# Patient Record
Sex: FEMALE | Race: WHITE | NOT HISPANIC OR LATINO | Employment: OTHER | URBAN - METROPOLITAN AREA
[De-identification: names, ages, dates, MRNs, and addresses within clinical notes are randomized per-mention and may not be internally consistent; named-entity substitution may affect disease eponyms.]

---

## 2019-03-26 ENCOUNTER — APPOINTMENT (OUTPATIENT)
Dept: GENERAL RADIOLOGY | Facility: HOSPITAL | Age: 64
End: 2019-03-26

## 2019-03-26 ENCOUNTER — HOSPITAL ENCOUNTER (INPATIENT)
Facility: HOSPITAL | Age: 64
LOS: 6 days | Discharge: NURSING, REHAB OR LONG-TERM CARE | End: 2019-04-01
Attending: EMERGENCY MEDICINE | Admitting: ORTHOPAEDIC SURGERY

## 2019-03-26 DIAGNOSIS — S72.141A CLOSED INTERTROCHANTERIC FRACTURE OF HIP, RIGHT, INITIAL ENCOUNTER (HCC): Primary | ICD-10-CM

## 2019-03-26 DIAGNOSIS — Z74.09 IMPAIRED MOBILITY AND ADLS: ICD-10-CM

## 2019-03-26 DIAGNOSIS — Z74.09 IMPAIRED FUNCTIONAL MOBILITY, BALANCE, GAIT, AND ENDURANCE: ICD-10-CM

## 2019-03-26 DIAGNOSIS — Z78.9 IMPAIRED MOBILITY AND ADLS: ICD-10-CM

## 2019-03-26 PROBLEM — E78.5 HLD (HYPERLIPIDEMIA): Status: ACTIVE | Noted: 2019-03-26

## 2019-03-26 PROBLEM — I48.91 ATRIAL FIBRILLATION (HCC): Status: ACTIVE | Noted: 2019-03-26

## 2019-03-26 PROBLEM — S32.599A PUBIC RAMUS FRACTURE: Status: ACTIVE | Noted: 2019-03-26

## 2019-03-26 PROBLEM — M06.9 RHEUMATOID ARTHRITIS: Status: ACTIVE | Noted: 2019-03-26

## 2019-03-26 PROBLEM — E11.9 DM II (DIABETES MELLITUS, TYPE II), CONTROLLED: Status: ACTIVE | Noted: 2019-03-26

## 2019-03-26 PROBLEM — I10 HTN (HYPERTENSION): Status: ACTIVE | Noted: 2019-03-26

## 2019-03-26 LAB
ABO GROUP BLD: NORMAL
ABO GROUP BLD: NORMAL
ALBUMIN SERPL-MCNC: 3.81 G/DL (ref 3.2–4.8)
ALBUMIN/GLOB SERPL: 1.7 G/DL (ref 1.5–2.5)
ALP SERPL-CCNC: 65 U/L (ref 25–100)
ALT SERPL W P-5'-P-CCNC: 10 U/L (ref 7–40)
ANION GAP SERPL CALCULATED.3IONS-SCNC: 11 MMOL/L (ref 3–11)
AST SERPL-CCNC: 19 U/L (ref 0–33)
BASOPHILS # BLD AUTO: 0.02 10*3/MM3 (ref 0–0.2)
BASOPHILS NFR BLD AUTO: 0.2 % (ref 0–1)
BILIRUB SERPL-MCNC: 0.4 MG/DL (ref 0.3–1.2)
BLD GP AB SCN SERPL QL: NEGATIVE
BUN BLD-MCNC: 8 MG/DL (ref 9–23)
BUN/CREAT SERPL: 11 (ref 7–25)
CALCIUM SPEC-SCNC: 8.2 MG/DL (ref 8.7–10.4)
CHLORIDE SERPL-SCNC: 100 MMOL/L (ref 99–109)
CO2 SERPL-SCNC: 29 MMOL/L (ref 20–31)
CREAT BLD-MCNC: 0.73 MG/DL (ref 0.6–1.3)
DEPRECATED RDW RBC AUTO: 59.2 FL (ref 37–54)
EOSINOPHIL # BLD AUTO: 0.02 10*3/MM3 (ref 0–0.3)
EOSINOPHIL NFR BLD AUTO: 0.2 % (ref 0–3)
ERYTHROCYTE [DISTWIDTH] IN BLOOD BY AUTOMATED COUNT: 18.7 % (ref 11.3–14.5)
GFR SERPL CREATININE-BSD FRML MDRD: 80 ML/MIN/1.73
GLOBULIN UR ELPH-MCNC: 2.2 GM/DL
GLUCOSE BLD-MCNC: 178 MG/DL (ref 70–100)
HCT VFR BLD AUTO: 38 % (ref 34.5–44)
HGB BLD-MCNC: 11.3 G/DL (ref 11.5–15.5)
IMM GRANULOCYTES # BLD AUTO: 0.04 10*3/MM3 (ref 0–0.05)
IMM GRANULOCYTES NFR BLD AUTO: 0.4 % (ref 0–0.6)
LYMPHOCYTES # BLD AUTO: 0.88 10*3/MM3 (ref 0.6–4.8)
LYMPHOCYTES NFR BLD AUTO: 8.3 % (ref 24–44)
MCH RBC QN AUTO: 26 PG (ref 27–31)
MCHC RBC AUTO-ENTMCNC: 29.7 G/DL (ref 32–36)
MCV RBC AUTO: 87.6 FL (ref 80–99)
MONOCYTES # BLD AUTO: 0.45 10*3/MM3 (ref 0–1)
MONOCYTES NFR BLD AUTO: 4.2 % (ref 0–12)
NEUTROPHILS # BLD AUTO: 9.25 10*3/MM3 (ref 1.5–8.3)
NEUTROPHILS NFR BLD AUTO: 87.1 % (ref 41–71)
PLATELET # BLD AUTO: 273 10*3/MM3 (ref 150–450)
PMV BLD AUTO: 9.8 FL (ref 6–12)
POTASSIUM BLD-SCNC: 4.2 MMOL/L (ref 3.5–5.5)
PROT SERPL-MCNC: 6 G/DL (ref 5.7–8.2)
RBC # BLD AUTO: 4.34 10*6/MM3 (ref 3.89–5.14)
RH BLD: POSITIVE
RH BLD: POSITIVE
SODIUM BLD-SCNC: 140 MMOL/L (ref 132–146)
T&S EXPIRATION DATE: NORMAL
TROPONIN I SERPL-MCNC: 0 NG/ML (ref 0–0.07)
WBC NRBC COR # BLD: 10.62 10*3/MM3 (ref 3.5–10.8)

## 2019-03-26 PROCEDURE — 86900 BLOOD TYPING SEROLOGIC ABO: CPT | Performed by: EMERGENCY MEDICINE

## 2019-03-26 PROCEDURE — 63710000001 INSULIN LISPRO (HUMAN) PER 5 UNITS: Performed by: INTERNAL MEDICINE

## 2019-03-26 PROCEDURE — 80053 COMPREHEN METABOLIC PANEL: CPT | Performed by: EMERGENCY MEDICINE

## 2019-03-26 PROCEDURE — 99222 1ST HOSP IP/OBS MODERATE 55: CPT | Performed by: ORTHOPAEDIC SURGERY

## 2019-03-26 PROCEDURE — 86850 RBC ANTIBODY SCREEN: CPT | Performed by: EMERGENCY MEDICINE

## 2019-03-26 PROCEDURE — 25010000002 HYDROMORPHONE 1 MG/ML SOLUTION: Performed by: EMERGENCY MEDICINE

## 2019-03-26 PROCEDURE — 25010000002 MORPHINE PER 10 MG: Performed by: EMERGENCY MEDICINE

## 2019-03-26 PROCEDURE — 25010000002 HYDROMORPHONE PER 4 MG: Performed by: EMERGENCY MEDICINE

## 2019-03-26 PROCEDURE — 99284 EMERGENCY DEPT VISIT MOD MDM: CPT

## 2019-03-26 PROCEDURE — 71045 X-RAY EXAM CHEST 1 VIEW: CPT

## 2019-03-26 PROCEDURE — 84484 ASSAY OF TROPONIN QUANT: CPT

## 2019-03-26 PROCEDURE — 73502 X-RAY EXAM HIP UNI 2-3 VIEWS: CPT

## 2019-03-26 PROCEDURE — 99223 1ST HOSP IP/OBS HIGH 75: CPT | Performed by: INTERNAL MEDICINE

## 2019-03-26 PROCEDURE — 86901 BLOOD TYPING SEROLOGIC RH(D): CPT | Performed by: EMERGENCY MEDICINE

## 2019-03-26 PROCEDURE — 93005 ELECTROCARDIOGRAM TRACING: CPT | Performed by: EMERGENCY MEDICINE

## 2019-03-26 PROCEDURE — 85025 COMPLETE CBC W/AUTO DIFF WBC: CPT | Performed by: EMERGENCY MEDICINE

## 2019-03-26 PROCEDURE — 86900 BLOOD TYPING SEROLOGIC ABO: CPT

## 2019-03-26 PROCEDURE — 25010000002 ONDANSETRON PER 1 MG

## 2019-03-26 PROCEDURE — 25010000002 ONDANSETRON PER 1 MG: Performed by: EMERGENCY MEDICINE

## 2019-03-26 PROCEDURE — 63710000001 PROMETHAZINE PER 12.5 MG: Performed by: NURSE PRACTITIONER

## 2019-03-26 PROCEDURE — 86901 BLOOD TYPING SEROLOGIC RH(D): CPT

## 2019-03-26 PROCEDURE — 82962 GLUCOSE BLOOD TEST: CPT

## 2019-03-26 RX ORDER — SODIUM CHLORIDE 0.9 % (FLUSH) 0.9 %
10 SYRINGE (ML) INJECTION AS NEEDED
Status: DISCONTINUED | OUTPATIENT
Start: 2019-03-26 | End: 2019-03-26

## 2019-03-26 RX ORDER — FLUOXETINE HYDROCHLORIDE 40 MG/1
40 CAPSULE ORAL 2 TIMES DAILY
Status: ON HOLD | COMMUNITY
End: 2019-03-26

## 2019-03-26 RX ORDER — ALLOPURINOL 100 MG/1
100 TABLET ORAL DAILY
Status: DISCONTINUED | OUTPATIENT
Start: 2019-03-27 | End: 2019-04-01 | Stop reason: HOSPADM

## 2019-03-26 RX ORDER — ONDANSETRON 2 MG/ML
4 INJECTION INTRAMUSCULAR; INTRAVENOUS EVERY 6 HOURS PRN
Status: DISCONTINUED | OUTPATIENT
Start: 2019-03-26 | End: 2019-04-01 | Stop reason: HOSPADM

## 2019-03-26 RX ORDER — FLUOXETINE HYDROCHLORIDE 20 MG/1
40 CAPSULE ORAL 2 TIMES DAILY
Status: DISCONTINUED | OUTPATIENT
Start: 2019-03-27 | End: 2019-03-27

## 2019-03-26 RX ORDER — LANOLIN ALCOHOL/MO/W.PET/CERES
400 CREAM (GRAM) TOPICAL DAILY
Status: DISCONTINUED | OUTPATIENT
Start: 2019-03-27 | End: 2019-04-01 | Stop reason: HOSPADM

## 2019-03-26 RX ORDER — GABAPENTIN 800 MG/1
800 TABLET ORAL 3 TIMES DAILY
COMMUNITY
End: 2023-01-03 | Stop reason: HOSPADM

## 2019-03-26 RX ORDER — GLIPIZIDE 10 MG/1
10 TABLET ORAL
COMMUNITY
End: 2019-04-01 | Stop reason: HOSPADM

## 2019-03-26 RX ORDER — HYDROMORPHONE HYDROCHLORIDE 1 MG/ML
0.5 INJECTION, SOLUTION INTRAMUSCULAR; INTRAVENOUS; SUBCUTANEOUS ONCE
Status: COMPLETED | OUTPATIENT
Start: 2019-03-26 | End: 2019-03-26

## 2019-03-26 RX ORDER — ONDANSETRON 4 MG/1
4 TABLET, FILM COATED ORAL EVERY 8 HOURS PRN
Status: ON HOLD | COMMUNITY
End: 2023-01-03 | Stop reason: SDUPTHER

## 2019-03-26 RX ORDER — DEXTROSE MONOHYDRATE 25 G/50ML
25 INJECTION, SOLUTION INTRAVENOUS
Status: DISCONTINUED | OUTPATIENT
Start: 2019-03-26 | End: 2019-04-01 | Stop reason: HOSPADM

## 2019-03-26 RX ORDER — ASPIRIN 81 MG/1
81 TABLET ORAL DAILY
COMMUNITY
End: 2019-04-01 | Stop reason: HOSPADM

## 2019-03-26 RX ORDER — HYDROCODONE BITARTRATE AND ACETAMINOPHEN 7.5; 325 MG/1; MG/1
1 TABLET ORAL EVERY 6 HOURS PRN
Status: DISCONTINUED | OUTPATIENT
Start: 2019-03-26 | End: 2019-03-27

## 2019-03-26 RX ORDER — ROSUVASTATIN CALCIUM 20 MG/1
20 TABLET, COATED ORAL NIGHTLY
COMMUNITY

## 2019-03-26 RX ORDER — SENNA AND DOCUSATE SODIUM 50; 8.6 MG/1; MG/1
2 TABLET, FILM COATED ORAL NIGHTLY
Status: DISCONTINUED | OUTPATIENT
Start: 2019-03-27 | End: 2019-04-01 | Stop reason: HOSPADM

## 2019-03-26 RX ORDER — LEVOCETIRIZINE DIHYDROCHLORIDE 5 MG/1
5 TABLET, FILM COATED ORAL EVERY EVENING
Status: ON HOLD | COMMUNITY
End: 2022-12-17

## 2019-03-26 RX ORDER — HYDROMORPHONE HYDROCHLORIDE 1 MG/ML
0.5 INJECTION, SOLUTION INTRAMUSCULAR; INTRAVENOUS; SUBCUTANEOUS
Status: DISCONTINUED | OUTPATIENT
Start: 2019-03-26 | End: 2019-03-26

## 2019-03-26 RX ORDER — ZOLPIDEM TARTRATE 5 MG/1
10 TABLET ORAL NIGHTLY PRN
Status: DISCONTINUED | OUTPATIENT
Start: 2019-03-26 | End: 2019-04-01 | Stop reason: HOSPADM

## 2019-03-26 RX ORDER — ONDANSETRON 2 MG/ML
INJECTION INTRAMUSCULAR; INTRAVENOUS
Status: COMPLETED
Start: 2019-03-26 | End: 2019-03-26

## 2019-03-26 RX ORDER — FOLIC ACID 5 MG
CAPSULE ORAL
COMMUNITY
End: 2019-04-01 | Stop reason: HOSPADM

## 2019-03-26 RX ORDER — INSULIN GLARGINE 100 [IU]/ML
60 INJECTION, SOLUTION SUBCUTANEOUS DAILY
COMMUNITY
End: 2019-04-01 | Stop reason: HOSPADM

## 2019-03-26 RX ORDER — ONDANSETRON 2 MG/ML
4 INJECTION INTRAMUSCULAR; INTRAVENOUS ONCE
Status: COMPLETED | OUTPATIENT
Start: 2019-03-26 | End: 2019-03-26

## 2019-03-26 RX ORDER — SODIUM CHLORIDE 0.9 % (FLUSH) 0.9 %
3 SYRINGE (ML) INJECTION EVERY 12 HOURS SCHEDULED
Status: DISCONTINUED | OUTPATIENT
Start: 2019-03-27 | End: 2019-04-01 | Stop reason: HOSPADM

## 2019-03-26 RX ORDER — ONDANSETRON 4 MG/1
4 TABLET, FILM COATED ORAL EVERY 6 HOURS PRN
Status: DISCONTINUED | OUTPATIENT
Start: 2019-03-26 | End: 2019-04-01 | Stop reason: HOSPADM

## 2019-03-26 RX ORDER — MORPHINE SULFATE 4 MG/ML
4 INJECTION, SOLUTION INTRAMUSCULAR; INTRAVENOUS ONCE
Status: COMPLETED | OUTPATIENT
Start: 2019-03-26 | End: 2019-03-26

## 2019-03-26 RX ORDER — NALOXONE HCL 0.4 MG/ML
0.4 VIAL (ML) INJECTION
Status: DISCONTINUED | OUTPATIENT
Start: 2019-03-26 | End: 2019-04-01 | Stop reason: HOSPADM

## 2019-03-26 RX ORDER — ZOLPIDEM TARTRATE 10 MG/1
5 TABLET ORAL NIGHTLY PRN
Status: ON HOLD | COMMUNITY
End: 2023-01-25 | Stop reason: SDUPTHER

## 2019-03-26 RX ORDER — ALLOPURINOL 300 MG/1
1 TABLET ORAL DAILY
COMMUNITY

## 2019-03-26 RX ORDER — DEXTROSE MONOHYDRATE 25 G/50ML
25 INJECTION, SOLUTION INTRAVENOUS
Status: DISCONTINUED | OUTPATIENT
Start: 2019-03-26 | End: 2019-03-26 | Stop reason: ALTCHOICE

## 2019-03-26 RX ORDER — PROMETHAZINE HYDROCHLORIDE 12.5 MG/1
12.5 TABLET ORAL ONCE
Status: COMPLETED | OUTPATIENT
Start: 2019-03-26 | End: 2019-03-26

## 2019-03-26 RX ORDER — LEVOTHYROXINE SODIUM 0.12 MG/1
125 TABLET ORAL EVERY MORNING
Status: ON HOLD | COMMUNITY
End: 2023-01-03 | Stop reason: SDUPTHER

## 2019-03-26 RX ORDER — BISOPROLOL FUMARATE 5 MG/1
2.5 TABLET ORAL DAILY
COMMUNITY
End: 2019-04-01 | Stop reason: HOSPADM

## 2019-03-26 RX ORDER — PROMETHAZINE HYDROCHLORIDE 25 MG/1
25 TABLET ORAL EVERY 6 HOURS PRN
Status: ON HOLD | COMMUNITY
End: 2022-12-17

## 2019-03-26 RX ORDER — ACETAMINOPHEN 325 MG/1
650 TABLET ORAL EVERY 4 HOURS PRN
Status: DISCONTINUED | OUTPATIENT
Start: 2019-03-26 | End: 2019-04-01 | Stop reason: HOSPADM

## 2019-03-26 RX ORDER — MAGNESIUM SULFATE HEPTAHYDRATE 40 MG/ML
2 INJECTION, SOLUTION INTRAVENOUS AS NEEDED
Status: DISCONTINUED | OUTPATIENT
Start: 2019-03-26 | End: 2019-04-01 | Stop reason: HOSPADM

## 2019-03-26 RX ORDER — METHOTREXATE 25 MG/ML
2 INJECTION INTRA-ARTERIAL; INTRAMUSCULAR; INTRATHECAL; INTRAVENOUS WEEKLY
Status: ON HOLD | COMMUNITY
End: 2023-01-25 | Stop reason: SDUPTHER

## 2019-03-26 RX ORDER — PREDNISONE 1 MG/1
5 TABLET ORAL DAILY
COMMUNITY

## 2019-03-26 RX ORDER — NICOTINE POLACRILEX 4 MG
15 LOZENGE BUCCAL
Status: DISCONTINUED | OUTPATIENT
Start: 2019-03-26 | End: 2019-03-26 | Stop reason: ALTCHOICE

## 2019-03-26 RX ORDER — POTASSIUM CHLORIDE 750 MG/1
40 CAPSULE, EXTENDED RELEASE ORAL AS NEEDED
Status: DISCONTINUED | OUTPATIENT
Start: 2019-03-26 | End: 2019-04-01 | Stop reason: HOSPADM

## 2019-03-26 RX ORDER — ASPIRIN 81 MG/1
81 TABLET ORAL DAILY
Status: CANCELLED | OUTPATIENT
Start: 2019-03-27

## 2019-03-26 RX ORDER — SODIUM CHLORIDE 9 MG/ML
75 INJECTION, SOLUTION INTRAVENOUS CONTINUOUS
Status: DISCONTINUED | OUTPATIENT
Start: 2019-03-27 | End: 2019-04-01 | Stop reason: HOSPADM

## 2019-03-26 RX ORDER — FLUOXETINE HYDROCHLORIDE 20 MG/1
20 CAPSULE ORAL 2 TIMES DAILY
COMMUNITY

## 2019-03-26 RX ORDER — NICOTINE POLACRILEX 4 MG
15 LOZENGE BUCCAL
Status: DISCONTINUED | OUTPATIENT
Start: 2019-03-26 | End: 2019-04-01 | Stop reason: HOSPADM

## 2019-03-26 RX ORDER — HYDROMORPHONE HYDROCHLORIDE 1 MG/ML
0.5 INJECTION, SOLUTION INTRAMUSCULAR; INTRAVENOUS; SUBCUTANEOUS
Status: DISCONTINUED | OUTPATIENT
Start: 2019-03-26 | End: 2019-04-01 | Stop reason: HOSPADM

## 2019-03-26 RX ORDER — SODIUM CHLORIDE 0.9 % (FLUSH) 0.9 %
10 SYRINGE (ML) INJECTION AS NEEDED
Status: DISCONTINUED | OUTPATIENT
Start: 2019-03-26 | End: 2019-04-01 | Stop reason: HOSPADM

## 2019-03-26 RX ORDER — PROMETHAZINE HYDROCHLORIDE 25 MG/1
25 TABLET ORAL EVERY 6 HOURS PRN
Status: DISCONTINUED | OUTPATIENT
Start: 2019-03-26 | End: 2019-04-01 | Stop reason: HOSPADM

## 2019-03-26 RX ORDER — HYDROXYZINE HYDROCHLORIDE 25 MG/1
25 TABLET, FILM COATED ORAL 2 TIMES DAILY
Status: ON HOLD | COMMUNITY
End: 2022-12-17

## 2019-03-26 RX ORDER — KETOROLAC TROMETHAMINE 30 MG/ML
30 INJECTION, SOLUTION INTRAMUSCULAR; INTRAVENOUS EVERY 6 HOURS PRN
Status: DISPENSED | OUTPATIENT
Start: 2019-03-26 | End: 2019-03-29

## 2019-03-26 RX ORDER — MAGNESIUM SULFATE HEPTAHYDRATE 40 MG/ML
4 INJECTION, SOLUTION INTRAVENOUS AS NEEDED
Status: DISCONTINUED | OUTPATIENT
Start: 2019-03-26 | End: 2019-04-01 | Stop reason: HOSPADM

## 2019-03-26 RX ORDER — POTASSIUM CHLORIDE 1.5 G/1.77G
40 POWDER, FOR SOLUTION ORAL AS NEEDED
Status: DISCONTINUED | OUTPATIENT
Start: 2019-03-26 | End: 2019-04-01 | Stop reason: HOSPADM

## 2019-03-26 RX ORDER — PREDNISONE 1 MG/1
5 TABLET ORAL
Status: DISCONTINUED | OUTPATIENT
Start: 2019-03-27 | End: 2019-04-01 | Stop reason: HOSPADM

## 2019-03-26 RX ORDER — SODIUM CHLORIDE 0.9 % (FLUSH) 0.9 %
3-10 SYRINGE (ML) INJECTION AS NEEDED
Status: DISCONTINUED | OUTPATIENT
Start: 2019-03-26 | End: 2019-04-01 | Stop reason: HOSPADM

## 2019-03-26 RX ADMIN — HYDROMORPHONE HYDROCHLORIDE 0.5 MG: 1 INJECTION, SOLUTION INTRAMUSCULAR; INTRAVENOUS; SUBCUTANEOUS at 23:18

## 2019-03-26 RX ADMIN — ONDANSETRON 4 MG: 2 INJECTION INTRAMUSCULAR; INTRAVENOUS at 18:18

## 2019-03-26 RX ADMIN — ONDANSETRON 4 MG: 2 INJECTION INTRAMUSCULAR; INTRAVENOUS at 22:26

## 2019-03-26 RX ADMIN — HYDROMORPHONE HYDROCHLORIDE 1 MG: 1 INJECTION, SOLUTION INTRAMUSCULAR; INTRAVENOUS; SUBCUTANEOUS at 19:23

## 2019-03-26 RX ADMIN — MORPHINE SULFATE 4 MG: 4 INJECTION, SOLUTION INTRAMUSCULAR; INTRAVENOUS at 18:20

## 2019-03-26 RX ADMIN — HYDROMORPHONE HYDROCHLORIDE 0.5 MG: 1 INJECTION, SOLUTION INTRAMUSCULAR; INTRAVENOUS; SUBCUTANEOUS at 21:10

## 2019-03-26 RX ADMIN — HYDROMORPHONE HYDROCHLORIDE 0.5 MG: 1 INJECTION, SOLUTION INTRAMUSCULAR; INTRAVENOUS; SUBCUTANEOUS at 22:26

## 2019-03-26 RX ADMIN — PROMETHAZINE HYDROCHLORIDE 12.5 MG: 12.5 TABLET ORAL at 22:50

## 2019-03-27 ENCOUNTER — ANESTHESIA EVENT (OUTPATIENT)
Dept: PERIOP | Facility: HOSPITAL | Age: 64
End: 2019-03-27

## 2019-03-27 ENCOUNTER — ANESTHESIA (OUTPATIENT)
Dept: PERIOP | Facility: HOSPITAL | Age: 64
End: 2019-03-27

## 2019-03-27 ENCOUNTER — APPOINTMENT (OUTPATIENT)
Dept: GENERAL RADIOLOGY | Facility: HOSPITAL | Age: 64
End: 2019-03-27

## 2019-03-27 LAB
ANION GAP SERPL CALCULATED.3IONS-SCNC: 10 MMOL/L (ref 3–11)
BUN BLD-MCNC: 9 MG/DL (ref 9–23)
BUN/CREAT SERPL: 14.1 (ref 7–25)
CALCIUM SPEC-SCNC: 8.2 MG/DL (ref 8.7–10.4)
CHLORIDE SERPL-SCNC: 101 MMOL/L (ref 99–109)
CO2 SERPL-SCNC: 30 MMOL/L (ref 20–31)
CREAT BLD-MCNC: 0.64 MG/DL (ref 0.6–1.3)
DEPRECATED RDW RBC AUTO: 60.9 FL (ref 37–54)
ERYTHROCYTE [DISTWIDTH] IN BLOOD BY AUTOMATED COUNT: 18.9 % (ref 11.3–14.5)
GFR SERPL CREATININE-BSD FRML MDRD: 93 ML/MIN/1.73
GLUCOSE BLD-MCNC: 78 MG/DL (ref 70–100)
GLUCOSE BLDC GLUCOMTR-MCNC: 107 MG/DL (ref 70–130)
GLUCOSE BLDC GLUCOMTR-MCNC: 111 MG/DL (ref 70–130)
GLUCOSE BLDC GLUCOMTR-MCNC: 111 MG/DL (ref 70–130)
GLUCOSE BLDC GLUCOMTR-MCNC: 88 MG/DL (ref 70–130)
GLUCOSE BLDC GLUCOMTR-MCNC: 93 MG/DL (ref 70–130)
GLUCOSE BLDC GLUCOMTR-MCNC: 94 MG/DL (ref 70–130)
HCT VFR BLD AUTO: 35.5 % (ref 34.5–44)
HGB BLD-MCNC: 10.5 G/DL (ref 11.5–15.5)
MCH RBC QN AUTO: 26.1 PG (ref 27–31)
MCHC RBC AUTO-ENTMCNC: 29.6 G/DL (ref 32–36)
MCV RBC AUTO: 88.3 FL (ref 80–99)
PLATELET # BLD AUTO: 248 10*3/MM3 (ref 150–450)
PMV BLD AUTO: 9.6 FL (ref 6–12)
POTASSIUM BLD-SCNC: 3.8 MMOL/L (ref 3.5–5.5)
RBC # BLD AUTO: 4.02 10*6/MM3 (ref 3.89–5.14)
SODIUM BLD-SCNC: 141 MMOL/L (ref 132–146)
WBC NRBC COR # BLD: 11.46 10*3/MM3 (ref 3.5–10.8)

## 2019-03-27 PROCEDURE — 25010000003 CEFAZOLIN IN DEXTROSE 2-4 GM/100ML-% SOLUTION: Performed by: ANESTHESIOLOGY

## 2019-03-27 PROCEDURE — 85027 COMPLETE CBC AUTOMATED: CPT | Performed by: INTERNAL MEDICINE

## 2019-03-27 PROCEDURE — 25010000002 FENTANYL CITRATE (PF) 100 MCG/2ML SOLUTION: Performed by: ANESTHESIOLOGY

## 2019-03-27 PROCEDURE — 27245 TREAT THIGH FRACTURE: CPT | Performed by: ORTHOPAEDIC SURGERY

## 2019-03-27 PROCEDURE — 25010000002 ROPIVACAINE PER 1 MG: Performed by: NURSE ANESTHETIST, CERTIFIED REGISTERED

## 2019-03-27 PROCEDURE — C1713 ANCHOR/SCREW BN/BN,TIS/BN: HCPCS | Performed by: ORTHOPAEDIC SURGERY

## 2019-03-27 PROCEDURE — 63710000001 PREDNISONE PER 5 MG: Performed by: INTERNAL MEDICINE

## 2019-03-27 PROCEDURE — 80048 BASIC METABOLIC PNL TOTAL CA: CPT | Performed by: INTERNAL MEDICINE

## 2019-03-27 PROCEDURE — C1769 GUIDE WIRE: HCPCS | Performed by: ORTHOPAEDIC SURGERY

## 2019-03-27 PROCEDURE — 82962 GLUCOSE BLOOD TEST: CPT

## 2019-03-27 PROCEDURE — 76000 FLUOROSCOPY <1 HR PHYS/QHP: CPT

## 2019-03-27 PROCEDURE — 25810000003 SODIUM CHLORIDE 0.9 % WITH KCL 20 MEQ 20-0.9 MEQ/L-% SOLUTION: Performed by: ORTHOPAEDIC SURGERY

## 2019-03-27 PROCEDURE — 25010000002 HYDROMORPHONE PER 4 MG: Performed by: INTERNAL MEDICINE

## 2019-03-27 PROCEDURE — 25010000002 PROPOFOL 10 MG/ML EMULSION: Performed by: ANESTHESIOLOGY

## 2019-03-27 PROCEDURE — 0QS636Z REPOSITION RIGHT UPPER FEMUR WITH INTRAMEDULLARY INTERNAL FIXATION DEVICE, PERCUTANEOUS APPROACH: ICD-10-PCS | Performed by: ORTHOPAEDIC SURGERY

## 2019-03-27 PROCEDURE — 99232 SBSQ HOSP IP/OBS MODERATE 35: CPT | Performed by: INTERNAL MEDICINE

## 2019-03-27 DEVICE — SCRW LK STRDRV TI 5X42MM STRL: Type: IMPLANTABLE DEVICE | Site: HIP | Status: FUNCTIONAL

## 2019-03-27 DEVICE — IMPLANTABLE DEVICE: Type: IMPLANTABLE DEVICE | Site: HIP | Status: FUNCTIONAL

## 2019-03-27 DEVICE — BLD FEM FIX HELI TFN ADV PERF 95MM STRL: Type: IMPLANTABLE DEVICE | Site: HIP | Status: FUNCTIONAL

## 2019-03-27 RX ORDER — PROPOFOL 10 MG/ML
VIAL (ML) INTRAVENOUS AS NEEDED
Status: DISCONTINUED | OUTPATIENT
Start: 2019-03-27 | End: 2019-03-27 | Stop reason: SURG

## 2019-03-27 RX ORDER — BUPIVACAINE HYDROCHLORIDE 5 MG/ML
INJECTION, SOLUTION EPIDURAL; INTRACAUDAL
Status: COMPLETED | OUTPATIENT
Start: 2019-03-27 | End: 2019-03-27

## 2019-03-27 RX ORDER — ONDANSETRON 4 MG/1
4 TABLET, FILM COATED ORAL EVERY 6 HOURS PRN
Status: DISCONTINUED | OUTPATIENT
Start: 2019-03-27 | End: 2019-04-01 | Stop reason: HOSPADM

## 2019-03-27 RX ORDER — SODIUM CHLORIDE AND POTASSIUM CHLORIDE 150; 900 MG/100ML; MG/100ML
50 INJECTION, SOLUTION INTRAVENOUS CONTINUOUS
Status: DISCONTINUED | OUTPATIENT
Start: 2019-03-27 | End: 2019-04-01 | Stop reason: HOSPADM

## 2019-03-27 RX ORDER — CEFAZOLIN SODIUM 2 G/100ML
INJECTION, SOLUTION INTRAVENOUS AS NEEDED
Status: DISCONTINUED | OUTPATIENT
Start: 2019-03-27 | End: 2019-03-27 | Stop reason: SURG

## 2019-03-27 RX ORDER — FAMOTIDINE 20 MG/1
20 TABLET, FILM COATED ORAL
Status: COMPLETED | OUTPATIENT
Start: 2019-03-27 | End: 2019-03-27

## 2019-03-27 RX ORDER — PROMETHAZINE HYDROCHLORIDE 25 MG/ML
6.25 INJECTION, SOLUTION INTRAMUSCULAR; INTRAVENOUS ONCE AS NEEDED
Status: DISCONTINUED | OUTPATIENT
Start: 2019-03-27 | End: 2019-03-27 | Stop reason: HOSPADM

## 2019-03-27 RX ORDER — BISACODYL 10 MG
10 SUPPOSITORY, RECTAL RECTAL DAILY PRN
Status: DISCONTINUED | OUTPATIENT
Start: 2019-03-27 | End: 2019-04-01 | Stop reason: HOSPADM

## 2019-03-27 RX ORDER — PROMETHAZINE HYDROCHLORIDE 25 MG/1
25 TABLET ORAL ONCE AS NEEDED
Status: DISCONTINUED | OUTPATIENT
Start: 2019-03-27 | End: 2019-03-27 | Stop reason: HOSPADM

## 2019-03-27 RX ORDER — ONDANSETRON 2 MG/ML
4 INJECTION INTRAMUSCULAR; INTRAVENOUS EVERY 6 HOURS PRN
Status: DISCONTINUED | OUTPATIENT
Start: 2019-03-27 | End: 2019-04-01 | Stop reason: HOSPADM

## 2019-03-27 RX ORDER — SODIUM CHLORIDE, SODIUM LACTATE, POTASSIUM CHLORIDE, CALCIUM CHLORIDE 600; 310; 30; 20 MG/100ML; MG/100ML; MG/100ML; MG/100ML
INJECTION, SOLUTION INTRAVENOUS CONTINUOUS PRN
Status: DISCONTINUED | OUTPATIENT
Start: 2019-03-27 | End: 2019-03-27 | Stop reason: SURG

## 2019-03-27 RX ORDER — HYDROMORPHONE HYDROCHLORIDE 1 MG/ML
0.5 INJECTION, SOLUTION INTRAMUSCULAR; INTRAVENOUS; SUBCUTANEOUS
Status: DISCONTINUED | OUTPATIENT
Start: 2019-03-27 | End: 2019-03-27 | Stop reason: HOSPADM

## 2019-03-27 RX ORDER — FLUOXETINE HYDROCHLORIDE 20 MG/1
20 CAPSULE ORAL 2 TIMES DAILY
Status: DISCONTINUED | OUTPATIENT
Start: 2019-03-28 | End: 2019-04-01 | Stop reason: HOSPADM

## 2019-03-27 RX ORDER — PROMETHAZINE HYDROCHLORIDE 25 MG/1
25 SUPPOSITORY RECTAL ONCE AS NEEDED
Status: DISCONTINUED | OUTPATIENT
Start: 2019-03-27 | End: 2019-03-27 | Stop reason: HOSPADM

## 2019-03-27 RX ORDER — HYDROMORPHONE HYDROCHLORIDE 1 MG/ML
0.5 INJECTION, SOLUTION INTRAMUSCULAR; INTRAVENOUS; SUBCUTANEOUS
Status: DISCONTINUED | OUTPATIENT
Start: 2019-03-27 | End: 2019-03-27

## 2019-03-27 RX ORDER — FENTANYL CITRATE 50 UG/ML
50 INJECTION, SOLUTION INTRAMUSCULAR; INTRAVENOUS
Status: DISCONTINUED | OUTPATIENT
Start: 2019-03-27 | End: 2019-03-27

## 2019-03-27 RX ORDER — OXYCODONE AND ACETAMINOPHEN 7.5; 325 MG/1; MG/1
1 TABLET ORAL EVERY 6 HOURS PRN
Status: DISCONTINUED | OUTPATIENT
Start: 2019-03-27 | End: 2019-03-28

## 2019-03-27 RX ORDER — DOCUSATE SODIUM 100 MG/1
100 CAPSULE, LIQUID FILLED ORAL 2 TIMES DAILY PRN
Status: DISCONTINUED | OUTPATIENT
Start: 2019-03-27 | End: 2019-04-01 | Stop reason: HOSPADM

## 2019-03-27 RX ORDER — ROPIVACAINE HYDROCHLORIDE 2 MG/ML
10 INJECTION, SOLUTION EPIDURAL; INFILTRATION CONTINUOUS
Status: DISPENSED | OUTPATIENT
Start: 2019-03-27 | End: 2019-04-01

## 2019-03-27 RX ORDER — HYDROCODONE BITARTRATE AND ACETAMINOPHEN 7.5; 325 MG/1; MG/1
1 TABLET ORAL EVERY 6 HOURS PRN
Status: DISCONTINUED | OUTPATIENT
Start: 2019-03-27 | End: 2019-03-27

## 2019-03-27 RX ORDER — CEFAZOLIN SODIUM IN 0.9 % NACL 3 G/100 ML
3 INTRAVENOUS SOLUTION, PIGGYBACK (ML) INTRAVENOUS ONCE
Status: DISCONTINUED | OUTPATIENT
Start: 2019-03-27 | End: 2019-03-27 | Stop reason: HOSPADM

## 2019-03-27 RX ORDER — MAGNESIUM HYDROXIDE 1200 MG/15ML
LIQUID ORAL AS NEEDED
Status: DISCONTINUED | OUTPATIENT
Start: 2019-03-27 | End: 2019-03-27 | Stop reason: HOSPADM

## 2019-03-27 RX ORDER — CEFAZOLIN SODIUM IN 0.9 % NACL 3 G/100 ML
3 INTRAVENOUS SOLUTION, PIGGYBACK (ML) INTRAVENOUS EVERY 8 HOURS
Status: COMPLETED | OUTPATIENT
Start: 2019-03-28 | End: 2019-03-28

## 2019-03-27 RX ORDER — SODIUM CHLORIDE, SODIUM LACTATE, POTASSIUM CHLORIDE, CALCIUM CHLORIDE 600; 310; 30; 20 MG/100ML; MG/100ML; MG/100ML; MG/100ML
9 INJECTION, SOLUTION INTRAVENOUS CONTINUOUS PRN
Status: DISCONTINUED | OUTPATIENT
Start: 2019-03-27 | End: 2019-04-01 | Stop reason: HOSPADM

## 2019-03-27 RX ORDER — SODIUM CHLORIDE 0.9 % (FLUSH) 0.9 %
3-10 SYRINGE (ML) INJECTION AS NEEDED
Status: DISCONTINUED | OUTPATIENT
Start: 2019-03-27 | End: 2019-04-01 | Stop reason: HOSPADM

## 2019-03-27 RX ORDER — SODIUM CHLORIDE 0.9 % (FLUSH) 0.9 %
3 SYRINGE (ML) INJECTION EVERY 12 HOURS SCHEDULED
Status: DISCONTINUED | OUTPATIENT
Start: 2019-03-27 | End: 2019-04-01 | Stop reason: HOSPADM

## 2019-03-27 RX ORDER — HEPARIN SODIUM 5000 [USP'U]/ML
5000 INJECTION, SOLUTION INTRAVENOUS; SUBCUTANEOUS EVERY 12 HOURS SCHEDULED
Status: DISCONTINUED | OUTPATIENT
Start: 2019-03-28 | End: 2019-04-01 | Stop reason: HOSPADM

## 2019-03-27 RX ORDER — FENTANYL CITRATE 50 UG/ML
INJECTION, SOLUTION INTRAMUSCULAR; INTRAVENOUS AS NEEDED
Status: DISCONTINUED | OUTPATIENT
Start: 2019-03-27 | End: 2019-03-27 | Stop reason: SURG

## 2019-03-27 RX ORDER — FENTANYL CITRATE 50 UG/ML
50 INJECTION, SOLUTION INTRAMUSCULAR; INTRAVENOUS
Status: DISCONTINUED | OUTPATIENT
Start: 2019-03-27 | End: 2019-03-27 | Stop reason: HOSPADM

## 2019-03-27 RX ADMIN — SODIUM CHLORIDE 75 ML/HR: 9 INJECTION, SOLUTION INTRAVENOUS at 13:40

## 2019-03-27 RX ADMIN — FENTANYL CITRATE 50 MCG: 50 INJECTION INTRAMUSCULAR; INTRAVENOUS at 20:18

## 2019-03-27 RX ADMIN — SODIUM CHLORIDE, POTASSIUM CHLORIDE, SODIUM LACTATE AND CALCIUM CHLORIDE: 600; 310; 30; 20 INJECTION, SOLUTION INTRAVENOUS at 19:58

## 2019-03-27 RX ADMIN — OXYCODONE HYDROCHLORIDE AND ACETAMINOPHEN 1 TABLET: 7.5; 325 TABLET ORAL at 07:36

## 2019-03-27 RX ADMIN — FENTANYL CITRATE 50 MCG: 50 INJECTION INTRAMUSCULAR; INTRAVENOUS at 20:30

## 2019-03-27 RX ADMIN — POTASSIUM CHLORIDE AND SODIUM CHLORIDE 50 ML/HR: 900; 150 INJECTION, SOLUTION INTRAVENOUS at 21:38

## 2019-03-27 RX ADMIN — HYDROMORPHONE HYDROCHLORIDE 0.5 MG: 1 INJECTION, SOLUTION INTRAMUSCULAR; INTRAVENOUS; SUBCUTANEOUS at 11:55

## 2019-03-27 RX ADMIN — PREDNISONE 5 MG: 5 TABLET ORAL at 09:06

## 2019-03-27 RX ADMIN — SODIUM CHLORIDE, POTASSIUM CHLORIDE, SODIUM LACTATE AND CALCIUM CHLORIDE: 600; 310; 30; 20 INJECTION, SOLUTION INTRAVENOUS at 18:00

## 2019-03-27 RX ADMIN — HYDROCODONE BITARTRATE AND ACETAMINOPHEN 1 TABLET: 7.5; 325 TABLET ORAL at 00:47

## 2019-03-27 RX ADMIN — PROPOFOL 150 MG: 10 INJECTION, EMULSION INTRAVENOUS at 18:20

## 2019-03-27 RX ADMIN — ALLOPURINOL 100 MG: 100 TABLET ORAL at 09:10

## 2019-03-27 RX ADMIN — FAMOTIDINE 20 MG: 20 TABLET ORAL at 17:30

## 2019-03-27 RX ADMIN — FLUOXETINE HYDROCHLORIDE 40 MG: 20 CAPSULE ORAL at 09:10

## 2019-03-27 RX ADMIN — TRANEXAMIC ACID 1000 MG: 100 INJECTION, SOLUTION INTRAVENOUS at 19:45

## 2019-03-27 RX ADMIN — BUPIVACAINE HYDROCHLORIDE 30 ML: 5 INJECTION, SOLUTION EPIDURAL; INTRACAUDAL; PERINEURAL at 08:35

## 2019-03-27 RX ADMIN — OXYCODONE HYDROCHLORIDE AND ACETAMINOPHEN 1 TABLET: 7.5; 325 TABLET ORAL at 13:42

## 2019-03-27 RX ADMIN — FLUOXETINE HYDROCHLORIDE 40 MG: 20 CAPSULE ORAL at 00:47

## 2019-03-27 RX ADMIN — SODIUM CHLORIDE 75 ML/HR: 9 INJECTION, SOLUTION INTRAVENOUS at 00:37

## 2019-03-27 RX ADMIN — TRANEXAMIC ACID 1000 MG: 100 INJECTION, SOLUTION INTRAVENOUS at 18:34

## 2019-03-27 RX ADMIN — SODIUM CHLORIDE, PRESERVATIVE FREE 3 ML: 5 INJECTION INTRAVENOUS at 00:48

## 2019-03-27 RX ADMIN — OXYCODONE HYDROCHLORIDE AND ACETAMINOPHEN 1 TABLET: 7.5; 325 TABLET ORAL at 21:46

## 2019-03-27 RX ADMIN — ROPIVACAINE HYDROCHLORIDE 8 ML/HR: 2 INJECTION, SOLUTION EPIDURAL; INFILTRATION at 09:06

## 2019-03-27 RX ADMIN — SODIUM CHLORIDE, PRESERVATIVE FREE 3 ML: 5 INJECTION INTRAVENOUS at 21:54

## 2019-03-27 RX ADMIN — HYDROMORPHONE HYDROCHLORIDE 0.5 MG: 1 INJECTION, SOLUTION INTRAMUSCULAR; INTRAVENOUS; SUBCUTANEOUS at 02:14

## 2019-03-27 RX ADMIN — HYDROMORPHONE HYDROCHLORIDE 0.5 MG: 1 INJECTION, SOLUTION INTRAMUSCULAR; INTRAVENOUS; SUBCUTANEOUS at 07:27

## 2019-03-27 RX ADMIN — CEFAZOLIN SODIUM 3 G: 2 INJECTION, SOLUTION INTRAVENOUS at 18:20

## 2019-03-27 RX ADMIN — FENTANYL CITRATE 100 MCG: 50 INJECTION, SOLUTION INTRAMUSCULAR; INTRAVENOUS at 18:20

## 2019-03-27 RX ADMIN — HYDROMORPHONE HYDROCHLORIDE 0.5 MG: 1 INJECTION, SOLUTION INTRAMUSCULAR; INTRAVENOUS; SUBCUTANEOUS at 04:41

## 2019-03-27 RX ADMIN — FOLIC ACID TAB 400 MCG 400 MCG: 400 TAB at 09:06

## 2019-03-27 RX ADMIN — HYDROMORPHONE HYDROCHLORIDE 0.5 MG: 1 INJECTION, SOLUTION INTRAMUSCULAR; INTRAVENOUS; SUBCUTANEOUS at 00:37

## 2019-03-27 NOTE — ANESTHESIA PROCEDURE NOTES
Fascia Iliaca    Pre-sedation assessment completed: 3/27/2019 8:17 AM    Patient reassessed immediately prior to procedure    Patient location during procedure: pre-op  Stop time: 3/27/2019 8:35 AM  Reason for block: procedure for pain and at surgeon's request  Performed by  CRNA: Katlin Christianson CRNA  Assisted by: Hay Beatty CRNA  Preanesthetic Checklist  Completed: patient identified, site marked, surgical consent, pre-op evaluation, timeout performed, IV checked, risks and benefits discussed and monitors and equipment checked  Prep:  Pt Position: supine  Sterile barriers:cap, gloves and mask  Prep: ChloraPrep  Patient monitoring: blood pressure monitoring, continuous pulse oximetry and EKG  Procedure  Performed under: local infiltration  Guidance:ultrasound guided  Images:still images obtained    Laterality:right  Block Type:fascia iliaca catheter  Injection Technique:catheter  Needle Type:echogenic  Needle Gauge:18 G    Catheter Size:20 G (20g)  Cath Depth at skin: 20 cm  Medications Used: bupivacaine (PF) (MARCAINE) 0.5 % injection, 30 mL  Med admintered at 3/27/2019 8:35 AM  Medications  Preservative Free Saline:30ml    Post Assessment  Injection Assessment: negative aspiration for heme, no paresthesia on injection and incremental injection  Patient Tolerance:comfortable throughout block  Complications:no  Additional Notes  Procedure:                 Pt placed in supine position.   The insertion site was prepped in sterile fashion with Chlorapreop and clear plastic drapes.  Analgesia was provided by skin infiltration at insertion site with Lidocaine 1% 3mls.  A B-Erickson 18 g , 4 inch echogenic Touhy needle was advance In-plane under ultrasound guidance. The   Anterior superior Iliac crest was initially visualized and the probe was directed slightly medially and slightly towards the umbilicus.  The course of the needle was tracked over the sartorius muscle through the fascia Iliacus and into the anterior  portion of the Iliacus muscle.  Major vessels where identified and avoided as where structures of the peritoneal cavity.  LA injection was made incrementally in 1-5ml amounts spread was visualized superiorly below fascia iliacus.  Injection was completed with negative aspiration of blood and negative intravascular injection.  Injection pressures where normal or minimal resistance.  A 20 g B-Erickson wire styleted catheter was then advance thru the needle and very easily placed in a superior or cephalad direction.  The catheter was secured at insertion site with skin afix , mastisol, steristreps.  A CHG tegaderm dressing was placed over the insertion site and the nerve catheter labeled and capped.  Thank You.

## 2019-03-27 NOTE — ANESTHESIA PREPROCEDURE EVALUATION
Anesthesia Evaluation     Patient summary reviewed and Nursing notes reviewed   NPO Solid Status: > 8 hours  NPO Liquid Status: > 8 hours           Airway   Dental      Pulmonary    Cardiovascular     ECG reviewed    (+) hypertension, dysrhythmias Atrial Fib, hyperlipidemia,       Neuro/Psych  GI/Hepatic/Renal/Endo    (+) obesity,  GERD,  diabetes mellitus, hypothyroidism,     Musculoskeletal         ROS comment: Intertrochanteric femur fx  Abdominal    Substance History      OB/GYN          Other   (+) arthritis (RA)       Other Comment: Chronic low dose steroids                  Anesthesia Plan    ASA 3     general with block     intravenous induction   Anesthetic plan, all risks, benefits, and alternatives have been provided, discussed and informed consent has been obtained with: patient.

## 2019-03-27 NOTE — ANESTHESIA PROCEDURE NOTES
Airway  Urgency: elective    Airway not difficult    General Information and Staff    Patient location during procedure: OR  Anesthesiologist: Tereso Enriquez MD    Indications and Patient Condition  Indications for airway management: airway protection    Preoxygenated: yes  Mask difficulty assessment: 1 - vent by mask    Final Airway Details  Final airway type: supraglottic airway      Successful airway: I-gel  Size 5    Number of attempts at approach: 1    Additional Comments  LMA placed without difficulty, ventilation with assist, equal breath sounds and symmetric chest rise and fall

## 2019-03-28 LAB
ANION GAP SERPL CALCULATED.3IONS-SCNC: 7 MMOL/L (ref 3–11)
BASOPHILS # BLD AUTO: 0.02 10*3/MM3 (ref 0–0.2)
BASOPHILS NFR BLD AUTO: 0.2 % (ref 0–1)
BUN BLD-MCNC: 10 MG/DL (ref 9–23)
BUN/CREAT SERPL: 13.7 (ref 7–25)
CALCIUM SPEC-SCNC: 7.7 MG/DL (ref 8.7–10.4)
CHLORIDE SERPL-SCNC: 98 MMOL/L (ref 99–109)
CO2 SERPL-SCNC: 30 MMOL/L (ref 20–31)
CREAT BLD-MCNC: 0.73 MG/DL (ref 0.6–1.3)
DEPRECATED RDW RBC AUTO: 61.6 FL (ref 37–54)
EOSINOPHIL # BLD AUTO: 0.28 10*3/MM3 (ref 0–0.3)
EOSINOPHIL NFR BLD AUTO: 3 % (ref 0–3)
ERYTHROCYTE [DISTWIDTH] IN BLOOD BY AUTOMATED COUNT: 19.4 % (ref 11.3–14.5)
GFR SERPL CREATININE-BSD FRML MDRD: 80 ML/MIN/1.73
GLUCOSE BLD-MCNC: 184 MG/DL (ref 70–100)
GLUCOSE BLDC GLUCOMTR-MCNC: 179 MG/DL (ref 70–130)
GLUCOSE BLDC GLUCOMTR-MCNC: 196 MG/DL (ref 70–130)
GLUCOSE BLDC GLUCOMTR-MCNC: 263 MG/DL (ref 70–130)
GLUCOSE BLDC GLUCOMTR-MCNC: 339 MG/DL (ref 70–130)
HCT VFR BLD AUTO: 30.3 % (ref 34.5–44)
HGB BLD-MCNC: 8.9 G/DL (ref 11.5–15.5)
IMM GRANULOCYTES # BLD AUTO: 0.04 10*3/MM3 (ref 0–0.05)
IMM GRANULOCYTES NFR BLD AUTO: 0.4 % (ref 0–0.6)
LYMPHOCYTES # BLD AUTO: 1.18 10*3/MM3 (ref 0.6–4.8)
LYMPHOCYTES NFR BLD AUTO: 12.6 % (ref 24–44)
MCH RBC QN AUTO: 26.2 PG (ref 27–31)
MCHC RBC AUTO-ENTMCNC: 29.4 G/DL (ref 32–36)
MCV RBC AUTO: 89.1 FL (ref 80–99)
MONOCYTES # BLD AUTO: 1.19 10*3/MM3 (ref 0–1)
MONOCYTES NFR BLD AUTO: 12.7 % (ref 0–12)
NEUTROPHILS # BLD AUTO: 6.68 10*3/MM3 (ref 1.5–8.3)
NEUTROPHILS NFR BLD AUTO: 71.5 % (ref 41–71)
PLATELET # BLD AUTO: 212 10*3/MM3 (ref 150–450)
PMV BLD AUTO: 10 FL (ref 6–12)
POTASSIUM BLD-SCNC: 3.8 MMOL/L (ref 3.5–5.5)
RBC # BLD AUTO: 3.4 10*6/MM3 (ref 3.89–5.14)
SODIUM BLD-SCNC: 135 MMOL/L (ref 132–146)
WBC NRBC COR # BLD: 9.35 10*3/MM3 (ref 3.5–10.8)

## 2019-03-28 PROCEDURE — 25010000002 KETOROLAC TROMETHAMINE PER 15 MG: Performed by: INTERNAL MEDICINE

## 2019-03-28 PROCEDURE — 80048 BASIC METABOLIC PNL TOTAL CA: CPT | Performed by: INTERNAL MEDICINE

## 2019-03-28 PROCEDURE — 99232 SBSQ HOSP IP/OBS MODERATE 35: CPT | Performed by: INTERNAL MEDICINE

## 2019-03-28 PROCEDURE — 99024 POSTOP FOLLOW-UP VISIT: CPT | Performed by: ORTHOPAEDIC SURGERY

## 2019-03-28 PROCEDURE — 63710000001 INSULIN REGULAR HUMAN PER 5 UNITS: Performed by: NURSE PRACTITIONER

## 2019-03-28 PROCEDURE — 63710000001 DIPHENHYDRAMINE PER 50 MG: Performed by: ORTHOPAEDIC SURGERY

## 2019-03-28 PROCEDURE — 82962 GLUCOSE BLOOD TEST: CPT

## 2019-03-28 PROCEDURE — 25010000002 HYDROMORPHONE PER 4 MG: Performed by: INTERNAL MEDICINE

## 2019-03-28 PROCEDURE — 63710000001 INSULIN DETEMIR PER 5 UNITS: Performed by: INTERNAL MEDICINE

## 2019-03-28 PROCEDURE — 25010000002 ROPIVACAINE PER 1 MG: Performed by: NURSE ANESTHETIST, CERTIFIED REGISTERED

## 2019-03-28 PROCEDURE — 25010000002 HEPARIN (PORCINE) PER 1000 UNITS: Performed by: ORTHOPAEDIC SURGERY

## 2019-03-28 PROCEDURE — 85025 COMPLETE CBC W/AUTO DIFF WBC: CPT | Performed by: ORTHOPAEDIC SURGERY

## 2019-03-28 PROCEDURE — 97166 OT EVAL MOD COMPLEX 45 MIN: CPT | Performed by: OCCUPATIONAL THERAPIST

## 2019-03-28 PROCEDURE — 25010000002 CEFAZOLIN PER 500 MG: Performed by: ORTHOPAEDIC SURGERY

## 2019-03-28 PROCEDURE — P9612 CATHETERIZE FOR URINE SPEC: HCPCS

## 2019-03-28 PROCEDURE — 94799 UNLISTED PULMONARY SVC/PX: CPT

## 2019-03-28 PROCEDURE — 63710000001 PREDNISONE PER 5 MG: Performed by: INTERNAL MEDICINE

## 2019-03-28 PROCEDURE — 97162 PT EVAL MOD COMPLEX 30 MIN: CPT

## 2019-03-28 PROCEDURE — 25010000002 ONDANSETRON PER 1 MG: Performed by: INTERNAL MEDICINE

## 2019-03-28 RX ORDER — DIPHENHYDRAMINE HCL 25 MG
25 CAPSULE ORAL EVERY 6 HOURS PRN
Status: DISCONTINUED | OUTPATIENT
Start: 2019-03-28 | End: 2019-04-01 | Stop reason: HOSPADM

## 2019-03-28 RX ORDER — OXYCODONE AND ACETAMINOPHEN 7.5; 325 MG/1; MG/1
1 TABLET ORAL EVERY 4 HOURS PRN
Status: DISCONTINUED | OUTPATIENT
Start: 2019-03-28 | End: 2019-04-01 | Stop reason: HOSPADM

## 2019-03-28 RX ADMIN — INSULIN HUMAN 8 UNITS: 100 INJECTION, SOLUTION PARENTERAL at 12:50

## 2019-03-28 RX ADMIN — HYDROMORPHONE HYDROCHLORIDE 0.5 MG: 1 INJECTION, SOLUTION INTRAMUSCULAR; INTRAVENOUS; SUBCUTANEOUS at 05:19

## 2019-03-28 RX ADMIN — PREDNISONE 5 MG: 5 TABLET ORAL at 08:27

## 2019-03-28 RX ADMIN — DIPHENHYDRAMINE HYDROCHLORIDE 25 MG: 25 CAPSULE ORAL at 08:27

## 2019-03-28 RX ADMIN — SODIUM CHLORIDE, PRESERVATIVE FREE 3 ML: 5 INJECTION INTRAVENOUS at 20:59

## 2019-03-28 RX ADMIN — HEPARIN SODIUM 5000 UNITS: 5000 INJECTION INTRAVENOUS; SUBCUTANEOUS at 08:26

## 2019-03-28 RX ADMIN — OXYCODONE HYDROCHLORIDE AND ACETAMINOPHEN 1 TABLET: 7.5; 325 TABLET ORAL at 08:28

## 2019-03-28 RX ADMIN — ROPIVACAINE HYDROCHLORIDE 10 ML/HR: 2 INJECTION, SOLUTION EPIDURAL; INFILTRATION at 08:35

## 2019-03-28 RX ADMIN — HYDROMORPHONE HYDROCHLORIDE 0.5 MG: 1 INJECTION, SOLUTION INTRAMUSCULAR; INTRAVENOUS; SUBCUTANEOUS at 11:28

## 2019-03-28 RX ADMIN — ALLOPURINOL 100 MG: 100 TABLET ORAL at 08:26

## 2019-03-28 RX ADMIN — SODIUM CHLORIDE 3 G: 9 INJECTION, SOLUTION INTRAVENOUS at 11:28

## 2019-03-28 RX ADMIN — SODIUM CHLORIDE 3 G: 9 INJECTION, SOLUTION INTRAVENOUS at 01:49

## 2019-03-28 RX ADMIN — OXYCODONE HYDROCHLORIDE AND ACETAMINOPHEN 1 TABLET: 7.5; 325 TABLET ORAL at 14:10

## 2019-03-28 RX ADMIN — INSULIN HUMAN 2 UNITS: 100 INJECTION, SOLUTION PARENTERAL at 20:58

## 2019-03-28 RX ADMIN — ONDANSETRON 4 MG: 2 INJECTION INTRAMUSCULAR; INTRAVENOUS at 11:28

## 2019-03-28 RX ADMIN — INSULIN DETEMIR 15 UNITS: 100 INJECTION, SOLUTION SUBCUTANEOUS at 20:54

## 2019-03-28 RX ADMIN — HYDROMORPHONE HYDROCHLORIDE 0.5 MG: 1 INJECTION, SOLUTION INTRAMUSCULAR; INTRAVENOUS; SUBCUTANEOUS at 16:52

## 2019-03-28 RX ADMIN — OXYCODONE HYDROCHLORIDE AND ACETAMINOPHEN 1 TABLET: 7.5; 325 TABLET ORAL at 03:22

## 2019-03-28 RX ADMIN — DOCUSATE SODIUM AND SENNOSIDES 2 TABLET: 50; 8.6 TABLET ORAL at 20:57

## 2019-03-28 RX ADMIN — LEVOTHYROXINE SODIUM 225 MCG: 125 TABLET ORAL at 05:19

## 2019-03-28 RX ADMIN — INSULIN HUMAN 10 UNITS: 100 INJECTION, SOLUTION PARENTERAL at 16:56

## 2019-03-28 RX ADMIN — OXYCODONE HYDROCHLORIDE AND ACETAMINOPHEN 1 TABLET: 7.5; 325 TABLET ORAL at 20:58

## 2019-03-28 RX ADMIN — KETOROLAC TROMETHAMINE 30 MG: 30 INJECTION, SOLUTION INTRAMUSCULAR; INTRAVENOUS at 06:09

## 2019-03-28 RX ADMIN — INSULIN HUMAN 3 UNITS: 100 INJECTION, SOLUTION PARENTERAL at 08:27

## 2019-03-28 RX ADMIN — FOLIC ACID TAB 400 MCG 400 MCG: 400 TAB at 08:26

## 2019-03-28 RX ADMIN — FLUOXETINE HYDROCHLORIDE 20 MG: 20 CAPSULE ORAL at 20:57

## 2019-03-28 RX ADMIN — FLUOXETINE HYDROCHLORIDE 20 MG: 20 CAPSULE ORAL at 08:27

## 2019-03-28 RX ADMIN — HYDROMORPHONE HYDROCHLORIDE 0.5 MG: 1 INJECTION, SOLUTION INTRAMUSCULAR; INTRAVENOUS; SUBCUTANEOUS at 00:42

## 2019-03-28 RX ADMIN — HEPARIN SODIUM 5000 UNITS: 5000 INJECTION INTRAVENOUS; SUBCUTANEOUS at 20:58

## 2019-03-28 NOTE — ANESTHESIA POSTPROCEDURE EVALUATION
Patient: Charly Blevins    Procedure Summary     Date:  03/27/19 Room / Location:   [facility]    Anesthesia Start:  1820 Anesthesia Stop:      Procedure:  HIP TROCANTERIC NAILING WITH INTRAMEDULLARY HIP SCREW (Right Hip) Diagnosis:      Surgeon:  Jona Tom MD Provider:  Tereso Enriquez MD    Anesthesia Type:  general with block ASA Status:  3          Anesthesia Type: general with block  Last vitals  BP   117/72 (03/27/19 1726)   Temp   97.7 °F (36.5 °C) (03/27/19 1726)   Pulse   72 (03/27/19 1726)   Resp   20 (03/27/19 1726)     SpO2   92 % (03/27/19 1726)     Post Anesthesia Care and Evaluation    Patient location during evaluation: PACU  Patient participation: complete - patient participated  Level of consciousness: awake and alert  Pain score: 0  Pain management: adequate  Airway patency: patent  Anesthetic complications: No anesthetic complications  PONV Status: none  Cardiovascular status: hemodynamically stable and acceptable  Respiratory status: nonlabored ventilation, acceptable and nasal cannula  Hydration status: acceptable

## 2019-03-29 LAB
DEPRECATED RDW RBC AUTO: 63.6 FL (ref 37–54)
ERYTHROCYTE [DISTWIDTH] IN BLOOD BY AUTOMATED COUNT: 19.9 % (ref 11.3–14.5)
GLUCOSE BLDC GLUCOMTR-MCNC: 162 MG/DL (ref 70–130)
GLUCOSE BLDC GLUCOMTR-MCNC: 206 MG/DL (ref 70–130)
GLUCOSE BLDC GLUCOMTR-MCNC: 239 MG/DL (ref 70–130)
GLUCOSE BLDC GLUCOMTR-MCNC: 243 MG/DL (ref 70–130)
GLUCOSE BLDC GLUCOMTR-MCNC: 253 MG/DL (ref 70–130)
HCT VFR BLD AUTO: 27.9 % (ref 34.5–44)
HGB BLD-MCNC: 8.3 G/DL (ref 11.5–15.5)
MCH RBC QN AUTO: 26.5 PG (ref 27–31)
MCHC RBC AUTO-ENTMCNC: 29.7 G/DL (ref 32–36)
MCV RBC AUTO: 89.1 FL (ref 80–99)
PLATELET # BLD AUTO: 178 10*3/MM3 (ref 150–450)
PMV BLD AUTO: 10.1 FL (ref 6–12)
RBC # BLD AUTO: 3.13 10*6/MM3 (ref 3.89–5.14)
WBC NRBC COR # BLD: 9.12 10*3/MM3 (ref 3.5–10.8)

## 2019-03-29 PROCEDURE — 25010000002 HEPARIN (PORCINE) PER 1000 UNITS: Performed by: ORTHOPAEDIC SURGERY

## 2019-03-29 PROCEDURE — 85027 COMPLETE CBC AUTOMATED: CPT | Performed by: INTERNAL MEDICINE

## 2019-03-29 PROCEDURE — 99232 SBSQ HOSP IP/OBS MODERATE 35: CPT | Performed by: INTERNAL MEDICINE

## 2019-03-29 PROCEDURE — 25010000002 HYDROMORPHONE PER 4 MG: Performed by: INTERNAL MEDICINE

## 2019-03-29 PROCEDURE — 63710000001 INSULIN LISPRO (HUMAN) PER 5 UNITS: Performed by: INTERNAL MEDICINE

## 2019-03-29 PROCEDURE — 63710000001 PREDNISONE PER 5 MG: Performed by: INTERNAL MEDICINE

## 2019-03-29 PROCEDURE — 97110 THERAPEUTIC EXERCISES: CPT

## 2019-03-29 PROCEDURE — 63710000001 INSULIN DETEMIR PER 5 UNITS: Performed by: INTERNAL MEDICINE

## 2019-03-29 PROCEDURE — 25010000002 KETOROLAC TROMETHAMINE PER 15 MG: Performed by: INTERNAL MEDICINE

## 2019-03-29 PROCEDURE — 82962 GLUCOSE BLOOD TEST: CPT

## 2019-03-29 PROCEDURE — 25010000002 ROPIVACAINE PER 1 MG: Performed by: NURSE ANESTHETIST, CERTIFIED REGISTERED

## 2019-03-29 PROCEDURE — 97116 GAIT TRAINING THERAPY: CPT

## 2019-03-29 PROCEDURE — 94799 UNLISTED PULMONARY SVC/PX: CPT

## 2019-03-29 PROCEDURE — 99024 POSTOP FOLLOW-UP VISIT: CPT | Performed by: PHYSICIAN ASSISTANT

## 2019-03-29 RX ORDER — TAMSULOSIN HYDROCHLORIDE 0.4 MG/1
0.4 CAPSULE ORAL DAILY
Status: DISCONTINUED | OUTPATIENT
Start: 2019-03-29 | End: 2019-04-01 | Stop reason: HOSPADM

## 2019-03-29 RX ADMIN — LEVOTHYROXINE SODIUM 225 MCG: 125 TABLET ORAL at 05:25

## 2019-03-29 RX ADMIN — HEPARIN SODIUM 5000 UNITS: 5000 INJECTION INTRAVENOUS; SUBCUTANEOUS at 21:09

## 2019-03-29 RX ADMIN — OXYCODONE HYDROCHLORIDE AND ACETAMINOPHEN 1 TABLET: 7.5; 325 TABLET ORAL at 01:06

## 2019-03-29 RX ADMIN — TAMSULOSIN HYDROCHLORIDE 0.4 MG: 0.4 CAPSULE ORAL at 21:09

## 2019-03-29 RX ADMIN — SODIUM CHLORIDE, PRESERVATIVE FREE 3 ML: 5 INJECTION INTRAVENOUS at 21:19

## 2019-03-29 RX ADMIN — SODIUM CHLORIDE, PRESERVATIVE FREE 3 ML: 5 INJECTION INTRAVENOUS at 08:55

## 2019-03-29 RX ADMIN — INSULIN HUMAN 5 UNITS: 100 INJECTION, SOLUTION PARENTERAL at 12:03

## 2019-03-29 RX ADMIN — HEPARIN SODIUM 5000 UNITS: 5000 INJECTION INTRAVENOUS; SUBCUTANEOUS at 08:26

## 2019-03-29 RX ADMIN — DOCUSATE SODIUM 100 MG: 100 CAPSULE, LIQUID FILLED ORAL at 08:25

## 2019-03-29 RX ADMIN — KETOROLAC TROMETHAMINE 30 MG: 30 INJECTION, SOLUTION INTRAMUSCULAR; INTRAVENOUS at 00:12

## 2019-03-29 RX ADMIN — ALLOPURINOL 100 MG: 100 TABLET ORAL at 08:25

## 2019-03-29 RX ADMIN — INSULIN LISPRO 6 UNITS: 100 INJECTION, SOLUTION INTRAVENOUS; SUBCUTANEOUS at 17:22

## 2019-03-29 RX ADMIN — OXYCODONE HYDROCHLORIDE AND ACETAMINOPHEN 1 TABLET: 7.5; 325 TABLET ORAL at 21:18

## 2019-03-29 RX ADMIN — OXYCODONE HYDROCHLORIDE AND ACETAMINOPHEN 1 TABLET: 7.5; 325 TABLET ORAL at 15:32

## 2019-03-29 RX ADMIN — ONDANSETRON HYDROCHLORIDE 4 MG: 4 TABLET, FILM COATED ORAL at 09:48

## 2019-03-29 RX ADMIN — HYDROMORPHONE HYDROCHLORIDE 0.5 MG: 1 INJECTION, SOLUTION INTRAMUSCULAR; INTRAVENOUS; SUBCUTANEOUS at 02:30

## 2019-03-29 RX ADMIN — FOLIC ACID TAB 400 MCG 400 MCG: 400 TAB at 08:25

## 2019-03-29 RX ADMIN — INSULIN HUMAN 5 UNITS: 100 INJECTION, SOLUTION PARENTERAL at 08:26

## 2019-03-29 RX ADMIN — PREDNISONE 5 MG: 5 TABLET ORAL at 08:25

## 2019-03-29 RX ADMIN — INSULIN DETEMIR 15 UNITS: 100 INJECTION, SOLUTION SUBCUTANEOUS at 21:10

## 2019-03-29 RX ADMIN — OXYCODONE HYDROCHLORIDE AND ACETAMINOPHEN 1 TABLET: 7.5; 325 TABLET ORAL at 05:27

## 2019-03-29 RX ADMIN — FLUOXETINE HYDROCHLORIDE 20 MG: 20 CAPSULE ORAL at 21:09

## 2019-03-29 RX ADMIN — DOCUSATE SODIUM AND SENNOSIDES 2 TABLET: 50; 8.6 TABLET ORAL at 21:09

## 2019-03-29 RX ADMIN — ROPIVACAINE HYDROCHLORIDE 10 ML/HR: 2 INJECTION, SOLUTION EPIDURAL; INFILTRATION at 22:12

## 2019-03-29 RX ADMIN — ROPIVACAINE HYDROCHLORIDE 10 ML/HR: 2 INJECTION, SOLUTION EPIDURAL; INFILTRATION at 04:05

## 2019-03-29 RX ADMIN — FLUOXETINE HYDROCHLORIDE 20 MG: 20 CAPSULE ORAL at 08:25

## 2019-03-29 RX ADMIN — INSULIN LISPRO 4 UNITS: 100 INJECTION, SOLUTION INTRAVENOUS; SUBCUTANEOUS at 21:11

## 2019-03-29 RX ADMIN — OXYCODONE HYDROCHLORIDE AND ACETAMINOPHEN 1 TABLET: 7.5; 325 TABLET ORAL at 09:48

## 2019-03-30 LAB
ANION GAP SERPL CALCULATED.3IONS-SCNC: 6 MMOL/L (ref 3–11)
BUN BLD-MCNC: 13 MG/DL (ref 9–23)
BUN/CREAT SERPL: 17.8 (ref 7–25)
CALCIUM SPEC-SCNC: 8.3 MG/DL (ref 8.7–10.4)
CHLORIDE SERPL-SCNC: 101 MMOL/L (ref 99–109)
CO2 SERPL-SCNC: 30 MMOL/L (ref 20–31)
CREAT BLD-MCNC: 0.73 MG/DL (ref 0.6–1.3)
DEPRECATED RDW RBC AUTO: 63.4 FL (ref 37–54)
ERYTHROCYTE [DISTWIDTH] IN BLOOD BY AUTOMATED COUNT: 19.8 % (ref 11.3–14.5)
GFR SERPL CREATININE-BSD FRML MDRD: 80 ML/MIN/1.73
GLUCOSE BLD-MCNC: 283 MG/DL (ref 70–100)
GLUCOSE BLDC GLUCOMTR-MCNC: 242 MG/DL (ref 70–130)
GLUCOSE BLDC GLUCOMTR-MCNC: 267 MG/DL (ref 70–130)
GLUCOSE BLDC GLUCOMTR-MCNC: 269 MG/DL (ref 70–130)
GLUCOSE BLDC GLUCOMTR-MCNC: 305 MG/DL (ref 70–130)
HCT VFR BLD AUTO: 26.1 % (ref 34.5–44)
HGB BLD-MCNC: 7.6 G/DL (ref 11.5–15.5)
MCH RBC QN AUTO: 26 PG (ref 27–31)
MCHC RBC AUTO-ENTMCNC: 29.1 G/DL (ref 32–36)
MCV RBC AUTO: 89.4 FL (ref 80–99)
PLATELET # BLD AUTO: 168 10*3/MM3 (ref 150–450)
PMV BLD AUTO: 10.1 FL (ref 6–12)
POTASSIUM BLD-SCNC: 3.9 MMOL/L (ref 3.5–5.5)
RBC # BLD AUTO: 2.92 10*6/MM3 (ref 3.89–5.14)
SODIUM BLD-SCNC: 137 MMOL/L (ref 132–146)
WBC NRBC COR # BLD: 7.14 10*3/MM3 (ref 3.5–10.8)

## 2019-03-30 PROCEDURE — 85027 COMPLETE CBC AUTOMATED: CPT | Performed by: INTERNAL MEDICINE

## 2019-03-30 PROCEDURE — 97116 GAIT TRAINING THERAPY: CPT

## 2019-03-30 PROCEDURE — 63710000001 PREDNISONE PER 5 MG: Performed by: INTERNAL MEDICINE

## 2019-03-30 PROCEDURE — 99232 SBSQ HOSP IP/OBS MODERATE 35: CPT | Performed by: INTERNAL MEDICINE

## 2019-03-30 PROCEDURE — 99024 POSTOP FOLLOW-UP VISIT: CPT | Performed by: ORTHOPAEDIC SURGERY

## 2019-03-30 PROCEDURE — 25010000002 HEPARIN (PORCINE) PER 1000 UNITS: Performed by: ORTHOPAEDIC SURGERY

## 2019-03-30 PROCEDURE — 82962 GLUCOSE BLOOD TEST: CPT

## 2019-03-30 PROCEDURE — 63710000001 INSULIN DETEMIR PER 5 UNITS: Performed by: INTERNAL MEDICINE

## 2019-03-30 PROCEDURE — 25010000002 HYDROMORPHONE PER 4 MG: Performed by: INTERNAL MEDICINE

## 2019-03-30 PROCEDURE — 94799 UNLISTED PULMONARY SVC/PX: CPT

## 2019-03-30 PROCEDURE — 97530 THERAPEUTIC ACTIVITIES: CPT | Performed by: OCCUPATIONAL THERAPIST

## 2019-03-30 PROCEDURE — 80048 BASIC METABOLIC PNL TOTAL CA: CPT | Performed by: INTERNAL MEDICINE

## 2019-03-30 RX ORDER — FERROUS SULFATE 325(65) MG
325 TABLET ORAL
Status: DISCONTINUED | OUTPATIENT
Start: 2019-03-30 | End: 2019-04-01 | Stop reason: HOSPADM

## 2019-03-30 RX ADMIN — FOLIC ACID TAB 400 MCG 400 MCG: 400 TAB at 08:35

## 2019-03-30 RX ADMIN — OXYCODONE HYDROCHLORIDE AND ACETAMINOPHEN 1 TABLET: 7.5; 325 TABLET ORAL at 05:11

## 2019-03-30 RX ADMIN — SODIUM CHLORIDE, PRESERVATIVE FREE 3 ML: 5 INJECTION INTRAVENOUS at 08:37

## 2019-03-30 RX ADMIN — FLUOXETINE HYDROCHLORIDE 20 MG: 20 CAPSULE ORAL at 08:35

## 2019-03-30 RX ADMIN — ALLOPURINOL 100 MG: 100 TABLET ORAL at 08:35

## 2019-03-30 RX ADMIN — OXYCODONE HYDROCHLORIDE AND ACETAMINOPHEN 1 TABLET: 7.5; 325 TABLET ORAL at 12:28

## 2019-03-30 RX ADMIN — INSULIN LISPRO 7 UNITS: 100 INJECTION, SOLUTION INTRAVENOUS; SUBCUTANEOUS at 08:35

## 2019-03-30 RX ADMIN — MAGNESIUM HYDROXIDE 10 ML: 2400 SUSPENSION ORAL at 22:08

## 2019-03-30 RX ADMIN — ONDANSETRON HYDROCHLORIDE 4 MG: 4 TABLET, FILM COATED ORAL at 12:28

## 2019-03-30 RX ADMIN — DOCUSATE SODIUM AND SENNOSIDES 2 TABLET: 50; 8.6 TABLET ORAL at 22:08

## 2019-03-30 RX ADMIN — OXYCODONE HYDROCHLORIDE AND ACETAMINOPHEN 1 TABLET: 7.5; 325 TABLET ORAL at 08:43

## 2019-03-30 RX ADMIN — PREDNISONE 5 MG: 5 TABLET ORAL at 08:35

## 2019-03-30 RX ADMIN — DOCUSATE SODIUM 100 MG: 100 CAPSULE, LIQUID FILLED ORAL at 22:08

## 2019-03-30 RX ADMIN — HEPARIN SODIUM 5000 UNITS: 5000 INJECTION INTRAVENOUS; SUBCUTANEOUS at 08:34

## 2019-03-30 RX ADMIN — OXYCODONE HYDROCHLORIDE AND ACETAMINOPHEN 1 TABLET: 7.5; 325 TABLET ORAL at 16:17

## 2019-03-30 RX ADMIN — FLUOXETINE HYDROCHLORIDE 20 MG: 20 CAPSULE ORAL at 22:08

## 2019-03-30 RX ADMIN — INSULIN LISPRO 4 UNITS: 100 INJECTION, SOLUTION INTRAVENOUS; SUBCUTANEOUS at 22:10

## 2019-03-30 RX ADMIN — OXYCODONE HYDROCHLORIDE AND ACETAMINOPHEN 1 TABLET: 7.5; 325 TABLET ORAL at 00:57

## 2019-03-30 RX ADMIN — OXYCODONE HYDROCHLORIDE AND ACETAMINOPHEN 1 TABLET: 7.5; 325 TABLET ORAL at 22:08

## 2019-03-30 RX ADMIN — INSULIN LISPRO 6 UNITS: 100 INJECTION, SOLUTION INTRAVENOUS; SUBCUTANEOUS at 17:48

## 2019-03-30 RX ADMIN — LEVOTHYROXINE SODIUM 225 MCG: 125 TABLET ORAL at 05:11

## 2019-03-30 RX ADMIN — INSULIN LISPRO 6 UNITS: 100 INJECTION, SOLUTION INTRAVENOUS; SUBCUTANEOUS at 11:58

## 2019-03-30 RX ADMIN — INSULIN DETEMIR 20 UNITS: 100 INJECTION, SOLUTION SUBCUTANEOUS at 22:07

## 2019-03-30 RX ADMIN — Medication 325 MG: at 08:43

## 2019-03-30 RX ADMIN — DOCUSATE SODIUM 100 MG: 100 CAPSULE, LIQUID FILLED ORAL at 05:12

## 2019-03-30 RX ADMIN — HEPARIN SODIUM 5000 UNITS: 5000 INJECTION INTRAVENOUS; SUBCUTANEOUS at 22:08

## 2019-03-30 RX ADMIN — HYDROMORPHONE HYDROCHLORIDE 0.5 MG: 1 INJECTION, SOLUTION INTRAMUSCULAR; INTRAVENOUS; SUBCUTANEOUS at 20:46

## 2019-03-31 LAB
DEPRECATED RDW RBC AUTO: 63.8 FL (ref 37–54)
ERYTHROCYTE [DISTWIDTH] IN BLOOD BY AUTOMATED COUNT: 19.9 % (ref 11.3–14.5)
GLUCOSE BLDC GLUCOMTR-MCNC: 157 MG/DL (ref 70–130)
GLUCOSE BLDC GLUCOMTR-MCNC: 206 MG/DL (ref 70–130)
GLUCOSE BLDC GLUCOMTR-MCNC: 262 MG/DL (ref 70–130)
GLUCOSE BLDC GLUCOMTR-MCNC: 339 MG/DL (ref 70–130)
HCT VFR BLD AUTO: 28.5 % (ref 34.5–44)
HGB BLD-MCNC: 8.6 G/DL (ref 11.5–15.5)
MCH RBC QN AUTO: 26.6 PG (ref 27–31)
MCHC RBC AUTO-ENTMCNC: 30.2 G/DL (ref 32–36)
MCV RBC AUTO: 88.2 FL (ref 80–99)
PLATELET # BLD AUTO: 220 10*3/MM3 (ref 150–450)
PMV BLD AUTO: 9.7 FL (ref 6–12)
RBC # BLD AUTO: 3.23 10*6/MM3 (ref 3.89–5.14)
WBC NRBC COR # BLD: 7.56 10*3/MM3 (ref 3.5–10.8)

## 2019-03-31 PROCEDURE — 63710000001 PREDNISONE PER 5 MG: Performed by: INTERNAL MEDICINE

## 2019-03-31 PROCEDURE — 99232 SBSQ HOSP IP/OBS MODERATE 35: CPT | Performed by: NURSE PRACTITIONER

## 2019-03-31 PROCEDURE — 97110 THERAPEUTIC EXERCISES: CPT

## 2019-03-31 PROCEDURE — 25010000002 HYDROMORPHONE PER 4 MG: Performed by: INTERNAL MEDICINE

## 2019-03-31 PROCEDURE — 63710000001 INSULIN DETEMIR PER 5 UNITS: Performed by: INTERNAL MEDICINE

## 2019-03-31 PROCEDURE — 99024 POSTOP FOLLOW-UP VISIT: CPT | Performed by: ORTHOPAEDIC SURGERY

## 2019-03-31 PROCEDURE — 97116 GAIT TRAINING THERAPY: CPT

## 2019-03-31 PROCEDURE — 25010000002 HEPARIN (PORCINE) PER 1000 UNITS: Performed by: ORTHOPAEDIC SURGERY

## 2019-03-31 PROCEDURE — 85027 COMPLETE CBC AUTOMATED: CPT | Performed by: INTERNAL MEDICINE

## 2019-03-31 PROCEDURE — 82962 GLUCOSE BLOOD TEST: CPT

## 2019-03-31 RX ADMIN — INSULIN LISPRO 4 UNITS: 100 INJECTION, SOLUTION INTRAVENOUS; SUBCUTANEOUS at 20:47

## 2019-03-31 RX ADMIN — INSULIN DETEMIR 20 UNITS: 100 INJECTION, SOLUTION SUBCUTANEOUS at 20:47

## 2019-03-31 RX ADMIN — HEPARIN SODIUM 5000 UNITS: 5000 INJECTION INTRAVENOUS; SUBCUTANEOUS at 08:29

## 2019-03-31 RX ADMIN — FLUOXETINE HYDROCHLORIDE 20 MG: 20 CAPSULE ORAL at 20:47

## 2019-03-31 RX ADMIN — Medication 10 MG: at 04:25

## 2019-03-31 RX ADMIN — HEPARIN SODIUM 5000 UNITS: 5000 INJECTION INTRAVENOUS; SUBCUTANEOUS at 20:47

## 2019-03-31 RX ADMIN — INSULIN LISPRO 7 UNITS: 100 INJECTION, SOLUTION INTRAVENOUS; SUBCUTANEOUS at 12:48

## 2019-03-31 RX ADMIN — OXYCODONE HYDROCHLORIDE AND ACETAMINOPHEN 1 TABLET: 7.5; 325 TABLET ORAL at 20:47

## 2019-03-31 RX ADMIN — FOLIC ACID TAB 400 MCG 400 MCG: 400 TAB at 08:29

## 2019-03-31 RX ADMIN — INSULIN LISPRO 2 UNITS: 100 INJECTION, SOLUTION INTRAVENOUS; SUBCUTANEOUS at 08:28

## 2019-03-31 RX ADMIN — PREDNISONE 5 MG: 5 TABLET ORAL at 08:28

## 2019-03-31 RX ADMIN — Medication 325 MG: at 08:29

## 2019-03-31 RX ADMIN — DOCUSATE SODIUM 100 MG: 100 CAPSULE, LIQUID FILLED ORAL at 08:30

## 2019-03-31 RX ADMIN — TAMSULOSIN HYDROCHLORIDE 0.4 MG: 0.4 CAPSULE ORAL at 08:28

## 2019-03-31 RX ADMIN — ALLOPURINOL 100 MG: 100 TABLET ORAL at 08:29

## 2019-03-31 RX ADMIN — OXYCODONE HYDROCHLORIDE AND ACETAMINOPHEN 1 TABLET: 7.5; 325 TABLET ORAL at 16:34

## 2019-03-31 RX ADMIN — INSULIN LISPRO 6 UNITS: 100 INJECTION, SOLUTION INTRAVENOUS; SUBCUTANEOUS at 18:36

## 2019-03-31 RX ADMIN — FLUOXETINE HYDROCHLORIDE 20 MG: 20 CAPSULE ORAL at 08:29

## 2019-03-31 RX ADMIN — OXYCODONE HYDROCHLORIDE AND ACETAMINOPHEN 1 TABLET: 7.5; 325 TABLET ORAL at 08:29

## 2019-03-31 RX ADMIN — SODIUM CHLORIDE, PRESERVATIVE FREE 3 ML: 5 INJECTION INTRAVENOUS at 08:30

## 2019-03-31 RX ADMIN — LEVOTHYROXINE SODIUM 225 MCG: 125 TABLET ORAL at 04:25

## 2019-03-31 RX ADMIN — HYDROMORPHONE HYDROCHLORIDE 0.5 MG: 1 INJECTION, SOLUTION INTRAMUSCULAR; INTRAVENOUS; SUBCUTANEOUS at 00:25

## 2019-03-31 RX ADMIN — DOCUSATE SODIUM AND SENNOSIDES 2 TABLET: 50; 8.6 TABLET ORAL at 20:47

## 2019-04-01 VITALS
DIASTOLIC BLOOD PRESSURE: 74 MMHG | SYSTOLIC BLOOD PRESSURE: 131 MMHG | TEMPERATURE: 97.4 F | WEIGHT: 220 LBS | BODY MASS INDEX: 35.36 KG/M2 | HEART RATE: 89 BPM | RESPIRATION RATE: 18 BRPM | OXYGEN SATURATION: 96 % | HEIGHT: 66 IN

## 2019-04-01 LAB
ANION GAP SERPL CALCULATED.3IONS-SCNC: 6 MMOL/L (ref 3–11)
BUN BLD-MCNC: 12 MG/DL (ref 9–23)
BUN/CREAT SERPL: 16.7 (ref 7–25)
CALCIUM SPEC-SCNC: 9.2 MG/DL (ref 8.7–10.4)
CHLORIDE SERPL-SCNC: 99 MMOL/L (ref 99–109)
CO2 SERPL-SCNC: 37 MMOL/L (ref 20–31)
CREAT BLD-MCNC: 0.72 MG/DL (ref 0.6–1.3)
DEPRECATED RDW RBC AUTO: 65.2 FL (ref 37–54)
ERYTHROCYTE [DISTWIDTH] IN BLOOD BY AUTOMATED COUNT: 20.2 % (ref 11.3–14.5)
GFR SERPL CREATININE-BSD FRML MDRD: 82 ML/MIN/1.73
GLUCOSE BLD-MCNC: 138 MG/DL (ref 70–100)
GLUCOSE BLDC GLUCOMTR-MCNC: 144 MG/DL (ref 70–130)
HBA1C MFR BLD: 6.5 % (ref 4.8–5.6)
HCT VFR BLD AUTO: 30 % (ref 34.5–44)
HGB BLD-MCNC: 8.7 G/DL (ref 11.5–15.5)
MCH RBC QN AUTO: 26.2 PG (ref 27–31)
MCHC RBC AUTO-ENTMCNC: 29 G/DL (ref 32–36)
MCV RBC AUTO: 90.4 FL (ref 80–99)
PLATELET # BLD AUTO: 228 10*3/MM3 (ref 150–450)
PMV BLD AUTO: 9.9 FL (ref 6–12)
POTASSIUM BLD-SCNC: 4 MMOL/L (ref 3.5–5.5)
RBC # BLD AUTO: 3.32 10*6/MM3 (ref 3.89–5.14)
SODIUM BLD-SCNC: 142 MMOL/L (ref 132–146)
WBC NRBC COR # BLD: 6.84 10*3/MM3 (ref 3.5–10.8)

## 2019-04-01 PROCEDURE — 80048 BASIC METABOLIC PNL TOTAL CA: CPT | Performed by: NURSE PRACTITIONER

## 2019-04-01 PROCEDURE — 83036 HEMOGLOBIN GLYCOSYLATED A1C: CPT | Performed by: NURSE PRACTITIONER

## 2019-04-01 PROCEDURE — 63710000001 DIPHENHYDRAMINE PER 50 MG: Performed by: ORTHOPAEDIC SURGERY

## 2019-04-01 PROCEDURE — 99024 POSTOP FOLLOW-UP VISIT: CPT | Performed by: ORTHOPAEDIC SURGERY

## 2019-04-01 PROCEDURE — 63710000001 PREDNISONE PER 5 MG: Performed by: INTERNAL MEDICINE

## 2019-04-01 PROCEDURE — 82962 GLUCOSE BLOOD TEST: CPT

## 2019-04-01 PROCEDURE — 25010000002 HEPARIN (PORCINE) PER 1000 UNITS: Performed by: ORTHOPAEDIC SURGERY

## 2019-04-01 PROCEDURE — 99239 HOSP IP/OBS DSCHRG MGMT >30: CPT | Performed by: NURSE PRACTITIONER

## 2019-04-01 PROCEDURE — 85027 COMPLETE CBC AUTOMATED: CPT | Performed by: NURSE PRACTITIONER

## 2019-04-01 RX ORDER — OXYCODONE AND ACETAMINOPHEN 7.5; 325 MG/1; MG/1
1 TABLET ORAL EVERY 4 HOURS PRN
Start: 2019-04-01 | End: 2019-04-25 | Stop reason: SDUPTHER

## 2019-04-01 RX ORDER — LANOLIN ALCOHOL/MO/W.PET/CERES
400 CREAM (GRAM) TOPICAL DAILY
Status: ON HOLD
Start: 2019-04-02 | End: 2023-01-03 | Stop reason: SDUPTHER

## 2019-04-01 RX ORDER — BISACODYL 10 MG
10 SUPPOSITORY, RECTAL RECTAL DAILY PRN
Status: ON HOLD
Start: 2019-04-01 | End: 2022-12-17

## 2019-04-01 RX ORDER — PSEUDOEPHEDRINE HCL 30 MG
100 TABLET ORAL 2 TIMES DAILY PRN
Status: ON HOLD
Start: 2019-04-01 | End: 2022-12-17

## 2019-04-01 RX ORDER — SENNA AND DOCUSATE SODIUM 50; 8.6 MG/1; MG/1
2 TABLET, FILM COATED ORAL NIGHTLY
Status: ON HOLD
Start: 2019-04-01 | End: 2022-12-17

## 2019-04-01 RX ORDER — FERROUS SULFATE 325(65) MG
325 TABLET ORAL
Status: ON HOLD
Start: 2019-04-02 | End: 2022-12-17

## 2019-04-01 RX ORDER — ACETAMINOPHEN 325 MG/1
650 TABLET ORAL EVERY 4 HOURS PRN
Start: 2019-04-01

## 2019-04-01 RX ORDER — TAMSULOSIN HYDROCHLORIDE 0.4 MG/1
0.4 CAPSULE ORAL DAILY
Qty: 30 CAPSULE | Status: ON HOLD
Start: 2019-04-02 | End: 2022-12-26

## 2019-04-01 RX ADMIN — TAMSULOSIN HYDROCHLORIDE 0.4 MG: 0.4 CAPSULE ORAL at 08:24

## 2019-04-01 RX ADMIN — DIPHENHYDRAMINE HYDROCHLORIDE 25 MG: 25 CAPSULE ORAL at 00:10

## 2019-04-01 RX ADMIN — FLUOXETINE HYDROCHLORIDE 20 MG: 20 CAPSULE ORAL at 08:24

## 2019-04-01 RX ADMIN — OXYCODONE HYDROCHLORIDE AND ACETAMINOPHEN 1 TABLET: 7.5; 325 TABLET ORAL at 10:01

## 2019-04-01 RX ADMIN — FOLIC ACID TAB 400 MCG 400 MCG: 400 TAB at 08:24

## 2019-04-01 RX ADMIN — OXYCODONE HYDROCHLORIDE AND ACETAMINOPHEN 1 TABLET: 7.5; 325 TABLET ORAL at 05:56

## 2019-04-01 RX ADMIN — OXYCODONE HYDROCHLORIDE AND ACETAMINOPHEN 1 TABLET: 7.5; 325 TABLET ORAL at 02:06

## 2019-04-01 RX ADMIN — PREDNISONE 5 MG: 5 TABLET ORAL at 08:24

## 2019-04-01 RX ADMIN — LEVOTHYROXINE SODIUM 225 MCG: 125 TABLET ORAL at 05:56

## 2019-04-01 RX ADMIN — Medication 325 MG: at 08:24

## 2019-04-01 RX ADMIN — HEPARIN SODIUM 5000 UNITS: 5000 INJECTION INTRAVENOUS; SUBCUTANEOUS at 08:25

## 2019-04-01 RX ADMIN — ALLOPURINOL 100 MG: 100 TABLET ORAL at 08:24

## 2019-04-01 NOTE — THERAPY DISCHARGE NOTE
Acute Care - Physical Therapy Discharge Summary  [facility]       Patient Name: Charly Blevins  : 1955  MRN: [id number]    Today's Date: 2019  Onset of Illness/Injury or Date of Surgery: 19    Date of Referral to PT: 19  Referring Physician: MD Vaishali      Admit Date: 3/26/2019      PT Recommendation and Plan    Visit Dx:    ICD-10-CM ICD-9-CM   1. Closed intertrochanteric fracture of hip, right, initial encounter (CMS/[facility]) S72.141A 820.21   2. Impaired functional mobility, balance, gait, and endurance Z74.09 V49.89   3. Impaired mobility and ADLs Z74.09 799.89       Outcome Measures     Row Name 19 1310 19 1446 19 1101       How much help from another person do you currently need...    Turning from your back to your side while in flat bed without using bedrails?  2  -CT  --  2  -CT    Moving from lying on back to sitting on the side of a flat bed without bedrails?  2  -CT  --  2  -CT    Moving to and from a bed to a chair (including a wheelchair)?  2  -CT  --  2  -CT    Standing up from a chair using your arms (e.g., wheelchair, bedside chair)?  2  -CT  --  2  -CT    Climbing 3-5 steps with a railing?  1  -CT  --  1  -CT    To walk in hospital room?  2  -CT  --  2  -CT    AM-PAC 6 Clicks Score  11  -CT  --  11  -CT       How much help from another is currently needed...    Putting on and taking off regular lower body clothing?  --  2  -ST  --    Bathing (including washing, rinsing, and drying)  --  2  -ST  --    Toileting (which includes using toilet bed pan or urinal)  --  2  -ST  --    Putting on and taking off regular upper body clothing  --  3  -ST  --    Taking care of personal grooming (such as brushing teeth)  --  3  -ST  --    Eating meals  --  4  -ST  --    Score  --  16  -ST  --       Functional Assessment    Outcome Measure Options  AM-PAC 6 Clicks Basic Mobility (PT)  -CT  --  AM-PAC 6 Clicks Basic Mobility (PT)  -CT      User Key  (r) = Recorded By, (t)  = Taken By, (c) = Cosigned By    Initials Name Provider Type    Evelyne Bunn, OTR Occupational Therapist    Kwabena Gamez, PT Physical Therapist              Rehab Goal Summary     Row Name 04/01/19 1314             Physical Therapy Goals    Bed Mobility Goal Selection (PT)  bed mobility, PT goal 1  -LR      Transfer Goal Selection (PT)  transfer, PT goal 1  -LR      Gait Training Goal Selection (PT)  gait training, PT goal 1  -LR         Bed Mobility Goal 1 (PT)    Activity/Assistive Device (Bed Mobility Goal 1, PT)  sit to supine/supine to sit  -LR      [place] Level/Cues Needed (Bed Mobility Goal 1, PT)  contact guard assist  -LR      Time Frame (Bed Mobility Goal 1, PT)  long term goal (LTG);5 days  -LR      Progress/Outcomes (Bed Mobility Goal 1, PT)  goal not met;discharged from facility  -LR         Transfer Goal 1 (PT)    Activity/Assistive Device (Transfer Goal 1, PT)  sit-to-stand/stand-to-sit;walker, rolling;bed-to-chair/chair-to-bed  -LR      [place] Level/Cues Needed (Transfer Goal 1, PT)  contact guard assist  -LR      Time Frame (Transfer Goal 1, PT)  long term goal (LTG);5 days  -LR      Progress/Outcome (Transfer Goal 1, PT)  goal not met;discharged from facility  -LR         Gait Training Goal 1 (PT)    Activity/Assistive Device (Gait Training Goal 1, PT)  gait (walking locomotion);assistive device use;walker, rolling  -LR      [place] Level (Gait Training Goal 1, PT)  minimum assist (75% or more patient effort);2 person assist  -LR      Distance (Gait Goal 1, PT)  10 feet  -LR      Time Frame (Gait Training Goal 1, PT)  long term goal (LTG);5 days  -LR      Progress/Outcome (Gait Training Goal 1, PT)  goal not met;discharged from facility  -LR        User Key  (r) = Recorded By, (t) = Taken By, (c) = Cosigned By    Initials Name Provider Type Татьяна Lebron, PT Physical Therapist PT              PT Discharge Summary  Anticipated  Discharge Disposition (PT): skilled nursing facility  Reason for Discharge: Discharge from facility  Outcomes Achieved: Refer to plan of care for updates on goals achieved  Discharge Destination: SNF      Татьяна Bray, PT   4/1/2019

## 2019-04-01 NOTE — DISCHARGE SUMMARY
[facility] Medicine Services  TRANSFER SUMMARY    Patient Name: Charly Blevins  : 1955  MRN: [id number]    Date of Admission: 3/26/2019  Date of Discharge:  2019  Length of Stay: 6  Primary Care Physician: Boyd Molina MD    Consults     Date and Time Order Name Status Description    3/26/2019 2316 Inpatient Orthopedic Surgery Consult            Hospital Course     Presenting Problem:   Closed intertrochanteric fracture of hip, right, initial encounter (CMS/[facility]) [S72.141A]    Active Hospital Problems    Diagnosis  POA   • Closed intertrochanteric fracture of hip, right, initial encounter (CMS/[facility]) [S72.141A]  Yes   • Pubic ramus fracture (CMS/[facility]) [S32.599A]  Yes   • HTN (hypertension) [I10]  Unknown   • HLD (hyperlipidemia) [E78.5]  Unknown   • DM II (diabetes mellitus, type II), controlled (CMS/[facility]) [E11.9]  Unknown   • Rheumatoid arthritis (CMS/[facility]) [M06.9]  Unknown   • Atrial fibrillation (CMS/[facility]) [I48.91]  Unknown      Resolved Hospital Problems   No resolved problems to display.          Hospital Course:  Charly Blevins is a 63 y.o. female past medical history significant for hypertension, hyperlipidemia, diabetes type 2, RA, A. fib.  Presents to be HL secondary to mechanical fall with hip pain.  Found to have right femur fracture and pubic ramus fracture.  Orthopedics was consulted.  She went to the OR on 3/27 for right intertrochanteric femur fracture nailing.  Postoperatively is improving.  Working with therapy.  Pain is controlled on oral medication.  Tolerating diet.  Hemodynamically stable and afebrile.  She has remained off of her home blood pressure medications while hospitalized due to low end of normal BPs here.  We will continue off of these on transfer and may be resumed at providers discretion at [facility] once BP improves.    Patient with history of urinary retention issues.  Failed voiding trial postop.  Hickman was replaced.  Has been started on  Flomax.  Will transfer today with Hickman in place with recommendations to begin voiding trials on transfer.  She is currently awake and alert.  She has been cleared for transfer from an orthopedic standpoint.  Mentations for weightbearing as tolerated to right lower extremity for transfers only otherwise touchdown weightbearing to right lower extremity.  Recommendations for Lovenox daily VT E prophylaxis times 4 weeks total.  Dressing change 1 week postop as noted below and then follow orthopedic recommendations.  Follow-up PCP 1 week of discharge from [facility].  Follow-up with orthopedics in 2 weeks as noted.        Discharge Follow Up Recommendations for labs/diagnostics:  PCP 1 week of dc [facility]  F/u [facility] in ortho [facility] clinic in 2 weeks     Pt is on several BP meds outpt.  These have been held due to low end nl BP here.  Will continue off on transfer today but may be resumed at [facility] providers discretion when needed.     Pt with DM II.  On glipize, metformin and basal insulin outpt.  Running high post op.  Will continue basal/bolus regimen and add back home metformin on transfer.  Defer resuming glipizide and further insulin adjustments to [facility] provider.      Day of Discharge     HPI:   She is seen resting up in bed awake and alert.  No visitors at bedside.  Reports her pain currently 9/10 scale but just took pain medication.  Has been working with therapy.  Still has Hickman cath in place due to urinary retention but is tolerating Flomax well.  Failed voiding trial prior to this admit.  Skin to keep Hickman on transfer and attempt voiding trial at [facility] later today.  Otherwise no nausea or vomiting.  Tolerating diet.  No new issues ready for transfer.    Review of Systems  Gen- No fevers, chills  CV- No chest pain, palpitations  Resp- No cough, dyspnea  GI- No N/V/D, abd pain      Otherwise complete ROS is negative except as mentioned in the HPI.    Vital Signs:   Temp:  [97.4 °F (36.3 °C)-98.5 °F (36.9 °C)]  97.4 °F (36.3 °C)  Heart Rate:  [] 89  Resp:  [16-18] 18  BP: (113-134)/(56-74) 131/74     Physical Exam:  Constitutional: No acute distress, awake, alert.  Resting back in bed.  No visitors at bs.  HENT: NCAT, mucous membranes moist  Respiratory: Clear to auscultation bilaterally A/P, respiratory effort normal on 2LNC  Cardiovascular: RRR, no murmurs, rubs, or gallops, palpable pedal pulses bilaterally  Gastrointestinal: Positive bowel sounds, soft, nontender, nondistended.  Obese   Musculoskeletal: RLE thigh edema (stable).  ROMERO spont.  Slight decreased rom to RLE due to pain post op   Psychiatric: Appropriate affect, cooperative  Neurologic: Oriented x 3, strength symmetric in all extremities, Cranial Nerves grossly intact to confrontation, speech clear and appropriate.  Follows commands   Skin: No rashes.  Rt hip drsg c/d/i.    Exam unchanged c/t yesterday 3/31/19      Pertinent Results     Results from last 7 days   Lab Units 04/01/19  0703 03/31/19  0737 03/30/19  0724 03/29/19  0704 03/28/19  0617 03/27/19  0611 03/26/19  1918   WBC 10*3/mm3 6.84 7.56 7.14 9.12 9.35 11.46* 10.62   HEMOGLOBIN g/dL 8.7* 8.6* 7.6* 8.3* 8.9* 10.5* 11.3*   HEMATOCRIT % 30.0* 28.5* 26.1* 27.9* 30.3* 35.5 38.0   PLATELETS 10*3/mm3 228 220 168 178 212 248 273   SODIUM mmol/L 142  --  137  --  135 141 140   POTASSIUM mmol/L 4.0  --  3.9  --  3.8 3.8 4.2   CHLORIDE mmol/L 99  --  101  --  98* 101 100   CO2 mmol/L 37.0*  --  30.0  --  30.0 30.0 29.0   BUN mg/dL 12  --  13  --  10 9 8*   CREATININE mg/dL 0.72  --  0.73  --  0.73 0.64 0.73   GLUCOSE mg/dL 138*  --  283*  --  184* 78 178*   CALCIUM mg/dL 9.2  --  8.3*  --  7.7* 8.2* 8.2*     Results from last 7 days   Lab Units 03/26/19  1918   BILIRUBIN mg/dL 0.4   ALK PHOS U/L 65   ALT (SGPT) U/L 10   AST (SGOT) U/L 19           Invalid input(s): TG, LDLCALC, LDLREALC  Results from last 7 days   Lab Units 04/01/19  0703   HEMOGLOBIN A1C % 6.50*     Brief Urine Lab Results     None           Microbiology Results Abnormal     None          Imaging Results (all)     Procedure Component Value Units Date/Time    FL C Arm During Surgery [[id number]] Collected:  03/28/19 1112     Updated:  03/28/19 1723    Narrative:       EXAMINATION: FL C-ARM DURING SURGERY-03/27/2019:     INDICATION: Right troch nail; S72.141A-Displaced intertrochanteric  fracture of right femur, initial encounter for closed fracture, right  hip pain.     COMPARISON: NONE.     FINDINGS: Two minutes and 10 seconds of fluoroscopy and 22 images used  for nailing of a right hip fracture. Please see the procedure report for  full details.       Impression:       Fluoroscopy for nailing of a right hip fracture.     D:  03/28/2019  E:  03/28/2019            This report was finalized on 3/28/2019 5:19 PM by Dr. Cary Hogan MD.       XR Chest 1 View [[id number]] Collected:  03/26/19 1839     Updated:  03/26/19 2034    Narrative:       EXAM:    XR Chest, 1 View     EXAM DATE/TIME:    3/26/2019 6:39 PM     CLINICAL HISTORY:    63 years old, female; Screening exam; Pre-operative exam; Other: Hip FX.   Pre-op chest     TECHNIQUE:    Imaging protocol: XR of the chest, 1 view.     COMPARISON:    Chest radiograph dated 11/16/16     FINDINGS:    Lungs:  Minimal bibasilar atelectasis and/or scarring.    Pleural space: Normal.    Heart/Mediastinum:  Small-sized hiatal hernia. Cardiac silhouette appears   mildly enlarged as before.    Bones/joints: No acute abnormality.    Other findings:  Area of mildly increased opacity within the medial aspect of   the right thorax, similar to prior study, likely representing superimposition   of normal tissues.       Impression:       No acute cardiopulmonary abnormality.    THIS DOCUMENT HAS BEEN ELECTRONICALLY SIGNED BY SOFIA VILLALBA MD    XR Hip With or Without Pelvis 2 - 3 View Right [[id number]] Collected:  03/26/19 1830     Updated:  03/26/19 2023    Narrative:       EXAM:    XR Right Hip with Pelvis when  Performed, 2 or 3 Views     EXAM DATE/TIME:    3/26/2019 6:30 PM     CLINICAL HISTORY:    63 years old, female; Pain; Hip pain; Right hip; Additional info: Fall/right   hip pain     TECHNIQUE:    Imaging protocol: XR Right hip with pelvis when performed, 2 or 3 views     COMPARISON:    No relevant prior studies available.     FINDINGS:    Bones/joints:  Acute, displaced, slightly comminuted fracture of the   intertrochanteric region of the right proximal femur. Minimal degenerative   changes of the hips bilaterally. Lower lobar spine degenerative changes.   Acute-appearing, minimally displaced fracture of the right inferior pubic   ramus.    Soft tissues:  Multiple surgical clips project over the upper pelvis.       Impression:       1. Acute, displaced, slightly comminuted fracture of the intertrochanteric   region of the right proximal femur.   2. Acute-appearing, minimally displaced fracture of the right inferior pubic   ramus.     THIS DOCUMENT HAS BEEN ELECTRONICALLY SIGNED BY SOFIA VILLALBA MD                   Discharge Details        Discharge Medications      New Medications      Instructions Start Date   acetaminophen 325 MG tablet  Commonly known as:  TYLENOL   650 mg, Oral, Every 4 Hours PRN      bisacodyl 10 MG suppository  Commonly known as:  DULCOLAX   10 mg, Rectal, Daily PRN      docusate sodium 100 MG capsule   100 mg, Oral, 2 Times Daily PRN      enoxaparin 40 MG/0.4ML solution syringe  Commonly known as:  LOVENOX   40 mg, Subcutaneous, Every 24 Hours Scheduled, VTE prophylaxis x 4 wks per ortho      ferrous sulfate 325 (65 FE) MG tablet   325 mg, Oral, Daily With Breakfast   Start Date:  4/2/2019     folic acid 400 MCG tablet  Commonly known as:  FOLVITE  Replaces:  Folic Acid 5 MG capsule   400 mcg, Oral, Daily   Start Date:  4/2/2019     insulin detemir 100 UNIT/ML injection  Commonly known as:  LEVEMIR   20 Units, Subcutaneous, Nightly      oxyCODONE-acetaminophen 7.5-325 MG per tablet  Commonly  known as:  PERCOCET   1 tablet, Oral, Every 4 Hours PRN      sennosides-docusate sodium 8.6-50 MG tablet  Commonly known as:  SENOKOT-S   2 tablets, Oral, Nightly      tamsulosin 0.4 MG capsule 24 hr capsule  Commonly known as:  FLOMAX   0.4 mg, Oral, Daily   Start Date:  4/2/2019        Changes to Medications      Instructions Start Date   insulin lispro 100 UNIT/ML injection  Commonly known as:  humaLOG  What changed:    · how much to take  · when to take this   0-9 Units, Subcutaneous, 4 Times Daily With Meals & Nightly      insulin lispro 100 UNIT/ML injection  Commonly known as:  humaLOG  What changed:  You were already taking a medication with the same name, and this prescription was added. Make sure you understand how and when to take each.   8 Units, Subcutaneous, 3 Times Daily With Meals         Continue These Medications      Instructions Start Date   ALLOPURINOL PO   Oral      AMBIEN 10 MG tablet  Generic drug:  zolpidem   20 mg, Oral, Nightly PRN      esomeprazole 20 MG capsule  Commonly known as:  nexIUM   20 mg, Oral, Every Morning Before Breakfast      FLUoxetine 20 MG capsule  Commonly known as:  PROzac   20 mg, Oral, 2 Times Daily      gabapentin 100 MG capsule  Commonly known as:  NEURONTIN   100 mg, Oral, 3 Times Daily      hydrOXYzine 25 MG tablet  Commonly known as:  ATARAX   25 mg, Oral, 2 Times Daily      levocetirizine 5 MG tablet  Commonly known as:  XYZAL   5 mg, Oral, Every Evening      metFORMIN 1000 MG tablet  Commonly known as:  GLUCOPHAGE   1,000 mg, Oral, 2 Times Daily With Meals      METHOTREXATE SODIUM (PF) IJ   1 unit marking on U-100 syringe, Injection, Weekly, Every saturday       ondansetron 4 MG tablet  Commonly known as:  ZOFRAN   4 mg, Oral, Every 8 Hours PRN      predniSONE 5 MG tablet  Commonly known as:  DELTASONE   5 mg, Oral, Daily      promethazine 25 MG tablet  Commonly known as:  PHENERGAN   25 mg, Oral, Every 6 Hours PRN      rosuvastatin 5 MG tablet  Commonly known  as:  CRESTOR   5 mg, Oral, Daily      SYNTHROID PO   225 mg, Oral         Stop These Medications    aspirin 81 MG EC tablet     bisoprolol 2.5 MG half tablet  Commonly known as:  ZEBeta     Folic Acid 5 MG capsule  Replaced by:  folic acid 400 MCG tablet     glipiZIDE 10 MG tablet  Commonly known as:  GLUCOTROL     HYDROcodone-acetaminophen 7.5-325 MG per tablet  Commonly known as:  NORCO     insulin glargine 100 UNIT/ML injection  Commonly known as:  LANTUS     NON FORMULARY     TEKTURNA -12.5 MG tablet  Generic drug:  Aliskiren-hydroCHLOROthiazide            No Known Allergies      Discharge Disposition:  Rehab Facility or Unit (DC - External)    Discharge Diet:  Diet Order   Procedures   • Diet Regular; Consistent Carbohydrate, Cardiac       Discharge Activity:  Activity Instructions     Other Instructions (Specify)      Per Dr Coleman:    Weight Bearing Status: Weightbearing as tolerated right lower extremity for transfers only.  Otherwise, touchdown weightbearing right lower extremity at all other times.     DVT prophylaxis: She will need Lovenox upon discharge for 4 weeks total.     Discharge planning: Skilled nursing facility     Upon discharge, patient will need follow-up with Mady Patel PA-C in the PA clinic in 2 weeks' time.  Continue DVTpx for 4 weeks.  Continue Aquacel dressing for 1 week after surgery and then remove and replace with a Covaderm or dry dressing every other day.  Keep incision covered at all times.  DC staples on or around 4/11/2019.            CODE STATUS:    Code Status and Medical Interventions:   Ordered at: 03/26/19 2115     Code Status:    CPR     Medical Interventions (Level of Support Prior to Arrest):    Full         Future Appointments   Date Time Provider Department Center   4/16/2019  9:50 AM Mady Patel PA-C [facility] None       Additional Instructions for the Follow-ups that You Need to Schedule     Discharge Follow-up with PCP   As directed        Currently Documented PCP:    Boyd Molina MD    PCP Phone Number:    [phone]     Follow Up Details:  1 week of dc         Discharge Follow-up with Specialty: f/u in Dr Anand office with [facility] in the PA clinic in 2 weeks   As directed      Specialty:  f/u in Dr Anand office with [facility] in the PA clinic in 2 weeks                 Time Spent on Discharge:  40 minutes    Electronically signed by KELSIE Norris, 04/01/19, 11:13 AM.

## 2019-04-01 NOTE — PROGRESS NOTES
"          Orthopaedic Surgery Hip Fracture Post-Op Progress Note      LOS: 6 days   Patient Care Team:  Boyd Molina MD as PCP - General (Family Medicine)    POD 5    Subjective     Interval History:   Patient states pain continues to improve.  Having some difficulty maintaining weightbearing restrictions with PT.    Objective     Vital Signs:  Temp (24hrs), Av.4 °F (36.9 °C), Min:98.2 °F (36.8 °C), Max:98.6 °F (37 °C)    /57 (BP Location: Right arm, Patient Position: Lying)   Pulse 83   Temp 98.5 °F (36.9 °C) (Oral)   Resp 16   Ht 167.6 cm (66\")   Wt 99.8 kg (220 lb)   SpO2 95%   BMI 35.51 kg/m²     Labs:  Lab Results (last 24 hours)     Procedure Component Value Units Date/Time    POC Glucose Once []  (Abnormal) Collected:  19    Specimen:  Blood Updated:  19 2006     Glucose 206 mg/dL     POC Glucose Once []  (Abnormal) Collected:  19 1539    Specimen:  Blood Updated:  19 1541     Glucose 262 mg/dL     POC Glucose Once []  (Abnormal) Collected:  19 1217    Specimen:  Blood Updated:  19 1228     Glucose 339 mg/dL     CBC (No Diff) []  (Abnormal) Collected:  19 0737    Specimen:  Blood Updated:  19 0841     WBC 7.56 10*3/mm3      RBC 3.23 10*6/mm3      Hemoglobin 8.6 g/dL      Hematocrit 28.5 %      MCV 88.2 fL      MCH 26.6 pg      MCHC 30.2 g/dL      RDW 19.9 %      RDW-SD 63.8 fl      MPV 9.7 fL      Platelets 220 10*3/mm3     POC Glucose Once []  (Abnormal) Collected:  19 0733    Specimen:  Blood Updated:  19 0742     Glucose 157 mg/dL           Physical Exam:  EHL, FHL, gastroc soleus, and tibialis anterior are intact  Toes are pink and warm  Surgical dressing is clean, dry and intact  Calf is soft and non tender  No pain with gentle ROM of the hip    Assessment/Plan     Postoperative day number 5 status post long trochanteric nail fixation for comminuted reverse obliquity " intertrochanteric femur fracture.    Pain Control    PT and OT     Weight Bearing Status: Weightbearing as tolerated right lower extremity for transfers only.  Otherwise, touchdown weightbearing right lower extremity at all other times.    DVT prophylaxis: Subcu heparin.  She will need Lovenox upon discharge for 4 weeks total.    Discharge planning: Skilled nursing facility    Upon discharge, patient will need follow-up with Mady Patel PA-C in the PA clinic in 2 weeks' time.  Continue DVTpx for 4 weeks.  Continue Aquacel dressing for 1 week after surgery and then remove and replace with a Covaderm or dry dressing every other day.  Keep incision covered at all times.  DC staples on or around 4/11/2019.      Edgar Gamboa MD  04/01/19  7:09 AM

## 2019-04-01 NOTE — PROGRESS NOTES
Case Management Discharge Note    Final Note: Per Sara w/ [facility], they are able to accept patient to the General Rehab Unit today if medically ready. Nurse to call report to [phone], dc summary to be pulled from [facility]. Transport scheduled for 1130 via [facility] WC Van. Please have patient at the [address] [facility] maternity entrancy by 1125. CM [facility].     Destination - Selection Complete      Service Provider Request Status Selected Services Address Phone Number Fax Number    [facility] Selected Inpatient Rehabilitation [address] [address] [phone]           Transportation Services  Ambulance: [facility] Transportation  W/C Van: Other([facility] van)    Final Discharge Disposition Code: 62 - inpatient rehab facility

## 2019-04-01 NOTE — DISCHARGE INSTRUCTIONS
Upon discharge, patient will need follow-up with Mady Patel PA-C in the PA clinic in 2 weeks' time.  Continue DVTpx for 4 weeks.  Continue Aquacel dressing for 1 week after surgery and then remove and replace with a Covaderm or dry dressing every other day.  Keep incision covered at all times.  DC staples on or around 4/11/2019.      Weight Bearing Status: Weightbearing as tolerated right lower extremity for transfers only.  Otherwise, touchdown weightbearing right lower extremity at all other times.

## 2019-04-01 NOTE — PLAN OF CARE
Problem: Fall Risk (Adult)  Goal: Identify Related Risk Factors and Signs and Symptoms  Outcome: Ongoing (interventions implemented as appropriate)      Problem: Patient Care Overview  Goal: Plan of Care Review  Outcome: Ongoing (interventions implemented as appropriate)   04/01/19 0530   Coping/Psychosocial   Plan of Care Reviewed With patient   Plan of Care Review   Progress no change   OTHER   Outcome Summary bunch in place and bladder training continued, VSS, possible DC today to , will continue to monitor for changes.       Problem: Fracture Orthopaedic (Adult)  Goal: Signs and Symptoms of Listed Potential Problems Will be Absent, Minimized or Managed (Fracture Orthopaedic)  Outcome: Ongoing (interventions implemented as appropriate)      Problem: Skin Injury Risk (Adult)  Goal: Identify Related Risk Factors and Signs and Symptoms  Outcome: Ongoing (interventions implemented as appropriate)

## 2019-04-16 ENCOUNTER — TELEPHONE (OUTPATIENT)
Dept: ORTHOPEDIC SURGERY | Facility: CLINIC | Age: 64
End: 2019-04-16

## 2019-04-16 NOTE — TELEPHONE ENCOUNTER
I called and spoke with Mr. Blevins.  He said the Charly is still in [facility].  She is supposed to be released tomorrow.  I told him that it is important that she follow up so we can make sure that everything is healing correctly and to check her incision.  He said that he will bring her home and get her settled, and will call and get her in for an appointment next week.  Elizabeth

## 2019-04-17 ENCOUNTER — READMISSION MANAGEMENT (OUTPATIENT)
Dept: CALL CENTER | Facility: HOSPITAL | Age: 64
End: 2019-04-17

## 2019-04-17 NOTE — OUTREACH NOTE
Prep Survey      Responses   Facility patient discharged from?  [place]   Is patient eligible?  Yes   Discharge diagnosis  Closed R hip fracture and pubic ramus fracture 2nd to fall, surgical nailing to hip   Does the patient have one of the following disease processes/diagnoses(primary or secondary)?  General Surgery   Does the patient have Home health ordered?  Yes   What is the Home health agency?   Unknown   Medication alerts for this patient  numerouse new and stopped meds   General alerts for this patient  D/C sub-acute care 4/1, lives w/   Prep survey completed?  Yes          Елена Geronimo RN

## 2019-04-18 ENCOUNTER — READMISSION MANAGEMENT (OUTPATIENT)
Dept: CALL CENTER | Facility: HOSPITAL | Age: 64
End: 2019-04-18

## 2019-04-18 NOTE — OUTREACH NOTE
General Surgery Week 3 Survey      Responses   Facility patient discharged from?  [place]   Does the patient have one of the following disease processes/diagnoses(primary or secondary)?  General Surgery   Week 3 attempt successful?  No   Revoke  Other transitional program [patient transferred to general rehab unit]          Gita Rider RN

## 2019-04-23 ENCOUNTER — OFFICE VISIT (OUTPATIENT)
Dept: ORTHOPEDIC SURGERY | Facility: CLINIC | Age: 64
End: 2019-04-23

## 2019-04-23 DIAGNOSIS — Z87.81 STATUS POST-OPERATIVE REPAIR OF CLOSED FRACTURE OF RIGHT HIP: Primary | ICD-10-CM

## 2019-04-23 DIAGNOSIS — S72.141D CLOSED DISPLACED INTERTROCHANTERIC FRACTURE OF RIGHT FEMUR WITH ROUTINE HEALING, SUBSEQUENT ENCOUNTER: ICD-10-CM

## 2019-04-23 DIAGNOSIS — Z98.890 STATUS POST-OPERATIVE REPAIR OF CLOSED FRACTURE OF RIGHT HIP: Primary | ICD-10-CM

## 2019-04-23 PROCEDURE — 99024 POSTOP FOLLOW-UP VISIT: CPT | Performed by: PHYSICIAN ASSISTANT

## 2019-04-23 NOTE — PROGRESS NOTES
[facility] [place]    Subjective     Post-op (3 weeks status post Right Hip Trocanteric Nailing with Intramedullary Hip Screw 03/27/2019)       NADIA Blevins is a 63 y.o. female.  Patient presents for her initial postoperative visit following right intertrochanteric femur fracture using a long trochanteric nail and hip screw on the above date by Dr. Tom.  Patient was discharged last week from [facility].  She currently has occupational therapy coming to her home.  Physical therapy is exposed to be starting in a few days.  She continues to note pain to the right hip.  Reports pain scale maximum 5/10.  Severity the pain moderate to severe.  Quite the pain stabbing, shooting.  She states that she does feel her symptoms are improving.  She is still wearing a knee immobilizer on her right leg anytime she is transferring from chair to bed.  She states she has been doing gentle range of motion to the hip and knee as directed while in the hospital and at [facility].  She has diffuse neuropathy throughout the lower extremity.  Patient states that she has remained toe-touch weightbearing for transfers only.    No reported fever, chills, night sweats or other constitutional symptoms.        Objective      Physical Exam  There were no vitals taken for this visit.    There is no height or weight on file to calculate BMI.      Ortho Exam  Peripheral Vascular  Lower Extremity   Edema - None bilaterally    Musculoskeletal  Lower Extremity   Right hip:     Assistive Device Used for Ambulation: Sitting in a wheelchair    Inspection and palpation:    Aquacel dressings still on incisions with staples in place since time of surgery    Tissue tension/texture is pliable and soft    Normal warmth    Incision--staples in place, healing without redness, warmth, drainage or evidence of infection.    Calf--soft and non-tender   Strength and Tone    Hip flexors -  3/5     T/A-5/5    EHL-5/5    FHL-5/5    GS-5/5   Range of Motion    Gentle PROM of hip painful with stiffness and limited motion, no incisional pain   Deformities/Postures/Misalignments/Discrepancies    No leg length discrepancy     Decreased range of motion noted to the knee secondary to stiffness.  Gentle PROM knee 0-30 degrees.    Assessment:  1. Status post-operative repair of closed fracture of right hip    2. Closed displaced intertrochanteric fracture of right femur with routine healing, subsequent encounter        Plan:  1. Patient status post right intertrochanteric femur fracture with trochanteric nail placement.  2. Staples were removed today.  3. Coverlet dressing applied.  4. Note written for PT for range of motion of both hip and knee.  5. Patient is to remain nonweightbearing but may place foot down for toe-touch or foot flat for transfers only.  6. Patient may discontinue knee immobilizer.  7. Narcotic pain medication was also refilled by Dr. Tom.  8. Follow-up in 4 weeks for repeat evaluation, sooner if issues arise or symptoms worsen/change.  Patient will require repeat imaging of the right hip/femur at that appointment.  9. Questions and concerns answered.      Mady Patel PA-C  04/25/19  12:01 PM

## 2019-04-24 ENCOUNTER — TELEPHONE (OUTPATIENT)
Dept: ORTHOPEDIC SURGERY | Facility: CLINIC | Age: 64
End: 2019-04-24

## 2019-04-24 NOTE — TELEPHONE ENCOUNTER
She is wanting percocet to be called into her pharmacy. Also wants to know if she can resume taking Methotrexate.  Please advise thank you!  Jessy

## 2019-04-24 NOTE — TELEPHONE ENCOUNTER
PATIENT CALLED STATING THAT [facility] WAS GOING TO CALL MEDICATION IN FOR HER BUT THE PHARMACY NEVER RECEIVED IT.     SHE WOULD ALSO LIKE TO KNOW IF IT'S OKAY FOR HER TO RESUME TAKING METHOTREXATE?

## 2019-04-25 DIAGNOSIS — S72.141D CLOSED DISPLACED INTERTROCHANTERIC FRACTURE OF RIGHT FEMUR WITH ROUTINE HEALING, SUBSEQUENT ENCOUNTER: ICD-10-CM

## 2019-04-25 DIAGNOSIS — Z09 SURGERY FOLLOW-UP: Primary | ICD-10-CM

## 2019-04-25 RX ORDER — OXYCODONE AND ACETAMINOPHEN 7.5; 325 MG/1; MG/1
1 TABLET ORAL EVERY 6 HOURS PRN
Qty: 40 TABLET | Refills: 0 | Status: SHIPPED | OUTPATIENT
Start: 2019-04-25 | End: 2019-05-28 | Stop reason: SDUPTHER

## 2019-05-10 ENCOUNTER — TELEPHONE (OUTPATIENT)
Dept: ORTHOPEDIC SURGERY | Facility: CLINIC | Age: 64
End: 2019-05-10

## 2019-05-10 NOTE — TELEPHONE ENCOUNTER
Tarah from [facility] called just to let us know that the patient had a missed visit today as Tarah should not get a hold of the patient.

## 2019-05-28 ENCOUNTER — OFFICE VISIT (OUTPATIENT)
Dept: ORTHOPEDIC SURGERY | Facility: CLINIC | Age: 64
End: 2019-05-28

## 2019-05-28 DIAGNOSIS — Z98.890 STATUS POST-OPERATIVE REPAIR OF CLOSED FRACTURE OF RIGHT HIP: Primary | ICD-10-CM

## 2019-05-28 DIAGNOSIS — S72.141D CLOSED DISPLACED INTERTROCHANTERIC FRACTURE OF RIGHT FEMUR WITH ROUTINE HEALING, SUBSEQUENT ENCOUNTER: ICD-10-CM

## 2019-05-28 DIAGNOSIS — Z87.81 STATUS POST-OPERATIVE REPAIR OF CLOSED FRACTURE OF RIGHT HIP: Primary | ICD-10-CM

## 2019-05-28 PROCEDURE — 99024 POSTOP FOLLOW-UP VISIT: CPT | Performed by: PHYSICIAN ASSISTANT

## 2019-05-28 RX ORDER — OXYCODONE AND ACETAMINOPHEN 7.5; 325 MG/1; MG/1
1 TABLET ORAL EVERY 8 HOURS PRN
Qty: 30 TABLET | Refills: 0 | Status: SHIPPED | OUTPATIENT
Start: 2019-05-28 | End: 2019-06-25 | Stop reason: SDUPTHER

## 2019-05-28 NOTE — PROGRESS NOTES
[facility] [place] Orthopaedic Surgery Clinic Note    Subjective     Post-op Follow-up (5 week f/u, Status post-operative repair of closed fracture of right hip 03/27/19)       NADIA Blevins is a 63 y.o. female.  Patient presents for her status post 9 weeks right intertrochanteric femur fracture using a long trochanteric nail and hip screw on the above date by Dr. Tom.  She feels her pain has improved but still has pain along anterior hip and thigh as well as discomfort posteriorly.  Home health PT showed her ROM exercises and stated they'll return when she can begin strengthening and weightbearing.  Reports pain comes and goes with scale maximum 5/10.  Severity the pain moderate when present.  Quite the pain stabbing, shooting.  Patient states that she has remained toe-touch weightbearing for transfers only.     No reported fever, chills, night sweats or other constitutional symptoms.       Objective      Physical Exam  There were no vitals taken for this visit.    There is no height or weight on file to calculate BMI.    Sitting in a wheelchair but able to stand with walker.    Ortho Exam  Peripheral Vascular  Lower Extremity              Edema - None bilaterally     Musculoskeletal  Lower Extremity              Right hip:                 Assistive Device Used for Ambulation: Sitting in a wheelchair                  Inspection and palpation:                          Tissue tension/texture is pliable and soft                          Normal warmth                          Incision--well healed without redness, warmth, drainage or evidence of infection.                            Calf--soft and non-tender              Strength and Tone                          Hip flexors - 3/5                                     T/A-5/5                          EHL-5/5                          FHL-5/5                          GS-5/5              Range of Motion                          Gentle PROM/limited AROM of hip painful  (anterior hip) with stiffness and limited motion, no incisional pain              Deformities/Postures/Misalignments/Discrepancies                          No leg length discrepancy                Knee ROM: 0-100.     Imaging:  Ordered plain film imaging of right hip/femur.  Images read by Dr. Tom. Positive healing proximal femur/intertrochanteric fracture.  Hardware intact with loosening, cutout, breakage.  See chart for official report.      Assessment:  1. Status post-operative repair of closed fracture of right hip    2. Closed displaced intertrochanteric fracture of right femur with routine healing, subsequent encounter        Plan:  1. Patient status post right intertrochanteric femur fracture with trochanteric nail placement, healing and stable.  2. May begin progressive weight bearing with walker.  3. New home health PT  Order written for weight bearing, ROM and strengthening.  4. Patient requesting refill of pain medication.  Percocet prescribed by Dr. Tom.  5. Follow-up in 4 weeks for repeat evaluation, sooner if issues arise or symptoms worsen/change.  Patient will require repeat imaging of the right hip/femur at that appointment.  6. Questions and concerns answered.      Mady Patel PA-C  05/28/19  9:12 PM

## 2019-06-13 ENCOUNTER — TELEPHONE (OUTPATIENT)
Dept: ORTHOPEDIC SURGERY | Facility: CLINIC | Age: 64
End: 2019-06-13

## 2019-06-13 NOTE — TELEPHONE ENCOUNTER
Patient called to request another prescription for her wheelchair. She said [facility] is requesting another script.

## 2019-06-25 ENCOUNTER — OFFICE VISIT (OUTPATIENT)
Dept: ORTHOPEDIC SURGERY | Facility: CLINIC | Age: 64
End: 2019-06-25

## 2019-06-25 DIAGNOSIS — Z87.81 STATUS POST-OPERATIVE REPAIR OF CLOSED FRACTURE OF RIGHT HIP: Primary | ICD-10-CM

## 2019-06-25 DIAGNOSIS — Z98.890 STATUS POST-OPERATIVE REPAIR OF CLOSED FRACTURE OF RIGHT HIP: Primary | ICD-10-CM

## 2019-06-25 DIAGNOSIS — S72.141D CLOSED DISPLACED INTERTROCHANTERIC FRACTURE OF RIGHT FEMUR WITH ROUTINE HEALING, SUBSEQUENT ENCOUNTER: ICD-10-CM

## 2019-06-25 PROCEDURE — 99024 POSTOP FOLLOW-UP VISIT: CPT | Performed by: PHYSICIAN ASSISTANT

## 2019-06-25 RX ORDER — OXYCODONE AND ACETAMINOPHEN 7.5; 325 MG/1; MG/1
1 TABLET ORAL EVERY 8 HOURS PRN
Qty: 30 TABLET | Refills: 0 | Status: ON HOLD | OUTPATIENT
Start: 2019-06-25 | End: 2022-12-17

## 2019-06-25 NOTE — PROGRESS NOTES
[facility] [place] Orthopaedic Surgery Clinic Note    Subjective     Post-op Follow-up (4 week follow up - 9 weeks Status post-operative repair of closed fracture of right hip 03/27/19)       NADIA Blevins is a 63 y.o. female.  Patient returns today approximately 3 months status post right intertrochanteric femur fracture.  Long trochanteric nail and hip screw were placed on the above-stated date by Dr. Tom.  Patient continues to note a constant 6/10 pain but states her symptoms are improving.  Pain remains along the anterior hip into the thigh and laterally.  She is working with physical therapy.  She cannot do stairs yet but she can walk on flat surfaces.  She has to use a walker for ambulation assistance.  She is also utilizing physical therapy for range of motion and strengthening.  Patient is requesting refill of pain medication.    Once again she denies any fever, chills, night sweats or other constitutional symptoms.      Objective      Physical Exam  There were no vitals taken for this visit.    There is no height or weight on file to calculate BMI.        Ortho Exam  Peripheral Vascular  Lower Extremity              Edema - None bilaterally     Musculoskeletal  Lower Extremity              Right hip:                 Assistive Device Used for Ambulation: Sitting in a wheelchair                  Inspection and palpation:                          Tissue tension/texture is pliable and soft                          Normal warmth                          Incision--well healed without redness, warmth, drainage or evidence of infection.                            Calf--soft and non-tender              Strength and Tone                          Hip flexors - 4-/5                                     T/A-5/5                          EHL-5/5                          FHL-5/5                          GS-5/5              Range of Motion                          Gentle PROM ER/IR negative; AROM of hip  painful (anterior hip) with stiffness and limited motion, no incisional pain              Deformities/Postures/Misalignments/Discrepancies                          No leg length discrepancy     Stinchfield test remains positive.    Imaging    Right femur x-Ray     Indication: s/p ORIF     Views:  AP pelvis and Frog Leg views to include the entire implant     Comparison: Right femur 5/28/2019     Findings:  The  fracture of the proximal femur appears to be healing appropriately.  New bone is visible.  No signs of implant cutout are present in the proximal femur  No bony lesions are appreciated  Normal soft tissue appearance     Impression:  Stable exam with interval but incomplete healing of the proximal femur fracture      Assessment:  1. Status post-operative repair of closed fracture of right hip    2. Closed displaced intertrochanteric fracture of right femur with routine healing, subsequent encounter        Plan:  1. Status post right intertrochanteric femur fracture with trochanteric nail placement, healing and stable.  2. Continue working with PT on range of motion, strengthening, gait training.  3. Continue use of walker for ambulatory assistance.  4. Patient requesting refill of pain medication.  Percocet prescribed by Dr. Tom.  5. Follow-up in 2 months for repeat evaluation, sooner if issues arise or symptoms worsen/change.  Patient will require repeat imaging of the right hip/femur at that appointment.  6. Questions and concerns answered.      Mady Patel PA-C  06/25/19  11:12 PM

## 2019-07-02 ENCOUNTER — TELEPHONE (OUTPATIENT)
Dept: ORTHOPEDIC SURGERY | Facility: CLINIC | Age: 64
End: 2019-07-02

## 2019-08-01 ENCOUNTER — TELEPHONE (OUTPATIENT)
Dept: ORTHOPEDIC SURGERY | Facility: CLINIC | Age: 64
End: 2019-08-01

## 2019-08-01 NOTE — TELEPHONE ENCOUNTER
PATIENT WAS CALLING TO GET A REFILL ON HER PAIN MEDICATION. SHE WOULD LIKE IT CALLED INTO THE [facility] IN HER CHART. THANKS.

## 2019-08-01 NOTE — TELEPHONE ENCOUNTER
I spoke with patient and advised that per Dr. Tom, he would like to see if she is able to get it from her PCP. The patient advised that he is not giving her any more. I told her unfortunately with her being 4 months out from surgery, we are no longer going to prescribe any more pain medication either.     Julieta

## 2019-09-05 ENCOUNTER — OFFICE VISIT (OUTPATIENT)
Dept: ORTHOPEDIC SURGERY | Facility: CLINIC | Age: 64
End: 2019-09-05

## 2019-09-05 VITALS — BODY MASS INDEX: 35.32 KG/M2 | WEIGHT: 219.8 LBS | OXYGEN SATURATION: 94 % | HEIGHT: 66 IN | HEART RATE: 83 BPM

## 2019-09-05 DIAGNOSIS — S72.141D CLOSED DISPLACED INTERTROCHANTERIC FRACTURE OF RIGHT FEMUR WITH ROUTINE HEALING, SUBSEQUENT ENCOUNTER: ICD-10-CM

## 2019-09-05 DIAGNOSIS — Z98.890 POST-OPERATIVE STATE: Primary | ICD-10-CM

## 2019-09-05 PROCEDURE — 99213 OFFICE O/P EST LOW 20 MIN: CPT | Performed by: PHYSICIAN ASSISTANT

## 2019-09-05 NOTE — PROGRESS NOTES
"    [facility] [place] Orth[facility] Surgery Clinic Note    Subjective     CC: Follow-up of the Right Hip (2.5 month f/u/5.5 months Status post-operative repair of closed fracture of right hip 03/27/19)      NADIA Blevins is a 63 y.o. female.  Patient returns today for evaluation of her right hip.  She is approximately 5-1/2 months out from date of injury/surgery.  She reports improvement of her symptoms.  She is currently using a cane to help assist her with ambulation.  She only notes that aching sensation to the hip with pain noted predominantly to the lateral aspect of the.  No groin tenderness.  Pain scale 3-5/10 depending on what she is doing.  She just finished with home health PT and would like to start going to outpatient PT.  No reported numbness or tingling into the extremity.      ROS:    Constiutional:Pt denies fever, chills, nausea, or vomiting.  MSK:as above    Objective      Past Medical History  Past Medical History:   Diagnosis Date   • Atrial fibrillation (CMS/[facility]) 2015    CARDIOVERSION - NO REOCCURANCE SINCE    • Diabetes mellitus (CMS/[facility])    • Hyperlipidemia    • Hypertension          Physical Exam  Pulse 83   Ht 167.6 cm (65.98\")   Wt 99.7 kg (219 lb 12.8 oz)   SpO2 94%   Breastfeeding? No   BMI 35.49 kg/m²     Body mass index is 35.49 kg/m².    Patient is well nourished and well developed.        Ortho Exam  Peripheral Vascular  Lower Extremity   Edema - None bilaterally    Musculoskeletal  Lower Extremity   Right hip:     Assistive Device Used for Ambulation: Pain    Inspection and palpation:    Tissue tension/texture is pliable and soft    Normal warmth    Incision--well-healed without redness, warmth, drainage or evidence of infection    Calf--soft and non-tender   Strength and Tone    Hip flexors - 4/5     T/A-5/5    EHL-5/5    FHL-5/5    GS-5/5   Range of Motion    Gentle PROM of hip intact and not painful   Deformities/Postures/Misalignments/Discrepancies    No leg length " discrepancy    Stinchfield test with minimal discomfort       Imaging/Labs/EMG Reviewed:  Ordered right hip plain films.  Imaging reviewed by Dr. Tom.    Right Hip X-Ray     Indication: s/p ORIF     Views:  AP pelvis and Frog Leg views to include the entire implant     Comparison: Right femur 6/25/2019     Findings:  The  fracture of the proximal femur appears to be healed  No signs of implant cutout are present in the proximal femur  No bony lesions are appreciated  Normal soft tissue appearance     Impression:  Stable exam with interval and complete healing of the proximal femur fracture      Assessment:  1. Post-operative state    2. Closed displaced intertrochanteric fracture of right femur with routine healing, subsequent encounter        Plan:  1. Status post right intertrochanteric femur fracture with trochanteric nail placement, healed and stable.  2. Referral placed for outpatient PT through [facility] - continue working on range of motion, strengthening, gait training.  3. Continue use of cane for ambulatory assistance.  4. Follow-up as needed.  5. Questions and concerns answered.    Case discussed with Dr. Tom who agrees with the above assessment and plan.      Mady Patel PA-C  09/06/19  12:18 PM

## 2020-01-30 ENCOUNTER — OFFICE VISIT (OUTPATIENT)
Dept: ORTHOPEDIC SURGERY | Facility: CLINIC | Age: 65
End: 2020-01-30

## 2020-01-30 VITALS — WEIGHT: 220 LBS | HEART RATE: 66 BPM | OXYGEN SATURATION: 95 % | HEIGHT: 66 IN | BODY MASS INDEX: 35.36 KG/M2

## 2020-01-30 DIAGNOSIS — M25.561 RIGHT KNEE PAIN, UNSPECIFIED CHRONICITY: Primary | ICD-10-CM

## 2020-01-30 DIAGNOSIS — M17.11 PRIMARY OSTEOARTHRITIS OF RIGHT KNEE: ICD-10-CM

## 2020-01-30 PROCEDURE — 20610 DRAIN/INJ JOINT/BURSA W/O US: CPT | Performed by: ORTHOPAEDIC SURGERY

## 2020-01-30 PROCEDURE — 99214 OFFICE O/P EST MOD 30 MIN: CPT | Performed by: ORTHOPAEDIC SURGERY

## 2020-01-30 RX ORDER — HYDROCODONE BITARTRATE AND ACETAMINOPHEN 10; 325 MG/1; MG/1
1 TABLET ORAL EVERY 8 HOURS PRN
Status: ON HOLD | COMMUNITY
End: 2023-01-25 | Stop reason: SDUPTHER

## 2020-01-30 RX ORDER — LIDOCAINE HYDROCHLORIDE 10 MG/ML
3 INJECTION, SOLUTION EPIDURAL; INFILTRATION; INTRACAUDAL; PERINEURAL
Status: COMPLETED | OUTPATIENT
Start: 2020-01-30 | End: 2020-01-30

## 2020-01-30 RX ORDER — TRIAMCINOLONE ACETONIDE 40 MG/ML
40 INJECTION, SUSPENSION INTRA-ARTICULAR; INTRAMUSCULAR
Status: COMPLETED | OUTPATIENT
Start: 2020-01-30 | End: 2020-01-30

## 2020-01-30 RX ADMIN — TRIAMCINOLONE ACETONIDE 40 MG: 40 INJECTION, SUSPENSION INTRA-ARTICULAR; INTRAMUSCULAR at 14:34

## 2020-01-30 RX ADMIN — LIDOCAINE HYDROCHLORIDE 3 ML: 10 INJECTION, SOLUTION EPIDURAL; INFILTRATION; INTRACAUDAL; PERINEURAL at 14:34

## 2020-01-30 NOTE — PROGRESS NOTES
Procedure   Large Joint Arthrocentesis: R knee  Date/Time: 1/30/2020 2:34 PM  Consent given by: patient  Site marked: site marked  Timeout: Immediately prior to procedure a time out was called to verify the correct patient, procedure, equipment, support staff and site/side marked as required   Supporting Documentation  Indications: pain   Procedure Details  Location: knee - R knee  Preparation: Patient was prepped and draped in the usual sterile fashion  Needle size: 18 G  Medications administered: 3 mL lidocaine PF 1% 1 %; 40 mg triamcinolone acetonide 40 MG/ML  Aspirate amount: 27 mL  Aspirate: bloody  Patient tolerance: patient tolerated the procedure well with no immediate complications

## 2020-01-30 NOTE — PROGRESS NOTES
[facility] Note        Subjective     Pain of the Right Knee      HPI    Charly Blevins is a 64 y.o. female who presents with right knee pain.  The issue has been ongoing for 7 day(s). Pain is a 8/10 on the pain scale. Pain is described as dull, aching, burning, throbbing, stabbing and shooting. Associated symptoms include pain, swelling and giving way/buckling. The pain is worse with walking and standing; resting, sitting and pain medication and/or NSAID improve the pain. Previous treatments have included: nothing.    I have reviewed the following portions of the patient's history:History of Present Illness           Past Medical History:   Diagnosis Date   • Atrial fibrillation (CMS/HCC) 2015    CARDIOVERSION - NO REOCCURANCE SINCE    • Diabetes mellitus (CMS/HCC)    • Hyperlipidemia    • Hypertension       Past Surgical History:   Procedure Laterality Date   • APPENDECTOMY     • CHOLECYSTECTOMY     • COLOSTOMY      RESULTED FROM HYSTERECTOMY    • COLOSTOMY CLOSURE      WHILE DOING COLOSTOMY CLOSURE; ENDED UP HAVING A ILEOSTOMY   • HEMORRHOIDECTOMY     • HIP TROCHANTERIC NAILING WITH INTRAMEDULLARY HIP SCREW Right 3/27/2019    Procedure: HIP TROCANTERIC NAILING WITH INTRAMEDULLARY HIP SCREW RIGHT;  Surgeon: Jona Tom MD;  Location: [facility];  Service: Orthopedics   • HYSTERECTOMY     • LUMBAR DISCECTOMY      L4 - L5   • OTHER SURGICAL HISTORY      TUBAL REVERSAL   • THYROIDECTOMY     • TUBAL ABDOMINAL LIGATION      X 2      Family History   Problem Relation Age of Onset   • Cancer Mother      Social History     Socioeconomic History   • Marital status:      Spouse name: Not on file   • Number of children: Not on file   • Years of education: Not on file   • Highest education level: Not on file   Tobacco Use   • Smoking status: Never Smoker   • Smokeless tobacco: Never Used   Substance and Sexual Activity   • Alcohol use: No   • Drug use: No   • Sexual activity: Defer       Current Outpatient Medications on File Prior to Visit   Medication Sig Dispense Refill   • acetaminophen (TYLENOL) 325 MG tablet Take 2 tablets by mouth Every 4 (Four) Hours As Needed for Mild Pain .     • ALLOPURINOL PO Take  by mouth.     • bisacodyl (DULCOLAX) 10 MG suppository Insert 1 suppository into the rectum Daily As Needed for Constipation.     • docusate sodium 100 MG capsule Take 100 mg by mouth 2 (Two) Times a Day As Needed for Constipation.     • esomeprazole (nexIUM) 20 MG capsule Take 20 mg by mouth Every Morning Before Breakfast.     • ferrous sulfate 325 (65 FE) MG tablet Take 1 tablet by mouth Daily With Breakfast.     • FLUoxetine (PROzac) 20 MG capsule Take 20 mg by mouth 2 (Two) Times a Day.     • folic acid (FOLVITE) 400 MCG tablet Take 1 tablet by mouth Daily.     • gabapentin (NEURONTIN) 100 MG capsule Take 100 mg by mouth 3 (Three) Times a Day.     • HYDROcodone-acetaminophen (NORCO)  MG per tablet Take 1 tablet by mouth Every 6 (Six) Hours As Needed for Moderate Pain .     • hydrOXYzine (ATARAX) 25 MG tablet Take 25 mg by mouth 2 (Two) Times a Day.     • insulin detemir (LEVEMIR) 100 UNIT/ML injection Inject 20 Units under the skin into the appropriate area as directed Every Night.  12   • insulin lispro (humaLOG) 100 UNIT/ML injection Inject 0-9 Units under the skin into the appropriate area as directed 4 (Four) Times a Day With Meals & at Bedtime.  12   • insulin lispro (humaLOG) 100 UNIT/ML injection Inject 8 Units under the skin into the appropriate area as directed 3 (Three) Times a Day With Meals.  12   • levocetirizine (XYZAL) 5 MG tablet Take 5 mg by mouth Every Evening.     • Levothyroxine Sodium (SYNTHROID PO) Take 225 mg by mouth.     • metFORMIN (GLUCOPHAGE) 1000 MG tablet Take 1,000 mg by mouth 2 (Two) Times a Day With Meals.     • METHOTREXATE SODIUM, PF, IJ Inject 1 unit marking on U-100 syringe as directed 1 (One) Time Per Week. Every saturday     • ondansetron  "(ZOFRAN) 4 MG tablet Take 4 mg by mouth Every 8 (Eight) Hours As Needed for Nausea or Vomiting.     • predniSONE (DELTASONE) 5 MG tablet Take 5 mg by mouth Daily.     • promethazine (PHENERGAN) 25 MG tablet Take 25 mg by mouth Every 6 (Six) Hours As Needed for Nausea or Vomiting.     • rosuvastatin (CRESTOR) 5 MG tablet Take 5 mg by mouth Daily.     • sennosides-docusate sodium (SENOKOT-S) 8.6-50 MG tablet Take 2 tablets by mouth Every Night.     • tamsulosin (FLOMAX) 0.4 MG capsule 24 hr capsule Take 1 capsule by mouth Daily. 30 capsule    • zolpidem (AMBIEN) 10 MG tablet Take 20 mg by mouth At Night As Needed for Sleep.     • oxyCODONE-acetaminophen (PERCOCET) 7.5-325 MG per tablet Take 1 tablet by mouth Every 8 (Eight) Hours As Needed for Severe Pain . 30 tablet 0     No current facility-administered medications on file prior to visit.       No Known Allergies     The following portions of the patient's history were reviewed and updated as appropriate: allergies, current medications, past family history, past medical history, past social history, past surgical history and problem list.    Review of Systems   Constitutional: Negative.    HENT: Negative.    Eyes: Negative.    Respiratory: Negative.    Cardiovascular: Negative.    Gastrointestinal: Negative.    Endocrine: Negative.    Genitourinary: Negative.    Musculoskeletal: Positive for arthralgias.   Skin: Negative.    Allergic/Immunologic: Negative.    Neurological: Negative.    Hematological: Negative.    Psychiatric/Behavioral: Negative.             Objective      Physical Exam  Pulse 66   Ht 167.6 cm (65.98\")   Wt 99.8 kg (220 lb)   SpO2 95%   BMI 35.53 kg/m²     Body mass index is 35.53 kg/m².    General  Mental Status - alert  General Appearance - cooperative, well groomed, not in acute distress  Orientation - Oriented X3  Build & Nutrition - well developed and well nourished  Posture - normal posture  Gait - normal gait     Integumentary  Global " Assessment  Examination of related systems reveals - no lymphadenopathy  Ears:  No abnormality  Nose:  No mucous drainage  General Characteristics  Overall examination of the patient's skin reveals - no rashes, no evidence of scars, no suspicious lesions and no bruises.  Color - normal coloration of skin.  Vascular: Brisk capillary refill in all extremities    Ortho Exam  Peripheral Vascular:    Upper Extremity:   Inspection:  Left--no cyanotic nail beds Right--no cyanotic nail beds   Bilateral:  Pink nail beds with brisk capillary refill   Palpation:  Bilateral radial pulse normal    Musculoskeletal:  Global Assessment:  Overall assessment of Lower Extremity Muscle Strength and Tone:  Right quadriceps--5/5   Right hamstrings--5/5       Right tibialis anterior--5/5  Right gastroc-soleus--5/5  Right EHL --5/5    Lower Extremity:  Knee/Patella:  No digital clubbing or cyanosis.    Examination of right knee reveals:  Normal deep tendon reflexes, coordination, strength, tone, sensation.  No known fractures or deformities.    Inspection and Palpation:  Right knee:  Tenderness:  Over the medial and lateral joint line and moderate severity  Effusion:  2+  Crepitus:  Positive  Pulses:  2+  Ecchymosis:  None  Warmth:  None     ROM:  Right:  Extension: 0    flexion: 45    Instability:    Right:  Lachman Test:  Negative, Varus stress test negative, Valgus stress test negative    Deformities/Malalignments/Discrepancies:    Left:  No deformities   Right: No deformity    Functional Testing:  Jenni's test:  Negative  Patella grind test:  Positive  Q-angle:  normal        Imaging/Studies  Imaging Results (Last 24 Hours)     Procedure Component Value Units Date/Time    XR Knee 4+ View Right [[id number]] Resulted:  01/30/20 1403     Updated:  01/30/20 1405    Narrative:       Knee X-Ray    Indication: Pain    Study:  Upright AP, Skiers, Lateral, and Sunrise views of Right knee(s)    Comparison: None    Findings:    Patient appears  to have mild degenerative changes in the medial   compartment.    There are mild degenerative changes in the lateral compartment.    There are severe changes in the patellofemoral compartment.    Patient has overall neutral to slight valgus alignment.   Distal tip of an intramedullary isaac is visible and intact      Impression:   Mild lateral and medial compartment and severe patellofemoral compartment   degnerative changes of the knee               Assessment    Assessment:  1. Right knee pain, unspecified chronicity    2. Primary osteoarthritis of right knee        Plan:  1. Continue over-the-counter medication as needed for discomfort  2. 64-year-old female who sustained a ground-level fall directly on the right knee.  Her x-rays are most consistent with exacerbation of underlying patellofemoral arthritis.  Her extensor mechanism is clearly intact today.  We discussed the risks and benefits of corticosteroid injection and aspiration of the knee.  Plan will be for close watching of her blood sugars for the next 1 to 2 weeks.  We will see her back in about 4 to 6 weeks and if she is not better, consider further imaging or other modalities to the knee  3. Patient did have some old blood within the knee on aspiration and therefore we have asked her to call back in 1 to 2 weeks if she is not dramatically better.  An MRI can be requested at that point.  Hopefully that will not be necessary.        Jona Tom MD  01/30/20  2:33 PM

## 2022-07-26 ENCOUNTER — APPOINTMENT (OUTPATIENT)
Dept: GENERAL RADIOLOGY | Facility: HOSPITAL | Age: 67
End: 2022-07-26

## 2022-07-26 ENCOUNTER — APPOINTMENT (OUTPATIENT)
Dept: CT IMAGING | Facility: HOSPITAL | Age: 67
End: 2022-07-26

## 2022-07-26 ENCOUNTER — HOSPITAL ENCOUNTER (EMERGENCY)
Facility: HOSPITAL | Age: 67
Discharge: HOME | End: 2022-07-26
Attending: EMERGENCY MEDICINE | Admitting: EMERGENCY MEDICINE

## 2022-07-26 VITALS
SYSTOLIC BLOOD PRESSURE: 102 MMHG | HEART RATE: 56 BPM | BODY MASS INDEX: 33.11 KG/M2 | RESPIRATION RATE: 18 BRPM | DIASTOLIC BLOOD PRESSURE: 83 MMHG | HEIGHT: 66 IN | OXYGEN SATURATION: 96 % | WEIGHT: 206 LBS | TEMPERATURE: 98.2 F

## 2022-07-26 DIAGNOSIS — Z13.9 ENCOUNTER FOR MEDICAL SCREENING EXAMINATION: ICD-10-CM

## 2022-07-26 DIAGNOSIS — J20.9 ACUTE BRONCHITIS, UNSPECIFIED ORGANISM: ICD-10-CM

## 2022-07-26 DIAGNOSIS — R06.00 DYSPNEA, UNSPECIFIED TYPE: Primary | ICD-10-CM

## 2022-07-26 LAB
ALBUMIN SERPL-MCNC: 3.2 G/DL (ref 3.5–5.2)
ALBUMIN/GLOB SERPL: 1.1 G/DL
ALP SERPL-CCNC: 49 U/L (ref 39–117)
ALT SERPL W P-5'-P-CCNC: 5 U/L (ref 1–33)
ANION GAP SERPL CALCULATED.3IONS-SCNC: 7 MMOL/L (ref 5–15)
AST SERPL-CCNC: 13 U/L (ref 1–32)
BASOPHILS # BLD AUTO: 0.01 10*3/MM3 (ref 0–0.2)
BASOPHILS NFR BLD AUTO: 0.1 % (ref 0–1.5)
BILIRUB SERPL-MCNC: 0.3 MG/DL (ref 0–1.2)
BUN SERPL-MCNC: 11 MG/DL (ref 8–23)
BUN/CREAT SERPL: 11.5 (ref 7–25)
CALCIUM SPEC-SCNC: 8.5 MG/DL (ref 8.6–10.5)
CHLORIDE SERPL-SCNC: 98 MMOL/L (ref 98–107)
CO2 SERPL-SCNC: 32 MMOL/L (ref 22–29)
CREAT SERPL-MCNC: 0.96 MG/DL (ref 0.57–1)
DEPRECATED RDW RBC AUTO: 60.6 FL (ref 37–54)
EGFRCR SERPLBLD CKD-EPI 2021: 65 ML/MIN/1.73
EOSINOPHIL # BLD AUTO: 0.14 10*3/MM3 (ref 0–0.4)
EOSINOPHIL NFR BLD AUTO: 1.3 % (ref 0.3–6.2)
ERYTHROCYTE [DISTWIDTH] IN BLOOD BY AUTOMATED COUNT: 18.2 % (ref 12.3–15.4)
FLUAV RNA RESP QL NAA+PROBE: NOT DETECTED
FLUBV RNA RESP QL NAA+PROBE: NOT DETECTED
GLOBULIN UR ELPH-MCNC: 2.9 GM/DL
GLUCOSE SERPL-MCNC: 123 MG/DL (ref 65–99)
HCT VFR BLD AUTO: 39.8 % (ref 34–46.6)
HGB BLD-MCNC: 11.9 G/DL (ref 12–15.9)
HOLD SPECIMEN: NORMAL
IMM GRANULOCYTES # BLD AUTO: 0.07 10*3/MM3 (ref 0–0.05)
IMM GRANULOCYTES NFR BLD AUTO: 0.7 % (ref 0–0.5)
INR PPP: 1.3 (ref 0.84–1.13)
LYMPHOCYTES # BLD AUTO: 1.18 10*3/MM3 (ref 0.7–3.1)
LYMPHOCYTES NFR BLD AUTO: 11.1 % (ref 19.6–45.3)
MCH RBC QN AUTO: 27.4 PG (ref 26.6–33)
MCHC RBC AUTO-ENTMCNC: 29.9 G/DL (ref 31.5–35.7)
MCV RBC AUTO: 91.7 FL (ref 79–97)
MONOCYTES # BLD AUTO: 0.67 10*3/MM3 (ref 0.1–0.9)
MONOCYTES NFR BLD AUTO: 6.3 % (ref 5–12)
NEUTROPHILS NFR BLD AUTO: 8.59 10*3/MM3 (ref 1.7–7)
NEUTROPHILS NFR BLD AUTO: 80.5 % (ref 42.7–76)
NRBC BLD AUTO-RTO: 0 /100 WBC (ref 0–0.2)
NT-PROBNP SERPL-MCNC: 573.6 PG/ML (ref 0–900)
PLATELET # BLD AUTO: 258 10*3/MM3 (ref 140–450)
PMV BLD AUTO: 10.1 FL (ref 6–12)
POTASSIUM SERPL-SCNC: 4.5 MMOL/L (ref 3.5–5.2)
PROT SERPL-MCNC: 6.1 G/DL (ref 6–8.5)
PROTHROMBIN TIME: 16.1 SECONDS (ref 11.4–14.4)
RBC # BLD AUTO: 4.34 10*6/MM3 (ref 3.77–5.28)
SARS-COV-2 RNA RESP QL NAA+PROBE: NOT DETECTED
SODIUM SERPL-SCNC: 137 MMOL/L (ref 136–145)
TROPONIN T SERPL-MCNC: <0.01 NG/ML (ref 0–0.03)
WBC NRBC COR # BLD: 10.66 10*3/MM3 (ref 3.4–10.8)
WHOLE BLOOD HOLD COAG: NORMAL
WHOLE BLOOD HOLD SPECIMEN: NORMAL

## 2022-07-26 PROCEDURE — 71045 X-RAY EXAM CHEST 1 VIEW: CPT

## 2022-07-26 PROCEDURE — 93005 ELECTROCARDIOGRAM TRACING: CPT | Performed by: EMERGENCY MEDICINE

## 2022-07-26 PROCEDURE — 80053 COMPREHEN METABOLIC PANEL: CPT

## 2022-07-26 PROCEDURE — 87636 SARSCOV2 & INF A&B AMP PRB: CPT | Performed by: EMERGENCY MEDICINE

## 2022-07-26 PROCEDURE — 84484 ASSAY OF TROPONIN QUANT: CPT

## 2022-07-26 PROCEDURE — 36415 COLL VENOUS BLD VENIPUNCTURE: CPT

## 2022-07-26 PROCEDURE — 85610 PROTHROMBIN TIME: CPT | Performed by: EMERGENCY MEDICINE

## 2022-07-26 PROCEDURE — 99284 EMERGENCY DEPT VISIT MOD MDM: CPT

## 2022-07-26 PROCEDURE — 83880 ASSAY OF NATRIURETIC PEPTIDE: CPT

## 2022-07-26 PROCEDURE — 71275 CT ANGIOGRAPHY CHEST: CPT

## 2022-07-26 PROCEDURE — 96374 THER/PROPH/DIAG INJ IV PUSH: CPT

## 2022-07-26 PROCEDURE — 0 IOPAMIDOL PER 1 ML: Performed by: EMERGENCY MEDICINE

## 2022-07-26 PROCEDURE — 25010000002 FUROSEMIDE PER 20 MG: Performed by: EMERGENCY MEDICINE

## 2022-07-26 PROCEDURE — 93005 ELECTROCARDIOGRAM TRACING: CPT

## 2022-07-26 PROCEDURE — 85025 COMPLETE CBC W/AUTO DIFF WBC: CPT

## 2022-07-26 RX ORDER — SODIUM CHLORIDE 0.9 % (FLUSH) 0.9 %
10 SYRINGE (ML) INJECTION AS NEEDED
Status: DISCONTINUED | OUTPATIENT
Start: 2022-07-26 | End: 2022-07-27 | Stop reason: HOSPADM

## 2022-07-26 RX ORDER — FUROSEMIDE 10 MG/ML
80 INJECTION INTRAMUSCULAR; INTRAVENOUS ONCE
Status: COMPLETED | OUTPATIENT
Start: 2022-07-26 | End: 2022-07-26

## 2022-07-26 RX ADMIN — IOPAMIDOL 80 ML: 755 INJECTION, SOLUTION INTRAVENOUS at 20:12

## 2022-07-26 RX ADMIN — FUROSEMIDE 80 MG: 10 INJECTION, SOLUTION INTRAMUSCULAR; INTRAVENOUS at 19:43

## 2022-07-29 LAB
QT INTERVAL: 474 MS
QTC INTERVAL: 469 MS

## 2022-12-17 ENCOUNTER — APPOINTMENT (OUTPATIENT)
Dept: CT IMAGING | Facility: HOSPITAL | Age: 67
DRG: 871 | End: 2022-12-17
Payer: MEDICARE

## 2022-12-17 ENCOUNTER — APPOINTMENT (OUTPATIENT)
Dept: GENERAL RADIOLOGY | Facility: HOSPITAL | Age: 67
DRG: 871 | End: 2022-12-17
Payer: MEDICARE

## 2022-12-17 ENCOUNTER — HOSPITAL ENCOUNTER (INPATIENT)
Facility: HOSPITAL | Age: 67
LOS: 17 days | Discharge: HOME HEALTH | DRG: 871 | End: 2023-01-03
Attending: EMERGENCY MEDICINE | Admitting: INTERNAL MEDICINE
Payer: MEDICARE

## 2022-12-17 DIAGNOSIS — J96.01 ACUTE RESPIRATORY FAILURE WITH HYPOXIA: ICD-10-CM

## 2022-12-17 DIAGNOSIS — N17.9 AKI (ACUTE KIDNEY INJURY): ICD-10-CM

## 2022-12-17 DIAGNOSIS — J10.1 INFLUENZA A: ICD-10-CM

## 2022-12-17 DIAGNOSIS — R65.21 SEPTIC SHOCK: Primary | ICD-10-CM

## 2022-12-17 DIAGNOSIS — J15.9 BACTERIAL PNEUMONIA: ICD-10-CM

## 2022-12-17 DIAGNOSIS — A41.9 SEPTIC SHOCK: Primary | ICD-10-CM

## 2022-12-17 DIAGNOSIS — G89.4 CHRONIC PAIN SYNDROME: ICD-10-CM

## 2022-12-17 DIAGNOSIS — Z99.81 ON HOME OXYGEN THERAPY: ICD-10-CM

## 2022-12-17 PROBLEM — E78.5 DYSLIPIDEMIA: Chronic | Status: ACTIVE | Noted: 2019-03-26

## 2022-12-17 PROBLEM — D84.9 IMMUNOSUPPRESSION (HCC): Status: ACTIVE | Noted: 2022-12-17

## 2022-12-17 PROBLEM — E66.9 OBESITY (BMI 30-39.9): Chronic | Status: ACTIVE | Noted: 2022-12-17

## 2022-12-17 PROBLEM — E03.9 HYPOTHYROIDISM: Status: ACTIVE | Noted: 2022-12-17

## 2022-12-17 PROBLEM — E03.9 HYPOTHYROIDISM: Chronic | Status: ACTIVE | Noted: 2022-12-17

## 2022-12-17 PROBLEM — M06.9 RHEUMATOID ARTHRITIS (HCC): Chronic | Status: ACTIVE | Noted: 2019-03-26

## 2022-12-17 PROBLEM — D84.9 IMMUNOSUPPRESSION (HCC): Chronic | Status: ACTIVE | Noted: 2022-12-17

## 2022-12-17 PROBLEM — K21.9 GERD (GASTROESOPHAGEAL REFLUX DISEASE): Chronic | Status: ACTIVE | Noted: 2022-12-17

## 2022-12-17 PROBLEM — K21.9 GERD (GASTROESOPHAGEAL REFLUX DISEASE): Status: ACTIVE | Noted: 2022-12-17

## 2022-12-17 PROBLEM — F32.A ANXIETY AND DEPRESSION: Chronic | Status: ACTIVE | Noted: 2022-12-17

## 2022-12-17 PROBLEM — M10.9 GOUT: Status: ACTIVE | Noted: 2022-12-17

## 2022-12-17 PROBLEM — M10.9 GOUT: Chronic | Status: ACTIVE | Noted: 2022-12-17

## 2022-12-17 PROBLEM — E11.9 DM II (DIABETES MELLITUS, TYPE II), CONTROLLED: Chronic | Status: ACTIVE | Noted: 2019-03-26

## 2022-12-17 PROBLEM — F41.9 ANXIETY AND DEPRESSION: Chronic | Status: ACTIVE | Noted: 2022-12-17

## 2022-12-17 PROBLEM — E66.9 OBESITY (BMI 30-39.9): Status: ACTIVE | Noted: 2022-12-17

## 2022-12-17 PROBLEM — F41.9 ANXIETY AND DEPRESSION: Status: ACTIVE | Noted: 2022-12-17

## 2022-12-17 PROBLEM — F32.A ANXIETY AND DEPRESSION: Status: ACTIVE | Noted: 2022-12-17

## 2022-12-17 LAB
ALBUMIN SERPL-MCNC: 3.4 G/DL (ref 3.5–5.2)
ALBUMIN/GLOB SERPL: 1.1 G/DL
ALP SERPL-CCNC: 66 U/L (ref 39–117)
ALT SERPL W P-5'-P-CCNC: 20 U/L (ref 1–33)
AMPHET+METHAMPHET UR QL: NEGATIVE
AMPHETAMINES UR QL: NEGATIVE
ANION GAP SERPL CALCULATED.3IONS-SCNC: 21 MMOL/L (ref 5–15)
ARTERIAL PATENCY WRIST A: ABNORMAL
AST SERPL-CCNC: 23 U/L (ref 1–32)
ATMOSPHERIC PRESS: ABNORMAL MM[HG]
BARBITURATES UR QL SCN: NEGATIVE
BASE EXCESS BLDA CALC-SCNC: -6.4 MMOL/L (ref 0–2)
BASOPHILS # BLD MANUAL: 0 10*3/MM3 (ref 0–0.2)
BASOPHILS NFR BLD MANUAL: 0 % (ref 0–1.5)
BDY SITE: ABNORMAL
BENZODIAZ UR QL SCN: POSITIVE
BILIRUB SERPL-MCNC: 0.3 MG/DL (ref 0–1.2)
BILIRUB UR QL STRIP: NEGATIVE
BODY TEMPERATURE: 37 C
BUN BLDA-MCNC: 25 MG/DL (ref 8–26)
BUN SERPL-MCNC: 22 MG/DL (ref 8–23)
BUN/CREAT SERPL: 12.4 (ref 7–25)
BUPRENORPHINE SERPL-MCNC: NEGATIVE NG/ML
CA-I BLDA-SCNC: 1.14 MMOL/L (ref 1.2–1.32)
CALCIUM SPEC-SCNC: 8.6 MG/DL (ref 8.6–10.5)
CANNABINOIDS SERPL QL: NEGATIVE
CHLORIDE BLDA-SCNC: 103 MMOL/L (ref 98–109)
CHLORIDE SERPL-SCNC: 103 MMOL/L (ref 98–107)
CK SERPL-CCNC: 160 U/L (ref 20–180)
CLARITY UR: CLEAR
CO2 BLDA-SCNC: 20.1 MMOL/L (ref 22–33)
CO2 BLDA-SCNC: 22 MMOL/L (ref 24–29)
CO2 SERPL-SCNC: 14 MMOL/L (ref 22–29)
COCAINE UR QL: NEGATIVE
COHGB MFR BLD: 0.7 % (ref 0–2)
COLOR UR: YELLOW
CORTIS SERPL-MCNC: 40.36 MCG/DL
CREAT BLDA-MCNC: 1.8 MG/DL (ref 0.6–1.3)
CREAT SERPL-MCNC: 1.77 MG/DL (ref 0.57–1)
CREAT UR-MCNC: 82.2 MG/DL
D-LACTATE SERPL-SCNC: 2 MMOL/L (ref 0.5–2)
D-LACTATE SERPL-SCNC: 4.7 MMOL/L (ref 0.5–2)
D-LACTATE SERPL-SCNC: 8.4 MMOL/L (ref 0.5–2)
DEPRECATED RDW RBC AUTO: 61.5 FL (ref 37–54)
EGFRCR SERPLBLD CKD-EPI 2021: 30.4 ML/MIN/1.73
EGFRCR SERPLBLD CKD-EPI 2021: 31 ML/MIN/1.73
EOSINOPHIL # BLD MANUAL: 0 10*3/MM3 (ref 0–0.4)
EOSINOPHIL NFR BLD MANUAL: 0 % (ref 0.3–6.2)
EPAP: 6
ERYTHROCYTE [DISTWIDTH] IN BLOOD BY AUTOMATED COUNT: 18.1 % (ref 12.3–15.4)
FLUAV SUBTYP SPEC NAA+PROBE: DETECTED
FLUBV RNA ISLT QL NAA+PROBE: NOT DETECTED
GLOBULIN UR ELPH-MCNC: 3 GM/DL
GLUCOSE BLDC GLUCOMTR-MCNC: 187 MG/DL (ref 70–130)
GLUCOSE BLDC GLUCOMTR-MCNC: 192 MG/DL (ref 70–130)
GLUCOSE BLDC GLUCOMTR-MCNC: 194 MG/DL (ref 70–130)
GLUCOSE SERPL-MCNC: 192 MG/DL (ref 65–99)
GLUCOSE UR STRIP-MCNC: ABNORMAL MG/DL
HCO3 BLDA-SCNC: 19 MMOL/L (ref 20–26)
HCT VFR BLD AUTO: 43.1 % (ref 34–46.6)
HCT VFR BLD CALC: 33.5 % (ref 38–51)
HCT VFR BLDA CALC: 45 % (ref 38–51)
HGB BLD-MCNC: 12.7 G/DL (ref 12–15.9)
HGB BLDA-MCNC: 10.9 G/DL (ref 14–18)
HGB BLDA-MCNC: 15.3 G/DL (ref 12–17)
HGB UR QL STRIP.AUTO: NEGATIVE
HOLD SPECIMEN: NORMAL
INHALED O2 CONCENTRATION: 80 %
INR PPP: 2.6 (ref 0.8–1.2)
IPAP: 14
KETONES UR QL STRIP: NEGATIVE
L PNEUMO1 AG UR QL IA: NEGATIVE
LEUKOCYTE ESTERASE UR QL STRIP.AUTO: NEGATIVE
LYMPHOCYTES # BLD MANUAL: 1.67 10*3/MM3 (ref 0.7–3.1)
LYMPHOCYTES NFR BLD MANUAL: 4 % (ref 5–12)
MAGNESIUM SERPL-MCNC: 1.4 MG/DL (ref 1.6–2.4)
MCH RBC QN AUTO: 27.9 PG (ref 26.6–33)
MCHC RBC AUTO-ENTMCNC: 29.5 G/DL (ref 31.5–35.7)
MCV RBC AUTO: 94.5 FL (ref 79–97)
METHADONE UR QL SCN: NEGATIVE
METHGB BLD QL: 0.1 % (ref 0–1.5)
MODALITY: ABNORMAL
MONOCYTES # BLD: 0.95 10*3/MM3 (ref 0.1–0.9)
MRSA DNA SPEC QL NAA+PROBE: NEGATIVE
NEUTROPHILS # BLD AUTO: 21.23 10*3/MM3 (ref 1.7–7)
NEUTROPHILS NFR BLD MANUAL: 62 % (ref 42.7–76)
NEUTS BAND NFR BLD MANUAL: 27 % (ref 0–5)
NITRITE UR QL STRIP: NEGATIVE
NOTE: ABNORMAL
NT-PROBNP SERPL-MCNC: 9401 PG/ML (ref 0–900)
OPIATES UR QL: POSITIVE
OXYCODONE UR QL SCN: NEGATIVE
OXYHGB MFR BLDV: 94.3 % (ref 94–99)
PAW @ PEAK INSP FLOW SETTING VENT: 0 CMH2O
PCO2 BLDA: 36.6 MM HG (ref 35–45)
PCO2 TEMP ADJ BLD: 36.6 MM HG (ref 35–45)
PCP UR QL SCN: NEGATIVE
PH BLDA: 7.32 PH UNITS (ref 7.35–7.45)
PH UR STRIP.AUTO: <=5 [PH] (ref 5–8)
PH, TEMP CORRECTED: 7.32 PH UNITS
PLAT MORPH BLD: NORMAL
PLATELET # BLD AUTO: 390 10*3/MM3 (ref 140–450)
PMV BLD AUTO: 10.7 FL (ref 6–12)
PO2 BLDA: 73.4 MM HG (ref 83–108)
PO2 TEMP ADJ BLD: 73.4 MM HG (ref 83–108)
POTASSIUM BLDA-SCNC: 4.1 MMOL/L (ref 3.5–4.9)
POTASSIUM SERPL-SCNC: 4.1 MMOL/L (ref 3.5–5.2)
PROCALCITONIN SERPL-MCNC: 16.28 NG/ML (ref 0–0.25)
PROPOXYPH UR QL: NEGATIVE
PROT SERPL-MCNC: 6.4 G/DL (ref 6–8.5)
PROT UR QL STRIP: ABNORMAL
PROTHROMBIN TIME: 29.6 SECONDS (ref 12.8–15.2)
QT INTERVAL: 456 MS
QTC INTERVAL: 529 MS
RBC # BLD AUTO: 4.56 10*6/MM3 (ref 3.77–5.28)
RBC MORPH BLD: NORMAL
S PNEUM AG SPEC QL LA: POSITIVE
SARS-COV-2 RNA PNL SPEC NAA+PROBE: NOT DETECTED
SODIUM BLD-SCNC: 141 MMOL/L (ref 138–146)
SODIUM SERPL-SCNC: 138 MMOL/L (ref 136–145)
SODIUM UR-SCNC: 47 MMOL/L
SP GR UR STRIP: 1.03 (ref 1–1.03)
T4 FREE SERPL-MCNC: 1.41 NG/DL (ref 0.93–1.7)
TOTAL RATE: 0 BREATHS/MINUTE
TRICYCLICS UR QL SCN: POSITIVE
TROPONIN T SERPL-MCNC: 0.02 NG/ML (ref 0–0.03)
TSH SERPL DL<=0.05 MIU/L-ACNC: 4.99 UIU/ML (ref 0.27–4.2)
UROBILINOGEN UR QL STRIP: ABNORMAL
VARIANT LYMPHS NFR BLD MANUAL: 7 % (ref 19.6–45.3)
WBC MORPH BLD: NORMAL
WBC NRBC COR # BLD: 23.85 10*3/MM3 (ref 3.4–10.8)
WHOLE BLOOD HOLD COAG: NORMAL
WHOLE BLOOD HOLD SPECIMEN: NORMAL

## 2022-12-17 PROCEDURE — 73523 X-RAY EXAM HIPS BI 5/> VIEWS: CPT

## 2022-12-17 PROCEDURE — 82550 ASSAY OF CK (CPK): CPT | Performed by: NURSE PRACTITIONER

## 2022-12-17 PROCEDURE — 85014 HEMATOCRIT: CPT

## 2022-12-17 PROCEDURE — 84300 ASSAY OF URINE SODIUM: CPT | Performed by: INTERNAL MEDICINE

## 2022-12-17 PROCEDURE — 82375 ASSAY CARBOXYHB QUANT: CPT

## 2022-12-17 PROCEDURE — 80053 COMPREHEN METABOLIC PANEL: CPT | Performed by: EMERGENCY MEDICINE

## 2022-12-17 PROCEDURE — 82805 BLOOD GASES W/O2 SATURATION: CPT

## 2022-12-17 PROCEDURE — 80306 DRUG TEST PRSMV INSTRMNT: CPT | Performed by: NURSE PRACTITIONER

## 2022-12-17 PROCEDURE — 87040 BLOOD CULTURE FOR BACTERIA: CPT | Performed by: EMERGENCY MEDICINE

## 2022-12-17 PROCEDURE — 99285 EMERGENCY DEPT VISIT HI MDM: CPT

## 2022-12-17 PROCEDURE — 85025 COMPLETE CBC W/AUTO DIFF WBC: CPT | Performed by: EMERGENCY MEDICINE

## 2022-12-17 PROCEDURE — 87899 AGENT NOS ASSAY W/OPTIC: CPT | Performed by: NURSE PRACTITIONER

## 2022-12-17 PROCEDURE — 25010000002 PIPERACILLIN SOD-TAZOBACTAM PER 1 G: Performed by: NURSE PRACTITIONER

## 2022-12-17 PROCEDURE — 36600 WITHDRAWAL OF ARTERIAL BLOOD: CPT

## 2022-12-17 PROCEDURE — 94799 UNLISTED PULMONARY SVC/PX: CPT

## 2022-12-17 PROCEDURE — 63710000001 INSULIN LISPRO (HUMAN) PER 5 UNITS: Performed by: NURSE PRACTITIONER

## 2022-12-17 PROCEDURE — 84443 ASSAY THYROID STIM HORMONE: CPT | Performed by: NURSE PRACTITIONER

## 2022-12-17 PROCEDURE — 83735 ASSAY OF MAGNESIUM: CPT | Performed by: EMERGENCY MEDICINE

## 2022-12-17 PROCEDURE — 25010000002 PIPERACILLIN SOD-TAZOBACTAM PER 1 G: Performed by: EMERGENCY MEDICINE

## 2022-12-17 PROCEDURE — 84145 PROCALCITONIN (PCT): CPT | Performed by: EMERGENCY MEDICINE

## 2022-12-17 PROCEDURE — 82533 TOTAL CORTISOL: CPT | Performed by: NURSE PRACTITIONER

## 2022-12-17 PROCEDURE — 82570 ASSAY OF URINE CREATININE: CPT | Performed by: INTERNAL MEDICINE

## 2022-12-17 PROCEDURE — 36415 COLL VENOUS BLD VENIPUNCTURE: CPT

## 2022-12-17 PROCEDURE — 99291 CRITICAL CARE FIRST HOUR: CPT | Performed by: INTERNAL MEDICINE

## 2022-12-17 PROCEDURE — 25010000002 VANCOMYCIN 10 G RECONSTITUTED SOLUTION: Performed by: EMERGENCY MEDICINE

## 2022-12-17 PROCEDURE — 25010000002 HEPARIN (PORCINE) PER 1000 UNITS: Performed by: NURSE PRACTITIONER

## 2022-12-17 PROCEDURE — 02HV33Z INSERTION OF INFUSION DEVICE INTO SUPERIOR VENA CAVA, PERCUTANEOUS APPROACH: ICD-10-PCS | Performed by: INTERNAL MEDICINE

## 2022-12-17 PROCEDURE — 87641 MR-STAPH DNA AMP PROBE: CPT | Performed by: NURSE PRACTITIONER

## 2022-12-17 PROCEDURE — 83880 ASSAY OF NATRIURETIC PEPTIDE: CPT | Performed by: EMERGENCY MEDICINE

## 2022-12-17 PROCEDURE — 80047 BASIC METABLC PNL IONIZED CA: CPT

## 2022-12-17 PROCEDURE — 94660 CPAP INITIATION&MGMT: CPT

## 2022-12-17 PROCEDURE — 84439 ASSAY OF FREE THYROXINE: CPT | Performed by: NURSE PRACTITIONER

## 2022-12-17 PROCEDURE — 83050 HGB METHEMOGLOBIN QUAN: CPT

## 2022-12-17 PROCEDURE — 82962 GLUCOSE BLOOD TEST: CPT

## 2022-12-17 PROCEDURE — 85007 BL SMEAR W/DIFF WBC COUNT: CPT | Performed by: EMERGENCY MEDICINE

## 2022-12-17 PROCEDURE — 93005 ELECTROCARDIOGRAM TRACING: CPT | Performed by: EMERGENCY MEDICINE

## 2022-12-17 PROCEDURE — 36556 INSERT NON-TUNNEL CV CATH: CPT | Performed by: INTERNAL MEDICINE

## 2022-12-17 PROCEDURE — 85610 PROTHROMBIN TIME: CPT

## 2022-12-17 PROCEDURE — 25010000002 HYDROCORTISONE SOD SUC (PF) 100 MG RECONSTITUTED SOLUTION: Performed by: INTERNAL MEDICINE

## 2022-12-17 PROCEDURE — 81003 URINALYSIS AUTO W/O SCOPE: CPT | Performed by: NURSE PRACTITIONER

## 2022-12-17 PROCEDURE — 84484 ASSAY OF TROPONIN QUANT: CPT | Performed by: EMERGENCY MEDICINE

## 2022-12-17 PROCEDURE — 71045 X-RAY EXAM CHEST 1 VIEW: CPT

## 2022-12-17 PROCEDURE — 87449 NOS EACH ORGANISM AG IA: CPT | Performed by: NURSE PRACTITIONER

## 2022-12-17 PROCEDURE — 70450 CT HEAD/BRAIN W/O DYE: CPT

## 2022-12-17 PROCEDURE — 87636 SARSCOV2 & INF A&B AMP PRB: CPT | Performed by: EMERGENCY MEDICINE

## 2022-12-17 PROCEDURE — 83605 ASSAY OF LACTIC ACID: CPT | Performed by: EMERGENCY MEDICINE

## 2022-12-17 PROCEDURE — 76937 US GUIDE VASCULAR ACCESS: CPT | Performed by: INTERNAL MEDICINE

## 2022-12-17 RX ORDER — SODIUM CHLORIDE 0.9 % (FLUSH) 0.9 %
10 SYRINGE (ML) INJECTION AS NEEDED
Status: DISCONTINUED | OUTPATIENT
Start: 2022-12-17 | End: 2023-01-03 | Stop reason: HOSPADM

## 2022-12-17 RX ORDER — SODIUM CHLORIDE, SODIUM LACTATE, POTASSIUM CHLORIDE, CALCIUM CHLORIDE 600; 310; 30; 20 MG/100ML; MG/100ML; MG/100ML; MG/100ML
75 INJECTION, SOLUTION INTRAVENOUS CONTINUOUS
Status: ACTIVE | OUTPATIENT
Start: 2022-12-17 | End: 2022-12-18

## 2022-12-17 RX ORDER — LANOLIN ALCOHOL/MO/W.PET/CERES
400 CREAM (GRAM) TOPICAL DAILY
Status: DISCONTINUED | OUTPATIENT
Start: 2022-12-17 | End: 2023-01-03 | Stop reason: HOSPADM

## 2022-12-17 RX ORDER — NOREPINEPHRINE BIT/0.9 % NACL 8 MG/250ML
.02-.3 INFUSION BOTTLE (ML) INTRAVENOUS
Status: DISCONTINUED | OUTPATIENT
Start: 2022-12-17 | End: 2022-12-19

## 2022-12-17 RX ORDER — DEXTROSE MONOHYDRATE 25 G/50ML
25 INJECTION, SOLUTION INTRAVENOUS
Status: DISCONTINUED | OUTPATIENT
Start: 2022-12-17 | End: 2023-01-03 | Stop reason: HOSPADM

## 2022-12-17 RX ORDER — GABAPENTIN 100 MG/1
100 CAPSULE ORAL 3 TIMES DAILY
Status: DISCONTINUED | OUTPATIENT
Start: 2022-12-17 | End: 2022-12-17

## 2022-12-17 RX ORDER — ACETAMINOPHEN 325 MG/1
650 TABLET ORAL EVERY 6 HOURS PRN
Status: DISCONTINUED | OUTPATIENT
Start: 2022-12-17 | End: 2022-12-21

## 2022-12-17 RX ORDER — MAGNESIUM SULFATE HEPTAHYDRATE 40 MG/ML
2 INJECTION, SOLUTION INTRAVENOUS AS NEEDED
Status: DISCONTINUED | OUTPATIENT
Start: 2022-12-17 | End: 2022-12-19

## 2022-12-17 RX ORDER — VANCOMYCIN HYDROCHLORIDE 1 G/200ML
1000 INJECTION, SOLUTION INTRAVENOUS EVERY 24 HOURS
Status: DISCONTINUED | OUTPATIENT
Start: 2022-12-18 | End: 2022-12-19

## 2022-12-17 RX ORDER — GABAPENTIN 100 MG/1
100 CAPSULE ORAL EVERY 12 HOURS SCHEDULED
Status: DISCONTINUED | OUTPATIENT
Start: 2022-12-18 | End: 2023-01-03 | Stop reason: HOSPADM

## 2022-12-17 RX ORDER — FLECAINIDE ACETATE 100 MG/1
50 TABLET ORAL 2 TIMES DAILY
COMMUNITY
Start: 2023-02-09

## 2022-12-17 RX ORDER — DAPAGLIFLOZIN 10 MG/1
10 TABLET, FILM COATED ORAL DAILY
COMMUNITY

## 2022-12-17 RX ORDER — TERBINAFINE HYDROCHLORIDE 250 MG/1
250 TABLET ORAL 2 TIMES DAILY
COMMUNITY

## 2022-12-17 RX ORDER — NICOTINE POLACRILEX 4 MG
15 LOZENGE BUCCAL
Status: DISCONTINUED | OUTPATIENT
Start: 2022-12-17 | End: 2022-12-19

## 2022-12-17 RX ORDER — CYCLOBENZAPRINE HCL 10 MG
10 TABLET ORAL ONCE
COMMUNITY
End: 2023-01-25 | Stop reason: HOSPADM

## 2022-12-17 RX ORDER — PREDNISONE 1 MG/1
5 TABLET ORAL DAILY
Status: DISCONTINUED | OUTPATIENT
Start: 2022-12-17 | End: 2022-12-17

## 2022-12-17 RX ORDER — ROSUVASTATIN CALCIUM 10 MG/1
5 TABLET, COATED ORAL DAILY
Status: DISCONTINUED | OUTPATIENT
Start: 2022-12-17 | End: 2023-01-03 | Stop reason: HOSPADM

## 2022-12-17 RX ORDER — MAGNESIUM SULFATE HEPTAHYDRATE 40 MG/ML
4 INJECTION, SOLUTION INTRAVENOUS AS NEEDED
Status: DISCONTINUED | OUTPATIENT
Start: 2022-12-17 | End: 2022-12-19

## 2022-12-17 RX ORDER — HEPARIN SODIUM 5000 [USP'U]/ML
5000 INJECTION, SOLUTION INTRAVENOUS; SUBCUTANEOUS EVERY 8 HOURS SCHEDULED
Status: DISCONTINUED | OUTPATIENT
Start: 2022-12-17 | End: 2022-12-20

## 2022-12-17 RX ORDER — HYDROXYCHLOROQUINE SULFATE 200 MG/1
TABLET, FILM COATED ORAL DAILY
Status: ON HOLD | COMMUNITY
End: 2022-12-26

## 2022-12-17 RX ORDER — TRAZODONE HYDROCHLORIDE 100 MG/1
100 TABLET ORAL NIGHTLY
COMMUNITY

## 2022-12-17 RX ORDER — FLUOXETINE HYDROCHLORIDE 20 MG/1
20 CAPSULE ORAL 2 TIMES DAILY
Status: DISCONTINUED | OUTPATIENT
Start: 2022-12-17 | End: 2023-01-03 | Stop reason: HOSPADM

## 2022-12-17 RX ORDER — COLESEVELAM 180 1/1
625 TABLET ORAL DAILY
Status: ON HOLD | COMMUNITY
End: 2022-12-26

## 2022-12-17 RX ORDER — INSULIN LISPRO 100 [IU]/ML
0-14 INJECTION, SOLUTION INTRAVENOUS; SUBCUTANEOUS
Status: DISCONTINUED | OUTPATIENT
Start: 2022-12-17 | End: 2022-12-31

## 2022-12-17 RX ORDER — OSELTAMIVIR PHOSPHATE 75 MG/1
75 CAPSULE ORAL EVERY 12 HOURS SCHEDULED
Status: DISCONTINUED | OUTPATIENT
Start: 2022-12-17 | End: 2022-12-18

## 2022-12-17 RX ADMIN — OSELTAMIVIR PHOSPHATE 75 MG: 75 CAPSULE ORAL at 21:09

## 2022-12-17 RX ADMIN — FOLIC ACID TAB 400 MCG 400 MCG: 400 TAB at 16:51

## 2022-12-17 RX ADMIN — Medication 0.02 MCG/KG/MIN: at 12:28

## 2022-12-17 RX ADMIN — DOXYCYCLINE 100 MG: 100 INJECTION, POWDER, LYOPHILIZED, FOR SOLUTION INTRAVENOUS at 13:35

## 2022-12-17 RX ADMIN — TAZOBACTAM SODIUM AND PIPERACILLIN SODIUM 4.5 G: 500; 4 INJECTION, SOLUTION INTRAVENOUS at 12:26

## 2022-12-17 RX ADMIN — VANCOMYCIN HYDROCHLORIDE 1750 MG: 10 INJECTION, POWDER, LYOPHILIZED, FOR SOLUTION INTRAVENOUS at 20:42

## 2022-12-17 RX ADMIN — FLUOXETINE 20 MG: 20 CAPSULE ORAL at 21:09

## 2022-12-17 RX ADMIN — ROSUVASTATIN CALCIUM 5 MG: 10 TABLET, COATED ORAL at 16:51

## 2022-12-17 RX ADMIN — SODIUM CHLORIDE 1000 ML: 9 INJECTION, SOLUTION INTRAVENOUS at 13:42

## 2022-12-17 RX ADMIN — ACETAMINOPHEN 650 MG: 325 TABLET ORAL at 18:06

## 2022-12-17 RX ADMIN — SODIUM CHLORIDE, POTASSIUM CHLORIDE, SODIUM LACTATE AND CALCIUM CHLORIDE 75 ML/HR: 600; 310; 30; 20 INJECTION, SOLUTION INTRAVENOUS at 16:51

## 2022-12-17 RX ADMIN — SODIUM CHLORIDE 1000 ML: 9 INJECTION, SOLUTION INTRAVENOUS at 11:21

## 2022-12-17 RX ADMIN — HEPARIN SODIUM 5000 UNITS: 5000 INJECTION INTRAVENOUS; SUBCUTANEOUS at 16:14

## 2022-12-17 RX ADMIN — Medication 0.14 MCG/KG/MIN: at 20:58

## 2022-12-17 RX ADMIN — INSULIN LISPRO 3 UNITS: 100 INJECTION, SOLUTION INTRAVENOUS; SUBCUTANEOUS at 21:34

## 2022-12-17 RX ADMIN — GABAPENTIN 100 MG: 100 CAPSULE ORAL at 16:51

## 2022-12-17 RX ADMIN — HEPARIN SODIUM 5000 UNITS: 5000 INJECTION INTRAVENOUS; SUBCUTANEOUS at 21:09

## 2022-12-17 RX ADMIN — SODIUM CHLORIDE, POTASSIUM CHLORIDE, SODIUM LACTATE AND CALCIUM CHLORIDE 75 ML/HR: 600; 310; 30; 20 INJECTION, SOLUTION INTRAVENOUS at 20:58

## 2022-12-17 RX ADMIN — HYDROCORTISONE SODIUM SUCCINATE 50 MG: 100 INJECTION, POWDER, FOR SOLUTION INTRAMUSCULAR; INTRAVENOUS at 17:01

## 2022-12-17 RX ADMIN — INSULIN LISPRO 3 UNITS: 100 INJECTION, SOLUTION INTRAVENOUS; SUBCUTANEOUS at 18:06

## 2022-12-17 RX ADMIN — TAZOBACTAM SODIUM AND PIPERACILLIN SODIUM 4.5 G: 500; 4 INJECTION, SOLUTION INTRAVENOUS at 22:40

## 2022-12-17 NOTE — PROCEDURES
"Insert Central Line At Bedside    Date/Time: 12/17/2022 6:07 PM  Performed by: Jersey Garnett MD  Authorized by: Jersey Garnett MD   Consent: Verbal consent obtained. Written consent obtained.  Risks and benefits: risks, benefits and alternatives were discussed  Consent given by: guardian  Patient understanding: patient states understanding of the procedure being performed  Patient consent: the patient's understanding of the procedure matches consent given  Procedure consent: procedure consent matches procedure scheduled  Relevant documents: relevant documents present and verified  Test results: test results available and properly labeled  Site marked: the operative site was marked  Imaging studies: imaging studies available  Required items: required blood products, implants, devices, and special equipment available  Patient identity confirmed: verbally with patient  Time out: Immediately prior to procedure a \"time out\" was called to verify the correct patient, procedure, equipment, support staff and site/side marked as required.  Indications: vascular access  Anesthesia: local infiltration    Anesthesia:  Local Anesthetic: lidocaine 1% without epinephrine  Anesthetic total: 4 mL    Sedation:  Patient sedated: no    Preparation: skin prepped with ChloraPrep  Skin prep agent dried: skin prep agent completely dried prior to procedure  Sterile barriers: all five maximum sterile barriers used - cap, mask, sterile gown, sterile gloves, and large sterile sheet  Hand hygiene: hand hygiene performed prior to central venous catheter insertion  Location details: right internal jugular  Patient position: Trendelenburg  Catheter type: triple lumen  Catheter size: 7 Fr  Pre-procedure: landmarks identified  Ultrasound guidance: yes  Sterile ultrasound techniques: sterile gel and sterile probe covers were used  Number of attempts: 1  Successful placement: yes  Post-procedure: line sutured and dressing applied  Assessment: " blood return through all ports,  free fluid flow and placement verified by x-ray  Complications: CXR pending.  Patient tolerance: patient tolerated the procedure well with no immediate complications  Comments: Left internal jugular cannulation attempted first per pt request but unable to thread guidewire x 2. So procedure aborted and line placed on right IJ.

## 2022-12-17 NOTE — ED PROVIDER NOTES
Subjective   History of Present Illness    Pt presents in respiratory distress.  She has had flu-like symptoms for about a week with cough, congestion, dyspnea, weakness, myalgias.  No fever or vomiting.  Family thought she had been getting better and two nights ago she felt well enough to go out to dinner with everyone.  This morning around 0600 she seemed fine (family has cameras in her home to keep tabs on her) and then a little while ago they checked in again and saw that she was on the floor.  EMS was called and they found her hypoxic on room air to the 60s and hypotensive at 75/40.  They gave fluids and NIPPV en route with mild improvement.  Pt complains of left hip pain since the fall.    PMH HTN, DM, HL, AF, gout, RA.  On Eliquis, Plaquenil and MTX per records.    History provided by:  Patient, relative and EMS personnel      Review of Systems   Constitutional: Negative for fever.   HENT: Positive for congestion.    Respiratory: Positive for cough and shortness of breath.    Cardiovascular: Negative for chest pain.   Gastrointestinal: Negative for diarrhea and vomiting.   Musculoskeletal: Positive for myalgias.   Neurological: Positive for weakness.   All other systems reviewed and are negative.      Past Medical History:   Diagnosis Date   • Anxiety and depression 12/17/2022   • Atrial fibrillation (HCC) 2015    CARDIOVERSION - NO REOCCURANCE SINCE    • Diabetes mellitus (HCC)    • Gout 12/17/2022   • Hyperlipidemia    • Hypertension    • Hypothyroidism 12/17/2022   • Obesity (BMI 30-39.9) 12/17/2022       No Known Allergies    Past Surgical History:   Procedure Laterality Date   • APPENDECTOMY     • CHOLECYSTECTOMY     • COLOSTOMY      RESULTED FROM HYSTERECTOMY    • COLOSTOMY CLOSURE      WHILE DOING COLOSTOMY CLOSURE; ENDED UP HAVING A ILEOSTOMY   • HEMORRHOIDECTOMY     • HIP TROCHANTERIC NAILING WITH INTRAMEDULLARY HIP SCREW Right 3/27/2019    Procedure: HIP TROCANTERIC NAILING WITH INTRAMEDULLARY HIP  SCREW RIGHT;  Surgeon: Jona Tom MD;  [facility];  Service: Orthopedics   • HYSTERECTOMY     • LUMBAR DISCECTOMY      L4 - L5   • OTHER SURGICAL HISTORY      TUBAL REVERSAL   • THYROIDECTOMY     • TUBAL ABDOMINAL LIGATION      X 2       Family History   Problem Relation Age of Onset   • Cancer Mother        Social History     Socioeconomic History   • Marital status:    Tobacco Use   • Smoking status: Never   • Smokeless tobacco: Never   Substance and Sexual Activity   • Alcohol use: No   • Drug use: No   • Sexual activity: Defer           Objective   Physical Exam  Vitals and nursing note reviewed.   Constitutional:       General: She is not in acute distress.     Appearance: Normal appearance. She is ill-appearing.   HENT:      Head: Normocephalic and atraumatic.      Mouth/Throat:      Mouth: Mucous membranes are moist.   Eyes:      General: No scleral icterus.        Right eye: No discharge.         Left eye: No discharge.      Conjunctiva/sclera: Conjunctivae normal.   Cardiovascular:      Rate and Rhythm: Normal rate and regular rhythm.      Heart sounds: No murmur heard.  Pulmonary:      Effort: Pulmonary effort is normal. No respiratory distress.      Breath sounds: Normal breath sounds. No wheezing.   Abdominal:      General: Bowel sounds are normal. There is no distension.      Palpations: Abdomen is soft.      Tenderness: There is no abdominal tenderness. There is no guarding or rebound.   Musculoskeletal:         General: No swelling. Normal range of motion.      Cervical back: Normal range of motion and neck supple.   Skin:     General: Skin is warm and dry.      Coloration: Skin is pale.      Findings: No rash.   Neurological:      General: No focal deficit present.      Mental Status: She is alert. Mental status is at baseline.   Psychiatric:         Mood and Affect: Mood normal.         Behavior: Behavior normal.         Thought Content: Thought content normal.          Critical Care  Performed by: Law Marin MD  Authorized by: Law Marin MD     Critical care provider statement:     Critical care time (minutes):  45    Critical care time was exclusive of:  Separately billable procedures and treating other patients    Critical care was necessary to treat or prevent imminent or life-threatening deterioration of the following conditions:  Sepsis and respiratory failure    Critical care was time spent personally by me on the following activities:  Obtaining history from patient or surrogate, ordering and performing treatments and interventions, ordering and review of laboratory studies, ordering and review of radiographic studies, pulse oximetry, re-evaluation of patient's condition, review of old charts, evaluation of patient's response to treatment, discussions with consultants and examination of patient    I assumed direction of critical care for this patient from another provider in my specialty: no      Care discussed with: admitting provider                 ED Course         Seen on arrival, history from patient, EMS and family.  Records reviewed for med list.    BIPAP initiated.  Fluid bolus ordered.  Empiric Zosyn for presumed sepsis, could be influenza but secondary pneumonia with sepsis suspected.    Flu positive, labs notable for severe bandemia, lactic acidosis, other sepsis markers. Creatinine up from baseline.     2L saline given and still hypotensive.  Levophed initiated and titrated and a third liter started.    Serial rechecks, remains severely ill.  Admitted to the ICU for further care.                             MDM  Number of Diagnoses or Management Options     Amount and/or Complexity of Data Reviewed  Clinical lab tests: ordered and reviewed  Tests in the radiology section of CPT®: ordered and reviewed  Decide to obtain previous medical records or to obtain history from someone other than the patient: yes  Obtain history from someone  other than the patient: yes  Review and summarize past medical records: yes  Discuss the patient with other providers: yes  Independent visualization of images, tracings, or specimens: yes        Final diagnoses:   Septic shock (HCC)   Influenza A   Bacterial pneumonia   DRISS (acute kidney injury) (HCC)       ED Disposition  ED Disposition     ED Disposition   Decision to Admit    Condition   --    Comment   Level of Care: Critical Care [6]   Admitting Physician: OSMIN NEAL [[id number]]               No follow-up provider specified.       Medication List      No changes were made to your prescriptions during this visit.          Law Marin MD  12/17/22 9670

## 2022-12-17 NOTE — H&P
Intensive Care Admission Note     Septic shock (HCC)    History of Present Illness     Charly Blevins is a 67 y.o. female who presents to [facility] ED 12/17/22 with respiratory distress.     Patient has a PMH of RA on MTX and chronic prednisone, T2DM, dyslipidemia, atrial fibrillation s/p ECV (converted to NSR, no recurrence), hypothyroidism, obesity, GERD, gout, and anxiety & depression. She has received both Moderna and Pfizer COVID-19 vaccinations, with most recent booster dose having been administered on 10/19/21.     Per report, over the past week patient has been feeling poorly with flu-like symptoms. She had apparently been feeling better over the past 24 hrs and was last seen well ~0600 this morning by family. Son checked in on her again a little while ago and ultimately found her down on the floor, prompting call to EMS. On arrival she was noted to be significantly hypoxic with SpO2 79% on RA and was placed on BiPAP with minimal improvement. She was brought to our ED where upon arrival initial VS as follows: Temp 97.8F, HR 81, BP 88/48 mmHg, RR 26, and SpO2 94% on 100% FiO2 BiPAP. Labs were significant for negative troponin, proBNP 9401, mag 1.4, sCr 1.77, lactate 8.4, PCT 16.28, WBC 23.85, and INR 2.6. Respiratory PCR testing was positive for Flu A.     Bilateral hip XR was obtained 2* complaints of pain and was negative for acute fracture. CTA chest was negative for PE but showed mild central bronchial wall thickening with areas of bandlike atelectasis within the right upper and bilateral lower lobes (most indicative of bronchitis), an age-indeterminate nondisplaced superior sternal body fracture, cardiomegaly, and incidental finding of a medialized right true vocal cord. Due to septic concern she was pancultured, initiated on IVF resuscitation (3L NS), and received empiric doses of Zosyn, Vancomycin, and Doxycycline. Due to persistent hypotension she was additionally initiated on a levophed infusion. Ms.  Justice will be admitted to ICU for further management.     Time spent: 15 minutes  Electronically signed by Tigist Hahn, GINO, APRN, 12/17/22, 2:17 PM EST.     Problem List, Surgical History, Family, Social History, and ROS     Patient Active Problem List    Diagnosis    • *Septic shock [A41.9, R65.21]    • Acute hypoxic respiratory failure [J96.01]    • DRISS [N17.9]    • Influenza A [J10.1]    • On chronic immunosuppressive therapy [D84.9]    • Obesity (BMI 30-39.9) [E66.9]    • Hypothyroidism [E03.9]    • T2DM [E11.9]    • RA [M06.9]    • GERD [K21.9]    • Anxiety and depression [F41.9, F32.A]    • Gout [M10.9]    • Dyslipidemia [E78.5]    • Closed intertrochanteric fracture of hip, right, initial encounter (HCC) [S72.141A]    • Pubic ramus fracture ([facility]) [S32.599A]    • HTN (hypertension) [I10]    • Atrial fibrillation (HCC) [I48.91]      Past Surgical History:   Procedure Laterality Date   • APPENDECTOMY     • CHOLECYSTECTOMY     • COLOSTOMY      RESULTED FROM HYSTERECTOMY    • COLOSTOMY CLOSURE      WHILE DOING COLOSTOMY CLOSURE; ENDED UP HAVING A ILEOSTOMY   • HEMORRHOIDECTOMY     • HIP TROCHANTERIC NAILING WITH INTRAMEDULLARY HIP SCREW Right 3/27/2019    Procedure: HIP TROCANTERIC NAILING WITH INTRAMEDULLARY HIP SCREW RIGHT;  Surgeon: Jona Tom MD;  Location: [facility];  Service: Orthopedics   • HYSTERECTOMY     • LUMBAR DISCECTOMY      L4 - L5   • OTHER SURGICAL HISTORY      TUBAL REVERSAL   • THYROIDECTOMY     • TUBAL ABDOMINAL LIGATION      X 2       No Known Allergies  No current facility-administered medications on file prior to encounter.     Current Outpatient Medications on File Prior to Encounter   Medication Sig   • acetaminophen (TYLENOL) 325 MG tablet Take 2 tablets by mouth Every 4 (Four) Hours As Needed for Mild Pain .   • ALLOPURINOL PO Take  by mouth.   • bisacodyl (DULCOLAX) 10 MG suppository Insert 1 suppository into the rectum Daily As Needed for Constipation.   • docusate  sodium 100 MG capsule Take 100 mg by mouth 2 (Two) Times a Day As Needed for Constipation.   • esomeprazole (nexIUM) 20 MG capsule Take 20 mg by mouth Every Morning Before Breakfast.   • ferrous sulfate 325 (65 FE) MG tablet Take 1 tablet by mouth Daily With Breakfast.   • FLUoxetine (PROzac) 20 MG capsule Take 20 mg by mouth 2 (Two) Times a Day.   • folic acid (FOLVITE) 400 MCG tablet Take 1 tablet by mouth Daily.   • gabapentin (NEURONTIN) 100 MG capsule Take 100 mg by mouth 3 (Three) Times a Day.   • HYDROcodone-acetaminophen (NORCO)  MG per tablet Take 1 tablet by mouth Every 6 (Six) Hours As Needed for Moderate Pain .   • hydrOXYzine (ATARAX) 25 MG tablet Take 25 mg by mouth 2 (Two) Times a Day.   • insulin detemir (LEVEMIR) 100 UNIT/ML injection Inject 20 Units under the skin into the appropriate area as directed Every Night.   • insulin lispro (humaLOG) 100 UNIT/ML injection Inject 0-9 Units under the skin into the appropriate area as directed 4 (Four) Times a Day With Meals & at Bedtime.   • insulin lispro (humaLOG) 100 UNIT/ML injection Inject 8 Units under the skin into the appropriate area as directed 3 (Three) Times a Day With Meals.   • levocetirizine (XYZAL) 5 MG tablet Take 5 mg by mouth Every Evening.   • Levothyroxine Sodium (SYNTHROID PO) Take 225 mg by mouth.   • metFORMIN (GLUCOPHAGE) 1000 MG tablet Take 1,000 mg by mouth 2 (Two) Times a Day With Meals.   • METHOTREXATE SODIUM, PF, IJ Inject 1 unit marking on U-100 syringe as directed 1 (One) Time Per Week. Every saturday   • ondansetron (ZOFRAN) 4 MG tablet Take 4 mg by mouth Every 8 (Eight) Hours As Needed for Nausea or Vomiting.   • oxyCODONE-acetaminophen (PERCOCET) 7.5-325 MG per tablet Take 1 tablet by mouth Every 8 (Eight) Hours As Needed for Severe Pain .   • predniSONE (DELTASONE) 5 MG tablet Take 5 mg by mouth Daily.   • promethazine (PHENERGAN) 25 MG tablet Take 25 mg by mouth Every 6 (Six) Hours As Needed for Nausea or Vomiting.  "  • rosuvastatin (CRESTOR) 5 MG tablet Take 5 mg by mouth Daily.   • sennosides-docusate sodium (SENOKOT-S) 8.6-50 MG tablet Take 2 tablets by mouth Every Night.   • tamsulosin (FLOMAX) 0.4 MG capsule 24 hr capsule Take 1 capsule by mouth Daily.   • zolpidem (AMBIEN) 10 MG tablet Take 20 mg by mouth At Night As Needed for Sleep.     MEDICATION LIST AND ALLERGIES REVIEWED.    Family History   Problem Relation Age of Onset   • Cancer Mother      Social History     Tobacco Use   • Smoking status: Never   • Smokeless tobacco: Never   Substance Use Topics   • Alcohol use: No   • Drug use: No     Social History     Social History Narrative   • Not on file     FAMILY AND SOCIAL HISTORY REVIEWED.    Review of Systems  Difficult to obtain as patient on Bipap currently    Physical Exam and Clinical Information   BP (!) 80/54   Pulse 73   Temp 97.8 °F (36.6 °C)   Resp 26   Ht 167.6 cm (66\")   Wt 86.2 kg (190 lb)   SpO2 (!) 89%   BMI 30.67 kg/m²   Physical Exam     General Appearance: Lethargic, in mild distress on Bipap  Head:    Atraumatic, Normocephalic, without obvious abnormality, Pupils reactive & symmetrical B/L.   Lungs:   B/L Breath sounds present with decreased breath sounds on bases, no wheezing heard, occ crackles.   Heart: S1 and S2 present, no murmur, rub  Abdomen: Soft, nontender, no guarding or rigidity, bowel sounds positive  Extremities:  no cyanosis or clubbing,  no edema, cool to touch.  Pulses: Positive and symmetric.  Neurologic:  Moving all four extremities. Good strength bilaterally. No obvious focal deficit.      Results from last 7 days   Lab Units 12/17/22  1111 12/17/22  1109   WBC 10*3/mm3  --  23.85*   HEMOGLOBIN g/dL  --  12.7   HEMOGLOBIN, POC g/dL 15.3  --    PLATELETS 10*3/mm3  --  390     Results from last 7 days   Lab Units 12/17/22  1111 12/17/22  1109   SODIUM mmol/L  --  138   POTASSIUM mmol/L  --  4.1   CO2 mmol/L  --  14.0*   BUN mg/dL  --  22   CREATININE mg/dL 1.80* 1.77* "   MAGNESIUM mg/dL  --  1.4*   GLUCOSE mg/dL  --  192*     Estimated Creatinine Clearance: 33.1 mL/min (A) (by C-G formula based on SCr of 1.8 mg/dL (H)).          Lab Results   Component Value Date    LACTATE 8.4 (C) 12/17/2022        Images:   Chest x-ray reviewed personally and showed right middle lobe consolidation elevated right hemidiaphragm noted as well.    Hip x-ray did not show any evidence of fracture.    Head CT negative for any intracranial pathology.    I reviewed the patient's results and images.     Impression     Septic shock    Acute hypoxic respiratory failure    DRISS    Influenza A    T2DM    RA    On chronic immunosuppressive therapy    Obesity (BMI 30-39.9)    Hypothyroidism    Dyslipidemia    GERD    Anxiety and depression    Gout    Plan/Recommendations     67-year-old female with past medical history of rheumatoid arthritis on methotrexate and chronic prednisone, type 2 diabetes, dyslipidemia, atrial fibrillation status post ECV in the past, hypothyroidism, obesity, GERD, gout, anxiety and depression.  Patient apparently has been feeling weak with flulike symptoms for little over a week.  Apparently started to improve within last 24 hours and was last seen well about 6AM today morning.  Later on patient was found down on the floor unresponsive and EMS was called.  Patient was found to be having saturations of 79% on room air and was placed on BiPAP with minimal improvement.  Patient was brought to emergency room where she was found to be hypotensive.  She was given 2 L of fluid bolus and started on norepinephrine.  Respiratory panel was positive for influenza A.  Patient had significantly elevated lactic acidosis of 8.4, procalcitonin was 16.28, serum creatinine of 1.77 with baseline creatinine of 0.96.  Chest x-ray yesterday for a right middle and lower lobe pneumonia.  Patient is being admitted to ICU and critical and in guarded condition.    1.  Admit to intensive care unit for close  hemodynamic and respiratory monitoring.  Continue NIV support for now.  If deteriorates things do not improve in next few hours may need intubation and mechanical ventilation.  Reviewed that with the patient and she wishes to be full code.  2.  Continue broad-spectrum antibiotics given immunocompromise state continue vancomycin, Zosyn, doxycycline for now.  Blood cultures have been obtained.  Patient is not coughing up much will try to get sputum cultures if she is able to produce much sputum.  Will get urine Legionella antigen as well as urine strep antigen.  Will add Tamiflu for influenza A infection as well.  3.  Monitor urine output closely.  Patient has been given IV fluids.  We will continue IV fluids at 75 mL/h for next 12 hours and then reassess.  We will send a urine sodium and urine creatinine levels to further work-up acute kidney injury.  CPK level checked and 160s so not consistent with rhabdomyolysis.  4.  Monitor electrolytes and replace per ICU protocol.  5.  Significant lactic acidosis likely secondary to hypotension and shock state.  Monitor for clearance.  6.  Patient has been on chronic prednisone.  Due to hemodynamic instability and concern for adrenal insufficiency we will add hydrocortisone 50 every 6.  7.  Continue pressors as needed to maintain mean arterial pressure greater than 65.  8.  Encephalopathy seems to have improved to some degree.  ABG not obtained on arrival.  May be patient was somewhat hypercapnic on arrival.  Will monitor for now.  Continue BiPAP support and will give break once oxygenation improves further.  Head CT scan is not showing any other untoward findings.  9.  Troponins are negative.  EKG is not showing any ST changes.  10.  Continues on home medications including levothyroxine for hypothyroidism, gabapentin for neuropathy and Prozac for anxiety/depression.  11.  GI and DVT prophylaxis.    Patient is being admitted in guarded condition.  Remains full code for now.   If deteriorates further will need intubation and mechanical ventilation.    High level of risk due to:  illness with threat to life or bodily function.    Time spent Critical care 35 min (exclusive of procedure time)  including high complexity decision making to assess, manipulate, and support vital organ system failure in this individual who has impairment of one or more vital organ systems such that there is a high probability of imminent or life threatening deterioration in the patient’s condition.    Jersey Garnett MD, [facility]  Pulmonary and Critical Care Medicine     CC: Boyd Molina MD

## 2022-12-18 LAB
ANION GAP SERPL CALCULATED.3IONS-SCNC: 10 MMOL/L (ref 5–15)
BASOPHILS # BLD AUTO: 0.11 10*3/MM3 (ref 0–0.2)
BASOPHILS NFR BLD AUTO: 0.5 % (ref 0–1.5)
BUN SERPL-MCNC: 28 MG/DL (ref 8–23)
BUN/CREAT SERPL: 18.8 (ref 7–25)
CALCIUM SPEC-SCNC: 7.2 MG/DL (ref 8.6–10.5)
CHLORIDE SERPL-SCNC: 111 MMOL/L (ref 98–107)
CO2 SERPL-SCNC: 21 MMOL/L (ref 22–29)
CREAT SERPL-MCNC: 1.49 MG/DL (ref 0.57–1)
D-LACTATE SERPL-SCNC: 2.3 MMOL/L (ref 0.5–2)
DEPRECATED RDW RBC AUTO: 61.1 FL (ref 37–54)
EGFRCR SERPLBLD CKD-EPI 2021: 38.1 ML/MIN/1.73
EOSINOPHIL # BLD AUTO: 0.03 10*3/MM3 (ref 0–0.4)
EOSINOPHIL NFR BLD AUTO: 0.1 % (ref 0.3–6.2)
ERYTHROCYTE [DISTWIDTH] IN BLOOD BY AUTOMATED COUNT: 18.1 % (ref 12.3–15.4)
GLUCOSE BLDC GLUCOMTR-MCNC: 142 MG/DL (ref 70–130)
GLUCOSE BLDC GLUCOMTR-MCNC: 167 MG/DL (ref 70–130)
GLUCOSE BLDC GLUCOMTR-MCNC: 224 MG/DL (ref 70–130)
GLUCOSE BLDC GLUCOMTR-MCNC: 236 MG/DL (ref 70–130)
GLUCOSE SERPL-MCNC: 156 MG/DL (ref 65–99)
HCT VFR BLD AUTO: 36.5 % (ref 34–46.6)
HGB BLD-MCNC: 10.8 G/DL (ref 12–15.9)
IMM GRANULOCYTES # BLD AUTO: 1.7 10*3/MM3 (ref 0–0.05)
IMM GRANULOCYTES NFR BLD AUTO: 7 % (ref 0–0.5)
LYMPHOCYTES # BLD AUTO: 0.89 10*3/MM3 (ref 0.7–3.1)
LYMPHOCYTES NFR BLD AUTO: 3.6 % (ref 19.6–45.3)
MAGNESIUM SERPL-MCNC: 1.1 MG/DL (ref 1.6–2.4)
MCH RBC QN AUTO: 27.4 PG (ref 26.6–33)
MCHC RBC AUTO-ENTMCNC: 29.6 G/DL (ref 31.5–35.7)
MCV RBC AUTO: 92.6 FL (ref 79–97)
MONOCYTES # BLD AUTO: 1.01 10*3/MM3 (ref 0.1–0.9)
MONOCYTES NFR BLD AUTO: 4.1 % (ref 5–12)
NEUTROPHILS NFR BLD AUTO: 20.65 10*3/MM3 (ref 1.7–7)
NEUTROPHILS NFR BLD AUTO: 84.7 % (ref 42.7–76)
NRBC BLD AUTO-RTO: 0 /100 WBC (ref 0–0.2)
PHOSPHATE SERPL-MCNC: 4.6 MG/DL (ref 2.5–4.5)
PLAT MORPH BLD: NORMAL
PLATELET # BLD AUTO: 303 10*3/MM3 (ref 140–450)
PMV BLD AUTO: 10.6 FL (ref 6–12)
POTASSIUM SERPL-SCNC: 5 MMOL/L (ref 3.5–5.2)
PROCALCITONIN SERPL-MCNC: 30.1 NG/ML (ref 0–0.25)
RBC # BLD AUTO: 3.94 10*6/MM3 (ref 3.77–5.28)
RBC MORPH BLD: NORMAL
SODIUM SERPL-SCNC: 142 MMOL/L (ref 136–145)
TROPONIN T SERPL-MCNC: 0.01 NG/ML (ref 0–0.03)
WBC MORPH BLD: NORMAL
WBC NRBC COR # BLD: 24.39 10*3/MM3 (ref 3.4–10.8)

## 2022-12-18 PROCEDURE — 94799 UNLISTED PULMONARY SVC/PX: CPT

## 2022-12-18 PROCEDURE — 80048 BASIC METABOLIC PNL TOTAL CA: CPT

## 2022-12-18 PROCEDURE — 99291 CRITICAL CARE FIRST HOUR: CPT | Performed by: INTERNAL MEDICINE

## 2022-12-18 PROCEDURE — 84484 ASSAY OF TROPONIN QUANT: CPT | Performed by: INTERNAL MEDICINE

## 2022-12-18 PROCEDURE — 25010000002 HEPARIN (PORCINE) PER 1000 UNITS: Performed by: NURSE PRACTITIONER

## 2022-12-18 PROCEDURE — 84145 PROCALCITONIN (PCT): CPT | Performed by: INTERNAL MEDICINE

## 2022-12-18 PROCEDURE — 83735 ASSAY OF MAGNESIUM: CPT | Performed by: NURSE PRACTITIONER

## 2022-12-18 PROCEDURE — 25010000002 HYDROCORTISONE SOD SUC (PF) 100 MG RECONSTITUTED SOLUTION: Performed by: INTERNAL MEDICINE

## 2022-12-18 PROCEDURE — 85025 COMPLETE CBC W/AUTO DIFF WBC: CPT | Performed by: NURSE PRACTITIONER

## 2022-12-18 PROCEDURE — 82962 GLUCOSE BLOOD TEST: CPT

## 2022-12-18 PROCEDURE — 25010000002 PIPERACILLIN SOD-TAZOBACTAM PER 1 G: Performed by: NURSE PRACTITIONER

## 2022-12-18 PROCEDURE — 25010000002 VANCOMYCIN PER 500 MG

## 2022-12-18 PROCEDURE — 63710000001 INSULIN LISPRO (HUMAN) PER 5 UNITS: Performed by: NURSE PRACTITIONER

## 2022-12-18 PROCEDURE — 85007 BL SMEAR W/DIFF WBC COUNT: CPT | Performed by: NURSE PRACTITIONER

## 2022-12-18 PROCEDURE — 94660 CPAP INITIATION&MGMT: CPT

## 2022-12-18 PROCEDURE — 84100 ASSAY OF PHOSPHORUS: CPT | Performed by: NURSE PRACTITIONER

## 2022-12-18 PROCEDURE — 25010000002 MAGNESIUM SULFATE 2 GM/50ML SOLUTION: Performed by: NURSE PRACTITIONER

## 2022-12-18 PROCEDURE — 83605 ASSAY OF LACTIC ACID: CPT | Performed by: NURSE PRACTITIONER

## 2022-12-18 RX ORDER — HYDROCODONE BITARTRATE AND ACETAMINOPHEN 5; 325 MG/1; MG/1
1 TABLET ORAL ONCE AS NEEDED
Status: COMPLETED | OUTPATIENT
Start: 2022-12-18 | End: 2022-12-18

## 2022-12-18 RX ORDER — SODIUM CHLORIDE, SODIUM LACTATE, POTASSIUM CHLORIDE, CALCIUM CHLORIDE 600; 310; 30; 20 MG/100ML; MG/100ML; MG/100ML; MG/100ML
75 INJECTION, SOLUTION INTRAVENOUS CONTINUOUS
Status: ACTIVE | OUTPATIENT
Start: 2022-12-18 | End: 2022-12-18

## 2022-12-18 RX ORDER — LIDOCAINE 50 MG/G
2 PATCH TOPICAL
Status: DISCONTINUED | OUTPATIENT
Start: 2022-12-18 | End: 2023-01-03 | Stop reason: HOSPADM

## 2022-12-18 RX ORDER — OSELTAMIVIR PHOSPHATE 30 MG/1
30 CAPSULE ORAL EVERY 12 HOURS SCHEDULED
Status: DISCONTINUED | OUTPATIENT
Start: 2022-12-18 | End: 2022-12-19

## 2022-12-18 RX ADMIN — OSELTAMIVIR PHOSPHATE 75 MG: 75 CAPSULE ORAL at 08:09

## 2022-12-18 RX ADMIN — MAGNESIUM SULFATE HEPTAHYDRATE 2 G: 2 INJECTION, SOLUTION INTRAVENOUS at 04:19

## 2022-12-18 RX ADMIN — TAZOBACTAM SODIUM AND PIPERACILLIN SODIUM 4.5 G: 500; 4 INJECTION, SOLUTION INTRAVENOUS at 11:42

## 2022-12-18 RX ADMIN — DOXYCYCLINE 100 MG: 100 INJECTION, POWDER, LYOPHILIZED, FOR SOLUTION INTRAVENOUS at 11:32

## 2022-12-18 RX ADMIN — FLUOXETINE 20 MG: 20 CAPSULE ORAL at 08:09

## 2022-12-18 RX ADMIN — GABAPENTIN 100 MG: 100 CAPSULE ORAL at 08:09

## 2022-12-18 RX ADMIN — HYDROCODONE BITARTRATE AND ACETAMINOPHEN 1 TABLET: 5; 325 TABLET ORAL at 17:13

## 2022-12-18 RX ADMIN — FLUOXETINE 20 MG: 20 CAPSULE ORAL at 20:46

## 2022-12-18 RX ADMIN — HEPARIN SODIUM 5000 UNITS: 5000 INJECTION INTRAVENOUS; SUBCUTANEOUS at 06:11

## 2022-12-18 RX ADMIN — ROSUVASTATIN CALCIUM 5 MG: 10 TABLET, COATED ORAL at 08:09

## 2022-12-18 RX ADMIN — HEPARIN SODIUM 5000 UNITS: 5000 INJECTION INTRAVENOUS; SUBCUTANEOUS at 21:06

## 2022-12-18 RX ADMIN — HYDROCORTISONE SODIUM SUCCINATE 50 MG: 100 INJECTION, POWDER, FOR SOLUTION INTRAMUSCULAR; INTRAVENOUS at 06:11

## 2022-12-18 RX ADMIN — LEVOTHYROXINE SODIUM 225 MCG: 150 TABLET ORAL at 06:11

## 2022-12-18 RX ADMIN — MAGNESIUM SULFATE HEPTAHYDRATE 2 G: 2 INJECTION, SOLUTION INTRAVENOUS at 06:11

## 2022-12-18 RX ADMIN — HYDROCORTISONE SODIUM SUCCINATE 50 MG: 100 INJECTION, POWDER, FOR SOLUTION INTRAMUSCULAR; INTRAVENOUS at 17:07

## 2022-12-18 RX ADMIN — MAGNESIUM SULFATE HEPTAHYDRATE 2 G: 2 INJECTION, SOLUTION INTRAVENOUS at 09:12

## 2022-12-18 RX ADMIN — HEPARIN SODIUM 5000 UNITS: 5000 INJECTION INTRAVENOUS; SUBCUTANEOUS at 13:39

## 2022-12-18 RX ADMIN — FOLIC ACID TAB 400 MCG 400 MCG: 400 TAB at 08:16

## 2022-12-18 RX ADMIN — TAZOBACTAM SODIUM AND PIPERACILLIN SODIUM 4.5 G: 500; 4 INJECTION, SOLUTION INTRAVENOUS at 20:45

## 2022-12-18 RX ADMIN — DOXYCYCLINE 100 MG: 100 INJECTION, POWDER, LYOPHILIZED, FOR SOLUTION INTRAVENOUS at 00:21

## 2022-12-18 RX ADMIN — INSULIN LISPRO 3 UNITS: 100 INJECTION, SOLUTION INTRAVENOUS; SUBCUTANEOUS at 11:38

## 2022-12-18 RX ADMIN — VANCOMYCIN HYDROCHLORIDE 1000 MG: 1 INJECTION, SOLUTION INTRAVENOUS at 17:07

## 2022-12-18 RX ADMIN — LIDOCAINE 2 PATCH: 700 PATCH TOPICAL at 14:51

## 2022-12-18 RX ADMIN — HYDROCORTISONE SODIUM SUCCINATE 50 MG: 100 INJECTION, POWDER, FOR SOLUTION INTRAMUSCULAR; INTRAVENOUS at 11:33

## 2022-12-18 RX ADMIN — GABAPENTIN 100 MG: 100 CAPSULE ORAL at 20:46

## 2022-12-18 RX ADMIN — OSELTAMIVIR PHOSPHATE 30 MG: 30 CAPSULE ORAL at 20:46

## 2022-12-18 RX ADMIN — INSULIN LISPRO 5 UNITS: 100 INJECTION, SOLUTION INTRAVENOUS; SUBCUTANEOUS at 21:06

## 2022-12-18 RX ADMIN — ACETAMINOPHEN 650 MG: 325 TABLET ORAL at 09:12

## 2022-12-18 RX ADMIN — INSULIN LISPRO 5 UNITS: 100 INJECTION, SOLUTION INTRAVENOUS; SUBCUTANEOUS at 17:38

## 2022-12-18 RX ADMIN — HYDROCORTISONE SODIUM SUCCINATE 50 MG: 100 INJECTION, POWDER, FOR SOLUTION INTRAMUSCULAR; INTRAVENOUS at 00:21

## 2022-12-18 RX ADMIN — ACETAMINOPHEN 650 MG: 325 TABLET ORAL at 03:01

## 2022-12-18 RX ADMIN — TAZOBACTAM SODIUM AND PIPERACILLIN SODIUM 4.5 G: 500; 4 INJECTION, SOLUTION INTRAVENOUS at 03:01

## 2022-12-18 NOTE — PLAN OF CARE
Goal Outcome Evaluation:  Plan of Care Reviewed With: patient        Progress: no change  Outcome Evaluation: Levo off since 0600 this AM. Has been BiPAP dependent, FiO2 down from 75% to 65%. Febrile up to 101.1, improved with tylenol and ice packs in place. LR @ 75ml/hr. Very weak, congested cough. Appears to be slightly more drowsy this AM, remains oriented and arouses to voice. Mag currently being replaced. Daughter at bedside at beginning of shift and updated.

## 2022-12-18 NOTE — PROGRESS NOTES
Intensive Care Follow-up     Hospital:  LOS: 1 day   Ms. Chraly Blevins, 67 y.o. female is followed for:   Septic shock (HCC)            History of present illness:   67-year-old female with past medical history of rheumatoid arthritis on methotrexate and chronic prednisone, type 2 diabetes, dyslipidemia, atrial fibrillation status post ECV in the past, hypothyroidism, obesity, GERD, gout, anxiety and depression.  Patient apparently has been feeling weak with flulike symptoms for little over a week.  Apparently started to improve within last 24 hours and was last seen well about 6AM today morning.  Later on patient was found down on the floor unresponsive and EMS was called.  Patient was found to be having saturations of 79% on room air and was placed on BiPAP with minimal improvement.  Patient was brought to emergency room where she was found to be hypotensive.  She was given 2 L of fluid bolus and started on norepinephrine.  Respiratory panel was positive for influenza A.  Patient had significantly elevated lactic acidosis of 8.4, procalcitonin was 16.28, serum creatinine of 1.77 with baseline creatinine of 0.96.  Chest x-ray yesterday for a right middle and lower lobe pneumonia.  Patient is admitted to ICU and critical and in guarded condition.      Subjective   Interval History:  Patient remained BiPAP dependent overnight.  FiO2 requirements have improved to 60%.  T-max of 101.  Urine strep pneumo antigen is positive as well.  Came off norepinephrine.  Creatinine is improving.  Lactate is improving as well.  Still very lethargic.                 The patient's past medical, surgical and social history were reviewed and updated in Epic as appropriate.       Objective     Infusions:  lactated ringers, 75 mL/hr, Last Rate: 75 mL/hr (12/18/22 8445)  norepinephrine, 0.02-0.3 mcg/kg/min, Last Rate: Stopped (12/18/22 9208)  Pharmacy to dose vancomycin,       Medications:  doxycycline, 100 mg, Intravenous,  Q12H  FLUoxetine, 20 mg, Oral, BID  folic acid, 400 mcg, Oral, Daily  gabapentin, 100 mg, Oral, Q12H  heparin (porcine), 5,000 Units, Subcutaneous, Q8H  Hydrocortisone Sod Suc (PF), 50 mg, Intravenous, Q6H  insulin lispro, 0-14 Units, Subcutaneous, 4x Daily With Meals & Nightly  levothyroxine, 225 mcg, Oral, Q AM  oseltamivir, 30 mg, Oral, Q12H  piperacillin-tazobactam, 4.5 g, Intravenous, Q8H  rosuvastatin, 5 mg, Oral, Daily  sodium chloride, 1,000 mL, Intravenous, Once  sodium chloride 0.9% - IBW for BMI > 30, 30 mL/kg (Ideal), Intravenous, Once  vancomycin, 1,000 mg, Intravenous, Q24H        Vital Sign Min/Max for last 24 hours  Temp  Min: 97.8 °F (36.6 °C)  Max: 101.1 °F (38.4 °C)   BP  Min: 60/47  Max: 116/68   Pulse  Min: 72  Max: 89   Resp  Min: 20  Max: 28   SpO2  Min: 87 %  Max: 100 %   Flow (L/min)  Min: 50  Max: 50       Input/Output for last 24 hour shift  12/17 0701 - 12/18 0700  In: 2333.9 [I.V.:1483.9]  Out: 1175 [Urine:1175]      Objective  General Appearance: Lethargic, in no acute distress distress on Bipap  Head:    Atraumatic, Normocephalic, without obvious abnormality, Pupils reactive & symmetrical B/L.          Lungs:   B/L Breath sounds present with decreased breath sounds on bases, no wheezing heard, occ crackles.   Heart: S1 and S2 present, no murmur, rub  Abdomen: Soft, nontender, no guarding or rigidity, bowel sounds hypoactive  Extremities:  no cyanosis or clubbing,  no edema, cool to touch.  Pulses: Positive and symmetric.  Neurologic:  Moving all four extremities. Good strength bilaterally. No obvious focal deficit.      Results from last 7 days   Lab Units 12/18/22  0310 12/17/22  1111 12/17/22  1109   WBC 10*3/mm3 24.39*  --  23.85*   HEMOGLOBIN g/dL 10.8*  --  12.7   HEMOGLOBIN, POC g/dL  --  15.3  --    PLATELETS 10*3/mm3 303  --  390     Results from last 7 days   Lab Units 12/18/22  0310 12/17/22  1111 12/17/22  1109   SODIUM mmol/L 142  --  138   POTASSIUM mmol/L 5.0  --  4.1    CO2 mmol/L 21.0*  --  14.0*   BUN mg/dL 28*  --  22   CREATININE mg/dL 1.49* 1.80* 1.77*   MAGNESIUM mg/dL 1.1*  --  1.4*   PHOSPHORUS mg/dL 4.6*  --   --    GLUCOSE mg/dL 156*  --  192*     Estimated Creatinine Clearance: 42.2 mL/min (A) (by C-G formula based on SCr of 1.49 mg/dL (H)).    Results from last 7 days   Lab Units 12/17/22  1646   PH, ARTERIAL pH units 7.323*   PCO2, ARTERIAL mm Hg 36.6   PO2 ART mm Hg 73.4*       Images:   Repeat chest x-ray is reviewed and continues to show patchy opacities in the right lower lung field.    I reviewed the patient's results and images.     Urine strep pneumo +    Assessment & Plan   Impression        Septic shock    Dyslipidemia    T2DM    RA    Acute hypoxic respiratory failure    DRISS    On chronic immunosuppressive therapy    GERD    Influenza A    Obesity (BMI 30-39.9)    Hypothyroidism    Anxiety and depression    Gout       Plan        1.  Patient presented with septic shock likely secondary to right lower lobe pneumonia with influenza.  Urine strep antigen is positive.  Continue current broad-spectrum antibiotic.  Will await cultures and if cultures remain negative will have low threshold of stopping vancomycin.  MRSA screen is negative but worried about drug-resistant strep pneumo.  Continue Tamiflu for 5 days.  Patient is immunocompromise state with chronic prednisone therapy  2.  Continue stress dose steroids for now given patient was on chronic steroid therapy.  Off pressors which is reassuring.  3.  Continue NIV support as needed.  We will try patient on high flow nasal cannula today and see if we can wean off BiPAP.  Overall respiratory wise seems little better.  4.  If able to tolerate high flow nasal cannula then will do swallow evaluation and oral diet as tolerated.  5.  Replace electrolytes including magnesium aggressively.  6.  Urine output is improving.  Creatinine improved.  Continue lactated Ringer 75 mL/h for another 12 hours and then stop.  Once  able to take orally will stop IV fluids altogether.  7.  Lactic acidosis has improved and that is reassuring.  8.  Patient is somewhat lethargic.  Continue low-dose Prozac patient's home dose.  Dose adjusted gabapentin with variable renal function for now.  9.  GI and DVT prophylaxis.  10.  Sliding scale insulin coverage for hyperglycemia management.  If blood sugars remain elevated will consider basal insulin.  Patient is on metformin at home which is on hold currently with renal dysfunction.    Patient remains with guarded condition, needs continued ICU care and high risk of decline from respiratory standpoint.    Plan of care and goals reviewed with multidisciplinary/antibiotic stewardship team during rounds.   I discussed the patient's findings and my recommendations with patient and nursing staff     High level of risk due to:  *illness with threat to life or bodily function.    Time spent Critical care 35 min (exclusive of procedure time)  including high complexity decision making to assess, manipulate, and support vital organ system failure in this individual who has impairment of one or more vital organ systems such that there is a high probability of imminent or life threatening deterioration in the patient’s condition.      Jersey Garnett MD, [facility]  Pulmonary, Critical care and Sleep Medicine

## 2022-12-19 ENCOUNTER — APPOINTMENT (OUTPATIENT)
Dept: CARDIOLOGY | Facility: HOSPITAL | Age: 67
DRG: 871 | End: 2022-12-19
Payer: MEDICARE

## 2022-12-19 LAB
ANION GAP SERPL CALCULATED.3IONS-SCNC: 8 MMOL/L (ref 5–15)
ASCENDING AORTA: 4.1 CM
BASOPHILS # BLD MANUAL: 0 10*3/MM3 (ref 0–0.2)
BASOPHILS NFR BLD MANUAL: 0 % (ref 0–1.5)
BH CV ECHO MEAS - AO MAX PG: 8.3 MMHG
BH CV ECHO MEAS - AO MEAN PG: 5 MMHG
BH CV ECHO MEAS - AO ROOT DIAM: 3.7 CM
BH CV ECHO MEAS - AO V2 MAX: 144 CM/SEC
BH CV ECHO MEAS - AO V2 VTI: 31.1 CM
BH CV ECHO MEAS - AVA(I,D): 2.19 CM2
BH CV ECHO MEAS - EDV(CUBED): 117.6 ML
BH CV ECHO MEAS - EDV(MOD-SP2): 125 ML
BH CV ECHO MEAS - EDV(MOD-SP4): 83.5 ML
BH CV ECHO MEAS - EF(MOD-BP): 59.5 %
BH CV ECHO MEAS - EF(MOD-SP2): 60.7 %
BH CV ECHO MEAS - EF(MOD-SP4): 56 %
BH CV ECHO MEAS - ESV(CUBED): 35.9 ML
BH CV ECHO MEAS - ESV(MOD-SP2): 49.1 ML
BH CV ECHO MEAS - ESV(MOD-SP4): 36.7 ML
BH CV ECHO MEAS - FS: 32.7 %
BH CV ECHO MEAS - IVS/LVPW: 1 CM
BH CV ECHO MEAS - IVSD: 1.2 CM
BH CV ECHO MEAS - LA DIMENSION: 4.2 CM
BH CV ECHO MEAS - LAT PEAK E' VEL: 8.9 CM/SEC
BH CV ECHO MEAS - LV MASS(C)D: 226.4 GRAMS
BH CV ECHO MEAS - LV MAX PG: 5.2 MMHG
BH CV ECHO MEAS - LV MEAN PG: 3 MMHG
BH CV ECHO MEAS - LV V1 MAX: 114 CM/SEC
BH CV ECHO MEAS - LV V1 VTI: 21.7 CM
BH CV ECHO MEAS - LVIDD: 4.9 CM
BH CV ECHO MEAS - LVIDS: 3.3 CM
BH CV ECHO MEAS - LVOT AREA: 3.1 CM2
BH CV ECHO MEAS - LVOT DIAM: 2 CM
BH CV ECHO MEAS - LVPWD: 1.2 CM
BH CV ECHO MEAS - MED PEAK E' VEL: 7.9 CM/SEC
BH CV ECHO MEAS - MV A MAX VEL: 62.3 CM/SEC
BH CV ECHO MEAS - MV DEC TIME: 0.19 MSEC
BH CV ECHO MEAS - MV E MAX VEL: 123 CM/SEC
BH CV ECHO MEAS - MV E/A: 1.97
BH CV ECHO MEAS - PA ACC TIME: 0.11 SEC
BH CV ECHO MEAS - PA PR(ACCEL): 28.2 MMHG
BH CV ECHO MEAS - PA V2 MAX: 88.2 CM/SEC
BH CV ECHO MEAS - RAP SYSTOLE: 3 MMHG
BH CV ECHO MEAS - RVSP: 27 MMHG
BH CV ECHO MEAS - SV(LVOT): 68.2 ML
BH CV ECHO MEAS - SV(MOD-SP2): 75.9 ML
BH CV ECHO MEAS - SV(MOD-SP4): 46.8 ML
BH CV ECHO MEAS - TAPSE (>1.6): 2.38 CM
BH CV ECHO MEAS - TR MAX PG: 23.6 MMHG
BH CV ECHO MEAS - TR MAX VEL: 242.9 CM/SEC
BH CV ECHO MEASUREMENTS AVERAGE E/E' RATIO: 14.64
BH CV VAS BP LEFT ARM: NORMAL MMHG
BH CV XLRA - RV BASE: 3.5 CM
BH CV XLRA - RV LENGTH: 6.9 CM
BH CV XLRA - RV MID: 2.6 CM
BH CV XLRA - TDI S': 13.8 CM/SEC
BUN SERPL-MCNC: 26 MG/DL (ref 8–23)
BUN/CREAT SERPL: 27.4 (ref 7–25)
CALCIUM SPEC-SCNC: 7.4 MG/DL (ref 8.6–10.5)
CHLORIDE SERPL-SCNC: 109 MMOL/L (ref 98–107)
CO2 SERPL-SCNC: 23 MMOL/L (ref 22–29)
CREAT SERPL-MCNC: 0.95 MG/DL (ref 0.57–1)
DEPRECATED RDW RBC AUTO: 59.8 FL (ref 37–54)
EGFRCR SERPLBLD CKD-EPI 2021: 65.4 ML/MIN/1.73
EOSINOPHIL # BLD MANUAL: 0 10*3/MM3 (ref 0–0.4)
EOSINOPHIL NFR BLD MANUAL: 0 % (ref 0.3–6.2)
ERYTHROCYTE [DISTWIDTH] IN BLOOD BY AUTOMATED COUNT: 17.9 % (ref 12.3–15.4)
GLUCOSE BLDC GLUCOMTR-MCNC: 143 MG/DL (ref 70–130)
GLUCOSE BLDC GLUCOMTR-MCNC: 146 MG/DL (ref 70–130)
GLUCOSE BLDC GLUCOMTR-MCNC: 168 MG/DL (ref 70–130)
GLUCOSE BLDC GLUCOMTR-MCNC: 174 MG/DL (ref 70–130)
GLUCOSE SERPL-MCNC: 149 MG/DL (ref 65–99)
HCT VFR BLD AUTO: 34.3 % (ref 34–46.6)
HGB BLD-MCNC: 10.4 G/DL (ref 12–15.9)
LEFT ATRIUM VOLUME INDEX: 35.1 ML/M2
LYMPHOCYTES # BLD MANUAL: 0.62 10*3/MM3 (ref 0.7–3.1)
LYMPHOCYTES NFR BLD MANUAL: 2 % (ref 5–12)
MAGNESIUM SERPL-MCNC: 2.3 MG/DL (ref 1.6–2.4)
MAXIMAL PREDICTED HEART RATE: 152 BPM
MCH RBC QN AUTO: 27.9 PG (ref 26.6–33)
MCHC RBC AUTO-ENTMCNC: 30.3 G/DL (ref 31.5–35.7)
MCV RBC AUTO: 92 FL (ref 79–97)
METAMYELOCYTES NFR BLD MANUAL: 1 % (ref 0–0)
MONOCYTES # BLD: 0.31 10*3/MM3 (ref 0.1–0.9)
NEUTROPHILS # BLD AUTO: 14.51 10*3/MM3 (ref 1.7–7)
NEUTROPHILS NFR BLD MANUAL: 83 % (ref 42.7–76)
NEUTS BAND NFR BLD MANUAL: 10 % (ref 0–5)
PHOSPHATE SERPL-MCNC: 3.5 MG/DL (ref 2.5–4.5)
PLAT MORPH BLD: NORMAL
PLATELET # BLD AUTO: 237 10*3/MM3 (ref 140–450)
PMV BLD AUTO: 10.9 FL (ref 6–12)
POTASSIUM SERPL-SCNC: 3.8 MMOL/L (ref 3.5–5.2)
RBC # BLD AUTO: 3.73 10*6/MM3 (ref 3.77–5.28)
RBC MORPH BLD: NORMAL
SODIUM SERPL-SCNC: 140 MMOL/L (ref 136–145)
STRESS TARGET HR: 129 BPM
VANCOMYCIN SERPL-MCNC: 14.7 MCG/ML (ref 5–40)
VARIANT LYMPHS NFR BLD MANUAL: 4 % (ref 19.6–45.3)
WBC MORPH BLD: NORMAL
WBC NRBC COR # BLD: 15.6 10*3/MM3 (ref 3.4–10.8)

## 2022-12-19 PROCEDURE — 25010000002 HEPARIN (PORCINE) PER 1000 UNITS: Performed by: NURSE PRACTITIONER

## 2022-12-19 PROCEDURE — 84100 ASSAY OF PHOSPHORUS: CPT | Performed by: INTERNAL MEDICINE

## 2022-12-19 PROCEDURE — 93306 TTE W/DOPPLER COMPLETE: CPT

## 2022-12-19 PROCEDURE — 99232 SBSQ HOSP IP/OBS MODERATE 35: CPT | Performed by: INTERNAL MEDICINE

## 2022-12-19 PROCEDURE — 80202 ASSAY OF VANCOMYCIN: CPT

## 2022-12-19 PROCEDURE — 63710000001 INSULIN LISPRO (HUMAN) PER 5 UNITS: Performed by: NURSE PRACTITIONER

## 2022-12-19 PROCEDURE — 82962 GLUCOSE BLOOD TEST: CPT

## 2022-12-19 PROCEDURE — 93306 TTE W/DOPPLER COMPLETE: CPT | Performed by: INTERNAL MEDICINE

## 2022-12-19 PROCEDURE — 25010000002 CEFTRIAXONE PER 250 MG: Performed by: INTERNAL MEDICINE

## 2022-12-19 PROCEDURE — 85007 BL SMEAR W/DIFF WBC COUNT: CPT | Performed by: INTERNAL MEDICINE

## 2022-12-19 PROCEDURE — 80048 BASIC METABOLIC PNL TOTAL CA: CPT

## 2022-12-19 PROCEDURE — 94799 UNLISTED PULMONARY SVC/PX: CPT

## 2022-12-19 PROCEDURE — 25010000002 ONDANSETRON PER 1 MG: Performed by: INTERNAL MEDICINE

## 2022-12-19 PROCEDURE — 25010000002 HYDROCORTISONE SOD SUC (PF) 100 MG RECONSTITUTED SOLUTION: Performed by: INTERNAL MEDICINE

## 2022-12-19 PROCEDURE — 63710000001 INSULIN DETEMIR PER 5 UNITS: Performed by: INTERNAL MEDICINE

## 2022-12-19 PROCEDURE — 85025 COMPLETE CBC W/AUTO DIFF WBC: CPT | Performed by: INTERNAL MEDICINE

## 2022-12-19 PROCEDURE — 83735 ASSAY OF MAGNESIUM: CPT | Performed by: INTERNAL MEDICINE

## 2022-12-19 PROCEDURE — 25010000002 PIPERACILLIN SOD-TAZOBACTAM PER 1 G: Performed by: NURSE PRACTITIONER

## 2022-12-19 PROCEDURE — 94660 CPAP INITIATION&MGMT: CPT

## 2022-12-19 RX ORDER — HYDROCODONE BITARTRATE AND ACETAMINOPHEN 10; 325 MG/1; MG/1
1 TABLET ORAL EVERY 6 HOURS PRN
Status: DISCONTINUED | OUTPATIENT
Start: 2022-12-19 | End: 2022-12-21

## 2022-12-19 RX ORDER — ONDANSETRON 2 MG/ML
4 INJECTION INTRAMUSCULAR; INTRAVENOUS EVERY 6 HOURS PRN
Status: DISCONTINUED | OUTPATIENT
Start: 2022-12-19 | End: 2023-01-03 | Stop reason: HOSPADM

## 2022-12-19 RX ORDER — HYDROCODONE BITARTRATE AND ACETAMINOPHEN 10; 325 MG/1; MG/1
1 TABLET ORAL EVERY 8 HOURS PRN
Status: DISCONTINUED | OUTPATIENT
Start: 2022-12-19 | End: 2022-12-19

## 2022-12-19 RX ORDER — OSELTAMIVIR PHOSPHATE 75 MG/1
75 CAPSULE ORAL EVERY 12 HOURS SCHEDULED
Status: COMPLETED | OUTPATIENT
Start: 2022-12-19 | End: 2022-12-21

## 2022-12-19 RX ADMIN — HYDROCODONE BITARTRATE AND ACETAMINOPHEN 1 TABLET: 10; 325 TABLET ORAL at 17:00

## 2022-12-19 RX ADMIN — FLUOXETINE 20 MG: 20 CAPSULE ORAL at 21:55

## 2022-12-19 RX ADMIN — HEPARIN SODIUM 5000 UNITS: 5000 INJECTION INTRAVENOUS; SUBCUTANEOUS at 13:57

## 2022-12-19 RX ADMIN — CEFTRIAXONE 2 G: 2 INJECTION, POWDER, FOR SOLUTION INTRAMUSCULAR; INTRAVENOUS at 10:50

## 2022-12-19 RX ADMIN — INSULIN LISPRO 3 UNITS: 100 INJECTION, SOLUTION INTRAVENOUS; SUBCUTANEOUS at 21:56

## 2022-12-19 RX ADMIN — LIDOCAINE 2 PATCH: 700 PATCH TOPICAL at 09:02

## 2022-12-19 RX ADMIN — HYDROCORTISONE SODIUM SUCCINATE 50 MG: 100 INJECTION, POWDER, FOR SOLUTION INTRAMUSCULAR; INTRAVENOUS at 11:00

## 2022-12-19 RX ADMIN — OSELTAMIVIR PHOSPHATE 75 MG: 75 CAPSULE ORAL at 10:51

## 2022-12-19 RX ADMIN — ONDANSETRON 4 MG: 2 INJECTION INTRAMUSCULAR; INTRAVENOUS at 13:57

## 2022-12-19 RX ADMIN — HYDROCODONE BITARTRATE AND ACETAMINOPHEN 1 TABLET: 10; 325 TABLET ORAL at 10:49

## 2022-12-19 RX ADMIN — DOXYCYCLINE 100 MG: 100 INJECTION, POWDER, LYOPHILIZED, FOR SOLUTION INTRAVENOUS at 00:58

## 2022-12-19 RX ADMIN — HEPARIN SODIUM 5000 UNITS: 5000 INJECTION INTRAVENOUS; SUBCUTANEOUS at 21:55

## 2022-12-19 RX ADMIN — HYDROCORTISONE SODIUM SUCCINATE 50 MG: 100 INJECTION, POWDER, FOR SOLUTION INTRAMUSCULAR; INTRAVENOUS at 00:58

## 2022-12-19 RX ADMIN — INSULIN DETEMIR 10 UNITS: 100 INJECTION, SOLUTION SUBCUTANEOUS at 21:55

## 2022-12-19 RX ADMIN — HYDROCORTISONE SODIUM SUCCINATE 50 MG: 100 INJECTION, POWDER, FOR SOLUTION INTRAMUSCULAR; INTRAVENOUS at 05:17

## 2022-12-19 RX ADMIN — GABAPENTIN 100 MG: 100 CAPSULE ORAL at 08:59

## 2022-12-19 RX ADMIN — HYDROCORTISONE SODIUM SUCCINATE 50 MG: 100 INJECTION, POWDER, FOR SOLUTION INTRAMUSCULAR; INTRAVENOUS at 17:00

## 2022-12-19 RX ADMIN — HYDROCODONE BITARTRATE AND ACETAMINOPHEN 1 TABLET: 10; 325 TABLET ORAL at 01:51

## 2022-12-19 RX ADMIN — HYDROCODONE BITARTRATE AND ACETAMINOPHEN 1 TABLET: 10; 325 TABLET ORAL at 23:22

## 2022-12-19 RX ADMIN — OSELTAMIVIR PHOSPHATE 75 MG: 75 CAPSULE ORAL at 21:55

## 2022-12-19 RX ADMIN — ROSUVASTATIN CALCIUM 5 MG: 10 TABLET, COATED ORAL at 08:59

## 2022-12-19 RX ADMIN — OSELTAMIVIR PHOSPHATE 30 MG: 30 CAPSULE ORAL at 08:59

## 2022-12-19 RX ADMIN — TAZOBACTAM SODIUM AND PIPERACILLIN SODIUM 4.5 G: 500; 4 INJECTION, SOLUTION INTRAVENOUS at 05:17

## 2022-12-19 RX ADMIN — FLUOXETINE 20 MG: 20 CAPSULE ORAL at 08:58

## 2022-12-19 RX ADMIN — INSULIN LISPRO 3 UNITS: 100 INJECTION, SOLUTION INTRAVENOUS; SUBCUTANEOUS at 12:42

## 2022-12-19 RX ADMIN — FOLIC ACID TAB 400 MCG 400 MCG: 400 TAB at 09:03

## 2022-12-19 RX ADMIN — LEVOTHYROXINE SODIUM 225 MCG: 150 TABLET ORAL at 05:18

## 2022-12-19 RX ADMIN — HEPARIN SODIUM 5000 UNITS: 5000 INJECTION INTRAVENOUS; SUBCUTANEOUS at 05:17

## 2022-12-19 RX ADMIN — GABAPENTIN 100 MG: 100 CAPSULE ORAL at 21:55

## 2022-12-19 RX ADMIN — ONDANSETRON 4 MG: 2 INJECTION INTRAMUSCULAR; INTRAVENOUS at 21:55

## 2022-12-19 NOTE — PROGRESS NOTES
Clinical Nutrition     Reason for Visit: MDR, Follow-up protocol    Patient Name: Charly Blevins  YOB: 1955  MRN: [id number]  Date of Encounter: 12/19/22 13:39 EST  Admission date: 12/17/2022    Nutrition Assessment       Problem List:    Septic shock    Dyslipidemia    T2DM    RA    Acute hypoxic respiratory failure    DRISS    On chronic immunosuppressive therapy    GERD    Influenza A    Obesity (BMI 30-39.9)    Hypothyroidism    Anxiety and depression    Gout   Urine + strep antigen      PMH:   She  has a past medical history of Anxiety and depression (12/17/2022), Atrial fibrillation (HCC) (2015), Diabetes mellitus (HCC), Gout (12/17/2022), Hyperlipidemia, Hypertension, Hypothyroidism (12/17/2022), and Obesity (BMI 30-39.9) (12/17/2022).    PSxH:  She  has a past surgical history that includes Hemorrhoid surgery; Cholecystectomy; Colostomy; Colostomy closure; Thyroidectomy; Hysterectomy; Tubal ligation; Other surgical history; Appendectomy; Lumbar discectomy; and Hip Trochanteric Nailing (Right, 3/27/2019).      Reported/Observed/Food/Nutrition Related History:     Per RN: pt has poor appetite    Pt resting in bed, on HFNC, reports she usually just eats one meal per day, has lost ~20lb's since the death of her  in 2021, dislikes protein drinks, will not drink premier protein, ensure, boost etc, is willing to try a magic cup      Anthropometrics   Height: 66in  Weight: 199lb bedscale on admission  BMI: 32  BMI classification: Obese Class I: 30-34.9kg/m2     Pt weighed 220lb's 12-23-21 at MD office  [facility]    21lb wt loss over past year, ~9.5% loss    Last 15 Recorded Weights  View Complete Flowsheet  Weight Weight (kg) Weight (lbs) Weight Method VISIT REPORT   12/18/2022 96.1 kg 211 lb 13.8 oz Bed scale -   12/17/2022 90.7 kg 199 lb 15.3 oz Bed scale -   12/17/2022 86.183 kg 190 lb - -   7/26/2022 93.441 kg 206 lb Stated -   1/30/2020 99.791 kg 220 lb - Report    9/5/2019 99.7 kg 219 lb 12.8 oz - Report   3/27/2019 99.791 kg 220 lb - -   3/26/2019 99.791 kg 220 lb Stated -   11/16/2016 95.255 kg 210 lb - -       Labs reviewed     Results from last 7 days   Lab Units 12/19/22  0553 12/17/22  1111 12/17/22  1109   SODIUM mmol/L 140   < > 138   POTASSIUM mmol/L 3.8   < > 4.1   CHLORIDE mmol/L 109*   < > 103   CO2 mmol/L 23.0   < > 14.0*   BUN mg/dL 26*   < > 22   CREATININE mg/dL 0.95   < > 1.77*   CALCIUM mg/dL 7.4*   < > 8.6   BILIRUBIN mg/dL  --   --  0.3   ALK PHOS U/L  --   --  66   ALT (SGPT) U/L  --   --  20   AST (SGOT) U/L  --   --  23   GLUCOSE mg/dL 149*   < > 192*    < > = values in this interval not displayed.       Results from last 7 days   Lab Units 12/19/22  1130 12/19/22  0727 12/18/22  2102 12/18/22  1728 12/18/22  1125 12/18/22  0709   GLUCOSE mg/dL 174* 143* 236* 224* 167* 142*     Lab Results   Lab Value Date/Time    HGBA1C 6.50 (H) 04/01/2019 0703       Medications reviewed   Pertinent:abx, prozac, folate, heparin, insulin, tamiflu    Intake/Ouptut 24 hrs (7:00AM - 6:59 AM)     Intake & Output (last day)       12/18 0701  12/19 0700 12/19 0701  12/20 0700    P.O. 1320 360    I.V. (mL/kg) 1547.7 (16.1) 20 (0.2)    IV Piggyback 900     Total Intake(mL/kg) 3767.7 (39.2) 380 (4)    Urine (mL/kg/hr) 2575 (1.1) 300 (0.5)    Total Output 2575 300    Net +1192.7 +80                     GI: abdomen soft, nontender, last bm 12-18    SKIN: wnl  Current Nutrition Prescription     PO: Diet: Cardiac Diets, Diabetic Diets; Healthy Heart (2-3 Na+); Consistent Carbohydrate; Texture: Regular Texture (IDDSI 7); Fluid Consistency: Thin (IDDSI 0)    Intake: 33% of 3 meals    Nutrition Diagnosis   Date:12-19-22  Problem Inadequate oral intake   Etiology Per Clinical Status   Signs/Symptoms Po intake 33%     Goal:   General: Nutrition support treatment  PO: Increase intake    Nutrition Intervention   1.  Follow treatment progress, Advised available snacks, Interview for  preferences, Menu provided, Menu adjusted, Supplement provided    Premier Protein not acceptable to pt    Will send Magic Cups 3x/day    Monitoring/Evaluation:   Per protocol, I&O, PO intake, Supplement intake, Pertinent labs, Weight, Skin status, GI status, Symptoms    Marichuy Muir, CARLOS  Time Spent: 30min

## 2022-12-19 NOTE — PROGRESS NOTES
"INTENSIVIST   PROGRESS NOTE        SUBJECTIVE     Charly 67 y.o. female is followed for: Shortness of Breath       Septic shock    Dyslipidemia    T2DM    RA    Acute hypoxic respiratory failure    DRISS    On chronic immunosuppressive therapy    GERD    Influenza A    Obesity (BMI 30-39.9)    Hypothyroidism    Anxiety and depression    Gout    As an Intensivist, we provide an integrated approach to the ICU patient and family, medical management of comorbid conditions, including but not limited to electrolytes, glycemic control, organ dysfunction, lead interdisciplinary rounds and coordinate the care with all other services, including those from other specialists.     Interval History:  Slowly improving.    Sputum production \"creamy\".    Off pressors.    Still on HFNC.    Device (Oxygen Therapy): humidified, heated, high-flow nasal cannula (22 1800): Flow (L/min): 40 (22 1800), Oxygen Concentration (%): 40 (22 1800)     The patient has started, but not completed, their COVID-19 vaccination series.     Temp  Min: 98.1 °F (36.7 °C)  Max: 98.8 °F (37.1 °C)       History     Last Reviewed by Dylon Pierre MD on 2022 at  6:37 PM    Sections Reviewed    Medical, Family, Surgical, Tobacco, Alcohol, Drug Use, Sexual Activity,   Social Documentation      Problem list reviewed by Dylon Pierre MD on 2022 at  6:37 PM  Medicines reviewed by Dylon Pierre MD on 2022 at  6:37 PM  Allergies reviewed by Dylon Pierre MD on 2022 at  6:37 PM       The patient's relevant past medical, surgical and social history were reviewed and updated in Epic as appropriate.        OBJECTIVE     Vitals:  Temp: 98.6 °F (37 °C) (22 1200) Temp  Min: 98.1 °F (36.7 °C)  Max: 98.8 °F (37.1 °C)   Temp core:      BP: 119/72 (22 1400) BP  Min: 94/56  Max: 129/73   MAP (non-invasive) Noninvasive MAP (mmHg): 90 (22 1400) Noninvasive MAP (mmHg)  Av  Min: 55  Max: 119   Pulse: 71 (22 " 1400) Pulse  Min: 65  Max: 86   Resp: 16 (12/19/22 1100) Resp  Min: 16  Max: 21   SpO2: 95 % (12/19/22 1400) SpO2  Min: 88 %  Max: 99 %   Device: humidified, heated, high-flow nasal cannula (12/19/22 1800)    Flow Rate: 40 (12/19/22 1800) Flow (L/min)  Min: 40  Max: 50         12/17/22  1533 12/18/22  0400   Weight: 90.7 kg (199 lb 15.3 oz) 96.1 kg (211 lb 13.8 oz)        Intake/Ouptut 24 hrs (7:00AM - 6:59 AM)  Intake & Output (last 3 days)       12/16 0701 12/17 0700 12/17 0701 12/18 0700 12/18 0701 12/19 0700 12/19 0701 12/20 0700    P.O.   1320 840    I.V. (mL/kg)  1483.9 (15.4) 1547.7 (16.1) 70 (0.7)    IV Piggyback  850 900 100    Total Intake(mL/kg)  2333.9 (24.3) 3767.7 (39.2) 1010 (10.5)    Urine (mL/kg/hr)  1175 2575 (1.1) 1000 (0.9)    Total Output  1175 2575 1000    Net  +1158.9 +1192.7 +10                  Medications (drips):  norepinephrine, Last Rate: Stopped (12/18/22 0555)        Non Invasive Ventilation   Settings: Observed:   Mode of Delivery: BiPAP, ST (12/19/22 0330)    IPAP (cm H2O): 16 (12/19/22 0330)    EPAP (cm H2O): 8 (12/19/22 0330)    Set Rate (Breaths/Min): 18 breaths/min (12/19/22 0330) Respiratory Rate Total (Breaths/Min): 18 breaths/min (12/19/22 0330)         Tidal Volume (mL): 472 mL (12/19/22 0330)    Minute Ventilation (L/Min): 8.5 (12/19/22 0330)      Physical Examination  Telemetry:  Rhythm: sinus arrhythmia (12/19/22 1800)         Constitutional:  No acute distress.   Cardiovascular: RRR.   Normal heart sounds.  No murmurs, gallop or rub.   Respiratory: Normal breath sounds  No adventitious sounds.   Abdominal:  Soft with no tenderness.  No distension.   No HSM.   Extremities: Warm.  Dry.  No cyanosis.  No Edema   Neurological:   Alert, Oriented, Cooperative.  Best Eye Response: 4-->(E4) spontaneous (12/19/22 1800)  Best Motor Response: 6-->(M6) obeys commands (12/19/22 1800)  Best Verbal Response: 5-->(V5) oriented (12/19/22 1800)  Aron Coma Scale Score: 15 (12/19/22  1800)         Results Reviewed:  Laboratory  Microbiology  Radiology  Pathology    Hematology:  Results from last 7 days   Lab Units 12/19/22  0553 12/18/22  0310   WBC 10*3/mm3 15.60* 24.39*   HEMOGLOBIN g/dL 10.4* 10.8*   MCV fL 92.0 92.6   PLATELETS 10*3/mm3 237 303   NEUTROS ABS 10*3/mm3 14.51* 20.65*   LYMPHS ABS 10*3/mm3  --  0.89   EOS ABS 10*3/mm3 0.00 0.03       Chemistry:  Estimated Creatinine Clearance: 66.2 mL/min (by C-G formula based on SCr of 0.95 mg/dL).    Results from last 7 days   Lab Units 12/19/22  0553 12/18/22  0310   SODIUM mmol/L 140 142   POTASSIUM mmol/L 3.8 5.0   CHLORIDE mmol/L 109* 111*   CO2 mmol/L 23.0 21.0*   BUN mg/dL 26* 28*   CREATININE mg/dL 0.95 1.49*   GLUCOSE mg/dL 149* 156*     Results from last 7 days   Lab Units 12/19/22  0553 12/18/22  0310   CALCIUM mg/dL 7.4* 7.2*   MAGNESIUM mg/dL 2.3 1.1*   PHOSPHORUS mg/dL 3.5 4.6*     Coagulation Labs:  Results from last 7 days   Lab Units 12/17/22  1111   PROTIME seconds 29.6*   INR  2.6*      Cardiac Labs:  Results from last 7 days   Lab Units 12/18/22  0310 12/17/22  1109   PROBNP pg/mL  --  9,401.0*   CK TOTAL U/L  --  160   TROPONIN T ng/mL 0.015 0.023       Lab Results   Lab Value Date/Time    MRSAPCR Negative 12/17/2022 1609     Serologies    Lab Results   Lab Value Date/Time    COVID19 Not Detected 12/17/2022 1128    FLUAPCR Detected (A) 12/17/2022 1128    FLUBPCR Not Detected 12/17/2022 1128     Urinary Serologies    Lab Results   Component Value Date    STREPPNEUAG Positive (A) 12/17/2022       Lab Results   Component Value Date    LEGANTIGENUR Negative 12/17/2022       Biomarkers:  Results from last 7 days   Lab Units 12/18/22  0310 12/17/22  2133 12/17/22  1608 12/17/22  1109   LACTATE mmol/L 2.3* 2.0 4.7* 8.4*   PROCALCITONIN ng/mL 30.10*  --   --  16.28*     COVID-19  Lab Results   Component Value Date    COVID19 Not Detected 12/17/2022    COVID19 Not Detected 07/26/2022     Images:  No radiology results for the last  day    Echo:  Results for orders placed during the hospital encounter of 12/17/22    Adult Transthoracic Echo Complete w/ Color, Spectral and Contrast if Necessary Per Protocol    Interpretation Summary  •  Left ventricular systolic function is normal. Calculated left ventricular EF = 59.5%  •  Left ventricular wall thickness is consistent with mild concentric hypertrophy.  •  Left ventricular diastolic function is consistent with (grade II w/high LAP) pseudonormalization.  •  Left atrial volume is mildly increased.  •  There is calcification of the aortic valve.  •  Estimated right ventricular systolic pressure from tricuspid regurgitation is normal (<35 mmHg). Calculated right ventricular systolic pressure from tricuspid regurgitation is 27 mmHg.  •  Mild dilation of the ascending aorta is present.  4.1 cm      Results: Reviewed.  I reviewed the patient's new laboratory and imaging results.  I independently reviewed the patient's new images.    Medications: Reviewed.    Assessment   A/P     Hospital:  LOS: 2 days   ICU: 2d 2h      Diagnosis   • **Septic shock [A41.9, R65.21]   • Acute hypoxic respiratory failure [J96.01]   • DRISS [N17.9]   • On chronic immunosuppressive therapy [D84.9]       • GERD [K21.9]   • Influenza A [J10.1]   • Obesity (BMI 30-39.9) [E66.9]   • Hypothyroidism [E03.9]   • Anxiety and depression [F41.9, F32.A]   • Gout [M10.9]   • Dyslipidemia [E78.5]   • RA [M06.9]   • T2DM [E11.9]     Charly is a 67 y.o. female admitted on 12/17/2022 with Septic shock (HCC) [A41.9, R65.21]    Assessment/Management/Treatment Plan:    1. Shock: improved. Off pressors  2. Respiratory failure, type 1  a. Influenza A  b. Strep pneumonia (per urinary antigen)  c. Ceftriaxone + Oseltamivir   3. GERD  4. Rheum  a. RA ? Treatment with MTX  b. Gout  5. Renal  a. DRISS - resolved. sCr WNL 12/19/22   6. Endocrine  a. Body mass index is 34.2 kg/m². Obese Class II: 35-39.9kg/m2  b. Hypothyroidism    Lab Results   Component  Value Date    TSH 4.990 (H) 12/17/2022    FREET4 1.41 12/17/2022     c. T2 Diabetes    Lab Results   Lab Value Date/Time    HGBA1C 6.50 (H) 04/01/2019 0703     Results from last 7 days   Lab Units 12/19/22  1646 12/19/22  1130 12/19/22  0727 12/18/22  2102 12/18/22  1728 12/18/22  1125 12/18/22  0709 12/17/22  2048   GLUCOSE mg/dL 146* 174* 143* 236* 224* 167* 142* 194*       Diet: Diet: Cardiac Diets, Diabetic Diets; Healthy Heart (2-3 Na+); Consistent Carbohydrate; Texture: Regular Texture (IDDSI 7); Fluid Consistency: Thin (IDDSI 0)  Orders Placed This Encounter      Dietary Nutrition Supplements Magic Cup      DIET MESSAGE Monday dinner: #2,  make sure egg salad does not have onions, add mac + cheese, grapes, orange magic cups, water THANKS      Advance Directives: Code Status and Medical Interventions:   Ordered at: 12/17/22 1507     Code Status (Patient has no pulse and is not breathing):    CPR (Attempt to Resuscitate)     Medical Interventions (Patient has pulse or is breathing):    Full Support        DVT prophylaxis:  Medical and mechanical DVT prophylaxis orders are present.     In brief:  1. De-escalate antibiotics: ? Ceftriaxone + Oseltamivir   2. Increase [place] to his usual home use.  3. Decrease stress steroids to q 12h  4. Wean FiO2 as tolerated for SpO2 > 90%-92%  5. Oral nutrition supplements  6. Goal: Glucose < 180 mg/dL.   1. Add basal insulin  7. Disposition: Keep in ICU.   1. Labs: Procalcitonin, proBNP, Lactic acid, INR, CBC, BMP in AM  2. CXR in AM    Plan of care and goals reviewed during interdisciplinary rounds.  I discussed the patient's findings and my recommendations with patient and nursing staff    Level of Risk is High due to: *illness with threat to life or bodily function.     Time: 30 minutes, in direct patient care, with the patient and/or in the ICU or lucero coordinating care with other health care providers. This is non-concurrent time.    I have spent > 50% percent of this  time, counseling and discussing management.       [x] Primary Attending Intensive Care Medicine - Nutrition Support   [] Consultant

## 2022-12-19 NOTE — PLAN OF CARE
Goal Outcome Evaluation:  Plan of Care Reviewed With: patient           Outcome Evaluation: Levo gtt remains off. BP stable throughout shift. Wore bipap throughout shift, transitioned to Hi corey nasal cannula this am. Afebrile. LR gtt stopped. >1L UOP. No BM this shift. Cough stronger, clearing thick secretions. Norco prn given for pain control per APRN. Daughter updated via phone call.

## 2022-12-19 NOTE — CASE MANAGEMENT/SOCIAL WORK
Discharge Planning Assessment  [facility]     Patient Name: Charly Blevins  MRN: [id number]  Today's Date: 12/19/2022    Admit Date: 12/17/2022    Plan: Ongoing   Discharge Needs Assessment     Row Name 12/19/22 1210       Living Environment    People in Home child(buddy), adult    Current Living Arrangements home    Primary Care Provided by self    Provides Primary Care For no one, unable/limited ability to care for self    Family Caregiver if Needed child(buddy), adult    Quality of Family Relationships supportive    Able to Return to Prior Arrangements yes       Resource/Environmental Concerns    Resource/Environmental Concerns none    Transportation Concerns none       Transition Planning    Patient/Family Anticipates Transition to home with family    Patient/Family Anticipated Services at Transition ;home health care;rehabilitation services    Transportation Anticipated family or friend will provide       Discharge Needs Assessment    Readmission Within the Last 30 Days no previous admission in last 30 days    Equipment Currently Used at Home cane, straight;shower chair;rollator;oxygen    Concerns to be Addressed discharge planning    Anticipated Changes Related to Illness none               Discharge Plan     Row Name 12/19/22 1396       Plan    Plan Ongoing    Patient/Family in Agreement with Plan yes    Plan Comments Spoke with patient at bedside.  Patient resides in [facility] and patients brother lives there half time.  Prior to admission patient was independent with ADL's and mobility using a rollator.  Patient also has at home a cane, shower chair and oxygen.  Patient doesn't know the name of the oxygen provider but says it is out of [place].  Patient states she has a caretaker that comes 2 days a week to assist with household chores, transports her to doctors appointments etc.  Patient confirms her PCP is Boyd Molina and that she has medicare A&B with and [place] secondary for  insurance.  Spoke with patients daughter over the phone and clarified with her patients prior level of function.  Daughter thinks patients oxygen is provided by [employer] but thinks that it may have also belonged to her father. Spoke with [employer] in [place] and patient does not have oxygen through them but has had other items.  CM will follow up with patients PCP office to see if they have documentation of oxygen for patient.  Discharge plan at this time is ongoing.  Patient is currently on HFNC.  CM will continue to follow.              Continued Care and Services - Admitted Since 12/17/2022    Coordination has not been started for this encounter.       Expected Discharge Date and Time     Expected Discharge Date Expected Discharge Time    Dec 26, 2022          Demographic Summary     Row Name 12/19/22 9763       General Information    Admission Type inpatient    Arrived From home    Referral Source admission list    Reason for Consult discharge planning    Preferred Language English       Contact Information    Permission Granted to Share Info With                Functional Status    No documentation.                Psychosocial    No documentation.                Abuse/Neglect    No documentation.                Legal    No documentation.                Substance Abuse    No documentation.                Patient Forms    No documentation.                   Veena Pierre RN

## 2022-12-19 NOTE — CONSULTS
Clinical Nutrition     Nutrition Support Assessment  Reason for Visit: Identified at risk by screening criteria, MST score 2+      Patient Name: Charly Blevins  YOB: 1955  MRN: [id number]  Date of Encounter: 12/18/22 20:19 EST  Admission date: 12/17/2022    Comments: Pt sleeping at times of attempted visits. See as feasible for data r/t wt hx and intake.     Nutrition Assessment   Admission Diagnosis:  Septic shock (HCC) [A41.9, R65.21]    Problem List:    Septic shock    Dyslipidemia    T2DM    RA    Acute hypoxic respiratory failure    DRISS    On chronic immunosuppressive therapy    GERD    Influenza A    Obesity (BMI 30-39.9)    Hypothyroidism    Anxiety and depression    Gout      PMH:   She  has a past medical history of Anxiety and depression (12/17/2022), Atrial fibrillation ([facility]) (2015), Diabetes mellitus (HCC), Gout (12/17/2022), Hyperlipidemia, Hypertension, Hypothyroidism (12/17/2022), and Obesity (BMI 30-39.9) (12/17/2022).      PSH:  She  has a past surgical history that includes Hemorrhoid surgery; Cholecystectomy; Colostomy; Colostomy closure; Thyroidectomy; Hysterectomy; Tubal ligation; Other surgical history; Appendectomy; Lumbar discectomy; and Hip Trochanteric Nailing (Right, 3/27/2019).    Applicable Nutrition Concerns:   Skin:  Oral:  GI:    Applicable Interval History:         Reported/Observed/Food/Nutrition Related History:     Pt sleeping at times of attempted visits.       Labs    Labs Reviewed: Yes   Replacing Mg    Results from last 7 days   Lab Units 12/18/22  0310 12/17/22  1111 12/17/22  1109   GLUCOSE mg/dL 156*  --  192*   BUN mg/dL 28*  --  22   CREATININE mg/dL 1.49* 1.80* 1.77*   SODIUM mmol/L 142  --  138   CHLORIDE mmol/L 111*  --  103   POTASSIUM mmol/L 5.0  --  4.1   PHOSPHORUS mg/dL 4.6*  --   --    MAGNESIUM mg/dL 1.1*  --  1.4*   ALT (SGPT) U/L  --   --  20       Results from last 7 days   Lab Units 12/17/22  1109   ALBUMIN g/dL 3.40*  "      Results from last 7 days   Lab Units 12/18/22  1728 12/18/22  1125 12/18/22  0709 12/17/22  2048 12/17/22  1611 12/17/22  1111   GLUCOSE mg/dL 224* 167* 142* 194* 192* 187*     Lab Results   Lab Value Date/Time    HGBA1C 6.50 (H) 04/01/2019 0703             Results from last 7 days   Lab Units 12/17/22  1109   PROBNP pg/mL 9,401.0*         Medications    Medications Reviewed: Yes  Pertinent:   Abx, Folic Acid, Steroid, Tamiflu  LR    Intake/Ouptut 24 hrs (7:00AM - 6:59 AM)   I&O's Reviewed: Yes  Intake & Output (last day)       12/18 0701  12/19 0700    P.O. 1080    I.V. (mL/kg) 875.3 (9.1)    IV Piggyback 550    Total Intake(mL/kg) 2505.3 (26.1)    Urine (mL/kg/hr) 1350 (1.1)    Total Output 1350    Net +1155.3               Anthropometrics     Admission Height 167.6 cm (66\") Documented at 12/17/2022 1113   Admission Weight 86.2 kg (190 lb) Documented at 12/17/2022 1113       Height: Height: 167.6 cm (66\")  Last Filed Weight: Weight: 96.1 kg (211 lb 13.8 oz) (12/18/22 0400)  Method: Weight Method: Bed scale  BMI: BMI (Calculated): 34.2  BMI classification: Obese Class I: 30-34.9kg/m2    UBW:  Per EMR: wt 199 lbs bed scale on 12/17, 197 lbs on 12/2, 194 lbs on 10/14   Note wt 206 lbs on 7/26 and 199 lbs on 8/24 representing loss of 3%     Weight change:     Nutrition Focused Physical Exam     Date:     Unable to perform exam due to: Defer pending indication      Current Nutrition Prescription     PO: Diet: Cardiac Diets, Diabetic Diets; Healthy Heart (2-3 Na+); Consistent Carbohydrate; Texture: Regular Texture (IDDSI 7); Fluid Consistency: Thin (IDDSI 0)  Oral Nutrition Supplement:   Intake: 1 Day: 50% x 2 meals recorded       Nutrition Diagnosis   Date:  Updated:   Problem No nutrition diagnosis at this time pending further data   Etiology    Signs/Symptoms    Status:     Goal:   General: Nutrition to support treatment  PO: Increase intake  EN/PN: N/A    Nutrition Intervention   Follow treatment progress, " Care plan reviewed, Menu provided to room        Monitoring/Evaluation:   Per protocol, I&O, PO intake, Pertinent labs, Weight, Symptoms      Pat Whiting RD  Time Spent: 25 min

## 2022-12-19 NOTE — PLAN OF CARE
Goal Outcome Evaluation:               VSS on HFNC 40% 40L. Productive cough- cloudy thick sputum- pt is able to clear secretions well. Sinus arrhythmia w/ occasional PACs on monitor.    PRN pain medication changed to Q6. Pt reports less pain.   Doxy, Vanc, and Zosyn discontinued. Rocephin BID added.     Reports nausea. Zofran added/given x 1 with relief.   Very low PO intake. Daughter reports she eats VERY little at home- usually just one meal at the end of the day. Pt says   ~1yr ago and she has not been eating well since.   See dietician note.     Daughter updated both via phone and in person.

## 2022-12-20 ENCOUNTER — APPOINTMENT (OUTPATIENT)
Dept: GENERAL RADIOLOGY | Facility: HOSPITAL | Age: 67
DRG: 871 | End: 2022-12-20
Payer: MEDICARE

## 2022-12-20 LAB
ANION GAP SERPL CALCULATED.3IONS-SCNC: 11 MMOL/L (ref 5–15)
BASOPHILS # BLD MANUAL: 0 10*3/MM3 (ref 0–0.2)
BASOPHILS NFR BLD MANUAL: 0 % (ref 0–1.5)
BUN SERPL-MCNC: 22 MG/DL (ref 8–23)
BUN/CREAT SERPL: 30.1 (ref 7–25)
CALCIUM SPEC-SCNC: 7.8 MG/DL (ref 8.6–10.5)
CHLORIDE SERPL-SCNC: 109 MMOL/L (ref 98–107)
CO2 SERPL-SCNC: 23 MMOL/L (ref 22–29)
CREAT SERPL-MCNC: 0.73 MG/DL (ref 0.57–1)
D-LACTATE SERPL-SCNC: 0.8 MMOL/L (ref 0.5–2)
DEPRECATED RDW RBC AUTO: 58 FL (ref 37–54)
EGFRCR SERPLBLD CKD-EPI 2021: 89.7 ML/MIN/1.73
EOSINOPHIL # BLD MANUAL: 0 10*3/MM3 (ref 0–0.4)
EOSINOPHIL NFR BLD MANUAL: 0 % (ref 0.3–6.2)
ERYTHROCYTE [DISTWIDTH] IN BLOOD BY AUTOMATED COUNT: 17.9 % (ref 12.3–15.4)
GLUCOSE BLDC GLUCOMTR-MCNC: 135 MG/DL (ref 70–130)
GLUCOSE BLDC GLUCOMTR-MCNC: 166 MG/DL (ref 70–130)
GLUCOSE BLDC GLUCOMTR-MCNC: 85 MG/DL (ref 70–130)
GLUCOSE BLDC GLUCOMTR-MCNC: 94 MG/DL (ref 70–130)
GLUCOSE BLDC GLUCOMTR-MCNC: 95 MG/DL (ref 70–130)
GLUCOSE SERPL-MCNC: 101 MG/DL (ref 65–99)
HCT VFR BLD AUTO: 30.4 % (ref 34–46.6)
HGB BLD-MCNC: 9.3 G/DL (ref 12–15.9)
INR PPP: 1.41 (ref 0.84–1.13)
LYMPHOCYTES # BLD MANUAL: 1.02 10*3/MM3 (ref 0.7–3.1)
LYMPHOCYTES NFR BLD MANUAL: 3 % (ref 5–12)
MCH RBC QN AUTO: 27.4 PG (ref 26.6–33)
MCHC RBC AUTO-ENTMCNC: 30.6 G/DL (ref 31.5–35.7)
MCV RBC AUTO: 89.4 FL (ref 79–97)
MONOCYTES # BLD: 0.44 10*3/MM3 (ref 0.1–0.9)
NEUTROPHILS # BLD AUTO: 13.07 10*3/MM3 (ref 1.7–7)
NEUTROPHILS NFR BLD MANUAL: 88 % (ref 42.7–76)
NEUTS BAND NFR BLD MANUAL: 2 % (ref 0–5)
NT-PROBNP SERPL-MCNC: ABNORMAL PG/ML (ref 0–900)
PLAT MORPH BLD: NORMAL
PLATELET # BLD AUTO: 250 10*3/MM3 (ref 140–450)
PMV BLD AUTO: 10.7 FL (ref 6–12)
POTASSIUM SERPL-SCNC: 3.4 MMOL/L (ref 3.5–5.2)
POTASSIUM SERPL-SCNC: 4 MMOL/L (ref 3.5–5.2)
PROCALCITONIN SERPL-MCNC: 5.71 NG/ML (ref 0–0.25)
PROTHROMBIN TIME: 17.2 SECONDS (ref 11.4–14.4)
RBC # BLD AUTO: 3.4 10*6/MM3 (ref 3.77–5.28)
RBC MORPH BLD: NORMAL
SCAN SLIDE: NORMAL
SODIUM SERPL-SCNC: 143 MMOL/L (ref 136–145)
VARIANT LYMPHS NFR BLD MANUAL: 7 % (ref 19.6–45.3)
WBC MORPH BLD: NORMAL
WBC NRBC COR # BLD: 14.52 10*3/MM3 (ref 3.4–10.8)

## 2022-12-20 PROCEDURE — 83605 ASSAY OF LACTIC ACID: CPT | Performed by: INTERNAL MEDICINE

## 2022-12-20 PROCEDURE — 84132 ASSAY OF SERUM POTASSIUM: CPT | Performed by: NURSE PRACTITIONER

## 2022-12-20 PROCEDURE — 84145 PROCALCITONIN (PCT): CPT | Performed by: INTERNAL MEDICINE

## 2022-12-20 PROCEDURE — 94799 UNLISTED PULMONARY SVC/PX: CPT

## 2022-12-20 PROCEDURE — 25010000002 ONDANSETRON PER 1 MG: Performed by: INTERNAL MEDICINE

## 2022-12-20 PROCEDURE — 99233 SBSQ HOSP IP/OBS HIGH 50: CPT | Performed by: INTERNAL MEDICINE

## 2022-12-20 PROCEDURE — 80048 BASIC METABOLIC PNL TOTAL CA: CPT | Performed by: INTERNAL MEDICINE

## 2022-12-20 PROCEDURE — 85610 PROTHROMBIN TIME: CPT | Performed by: INTERNAL MEDICINE

## 2022-12-20 PROCEDURE — 25010000002 CEFTRIAXONE PER 250 MG: Performed by: INTERNAL MEDICINE

## 2022-12-20 PROCEDURE — 85007 BL SMEAR W/DIFF WBC COUNT: CPT | Performed by: INTERNAL MEDICINE

## 2022-12-20 PROCEDURE — 25010000002 HEPARIN (PORCINE) PER 1000 UNITS: Performed by: NURSE PRACTITIONER

## 2022-12-20 PROCEDURE — 63710000001 INSULIN DETEMIR PER 5 UNITS: Performed by: INTERNAL MEDICINE

## 2022-12-20 PROCEDURE — 94660 CPAP INITIATION&MGMT: CPT

## 2022-12-20 PROCEDURE — 25010000002 HYDROCORTISONE SOD SUC (PF) 100 MG RECONSTITUTED SOLUTION: Performed by: INTERNAL MEDICINE

## 2022-12-20 PROCEDURE — 71045 X-RAY EXAM CHEST 1 VIEW: CPT

## 2022-12-20 PROCEDURE — 82962 GLUCOSE BLOOD TEST: CPT

## 2022-12-20 PROCEDURE — 83880 ASSAY OF NATRIURETIC PEPTIDE: CPT | Performed by: INTERNAL MEDICINE

## 2022-12-20 PROCEDURE — 85025 COMPLETE CBC W/AUTO DIFF WBC: CPT | Performed by: INTERNAL MEDICINE

## 2022-12-20 RX ORDER — POTASSIUM CHLORIDE 750 MG/1
40 CAPSULE, EXTENDED RELEASE ORAL AS NEEDED
Status: DISCONTINUED | OUTPATIENT
Start: 2022-12-20 | End: 2023-01-03 | Stop reason: HOSPADM

## 2022-12-20 RX ORDER — BUMETANIDE 0.25 MG/ML
2.5 INJECTION INTRAMUSCULAR; INTRAVENOUS ONCE
Status: COMPLETED | OUTPATIENT
Start: 2022-12-20 | End: 2022-12-20

## 2022-12-20 RX ORDER — ECHINACEA PURPUREA EXTRACT 125 MG
1 TABLET ORAL AS NEEDED
Status: DISCONTINUED | OUTPATIENT
Start: 2022-12-20 | End: 2023-01-03 | Stop reason: HOSPADM

## 2022-12-20 RX ORDER — ZOLPIDEM TARTRATE 5 MG/1
5 TABLET ORAL NIGHTLY PRN
Status: DISCONTINUED | OUTPATIENT
Start: 2022-12-20 | End: 2023-01-03 | Stop reason: HOSPADM

## 2022-12-20 RX ORDER — ACETAMINOPHEN AND CODEINE PHOSPHATE 300; 30 MG/1; MG/1
1 TABLET ORAL EVERY 6 HOURS PRN
Status: DISCONTINUED | OUTPATIENT
Start: 2022-12-20 | End: 2022-12-21

## 2022-12-20 RX ORDER — COLESEVELAM 180 1/1
1250 TABLET ORAL 2 TIMES DAILY WITH MEALS
Status: DISCONTINUED | OUTPATIENT
Start: 2022-12-20 | End: 2023-01-03 | Stop reason: HOSPADM

## 2022-12-20 RX ADMIN — HYDROCODONE BITARTRATE AND ACETAMINOPHEN 1 TABLET: 10; 325 TABLET ORAL at 18:07

## 2022-12-20 RX ADMIN — GABAPENTIN 100 MG: 100 CAPSULE ORAL at 20:24

## 2022-12-20 RX ADMIN — FLUOXETINE 20 MG: 20 CAPSULE ORAL at 20:24

## 2022-12-20 RX ADMIN — APIXABAN 5 MG: 5 TABLET, FILM COATED ORAL at 11:15

## 2022-12-20 RX ADMIN — HYDROCODONE BITARTRATE AND ACETAMINOPHEN 1 TABLET: 10; 325 TABLET ORAL at 11:58

## 2022-12-20 RX ADMIN — INSULIN DETEMIR 10 UNITS: 100 INJECTION, SOLUTION SUBCUTANEOUS at 08:12

## 2022-12-20 RX ADMIN — LIDOCAINE 2 PATCH: 700 PATCH TOPICAL at 09:30

## 2022-12-20 RX ADMIN — HYDROCORTISONE SODIUM SUCCINATE 50 MG: 100 INJECTION, POWDER, FOR SOLUTION INTRAMUSCULAR; INTRAVENOUS at 05:17

## 2022-12-20 RX ADMIN — GABAPENTIN 100 MG: 100 CAPSULE ORAL at 08:12

## 2022-12-20 RX ADMIN — INSULIN DETEMIR 10 UNITS: 100 INJECTION, SOLUTION SUBCUTANEOUS at 21:11

## 2022-12-20 RX ADMIN — FLUOXETINE 20 MG: 20 CAPSULE ORAL at 08:12

## 2022-12-20 RX ADMIN — OSELTAMIVIR PHOSPHATE 75 MG: 75 CAPSULE ORAL at 20:24

## 2022-12-20 RX ADMIN — ROSUVASTATIN CALCIUM 5 MG: 10 TABLET, COATED ORAL at 08:12

## 2022-12-20 RX ADMIN — HYDROCODONE BITARTRATE AND ACETAMINOPHEN 1 TABLET: 10; 325 TABLET ORAL at 05:17

## 2022-12-20 RX ADMIN — OSELTAMIVIR PHOSPHATE 75 MG: 75 CAPSULE ORAL at 08:12

## 2022-12-20 RX ADMIN — HYDROCORTISONE SODIUM SUCCINATE 50 MG: 100 INJECTION, POWDER, FOR SOLUTION INTRAMUSCULAR; INTRAVENOUS at 18:07

## 2022-12-20 RX ADMIN — POTASSIUM CHLORIDE 40 MEQ: 750 CAPSULE, EXTENDED RELEASE ORAL at 11:15

## 2022-12-20 RX ADMIN — POTASSIUM CHLORIDE 40 MEQ: 750 CAPSULE, EXTENDED RELEASE ORAL at 15:07

## 2022-12-20 RX ADMIN — FOLIC ACID TAB 400 MCG 400 MCG: 400 TAB at 09:15

## 2022-12-20 RX ADMIN — ZOLPIDEM TARTRATE 5 MG: 5 TABLET ORAL at 21:56

## 2022-12-20 RX ADMIN — ONDANSETRON 4 MG: 2 INJECTION INTRAMUSCULAR; INTRAVENOUS at 20:24

## 2022-12-20 RX ADMIN — CEFTRIAXONE 2 G: 2 INJECTION, POWDER, FOR SOLUTION INTRAMUSCULAR; INTRAVENOUS at 11:17

## 2022-12-20 RX ADMIN — ACETAMINOPHEN AND CODEINE PHOSPHATE 1 TABLET: 300; 30 TABLET ORAL at 11:15

## 2022-12-20 RX ADMIN — SALINE NASAL SPRAY 1 SPRAY: 1.5 SOLUTION NASAL at 06:18

## 2022-12-20 RX ADMIN — HEPARIN SODIUM 5000 UNITS: 5000 INJECTION INTRAVENOUS; SUBCUTANEOUS at 05:17

## 2022-12-20 RX ADMIN — LEVOTHYROXINE SODIUM 225 MCG: 150 TABLET ORAL at 05:17

## 2022-12-20 RX ADMIN — ACETAMINOPHEN AND CODEINE PHOSPHATE 1 TABLET: 300; 30 TABLET ORAL at 20:41

## 2022-12-20 RX ADMIN — BUMETANIDE 2.5 MG: 0.25 INJECTION, SOLUTION INTRAMUSCULAR; INTRAVENOUS at 18:19

## 2022-12-20 RX ADMIN — APIXABAN 5 MG: 5 TABLET, FILM COATED ORAL at 20:24

## 2022-12-20 NOTE — PROGRESS NOTES
INTENSIVIST   PROGRESS NOTE        SUBJECTIVE     Charly 67 y.o. female is followed for: Shortness of Breath       Septic shock    Dyslipidemia    T2DM    RA    Acute hypoxic respiratory failure    DRISS    On chronic immunosuppressive therapy    GERD    Influenza A    Obesity (BMI 30-39.9)    Hypothyroidism    Anxiety and depression    Gout    As an Intensivist, we provide an integrated approach to the ICU patient and family, medical management of comorbid conditions, including but not limited to electrolytes, glycemic control, organ dysfunction, lead interdisciplinary rounds and coordinate the care with all other services, including those from other specialists.     Interval History:  Uneventful night.  Nausea resolved.   Still on HFNC.  (+) cough.    Device (Oxygen Therapy): heated, humidified, high-flow nasal cannula (22 1600): Flow (L/min): 40 (22 1600), Oxygen Concentration (%): 55 (22 1600)     The patient has started, but not completed, their COVID-19 vaccination series.     Temp  Min: 98.4 °F (36.9 °C)  Max: 99 °F (37.2 °C)       History     Last Reviewed by Dylon Pierre MD on 2022 at  6:37 PM    Sections Reviewed    Medical, Family, Surgical, Tobacco, Alcohol, Drug Use, Sexual Activity,   Social Documentation      Problem list reviewed by Dylon Pierre MD on 2022 at  6:37 PM  Medicines reviewed by Dylon Pierre MD on 2022 at  6:37 PM  Allergies reviewed by Dylon Pierre MD on 2022 at  6:37 PM       The patient's relevant past medical, surgical and social history were reviewed and updated in Epic as appropriate.        OBJECTIVE     Vitals:  Temp: 98.5 °F (36.9 °C) (22 1400) Temp  Min: 98.4 °F (36.9 °C)  Max: 99 °F (37.2 °C)   Temp core:      BP: 141/89 (22 1600) BP  Min: 118/68  Max: 144/88   MAP (non-invasive) Noninvasive MAP (mmHg): 105 (22 1600) Noninvasive MAP (mmHg)  Av.5  Min: 55  Max: 119   Pulse: 65 (22 1600) Pulse  Min:  65  Max: 93   Resp: 18 (12/20/22 1600) Resp  Min: 14  Max: 18   SpO2: 94 % (12/20/22 1600) SpO2  Min: 90 %  Max: 94 %   Device: heated, humidified, high-flow nasal cannula (12/20/22 1600)    Flow Rate: 40 (12/20/22 1600) Flow (L/min)  Min: 40  Max: 45         12/17/22  1533 12/18/22  0400   Weight: 90.7 kg (199 lb 15.3 oz) 96.1 kg (211 lb 13.8 oz)        Intake/Ouptut 24 hrs (7:00AM - 6:59 AM)  Intake & Output (last 3 days)       12/17 0701 12/18 0700 12/18 0701 12/19 0700 12/19 0701 12/20 0700 12/20 0701 12/21 0700    P.O.  1320 1560 720    I.V. (mL/kg) 1483.9 (15.4) 1547.7 (16.1) 127.5 (1.3) 55 (0.6)    IV Piggyback 850 900 100     Total Intake(mL/kg) 2333.9 (24.3) 3767.7 (39.2) 1787.5 (18.6) 775 (8.1)    Urine (mL/kg/hr) 1175 2575 (1.1) 1900 (0.8) 450 (0.4)    Stool    0    Total Output 1175 2575 1900 450    Net +1158.9 +1192.7 -112.5 +325            Stool Unmeasured Occurrence    2 x          Medications (drips):       Non Invasive Ventilation   Settings: Observed:   Mode of Delivery: standby (12/20/22 0339)    IPAP (cm H2O): 16 (12/19/22 0330)    EPAP (cm H2O): 8 (12/19/22 0330)    Set Rate (Breaths/Min): 18 breaths/min (12/19/22 0330) Respiratory Rate Total (Breaths/Min): 18 breaths/min (12/19/22 0330)         Tidal Volume (mL): 472 mL (12/19/22 0330)    Minute Ventilation (L/Min): 8.5 (12/19/22 0330)      Physical Examination  Telemetry:  Rhythm: sinus arrhythmia, sinus bradycardia (12/20/22 1600)         Constitutional:  No acute distress.   Cardiovascular: RRR.   Normal heart sounds.  No murmurs, gallop or rub.   Respiratory: Normal breath sounds  No adventitious sounds.   Abdominal:  Soft with no tenderness.  No distension.   No HSM.   Extremities: Warm.  Dry.  No cyanosis.  No Edema   Neurological:   Alert, Oriented, Cooperative.  Best Eye Response: 4-->(E4) spontaneous (12/20/22 1600)  Best Motor Response: 6-->(M6) obeys commands (12/20/22 1600)  Best Verbal Response: 5-->(V5) oriented (12/20/22  1600)  Armstrong Coma Scale Score: 15 (12/20/22 1600)         Results Reviewed:  Laboratory  Microbiology  Radiology  Pathology    Hematology:  Results from last 7 days   Lab Units 12/20/22  0410 12/19/22  0553 12/18/22  0310   WBC 10*3/mm3 14.52* 15.60* 24.39*   HEMOGLOBIN g/dL 9.3* 10.4* 10.8*   MCV fL 89.4 92.0 92.6   PLATELETS 10*3/mm3 250 237 303   NEUTROS ABS 10*3/mm3 13.07* 14.51* 20.65*   LYMPHS ABS 10*3/mm3  --   --  0.89   EOS ABS 10*3/mm3 0.00 0.00 0.03       Chemistry:  Estimated Creatinine Clearance: 86.2 mL/min (by C-G formula based on SCr of 0.73 mg/dL).    Results from last 7 days   Lab Units 12/20/22  0410 12/19/22  0553   SODIUM mmol/L 143 140   POTASSIUM mmol/L 3.4* 3.8   CHLORIDE mmol/L 109* 109*   CO2 mmol/L 23.0 23.0   BUN mg/dL 22 26*   CREATININE mg/dL 0.73 0.95   GLUCOSE mg/dL 101* 149*     Results from last 7 days   Lab Units 12/20/22  0410 12/19/22  0553 12/18/22  0310   CALCIUM mg/dL 7.8* 7.4* 7.2*   MAGNESIUM mg/dL  --  2.3 1.1*   PHOSPHORUS mg/dL  --  3.5 4.6*     Coagulation Labs:  Results from last 7 days   Lab Units 12/20/22  0410 12/17/22  1111   PROTIME Seconds 17.2* 29.6*   INR  1.41* 2.6*      Cardiac Labs:  Results from last 7 days   Lab Units 12/20/22  0410 12/18/22  0310 12/17/22  1109   PROBNP pg/mL 10,170.0*  --  9,401.0*   CK TOTAL U/L  --   --  160   TROPONIN T ng/mL  --  0.015 0.023       Lab Results   Lab Value Date/Time    MRSAPCR Negative 12/17/2022 1609     Serologies    Lab Results   Lab Value Date/Time    COVID19 Not Detected 12/17/2022 1128    FLUAPCR Detected (A) 12/17/2022 1128    FLUBPCR Not Detected 12/17/2022 1128     Urinary Serologies    Lab Results   Component Value Date    STREPPNEUAG Positive (A) 12/17/2022       Lab Results   Component Value Date    LEGANTIGENUR Negative 12/17/2022       Biomarkers:  Results from last 7 days   Lab Units 12/20/22  0410 12/18/22  0310 12/17/22  2133 12/17/22  1608 12/17/22  1109   LACTATE mmol/L 0.8 2.3* 2.0   < > 8.4*    PROCALCITONIN ng/mL 5.71* 30.10*  --   --  16.28*    < > = values in this interval not displayed.     COVID-19  Lab Results   Component Value Date    COVID19 Not Detected 12/17/2022    COVID19 Not Detected 07/26/2022     Images:  XR Chest 1 View    Result Date: 12/20/2022   1. Interval development of diffuse perihilar disease suggesting CHF. 2. Worsening right lower lung atelectasis plus/minus effusion.  This report was finalized on 12/20/2022 7:57 AM by Dr. Heath Prado MD.        Echo:  Results for orders placed during the hospital encounter of 12/17/22    Adult Transthoracic Echo Complete w/ Color, Spectral and Contrast if Necessary Per Protocol    Interpretation Summary  •  Left ventricular systolic function is normal. Calculated left ventricular EF = 59.5%  •  Left ventricular wall thickness is consistent with mild concentric hypertrophy.  •  Left ventricular diastolic function is consistent with (grade II w/high LAP) pseudonormalization.  •  Left atrial volume is mildly increased.  •  There is calcification of the aortic valve.  •  Estimated right ventricular systolic pressure from tricuspid regurgitation is normal (<35 mmHg). Calculated right ventricular systolic pressure from tricuspid regurgitation is 27 mmHg.  •  Mild dilation of the ascending aorta is present.  4.1 cm      Results: Reviewed.  I reviewed the patient's new laboratory and imaging results.  I independently reviewed the patient's new images.    Medications: Reviewed.    Assessment   A/P     Hospital:  LOS: 3 days   ICU: 3d 2h      Diagnosis   • **Septic shock [A41.9, R65.21]   • Acute hypoxic respiratory failure [J96.01]   • DRISS [N17.9]   • On chronic immunosuppressive therapy [D84.9]       • GERD [K21.9]   • Influenza A [J10.1]   • Obesity (BMI 30-39.9) [E66.9]   • Hypothyroidism [E03.9]   • Anxiety and depression [F41.9, F32.A]   • Gout [M10.9]   • Dyslipidemia [E78.5]   • RA [M06.9]   • T2DM [E11.9]     Charly is a 67 y.o. female admitted on  12/17/2022 with Septic shock (HCC) [A41.9, R65.21]    Assessment/Management/Treatment Plan:    1. Shock: improved. Off pressors  2. Respiratory failure, type 1  a. Influenza A  b. Strep pneumonia (per urinary antigen)  c. Ceftriaxone + Oseltamivir   3. GERD  4. Rheum  a. RA ? Treatment with MTX  b. Gout  5. Renal  a. DRISS - resolved. sCr WNL 12/19/22   6. Endocrine  a. Body mass index is 34.2 kg/m². Obese Class II: 35-39.9kg/m2  b. Hypothyroidism    Lab Results   Component Value Date    TSH 4.990 (H) 12/17/2022    FREET4 1.41 12/17/2022     c. T2 Diabetes    Lab Results   Lab Value Date/Time    HGBA1C 6.50 (H) 04/01/2019 0703     Results from last 7 days   Lab Units 12/20/22  1127 12/20/22  0748 12/19/22  2048 12/19/22  1646 12/19/22  1130 12/19/22  0727 12/18/22  2102 12/18/22  1728   GLUCOSE mg/dL 135* 85 168* 146* 174* 143* 236* 224*       Diet: Diet: Cardiac Diets, Diabetic Diets; Healthy Heart (2-3 Na+); Consistent Carbohydrate; Texture: Regular Texture (IDDSI 7); Fluid Consistency: Thin (IDDSI 0)  Orders Placed This Encounter      Dietary Nutrition Supplements Magic Cup      DIET MESSAGE Pt would like 2 containers of cereal (cheerios) and 2 containers of 2% milk for breakfast please.      Advance Directives: Code Status and Medical Interventions:   Ordered at: 12/17/22 1507     Code Status (Patient has no pulse and is not breathing):    CPR (Attempt to Resuscitate)     Medical Interventions (Patient has pulse or is breathing):    Full Support        DVT prophylaxis:  Medical and mechanical DVT prophylaxis orders are present.     In brief:  1. She is requesting her home medicine: zolpidem  2. Codeine for cough.  3. Continue antibiotics: ? Ceftriaxone + Oseltamivir   4. Decrease stress steroids to daily  5. Wean FiO2 as tolerated for SpO2 > 90%-92%  6. Oral nutrition supplements  7. Goal: Glucose < 180 mg/dL.   1. Continue current doses of insulin  8. PCT trending down.  9. Lactic acidosis:  resolved.  10. Diuresis  11. K replacement  12. PT/OT  13. Disposition: Keep in ICU.   1. Labs: Procalcitonin, proBNP in AM  2. F/U CXR in AM: Pulmonary edema    Plan of care and goals reviewed during interdisciplinary rounds.  I discussed the patient's findings and my recommendations with patient and nursing staff    Level of Risk is High due to: *illness with threat to life or bodily function.     Time: 40 minutes, in direct patient care, with the patient and/or in the ICU or lucero coordinating care with other health care providers. This is non-concurrent time.    I have spent > 50% percent of this time, counseling and discussing management.       [x] Primary Attending Intensive Care Medicine - Nutrition Support   [] Consultant

## 2022-12-20 NOTE — PLAN OF CARE
Goal Outcome Evaluation:  Plan of Care Reviewed With: patient        Progress: no change  Outcome Evaluation: BP stable throughout shift. Wore HFNC throughout shift, pt states that Bipap digs into bridge of her nose. Afebrile. PRN meds given for pain management. UOP adequate, no BM this shift. Daughter updated via phone call.

## 2022-12-20 NOTE — PLAN OF CARE
"Goal Outcome Evaluation:     VSS. Afebrile. Sinus arrhythmia w/ PAC's on monitor.   K+ 3.4 - Replaced per protocol.   Remains on HFNC 40L and 55%.     Discontinued heparin, restarted eliquis.  Tylenol 3 added. Cough remains very frequent.  Up to chair x 3 hours.   Home dose of ambien added.   Liquid/loose stools x 3 today. Pt takes welchol at home.   PO intake low, pt states, \"I don't want to eat because I am afraid I will have to poop.\"   Hickman out.  + 1 large unmeasured void.            "

## 2022-12-21 ENCOUNTER — APPOINTMENT (OUTPATIENT)
Dept: GENERAL RADIOLOGY | Facility: HOSPITAL | Age: 67
DRG: 871 | End: 2022-12-21
Payer: MEDICARE

## 2022-12-21 LAB
ANION GAP SERPL CALCULATED.3IONS-SCNC: 9 MMOL/L (ref 5–15)
BASOPHILS # BLD AUTO: 0.01 10*3/MM3 (ref 0–0.2)
BASOPHILS NFR BLD AUTO: 0.1 % (ref 0–1.5)
BUN SERPL-MCNC: 16 MG/DL (ref 8–23)
BUN/CREAT SERPL: 32 (ref 7–25)
CALCIUM SPEC-SCNC: 8.1 MG/DL (ref 8.6–10.5)
CHLORIDE SERPL-SCNC: 106 MMOL/L (ref 98–107)
CO2 SERPL-SCNC: 29 MMOL/L (ref 22–29)
CREAT SERPL-MCNC: 0.5 MG/DL (ref 0.57–1)
DEPRECATED RDW RBC AUTO: 57.1 FL (ref 37–54)
EGFRCR SERPLBLD CKD-EPI 2021: 102.3 ML/MIN/1.73
EOSINOPHIL # BLD AUTO: 0 10*3/MM3 (ref 0–0.4)
EOSINOPHIL NFR BLD AUTO: 0 % (ref 0.3–6.2)
ERYTHROCYTE [DISTWIDTH] IN BLOOD BY AUTOMATED COUNT: 17.6 % (ref 12.3–15.4)
GLUCOSE BLDC GLUCOMTR-MCNC: 176 MG/DL (ref 70–130)
GLUCOSE BLDC GLUCOMTR-MCNC: 192 MG/DL (ref 70–130)
GLUCOSE BLDC GLUCOMTR-MCNC: 345 MG/DL (ref 70–130)
GLUCOSE BLDC GLUCOMTR-MCNC: 411 MG/DL (ref 70–130)
GLUCOSE BLDC GLUCOMTR-MCNC: 98 MG/DL (ref 70–130)
GLUCOSE SERPL-MCNC: 109 MG/DL (ref 65–99)
HCT VFR BLD AUTO: 33.8 % (ref 34–46.6)
HGB BLD-MCNC: 10.4 G/DL (ref 12–15.9)
IMM GRANULOCYTES # BLD AUTO: 0.04 10*3/MM3 (ref 0–0.05)
IMM GRANULOCYTES NFR BLD AUTO: 0.4 % (ref 0–0.5)
LYMPHOCYTES # BLD AUTO: 1.41 10*3/MM3 (ref 0.7–3.1)
LYMPHOCYTES NFR BLD AUTO: 14.2 % (ref 19.6–45.3)
MAGNESIUM SERPL-MCNC: 1.3 MG/DL (ref 1.6–2.4)
MCH RBC QN AUTO: 27.5 PG (ref 26.6–33)
MCHC RBC AUTO-ENTMCNC: 30.8 G/DL (ref 31.5–35.7)
MCV RBC AUTO: 89.4 FL (ref 79–97)
MONOCYTES # BLD AUTO: 0.88 10*3/MM3 (ref 0.1–0.9)
MONOCYTES NFR BLD AUTO: 8.9 % (ref 5–12)
NEUTROPHILS NFR BLD AUTO: 7.56 10*3/MM3 (ref 1.7–7)
NEUTROPHILS NFR BLD AUTO: 76.4 % (ref 42.7–76)
NRBC BLD AUTO-RTO: 0 /100 WBC (ref 0–0.2)
NT-PROBNP SERPL-MCNC: ABNORMAL PG/ML (ref 0–900)
PLATELET # BLD AUTO: 236 10*3/MM3 (ref 140–450)
PMV BLD AUTO: 10.5 FL (ref 6–12)
POTASSIUM SERPL-SCNC: 3.7 MMOL/L (ref 3.5–5.2)
PROCALCITONIN SERPL-MCNC: 1.95 NG/ML (ref 0–0.25)
RBC # BLD AUTO: 3.78 10*6/MM3 (ref 3.77–5.28)
SODIUM SERPL-SCNC: 144 MMOL/L (ref 136–145)
WBC NRBC COR # BLD: 9.9 10*3/MM3 (ref 3.4–10.8)

## 2022-12-21 PROCEDURE — 85025 COMPLETE CBC W/AUTO DIFF WBC: CPT | Performed by: INTERNAL MEDICINE

## 2022-12-21 PROCEDURE — 80048 BASIC METABOLIC PNL TOTAL CA: CPT | Performed by: INTERNAL MEDICINE

## 2022-12-21 PROCEDURE — 82962 GLUCOSE BLOOD TEST: CPT

## 2022-12-21 PROCEDURE — 83880 ASSAY OF NATRIURETIC PEPTIDE: CPT | Performed by: INTERNAL MEDICINE

## 2022-12-21 PROCEDURE — 63710000001 INSULIN DETEMIR PER 5 UNITS: Performed by: INTERNAL MEDICINE

## 2022-12-21 PROCEDURE — 99232 SBSQ HOSP IP/OBS MODERATE 35: CPT | Performed by: INTERNAL MEDICINE

## 2022-12-21 PROCEDURE — 83735 ASSAY OF MAGNESIUM: CPT | Performed by: INTERNAL MEDICINE

## 2022-12-21 PROCEDURE — 25010000002 CEFTRIAXONE PER 250 MG: Performed by: INTERNAL MEDICINE

## 2022-12-21 PROCEDURE — 84145 PROCALCITONIN (PCT): CPT | Performed by: INTERNAL MEDICINE

## 2022-12-21 PROCEDURE — 25010000002 MAGNESIUM SULFATE 2 GM/50ML SOLUTION: Performed by: INTERNAL MEDICINE

## 2022-12-21 PROCEDURE — 25010000002 HYDROCORTISONE SOD SUC (PF) 100 MG RECONSTITUTED SOLUTION: Performed by: INTERNAL MEDICINE

## 2022-12-21 PROCEDURE — 63710000001 INSULIN LISPRO (HUMAN) PER 5 UNITS: Performed by: NURSE PRACTITIONER

## 2022-12-21 PROCEDURE — 71045 X-RAY EXAM CHEST 1 VIEW: CPT

## 2022-12-21 PROCEDURE — 94799 UNLISTED PULMONARY SVC/PX: CPT

## 2022-12-21 RX ORDER — BUMETANIDE 0.25 MG/ML
2.5 INJECTION INTRAMUSCULAR; INTRAVENOUS ONCE
Status: COMPLETED | OUTPATIENT
Start: 2022-12-21 | End: 2022-12-21

## 2022-12-21 RX ORDER — HYDROCODONE BITARTRATE AND ACETAMINOPHEN 10; 325 MG/1; MG/1
1 TABLET ORAL EVERY 4 HOURS PRN
Status: DISCONTINUED | OUTPATIENT
Start: 2022-12-21 | End: 2023-01-03 | Stop reason: HOSPADM

## 2022-12-21 RX ORDER — MAGNESIUM SULFATE HEPTAHYDRATE 40 MG/ML
6 INJECTION, SOLUTION INTRAVENOUS AS NEEDED
Status: DISCONTINUED | OUTPATIENT
Start: 2022-12-21 | End: 2022-12-25

## 2022-12-21 RX ADMIN — ROSUVASTATIN CALCIUM 5 MG: 10 TABLET, COATED ORAL at 08:13

## 2022-12-21 RX ADMIN — FLUOXETINE 20 MG: 20 CAPSULE ORAL at 08:12

## 2022-12-21 RX ADMIN — INSULIN DETEMIR 10 UNITS: 100 INJECTION, SOLUTION SUBCUTANEOUS at 08:34

## 2022-12-21 RX ADMIN — HYDROCODONE BITARTRATE AND ACETAMINOPHEN 1 TABLET: 10; 325 TABLET ORAL at 16:16

## 2022-12-21 RX ADMIN — GABAPENTIN 100 MG: 100 CAPSULE ORAL at 20:08

## 2022-12-21 RX ADMIN — HYDROCORTISONE SODIUM SUCCINATE 50 MG: 100 INJECTION, POWDER, FOR SOLUTION INTRAMUSCULAR; INTRAVENOUS at 08:13

## 2022-12-21 RX ADMIN — OSELTAMIVIR PHOSPHATE 75 MG: 75 CAPSULE ORAL at 20:07

## 2022-12-21 RX ADMIN — CEFTRIAXONE 2 G: 2 INJECTION, POWDER, FOR SOLUTION INTRAMUSCULAR; INTRAVENOUS at 10:36

## 2022-12-21 RX ADMIN — HYDROCODONE BITARTRATE AND ACETAMINOPHEN 1 TABLET: 10; 325 TABLET ORAL at 02:12

## 2022-12-21 RX ADMIN — INSULIN LISPRO 3 UNITS: 100 INJECTION, SOLUTION INTRAVENOUS; SUBCUTANEOUS at 12:26

## 2022-12-21 RX ADMIN — LEVOTHYROXINE SODIUM 225 MCG: 150 TABLET ORAL at 05:51

## 2022-12-21 RX ADMIN — MAGNESIUM SULFATE HEPTAHYDRATE 6 G: 2 INJECTION, SOLUTION INTRAVENOUS at 08:32

## 2022-12-21 RX ADMIN — BUMETANIDE 2.5 MG: 0.25 INJECTION, SOLUTION INTRAMUSCULAR; INTRAVENOUS at 12:27

## 2022-12-21 RX ADMIN — OSELTAMIVIR PHOSPHATE 75 MG: 75 CAPSULE ORAL at 08:12

## 2022-12-21 RX ADMIN — INSULIN LISPRO 12 UNITS: 100 INJECTION, SOLUTION INTRAVENOUS; SUBCUTANEOUS at 17:06

## 2022-12-21 RX ADMIN — HYDROCODONE BITARTRATE AND ACETAMINOPHEN 1 TABLET: 10; 325 TABLET ORAL at 08:05

## 2022-12-21 RX ADMIN — HYDROCODONE BITARTRATE AND ACETAMINOPHEN 1 TABLET: 10; 325 TABLET ORAL at 20:08

## 2022-12-21 RX ADMIN — FLUOXETINE 20 MG: 20 CAPSULE ORAL at 20:07

## 2022-12-21 RX ADMIN — APIXABAN 5 MG: 5 TABLET, FILM COATED ORAL at 08:13

## 2022-12-21 RX ADMIN — APIXABAN 5 MG: 5 TABLET, FILM COATED ORAL at 20:08

## 2022-12-21 RX ADMIN — COLESEVELAM HYDROCHLORIDE 1250 MG: 625 TABLET, COATED ORAL at 17:07

## 2022-12-21 RX ADMIN — HYDROCODONE BITARTRATE AND ACETAMINOPHEN 1 TABLET: 10; 325 TABLET ORAL at 12:26

## 2022-12-21 RX ADMIN — GABAPENTIN 100 MG: 100 CAPSULE ORAL at 08:12

## 2022-12-21 RX ADMIN — COLESEVELAM HYDROCHLORIDE 1250 MG: 625 TABLET, COATED ORAL at 08:11

## 2022-12-21 RX ADMIN — LIDOCAINE 2 PATCH: 700 PATCH TOPICAL at 10:29

## 2022-12-21 RX ADMIN — INSULIN LISPRO 3 UNITS: 100 INJECTION, SOLUTION INTRAVENOUS; SUBCUTANEOUS at 21:02

## 2022-12-21 RX ADMIN — ZOLPIDEM TARTRATE 5 MG: 5 TABLET ORAL at 20:08

## 2022-12-21 RX ADMIN — ACETAMINOPHEN AND CODEINE PHOSPHATE 1 TABLET: 300; 30 TABLET ORAL at 06:18

## 2022-12-21 RX ADMIN — FOLIC ACID TAB 400 MCG 400 MCG: 400 TAB at 08:13

## 2022-12-21 RX ADMIN — INSULIN DETEMIR 10 UNITS: 100 INJECTION, SOLUTION SUBCUTANEOUS at 21:02

## 2022-12-21 NOTE — PROGRESS NOTES
Clinical Nutrition     Reason for Visit: MDR, Follow-up protocol    Patient Name: Charly Blevins  YOB: 1955  MRN: [id number]  Date of Encounter: 12/21/22 12:26 EST  Admission date: 12/17/2022    Nutrition Assessment       Problem List:    Septic shock    Dyslipidemia    T2DM    RA    Acute hypoxic respiratory failure    DRISS    On chronic immunosuppressive therapy    GERD    Influenza A    Obesity (BMI 30-39.9)    Hypothyroidism    Anxiety and depression    Gout    Strep PNA      PMH:   She  has a past medical history of Anxiety and depression (12/17/2022), Atrial fibrillation (HCC) (2015), Diabetes mellitus (HCC), Gout (12/17/2022), Hyperlipidemia, Hypertension, Hypothyroidism (12/17/2022), and Obesity (BMI 30-39.9) (12/17/2022).    PSxH:  She  has a past surgical history that includes Hemorrhoid surgery; Cholecystectomy; Colostomy; Colostomy closure; Thyroidectomy; Hysterectomy; Tubal ligation; Other surgical history; Appendectomy; Lumbar discectomy; and Hip Trochanteric Nailing (Right, 3/27/2019).      Reported/Observed/Food/Nutrition Related History:     Per RN: pt has not been eating because she is afraid of having diarrhea, still had a cough, c/o migraine,  is requesting her pain meds Q 4 hours    Plan to start PT/.OT    Pt sitting up in chair, on HFNC, report she does not want to eat until her medicine has time to work, pt is now taking her home medication welchol, does not like magic cups     Discussed with pt importance of nutrition for recovery, encourage intake, supplement adjusted to Boost Breeze    Anthropometrics   Height: 66in  Weight: 199lb bedscale on admission  BMI: 32  BMI classification: Obese Class I: 30-34.9kg/m2     Pt weighed 220lb's 12-23-21 at MD office  [facility]    21lb wt loss over past year, ~9.5% loss    Last 15 Recorded Weights  View Complete Flowsheet  Weight Weight (kg) Weight (lbs) Weight Method VISIT REPORT   12/18/2022 96.1 kg 211 lb 13.8  oz Bed scale -   12/17/2022 90.7 kg 199 lb 15.3 oz Bed scale -   12/17/2022 86.183 kg 190 lb - -   7/26/2022 93.441 kg 206 lb Stated -   1/30/2020 99.791 kg 220 lb - Report   9/5/2019 99.7 kg 219 lb 12.8 oz - Report   3/27/2019 99.791 kg 220 lb - -   3/26/2019 99.791 kg 220 lb Stated -   11/16/2016 95.255 kg 210 lb - -       Labs reviewed     Results from last 7 days   Lab Units 12/21/22  0613 12/17/22  1111 12/17/22  1109   SODIUM mmol/L 144   < > 138   POTASSIUM mmol/L 3.7   < > 4.1   CHLORIDE mmol/L 106   < > 103   CO2 mmol/L 29.0   < > 14.0*   BUN mg/dL 16   < > 22   CREATININE mg/dL 0.50*   < > 1.77*   CALCIUM mg/dL 8.1*   < > 8.6   BILIRUBIN mg/dL  --   --  0.3   ALK PHOS U/L  --   --  66   ALT (SGPT) U/L  --   --  20   AST (SGOT) U/L  --   --  23   GLUCOSE mg/dL 109*   < > 192*    < > = values in this interval not displayed.       Results from last 7 days   Lab Units 12/21/22  1110 12/21/22  0712 12/20/22  2312 12/20/22  2032 12/20/22  1806 12/20/22  1127   GLUCOSE mg/dL 176* 98 166* 95 94 135*     Lab Results   Lab Value Date/Time    HGBA1C 6.50 (H) 04/01/2019 0703       Medications reviewed   Pertinent:abx, bumex, eliquis, prozac, folate, heparin, insulin, tamiflu, welchol, lidocaine patch, PRN norco    Intake/Ouptut 24 hrs (7:00AM - 6:59 AM)     Intake & Output (last day)       12/20 0701  12/21 0700 12/21 0701 12/22 0700    P.O. 1200     I.V. (mL/kg) 55 (0.6)     IV Piggyback      Total Intake(mL/kg) 1255 (13.1)     Urine (mL/kg/hr) 3300 (1.4)     Stool 0     Total Output 3300     Net -2045           Urine Unmeasured Occurrence 1 x     Stool Unmeasured Occurrence 2 x                GI: abdomen soft, nontender, last bm 12-20    SKIN: wnl  Current Nutrition Prescription     PO: Diet: Cardiac Diets, Diabetic Diets; Healthy Heart (2-3 Na+); Consistent Carbohydrate; Texture: Regular Texture (IDDSI 7); Fluid Consistency: Thin (IDDSI 0)    Magic Cups 3x/day    Intake: 25% of 3 meals    Nutrition Diagnosis    Date:12-19-22, 12-21  Problem Inadequate oral intake   Etiology Per Clinical Status   Signs/Symptoms Po intake 25%     Goal:   General: Nutrition support treatment     PO: Increase intake    Nutrition Intervention   1.  Follow treatment progress, Advise alternate selection, Advised available snacks, Interview for preferences, Menu provided, Menu adjusted, Adjusted supplement, Encourage intake    Pt has not tolerated Premier Protein, Ensure, Magic Cups    Will send Boost Breeze 3x/day    Monitoring/Evaluation:   Per protocol, I&O, PO intake, Supplement intake, Pertinent labs, Weight, Skin status, GI status, Symptoms    Marichuy Muir, CARLOS  Time Spent: 30min

## 2022-12-21 NOTE — CASE MANAGEMENT/SOCIAL WORK
Continued Stay Note  [facility]     Patient Name: Charly Blevins  MRN: [id number]  Today's Date: 12/21/2022    Admit Date: 12/17/2022    Plan: Ongoing   Discharge Plan     Row Name 12/21/22 1414       Plan    Plan Ongoing    Patient/Family in Agreement with Plan yes    Plan Comments Spoke with patient at bedside.  Patient isn't sure if the oxygen she has at home is hers or was her husbands.  May need her own referral for home oxygen at discharge.  Patient still on HFNC.  Discharge plan at this time is ongoing.  CM will continue to follow.               Discharge Codes    No documentation.               Expected Discharge Date and Time     Expected Discharge Date Expected Discharge Time    Dec 26, 2022             Veena Pierre RN

## 2022-12-21 NOTE — PROGRESS NOTES
INTENSIVIST   PROGRESS NOTE        SUBJECTIVE     Charly 67 y.o. female is followed for: Shortness of Breath       Septic shock    T2DM    Acute hypoxic respiratory failure    GERD    Influenza A    Hypothyroidism      As an Intensivist, we provide an integrated approach to the ICU patient and family, medical management of comorbid conditions, including but not limited to electrolytes, glycemic control, organ dysfunction, lead interdisciplinary rounds and coordinate the care with all other services, including those from other specialists.     Interval History:  About the same.  C/O pain and asking to increase frequency of her opioid medications.  Migraine.  (+) cough.  Still on HFNC.    Device (Oxygen Therapy): heated, high-flow nasal cannula, humidified (22 1200): Flow (L/min): 40 (22 1200), Oxygen Concentration (%): 45 (22 1200)     The patient has started, but not completed, their COVID-19 vaccination series.     Temp  Min: 98 °F (36.7 °C)  Max: 98.3 °F (36.8 °C)       History     Last Reviewed by Dylon Pierre MD on 2022 at  6:37 PM    Sections Reviewed    Medical, Family, Surgical, Tobacco, Alcohol, Drug Use, Sexual Activity,   Social Documentation      Problem list reviewed by Dylon Pierre MD on 2022 at  6:37 PM  Medicines reviewed by Dylon Pierre MD on 2022 at  6:37 PM  Allergies reviewed by Dylon Pierre MD on 2022 at  6:37 PM       The patient's relevant past medical, surgical and social history were reviewed and updated in Epic as appropriate.        OBJECTIVE     Vitals:  Temp: 98.1 °F (36.7 °C) (22 1200) Temp  Min: 98 °F (36.7 °C)  Max: 98.3 °F (36.8 °C)   Temp core:      BP: 121/71 (22 1300) BP  Min: 121/71  Max: 155/90   MAP (non-invasive) Noninvasive MAP (mmHg): 93 (22 1300) Noninvasive MAP (mmHg)  Av.2  Min: 55  Max: 134   Pulse: 67 (22 1400) Pulse  Min: 60  Max: 102   Resp: 16 (22 1200) Resp  Min: 16  Max: 18    SpO2: 91 % (12/21/22 1400) SpO2  Min: 89 %  Max: 95 %   Device: heated, high-flow nasal cannula, humidified (12/21/22 1200)    Flow Rate: 40 (12/21/22 1200) Flow (L/min)  Min: 40  Max: 40         12/17/22  1533 12/18/22  0400   Weight: 90.7 kg (199 lb 15.3 oz) 96.1 kg (211 lb 13.8 oz)        Intake/Ouptut 24 hrs (7:00AM - 6:59 AM)  Intake & Output (last 3 days)       12/18 0701  12/19 0700 12/19 0701 12/20 0700 12/20 0701  12/21 0700 12/21 0701  12/22 0700    P.O. 1320 1560 1200     I.V. (mL/kg) 1547.7 (16.1) 127.5 (1.3) 55 (0.6)     IV Piggyback 900 100      Total Intake(mL/kg) 3767.7 (39.2) 1787.5 (18.6) 1255 (13.1)     Urine (mL/kg/hr) 2575 (1.1) 1900 (0.8) 3300 (1.4) 1550 (2)    Stool   0     Total Output 2575 1900 3300 1550    Net +1192.7 -112.5 -2045 -1550            Urine Unmeasured Occurrence   1 x     Stool Unmeasured Occurrence   2 x           Medications (drips):       Non Invasive Ventilation   Settings: Observed:   Mode of Delivery: standby (12/20/22 0339)    IPAP (cm H2O): 16 (12/19/22 0330)    EPAP (cm H2O): 8 (12/19/22 0330)    Set Rate (Breaths/Min): 18 breaths/min (12/19/22 0330) Respiratory Rate Total (Breaths/Min): 18 breaths/min (12/19/22 0330)         Tidal Volume (mL): 472 mL (12/19/22 0330)    Minute Ventilation (L/Min): 8.5 (12/19/22 0330)      Physical Examination  Telemetry:  Rhythm: sinus arrhythmia (12/21/22 1200)         Constitutional:  No acute distress.   Cardiovascular: RRR.   Normal heart sounds.  No murmurs, gallop or rub.   Respiratory: Normal breath sounds  No adventitious sounds.   Abdominal:  Soft with no tenderness.  No distension.   No HSM.   Extremities: Warm.  Dry.  No cyanosis.  No Edema   Neurological:   Alert, Oriented, Cooperative.  Best Eye Response: 4-->(E4) spontaneous (12/21/22 1400)  Best Motor Response: 6-->(M6) obeys commands (12/21/22 1400)  Best Verbal Response: 5-->(V5) oriented (12/21/22 1400)  Aron Coma Scale Score: 15 (12/21/22 1400)         Results  Reviewed:  Laboratory  Microbiology  Radiology  Pathology    Hematology:  Results from last 7 days   Lab Units 12/21/22  0613 12/20/22 0410 12/19/22  0553 12/18/22  0310   WBC 10*3/mm3 9.90 14.52*   < > 24.39*   HEMOGLOBIN g/dL 10.4* 9.3*   < > 10.8*   MCV fL 89.4 89.4   < > 92.6   PLATELETS 10*3/mm3 236 250   < > 303   NEUTROS ABS 10*3/mm3 7.56* 13.07*   < > 20.65*   LYMPHS ABS 10*3/mm3 1.41  --   --  0.89   EOS ABS 10*3/mm3 0.00 0.00   < > 0.03    < > = values in this interval not displayed.       Chemistry:  Estimated Creatinine Clearance: 125.8 mL/min (A) (by C-G formula based on SCr of 0.5 mg/dL (L)).    Results from last 7 days   Lab Units 12/21/22  0613 12/20/22 2112 12/20/22  0410   SODIUM mmol/L 144  --  143   POTASSIUM mmol/L 3.7 4.0 3.4*   CHLORIDE mmol/L 106  --  109*   CO2 mmol/L 29.0  --  23.0   BUN mg/dL 16  --  22   CREATININE mg/dL 0.50*  --  0.73   GLUCOSE mg/dL 109*  --  101*     Results from last 7 days   Lab Units 12/21/22  0613 12/20/22  0410 12/19/22  0553 12/18/22  0310   CALCIUM mg/dL 8.1* 7.8* 7.4* 7.2*   MAGNESIUM mg/dL 1.3*  --  2.3 1.1*   PHOSPHORUS mg/dL  --   --  3.5 4.6*     Coagulation Labs:  Results from last 7 days   Lab Units 12/20/22  0410 12/17/22  1111   PROTIME Seconds 17.2* 29.6*   INR  1.41* 2.6*      Cardiac Labs:  Results from last 7 days   Lab Units 12/21/22  0613 12/20/22  0410 12/18/22  0310 12/17/22  1109   PROBNP pg/mL 11,614.0* 10,170.0*  --  9,401.0*   CK TOTAL U/L  --   --   --  160   TROPONIN T ng/mL  --   --  0.015 0.023       Lab Results   Lab Value Date/Time    MRSAPCR Negative 12/17/2022 1609     Serologies    Lab Results   Lab Value Date/Time    COVID19 Not Detected 12/17/2022 1128    FLUAPCR Detected (A) 12/17/2022 1128    FLUBPCR Not Detected 12/17/2022 1128     Urinary Serologies    Lab Results   Component Value Date    STREPPNEUAG Positive (A) 12/17/2022       Lab Results   Component Value Date    LEGANTIGENUR Negative 12/17/2022        Biomarkers:  Results from last 7 days   Lab Units 12/21/22  0613 12/20/22  0410 12/18/22  0310 12/17/22  2133   LACTATE mmol/L  --  0.8 2.3* 2.0   PROCALCITONIN ng/mL 1.95* 5.71* 30.10*  --      COVID-19  Lab Results   Component Value Date    COVID19 Not Detected 12/17/2022    COVID19 Not Detected 07/26/2022     Images:  XR Chest 1 View    Result Date: 12/21/2022   1. Overall decrease in pulmonary edema. 2. The right-sided pulmonary infiltrate is better appreciated with slightly better aeration at the right lung base than on the last exam.  This report was finalized on 12/21/2022 9:06 AM by Edwin Garg MD.      XR Chest 1 View    Result Date: 12/20/2022   1. Interval development of diffuse perihilar disease suggesting CHF. 2. Worsening right lower lung atelectasis plus/minus effusion.  This report was finalized on 12/20/2022 7:57 AM by Dr. Heath Prado MD.        Echo:  Results for orders placed during the hospital encounter of 12/17/22    Adult Transthoracic Echo Complete w/ Color, Spectral and Contrast if Necessary Per Protocol    Interpretation Summary  •  Left ventricular systolic function is normal. Calculated left ventricular EF = 59.5%  •  Left ventricular wall thickness is consistent with mild concentric hypertrophy.  •  Left ventricular diastolic function is consistent with (grade II w/high LAP) pseudonormalization.  •  Left atrial volume is mildly increased.  •  There is calcification of the aortic valve.  •  Estimated right ventricular systolic pressure from tricuspid regurgitation is normal (<35 mmHg). Calculated right ventricular systolic pressure from tricuspid regurgitation is 27 mmHg.  •  Mild dilation of the ascending aorta is present.  4.1 cm      Results: Reviewed.  I reviewed the patient's new laboratory and imaging results.  I independently reviewed the patient's new images.    Medications: Reviewed.    Assessment   A/P     Hospital:  LOS: 4 days   ICU: 3d 23h      Diagnosis   • **Septic  shock [A41.9, R65.21]   • Acute hypoxic respiratory failure [J96.01]   • DRISS [N17.9]   • On chronic immunosuppressive therapy [D84.9]       • GERD [K21.9]   • Influenza A [J10.1]   • Obesity (BMI 30-39.9) [E66.9]   • Hypothyroidism [E03.9]   • Anxiety and depression [F41.9, F32.A]   • Gout [M10.9]   • Dyslipidemia [E78.5]   • RA [M06.9]   • T2DM [E11.9]     Charly is a 67 y.o. female admitted on 12/17/2022 with Septic shock (HCC) [A41.9, R65.21]    Assessment/Management/Treatment Plan:    1. Shock: improved. Off pressors  2. Respiratory failure, type 1  a. Influenza A  b. Strep pneumonia (per urinary antigen)  c. Ceftriaxone + Oseltamivir   3. GERD  4. Rheum  a. RA ? Treatment with MTX  b. Gout  5. Renal  a. DRISS - resolved. sCr WNL 12/19/22   6. NC Anemia, stable.  7. Endocrine  a. Body mass index is 34.2 kg/m². Obese Class II: 35-39.9kg/m2  b. Hypothyroidism ? Treatment with Levothyroxine     Lab Results   Component Value Date    TSH 4.990 (H) 12/17/2022    FREET4 1.41 12/17/2022     c. T2 Diabetes    Lab Results   Lab Value Date/Time    HGBA1C 6.50 (H) 04/01/2019 0703     Results from last 7 days   Lab Units 12/21/22  1110 12/21/22  0712 12/20/22  2312 12/20/22  2032 12/20/22  1806 12/20/22  1127 12/20/22  0748 12/19/22  2048   GLUCOSE mg/dL 176* 98 166* 95 94 135* 85 168*       Diet: Diet: Cardiac Diets, Diabetic Diets; Healthy Heart (2-3 Na+); Consistent Carbohydrate; Texture: Regular Texture (IDDSI 7); Fluid Consistency: Thin (IDDSI 0)  Orders Placed This Encounter      DIET MESSAGE Pt would like 2 containers of cereal (cheerios) and 2 containers of 2% milk for breakfast please.      DIET MESSAGE Wed please send late lunch tray: grill cheese, milk,  boost breeze, dinner #2 with janice soup, milk, boost breeze, pt does not drink coffee or tea THANKS      Dietary Nutrition Supplements Boost Breeze (Clear Liquid)      Advance Directives: Code Status and Medical Interventions:   Ordered at: 12/17/22 1507     Code  Status (Patient has no pulse and is not breathing):    CPR (Attempt to Resuscitate)     Medical Interventions (Patient has pulse or is breathing):    Full Support        DVT prophylaxis:  Medical and mechanical DVT prophylaxis orders are present.     In brief:  1. Replacing Mg today  1. Diuresis afterwards.  2. Zolpidem working well for her.  3. Continue antibiotics: ? Ceftriaxone + Oseltamivir   4. Wean FiO2 as tolerated for SpO2 > 90%-92%  5. Oral nutrition supplements  6. Discontinue Hydrocortisone  7. Goal: Glucose < 180 mg/dL.   1. Continue current doses of insulin  8. PCT < 20% than documented peak.  9. PT/OT   10. CXR today improving.  11. Disposition: Keep in ICU.   1. Labs: Procalcitonin, proBNP, INR in AM    Plan of care and goals reviewed during interdisciplinary rounds.  I discussed the patient's findings and my recommendations with patient and nursing staff    Level of Risk is High due to: *illness with threat to life or bodily function.     Time: 30 minutes, in direct patient care, with the patient and/or in the ICU or lucero coordinating care with other health care providers. This is non-concurrent time.    I have spent > 50% percent of this time, counseling and discussing management.       [x] Primary Attending Intensive Care Medicine - Nutrition Support   [] Consultant

## 2022-12-22 LAB
ANION GAP SERPL CALCULATED.3IONS-SCNC: 5 MMOL/L (ref 5–15)
BACTERIA SPEC AEROBE CULT: NORMAL
BACTERIA SPEC AEROBE CULT: NORMAL
BASOPHILS # BLD AUTO: 0.01 10*3/MM3 (ref 0–0.2)
BASOPHILS NFR BLD AUTO: 0.1 % (ref 0–1.5)
BUN SERPL-MCNC: 16 MG/DL (ref 8–23)
BUN/CREAT SERPL: 32 (ref 7–25)
CALCIUM SPEC-SCNC: 8.6 MG/DL (ref 8.6–10.5)
CHLORIDE SERPL-SCNC: 100 MMOL/L (ref 98–107)
CO2 SERPL-SCNC: 35 MMOL/L (ref 22–29)
CREAT SERPL-MCNC: 0.5 MG/DL (ref 0.57–1)
DEPRECATED RDW RBC AUTO: 56.4 FL (ref 37–54)
EGFRCR SERPLBLD CKD-EPI 2021: 102.3 ML/MIN/1.73
EOSINOPHIL # BLD AUTO: 0.13 10*3/MM3 (ref 0–0.4)
EOSINOPHIL NFR BLD AUTO: 1.7 % (ref 0.3–6.2)
ERYTHROCYTE [DISTWIDTH] IN BLOOD BY AUTOMATED COUNT: 17.7 % (ref 12.3–15.4)
GLUCOSE BLDC GLUCOMTR-MCNC: 137 MG/DL (ref 70–130)
GLUCOSE BLDC GLUCOMTR-MCNC: 141 MG/DL (ref 70–130)
GLUCOSE BLDC GLUCOMTR-MCNC: 142 MG/DL (ref 70–130)
GLUCOSE BLDC GLUCOMTR-MCNC: 152 MG/DL (ref 70–130)
GLUCOSE BLDC GLUCOMTR-MCNC: 203 MG/DL (ref 70–130)
GLUCOSE BLDC GLUCOMTR-MCNC: 432 MG/DL (ref 70–130)
GLUCOSE BLDC GLUCOMTR-MCNC: 89 MG/DL (ref 70–130)
GLUCOSE SERPL-MCNC: 89 MG/DL (ref 65–99)
HCT VFR BLD AUTO: 36.1 % (ref 34–46.6)
HGB BLD-MCNC: 11.3 G/DL (ref 12–15.9)
IMM GRANULOCYTES # BLD AUTO: 0.1 10*3/MM3 (ref 0–0.05)
IMM GRANULOCYTES NFR BLD AUTO: 1.3 % (ref 0–0.5)
INR PPP: 1.27 (ref 0.84–1.13)
LYMPHOCYTES # BLD AUTO: 2.13 10*3/MM3 (ref 0.7–3.1)
LYMPHOCYTES NFR BLD AUTO: 27.6 % (ref 19.6–45.3)
MAGNESIUM SERPL-MCNC: 1.5 MG/DL (ref 1.6–2.4)
MCH RBC QN AUTO: 27.6 PG (ref 26.6–33)
MCHC RBC AUTO-ENTMCNC: 31.3 G/DL (ref 31.5–35.7)
MCV RBC AUTO: 88 FL (ref 79–97)
MONOCYTES # BLD AUTO: 1.01 10*3/MM3 (ref 0.1–0.9)
MONOCYTES NFR BLD AUTO: 13.1 % (ref 5–12)
NEUTROPHILS NFR BLD AUTO: 4.34 10*3/MM3 (ref 1.7–7)
NEUTROPHILS NFR BLD AUTO: 56.2 % (ref 42.7–76)
NRBC BLD AUTO-RTO: 0 /100 WBC (ref 0–0.2)
NT-PROBNP SERPL-MCNC: 5201 PG/ML (ref 0–900)
PLATELET # BLD AUTO: 228 10*3/MM3 (ref 140–450)
PMV BLD AUTO: 9.9 FL (ref 6–12)
POTASSIUM SERPL-SCNC: 3.3 MMOL/L (ref 3.5–5.2)
POTASSIUM SERPL-SCNC: 4.2 MMOL/L (ref 3.5–5.2)
PROCALCITONIN SERPL-MCNC: 1.07 NG/ML (ref 0–0.25)
PROTHROMBIN TIME: 15.8 SECONDS (ref 11.4–14.4)
RBC # BLD AUTO: 4.1 10*6/MM3 (ref 3.77–5.28)
SODIUM SERPL-SCNC: 140 MMOL/L (ref 136–145)
WBC NRBC COR # BLD: 7.72 10*3/MM3 (ref 3.4–10.8)

## 2022-12-22 PROCEDURE — 25010000002 CEFTRIAXONE PER 250 MG: Performed by: INTERNAL MEDICINE

## 2022-12-22 PROCEDURE — 84145 PROCALCITONIN (PCT): CPT | Performed by: INTERNAL MEDICINE

## 2022-12-22 PROCEDURE — 85025 COMPLETE CBC W/AUTO DIFF WBC: CPT | Performed by: INTERNAL MEDICINE

## 2022-12-22 PROCEDURE — 25010000002 MAGNESIUM SULFATE 2 GM/50ML SOLUTION: Performed by: INTERNAL MEDICINE

## 2022-12-22 PROCEDURE — 80048 BASIC METABOLIC PNL TOTAL CA: CPT | Performed by: INTERNAL MEDICINE

## 2022-12-22 PROCEDURE — 82962 GLUCOSE BLOOD TEST: CPT

## 2022-12-22 PROCEDURE — 63710000001 INSULIN DETEMIR PER 5 UNITS: Performed by: INTERNAL MEDICINE

## 2022-12-22 PROCEDURE — 83880 ASSAY OF NATRIURETIC PEPTIDE: CPT | Performed by: INTERNAL MEDICINE

## 2022-12-22 PROCEDURE — 85610 PROTHROMBIN TIME: CPT | Performed by: INTERNAL MEDICINE

## 2022-12-22 PROCEDURE — 84132 ASSAY OF SERUM POTASSIUM: CPT | Performed by: INTERNAL MEDICINE

## 2022-12-22 PROCEDURE — 63710000001 INSULIN LISPRO (HUMAN) PER 5 UNITS: Performed by: NURSE PRACTITIONER

## 2022-12-22 PROCEDURE — 99232 SBSQ HOSP IP/OBS MODERATE 35: CPT | Performed by: INTERNAL MEDICINE

## 2022-12-22 PROCEDURE — 83735 ASSAY OF MAGNESIUM: CPT | Performed by: INTERNAL MEDICINE

## 2022-12-22 RX ORDER — SUMATRIPTAN 50 MG/1
100 TABLET, FILM COATED ORAL
Status: DISCONTINUED | OUTPATIENT
Start: 2022-12-22 | End: 2023-01-03 | Stop reason: HOSPADM

## 2022-12-22 RX ORDER — BUMETANIDE 0.25 MG/ML
1 INJECTION INTRAMUSCULAR; INTRAVENOUS ONCE
Status: COMPLETED | OUTPATIENT
Start: 2022-12-22 | End: 2022-12-22

## 2022-12-22 RX ADMIN — FLUOXETINE 20 MG: 20 CAPSULE ORAL at 08:37

## 2022-12-22 RX ADMIN — FOLIC ACID TAB 400 MCG 400 MCG: 400 TAB at 08:37

## 2022-12-22 RX ADMIN — HYDROCODONE BITARTRATE AND ACETAMINOPHEN 1 TABLET: 10; 325 TABLET ORAL at 08:46

## 2022-12-22 RX ADMIN — SUMATRIPTAN SUCCINATE 100 MG: 50 TABLET ORAL at 22:42

## 2022-12-22 RX ADMIN — POTASSIUM CHLORIDE 40 MEQ: 750 CAPSULE, EXTENDED RELEASE ORAL at 08:47

## 2022-12-22 RX ADMIN — HYDROCODONE BITARTRATE AND ACETAMINOPHEN 1 TABLET: 10; 325 TABLET ORAL at 00:02

## 2022-12-22 RX ADMIN — GABAPENTIN 100 MG: 100 CAPSULE ORAL at 08:37

## 2022-12-22 RX ADMIN — APIXABAN 5 MG: 5 TABLET, FILM COATED ORAL at 21:31

## 2022-12-22 RX ADMIN — HYDROCODONE BITARTRATE AND ACETAMINOPHEN 1 TABLET: 10; 325 TABLET ORAL at 21:37

## 2022-12-22 RX ADMIN — POTASSIUM CHLORIDE 40 MEQ: 750 CAPSULE, EXTENDED RELEASE ORAL at 11:56

## 2022-12-22 RX ADMIN — ROSUVASTATIN CALCIUM 5 MG: 10 TABLET, COATED ORAL at 08:38

## 2022-12-22 RX ADMIN — INSULIN LISPRO 5 UNITS: 100 INJECTION, SOLUTION INTRAVENOUS; SUBCUTANEOUS at 11:54

## 2022-12-22 RX ADMIN — APIXABAN 5 MG: 5 TABLET, FILM COATED ORAL at 08:37

## 2022-12-22 RX ADMIN — HYDROCODONE BITARTRATE AND ACETAMINOPHEN 1 TABLET: 10; 325 TABLET ORAL at 13:15

## 2022-12-22 RX ADMIN — HYDROCODONE BITARTRATE AND ACETAMINOPHEN 1 TABLET: 10; 325 TABLET ORAL at 03:59

## 2022-12-22 RX ADMIN — BUMETANIDE 1 MG: 0.25 INJECTION, SOLUTION INTRAMUSCULAR; INTRAVENOUS at 19:48

## 2022-12-22 RX ADMIN — COLESEVELAM HYDROCHLORIDE 1250 MG: 625 TABLET, COATED ORAL at 08:39

## 2022-12-22 RX ADMIN — INSULIN DETEMIR 10 UNITS: 100 INJECTION, SOLUTION SUBCUTANEOUS at 08:39

## 2022-12-22 RX ADMIN — LEVOTHYROXINE SODIUM 225 MCG: 150 TABLET ORAL at 05:28

## 2022-12-22 RX ADMIN — FLUOXETINE 20 MG: 20 CAPSULE ORAL at 21:31

## 2022-12-22 RX ADMIN — HYDROCODONE BITARTRATE AND ACETAMINOPHEN 1 TABLET: 10; 325 TABLET ORAL at 18:05

## 2022-12-22 RX ADMIN — MAGNESIUM SULFATE HEPTAHYDRATE 2 G: 2 INJECTION, SOLUTION INTRAVENOUS at 14:19

## 2022-12-22 RX ADMIN — SUMATRIPTAN SUCCINATE 100 MG: 50 TABLET ORAL at 16:03

## 2022-12-22 RX ADMIN — COLESEVELAM HYDROCHLORIDE 1250 MG: 625 TABLET, COATED ORAL at 18:04

## 2022-12-22 RX ADMIN — MAGNESIUM SULFATE HEPTAHYDRATE 2 G: 2 INJECTION, SOLUTION INTRAVENOUS at 08:52

## 2022-12-22 RX ADMIN — GABAPENTIN 100 MG: 100 CAPSULE ORAL at 21:31

## 2022-12-22 RX ADMIN — MAGNESIUM SULFATE HEPTAHYDRATE 2 G: 2 INJECTION, SOLUTION INTRAVENOUS at 11:54

## 2022-12-22 RX ADMIN — ZOLPIDEM TARTRATE 5 MG: 5 TABLET ORAL at 21:31

## 2022-12-22 RX ADMIN — INSULIN DETEMIR 10 UNITS: 100 INJECTION, SOLUTION SUBCUTANEOUS at 21:32

## 2022-12-22 RX ADMIN — CEFTRIAXONE 2 G: 2 INJECTION, POWDER, FOR SOLUTION INTRAMUSCULAR; INTRAVENOUS at 11:55

## 2022-12-22 NOTE — PLAN OF CARE
Goal Outcome Evaluation:              Outcome Evaluation: VSS throughout shift. Between 5-6L NC. Adequate UOP + 1 occurence, no BM. Prn pain medications given.

## 2022-12-22 NOTE — PLAN OF CARE
Goal Outcome Evaluation:      VSS. 4LNC, sats 93%. Up to chair most of shift. Afebrile. . No BM. Daughter at bedside.

## 2022-12-22 NOTE — PROGRESS NOTES
INTENSIVIST   PROGRESS NOTE        SUBJECTIVE     Charly 67 y.o. female is followed for: Shortness of Breath       Septic shock    T2DM    Acute hypoxic respiratory failure    GERD    Influenza A    Hypothyroidism      As an Intensivist, we provide an integrated approach to the ICU patient and family, medical management of comorbid conditions, including but not limited to electrolytes, glycemic control, organ dysfunction, lead interdisciplinary rounds and coordinate the care with all other services, including those from other specialists.     Interval History:  Improving.  LFNC since last night.    Device (Oxygen Therapy): nasal cannula, humidified (22 1600): Flow (L/min): 5 (22 1600), Oxygen Concentration (%): 45 (22 1200)     The patient has started, but not completed, their COVID-19 vaccination series.     Temp  Min: 97.6 °F (36.4 °C)  Max: 98.9 °F (37.2 °C)       History     Last Reviewed by Dylon Pierre MD on 2022 at  6:37 PM    Sections Reviewed    Medical, Family, Surgical, Tobacco, Alcohol, Drug Use, Sexual Activity,   Social Documentation      Problem list reviewed by Dylon Pierre MD on 2022 at  6:37 PM  Medicines reviewed by Dylon Pierre MD on 2022 at  6:37 PM  Allergies reviewed by Dylon Pierre MD on 2022 at  6:37 PM       The patient's relevant past medical, surgical and social history were reviewed and updated in Epic as appropriate.        OBJECTIVE     Vitals:  Temp: 97.9 °F (36.6 °C) (22 1600) Temp  Min: 97.6 °F (36.4 °C)  Max: 98.9 °F (37.2 °C)   Temp core:      BP: 137/81 (22 1603) BP  Min: 107/70  Max: 140/81   MAP (non-invasive) Noninvasive MAP (mmHg): 99 (22 1600) Noninvasive MAP (mmHg)  Av.2  Min: 55  Max: 134   Pulse: 71 (22 1603) Pulse  Min: 62  Max: 90   Resp: 16 (22 1600) Resp  Min: 16  Max: 18   SpO2: 95 % (22 1600) SpO2  Min: 90 %  Max: 96 %   Device: nasal cannula, humidified (22 1600)     Flow Rate: 5 (12/22/22 1600) Flow (L/min)  Min: 4  Max: 6         12/17/22  1533 12/18/22  0400   Weight: 90.7 kg (199 lb 15.3 oz) 96.1 kg (211 lb 13.8 oz)        Intake/Ouptut 24 hrs (7:00AM - 6:59 AM)  Intake & Output (last 3 days)       12/19 0701 12/20 0700 12/20 0701 12/21 0700 12/21 0701 12/22 0700 12/22 0701  12/23 0700    P.O. 1560 1200 920     I.V. (mL/kg) 127.5 (1.3) 55 (0.6) 162.6 (1.7) 167 (1.7)    IV Piggyback 100  100 100    Total Intake(mL/kg) 1787.5 (18.6) 1255 (13.1) 1182.6 (12.3) 267 (2.8)    Urine (mL/kg/hr) 1900 (0.8) 3300 (1.4) 3525 (1.5) 400 (0.4)    Stool  0      Total Output 1900 3300 3525 400    Net -112.5 -2045 -2342.4 -133            Urine Unmeasured Occurrence  1 x 1 x     Stool Unmeasured Occurrence  2 x          Physical Examination  Telemetry:  Rhythm: normal sinus rhythm (12/22/22 1600)         Constitutional:  No acute distress.   Cardiovascular: RRR.   Normal heart sounds.  No murmurs, gallop or rub.   Respiratory: Normal breath sounds  No adventitious sounds.   Abdominal:  Soft with no tenderness.  No distension.   No HSM.   Extremities: Warm.  Dry.  No cyanosis.  No Edema   Neurological:   Alert, Oriented, Cooperative.  Best Eye Response: 4-->(E4) spontaneous (12/22/22 1600)  Best Motor Response: 6-->(M6) obeys commands (12/22/22 1600)  Best Verbal Response: 5-->(V5) oriented (12/22/22 1600)  Cushing Coma Scale Score: 15 (12/22/22 1600)     Results Reviewed:  Laboratory  Microbiology  Radiology  Pathology    Hematology:  Results from last 7 days   Lab Units 12/22/22  0545 12/21/22  0613   WBC 10*3/mm3 7.72 9.90   HEMOGLOBIN g/dL 11.3* 10.4*   MCV fL 88.0 89.4   PLATELETS 10*3/mm3 228 236   NEUTROS ABS 10*3/mm3 4.34 7.56*   LYMPHS ABS 10*3/mm3 2.13 1.41   EOS ABS 10*3/mm3 0.13 0.00       Chemistry:  Estimated Creatinine Clearance: 125.8 mL/min (A) (by C-G formula based on SCr of 0.5 mg/dL (L)).    Results from last 7 days   Lab Units 12/22/22  1606 12/22/22  0517  12/21/22  0613   SODIUM mmol/L  --  140 144   POTASSIUM mmol/L 4.2 3.3* 3.7   CHLORIDE mmol/L  --  100 106   CO2 mmol/L  --  35.0* 29.0   BUN mg/dL  --  16 16   CREATININE mg/dL  --  0.50* 0.50*   GLUCOSE mg/dL  --  89 109*     Results from last 7 days   Lab Units 12/22/22  0545 12/21/22  0613 12/20/22 0410 12/19/22  0553 12/18/22  0310   CALCIUM mg/dL 8.6 8.1*   < > 7.4* 7.2*   MAGNESIUM mg/dL 1.5* 1.3*  --  2.3 1.1*   PHOSPHORUS mg/dL  --   --   --  3.5 4.6*    < > = values in this interval not displayed.     Coagulation Labs:  Results from last 7 days   Lab Units 12/22/22  0545 12/20/22  0410   PROTIME Seconds 15.8* 17.2*   INR  1.27* 1.41*      Cardiac Labs:  Results from last 7 days   Lab Units 12/22/22  0545 12/21/22  0613 12/20/22  0410 12/18/22  0310 12/17/22  1109   PROBNP pg/mL 5,201.0* 11,614.0* 10,170.0*  --  9,401.0*   CK TOTAL U/L  --   --   --   --  160   TROPONIN T ng/mL  --   --   --  0.015 0.023       Lab Results   Lab Value Date/Time    MRSAPCR Negative 12/17/2022 1609     Serologies    Lab Results   Lab Value Date/Time    COVID19 Not Detected 12/17/2022 1128    FLUAPCR Detected (A) 12/17/2022 1128    FLUBPCR Not Detected 12/17/2022 1128     Urinary Serologies    Lab Results   Component Value Date    STREPPNEUAG Positive (A) 12/17/2022       Lab Results   Component Value Date    LEGANTIGENUR Negative 12/17/2022       Biomarkers:  Results from last 7 days   Lab Units 12/22/22  0545 12/21/22  0613 12/20/22  0410 12/18/22  0310 12/17/22  2133   LACTATE mmol/L  --   --  0.8 2.3* 2.0   PROCALCITONIN ng/mL 1.07* 1.95* 5.71* 30.10*  --      COVID-19  Lab Results   Component Value Date    COVID19 Not Detected 12/17/2022    COVID19 Not Detected 07/26/2022     Images:  XR Chest 1 View    Result Date: 12/21/2022   1. Overall decrease in pulmonary edema. 2. The right-sided pulmonary infiltrate is better appreciated with slightly better aeration at the right lung base than on the last exam.  This report was  finalized on 12/21/2022 9:06 AM by Edwin Garg MD.        Echo:  Results for orders placed during the hospital encounter of 12/17/22    Adult Transthoracic Echo Complete w/ Color, Spectral and Contrast if Necessary Per Protocol    Interpretation Summary  •  Left ventricular systolic function is normal. Calculated left ventricular EF = 59.5%  •  Left ventricular wall thickness is consistent with mild concentric hypertrophy.  •  Left ventricular diastolic function is consistent with (grade II w/high LAP) pseudonormalization.  •  Left atrial volume is mildly increased.  •  There is calcification of the aortic valve.  •  Estimated right ventricular systolic pressure from tricuspid regurgitation is normal (<35 mmHg). Calculated right ventricular systolic pressure from tricuspid regurgitation is 27 mmHg.  •  Mild dilation of the ascending aorta is present.  4.1 cm      Results: Reviewed.  I reviewed the patient's new laboratory and imaging results.  I independently reviewed the patient's new images.    Medications: Reviewed.    Assessment   A/P     Hospital:  LOS: 5 days   ICU: 5d 2h      Diagnosis   • **Septic shock [A41.9, R65.21]   • Acute hypoxic respiratory failure [J96.01]   • DRISS [N17.9]   • On chronic immunosuppressive therapy [D84.9]       • GERD [K21.9]   • Influenza A [J10.1]   • Obesity (BMI 30-39.9) [E66.9]   • Hypothyroidism [E03.9]   • Anxiety and depression [F41.9, F32.A]   • Gout [M10.9]   • Dyslipidemia [E78.5]   • RA [M06.9]   • T2DM [E11.9]     Charly is a 67 y.o. female admitted on 12/17/2022 with Septic shock (HCC) [A41.9, R65.21]    Assessment/Management/Treatment Plan:    1. Shock: improved. Off pressors  2. Respiratory failure, type 1  a. Influenza A  b. Strep pneumonia (per urinary antigen)  c. Ceftriaxone + Oseltamivir   3. GERD  4. Rheum  a. RA ? Treatment with MTX  b. Gout  5. Renal  a. DRISS - resolved. sCr WNL 12/19/22   6. NC Anemia, stable.  7. Endocrine  a. Body mass index is 34.2 kg/m².  Obese Class II: 35-39.9kg/m2  b. Hypothyroidism ? Treatment with Levothyroxine     Lab Results   Component Value Date    TSH 4.990 (H) 12/17/2022    FREET4 1.41 12/17/2022     c. T2 Diabetes    Lab Results   Lab Value Date/Time    HGBA1C 6.50 (H) 04/01/2019 0703     Results from last 7 days   Lab Units 12/22/22  1550 12/22/22  1105 12/22/22  0715 12/21/22 2009 12/21/22  1611 12/21/22  1609 12/21/22  1110 12/21/22  0712   GLUCOSE mg/dL 152* 203* 89 192* 345* 411* 176* 98       Diet: Diet: Cardiac Diets, Diabetic Diets; Healthy Heart (2-3 Na+); Consistent Carbohydrate; Texture: Regular Texture (IDDSI 7); Fluid Consistency: Thin (IDDSI 0)  Orders Placed This Encounter      DIET MESSAGE Pt would like 2 containers of cereal (cheerios) and 2 containers of 2% milk for breakfast please.      Dietary Nutrition Supplements Boost Breeze (Clear Liquid)      Advance Directives: Code Status and Medical Interventions:   Ordered at: 12/17/22 1507     Code Status (Patient has no pulse and is not breathing):    CPR (Attempt to Resuscitate)     Medical Interventions (Patient has pulse or is breathing):    Full Support        DVT prophylaxis:  Medical and mechanical DVT prophylaxis orders are present.     In brief:  1. Imitrex  2. Replacing Mg/K today  1. Diuresis afterwards.  3. Continue antibiotics: ? Ceftriaxone + Oseltamivir   4. Wean FiO2 as tolerated for SpO2 > 90%-92%  5. Oral nutrition supplements  6. Goal: Glucose < 180 mg/dL.   1. Continue current doses of insulin  7. PT/OT   8. proBNP improving  9. Disposition: Keep in ICU.   1. Labs: proBNP, Mg, K in AM  2. CXR in AM    Plan of care and goals reviewed during interdisciplinary rounds.  I discussed the patient's findings and my recommendations with patient and nursing staff    Level of Risk is High due to: *illness with threat to life or bodily function.     Time: 25 minutes, in direct patient care, with the patient and/or in the ICU or lucero coordinating care with other  health care providers. This is non-concurrent time.    I have spent > 50% percent of this time, counseling and discussing management.       [x] Primary Attending Intensive Care Medicine - Nutrition Support   [] Consultant

## 2022-12-23 ENCOUNTER — APPOINTMENT (OUTPATIENT)
Dept: GENERAL RADIOLOGY | Facility: HOSPITAL | Age: 67
DRG: 871 | End: 2022-12-23
Payer: MEDICARE

## 2022-12-23 LAB
ANION GAP SERPL CALCULATED.3IONS-SCNC: 5 MMOL/L (ref 5–15)
BASOPHILS # BLD AUTO: 0.02 10*3/MM3 (ref 0–0.2)
BASOPHILS NFR BLD AUTO: 0.3 % (ref 0–1.5)
CHLORIDE SERPL-SCNC: 97 MMOL/L (ref 98–107)
CO2 SERPL-SCNC: 38 MMOL/L (ref 22–29)
DEPRECATED RDW RBC AUTO: 55.3 FL (ref 37–54)
EOSINOPHIL # BLD AUTO: 0.23 10*3/MM3 (ref 0–0.4)
EOSINOPHIL NFR BLD AUTO: 3.5 % (ref 0.3–6.2)
ERYTHROCYTE [DISTWIDTH] IN BLOOD BY AUTOMATED COUNT: 17.2 % (ref 12.3–15.4)
GLUCOSE BLDC GLUCOMTR-MCNC: 118 MG/DL (ref 70–130)
GLUCOSE BLDC GLUCOMTR-MCNC: 136 MG/DL (ref 70–130)
GLUCOSE BLDC GLUCOMTR-MCNC: 187 MG/DL (ref 70–130)
GLUCOSE BLDC GLUCOMTR-MCNC: 257 MG/DL (ref 70–130)
HCT VFR BLD AUTO: 36.9 % (ref 34–46.6)
HGB BLD-MCNC: 11.3 G/DL (ref 12–15.9)
IMM GRANULOCYTES # BLD AUTO: 0.18 10*3/MM3 (ref 0–0.05)
IMM GRANULOCYTES NFR BLD AUTO: 2.8 % (ref 0–0.5)
LYMPHOCYTES # BLD AUTO: 2.23 10*3/MM3 (ref 0.7–3.1)
LYMPHOCYTES NFR BLD AUTO: 34.4 % (ref 19.6–45.3)
MAGNESIUM SERPL-MCNC: 1.5 MG/DL (ref 1.6–2.4)
MCH RBC QN AUTO: 26.9 PG (ref 26.6–33)
MCHC RBC AUTO-ENTMCNC: 30.6 G/DL (ref 31.5–35.7)
MCV RBC AUTO: 87.9 FL (ref 79–97)
MONOCYTES # BLD AUTO: 0.91 10*3/MM3 (ref 0.1–0.9)
MONOCYTES NFR BLD AUTO: 14 % (ref 5–12)
NEUTROPHILS NFR BLD AUTO: 2.92 10*3/MM3 (ref 1.7–7)
NEUTROPHILS NFR BLD AUTO: 45 % (ref 42.7–76)
NRBC BLD AUTO-RTO: 0 /100 WBC (ref 0–0.2)
NT-PROBNP SERPL-MCNC: 2383 PG/ML (ref 0–900)
PLATELET # BLD AUTO: 235 10*3/MM3 (ref 140–450)
PMV BLD AUTO: 10.4 FL (ref 6–12)
POTASSIUM SERPL-SCNC: 4.1 MMOL/L (ref 3.5–5.2)
RBC # BLD AUTO: 4.2 10*6/MM3 (ref 3.77–5.28)
SODIUM SERPL-SCNC: 140 MMOL/L (ref 136–145)
WBC NRBC COR # BLD: 6.49 10*3/MM3 (ref 3.4–10.8)

## 2022-12-23 PROCEDURE — 82962 GLUCOSE BLOOD TEST: CPT

## 2022-12-23 PROCEDURE — 25010000002 KETOROLAC TROMETHAMINE PER 15 MG: Performed by: NURSE PRACTITIONER

## 2022-12-23 PROCEDURE — 80051 ELECTROLYTE PANEL: CPT | Performed by: INTERNAL MEDICINE

## 2022-12-23 PROCEDURE — 71045 X-RAY EXAM CHEST 1 VIEW: CPT

## 2022-12-23 PROCEDURE — 25010000002 HYDROMORPHONE PER 4 MG: Performed by: INTERNAL MEDICINE

## 2022-12-23 PROCEDURE — 83880 ASSAY OF NATRIURETIC PEPTIDE: CPT | Performed by: INTERNAL MEDICINE

## 2022-12-23 PROCEDURE — 63710000001 INSULIN DETEMIR PER 5 UNITS: Performed by: INTERNAL MEDICINE

## 2022-12-23 PROCEDURE — 99232 SBSQ HOSP IP/OBS MODERATE 35: CPT | Performed by: INTERNAL MEDICINE

## 2022-12-23 PROCEDURE — 85025 COMPLETE CBC W/AUTO DIFF WBC: CPT | Performed by: INTERNAL MEDICINE

## 2022-12-23 PROCEDURE — 25010000002 ONDANSETRON PER 1 MG: Performed by: INTERNAL MEDICINE

## 2022-12-23 PROCEDURE — 25010000002 MAGNESIUM SULFATE 2 GM/50ML SOLUTION: Performed by: INTERNAL MEDICINE

## 2022-12-23 PROCEDURE — 63710000001 INSULIN LISPRO (HUMAN) PER 5 UNITS: Performed by: NURSE PRACTITIONER

## 2022-12-23 PROCEDURE — 25010000002 CEFTRIAXONE PER 250 MG: Performed by: INTERNAL MEDICINE

## 2022-12-23 PROCEDURE — 83735 ASSAY OF MAGNESIUM: CPT | Performed by: INTERNAL MEDICINE

## 2022-12-23 RX ORDER — MAGNESIUM SULFATE HEPTAHYDRATE 40 MG/ML
4 INJECTION, SOLUTION INTRAVENOUS AS NEEDED
Status: DISCONTINUED | OUTPATIENT
Start: 2022-12-23 | End: 2022-12-25

## 2022-12-23 RX ORDER — HYDROMORPHONE HYDROCHLORIDE 1 MG/ML
0.5 INJECTION, SOLUTION INTRAMUSCULAR; INTRAVENOUS; SUBCUTANEOUS
Status: DISCONTINUED | OUTPATIENT
Start: 2022-12-23 | End: 2022-12-25

## 2022-12-23 RX ORDER — CALCIUM CARBONATE 200(500)MG
2 TABLET,CHEWABLE ORAL 3 TIMES DAILY PRN
Status: DISCONTINUED | OUTPATIENT
Start: 2022-12-23 | End: 2023-01-03 | Stop reason: HOSPADM

## 2022-12-23 RX ORDER — KETOROLAC TROMETHAMINE 15 MG/ML
15 INJECTION, SOLUTION INTRAMUSCULAR; INTRAVENOUS ONCE
Status: COMPLETED | OUTPATIENT
Start: 2022-12-23 | End: 2022-12-23

## 2022-12-23 RX ADMIN — CALCIUM CARBONATE (ANTACID) CHEW TAB 500 MG 2 TABLET: 500 CHEW TAB at 03:21

## 2022-12-23 RX ADMIN — LIDOCAINE 2 PATCH: 700 PATCH TOPICAL at 08:19

## 2022-12-23 RX ADMIN — ONDANSETRON 4 MG: 2 INJECTION INTRAMUSCULAR; INTRAVENOUS at 03:11

## 2022-12-23 RX ADMIN — CEFTRIAXONE 2 G: 2 INJECTION, POWDER, FOR SOLUTION INTRAMUSCULAR; INTRAVENOUS at 10:17

## 2022-12-23 RX ADMIN — HYDROCODONE BITARTRATE AND ACETAMINOPHEN 1 TABLET: 10; 325 TABLET ORAL at 14:04

## 2022-12-23 RX ADMIN — INSULIN DETEMIR 10 UNITS: 100 INJECTION, SOLUTION SUBCUTANEOUS at 08:18

## 2022-12-23 RX ADMIN — COLESEVELAM HYDROCHLORIDE 1250 MG: 625 TABLET, COATED ORAL at 08:18

## 2022-12-23 RX ADMIN — KETOROLAC TROMETHAMINE 15 MG: 15 INJECTION, SOLUTION INTRAMUSCULAR; INTRAVENOUS at 05:47

## 2022-12-23 RX ADMIN — MAGNESIUM SULFATE HEPTAHYDRATE 6 G: 2 INJECTION, SOLUTION INTRAVENOUS at 05:24

## 2022-12-23 RX ADMIN — GABAPENTIN 100 MG: 100 CAPSULE ORAL at 20:39

## 2022-12-23 RX ADMIN — Medication 10 ML: at 08:19

## 2022-12-23 RX ADMIN — HYDROMORPHONE HYDROCHLORIDE 0.5 MG: 1 INJECTION, SOLUTION INTRAMUSCULAR; INTRAVENOUS; SUBCUTANEOUS at 09:33

## 2022-12-23 RX ADMIN — ROSUVASTATIN CALCIUM 5 MG: 10 TABLET, COATED ORAL at 08:17

## 2022-12-23 RX ADMIN — FLUOXETINE 20 MG: 20 CAPSULE ORAL at 08:17

## 2022-12-23 RX ADMIN — FOLIC ACID TAB 400 MCG 400 MCG: 400 TAB at 08:18

## 2022-12-23 RX ADMIN — HYDROMORPHONE HYDROCHLORIDE 0.5 MG: 1 INJECTION, SOLUTION INTRAMUSCULAR; INTRAVENOUS; SUBCUTANEOUS at 15:01

## 2022-12-23 RX ADMIN — LEVOTHYROXINE SODIUM 225 MCG: 150 TABLET ORAL at 05:24

## 2022-12-23 RX ADMIN — APIXABAN 5 MG: 5 TABLET, FILM COATED ORAL at 20:39

## 2022-12-23 RX ADMIN — ZOLPIDEM TARTRATE 5 MG: 5 TABLET ORAL at 22:04

## 2022-12-23 RX ADMIN — HYDROCODONE BITARTRATE AND ACETAMINOPHEN 1 TABLET: 10; 325 TABLET ORAL at 05:24

## 2022-12-23 RX ADMIN — APIXABAN 5 MG: 5 TABLET, FILM COATED ORAL at 08:17

## 2022-12-23 RX ADMIN — GABAPENTIN 100 MG: 100 CAPSULE ORAL at 08:17

## 2022-12-23 RX ADMIN — HYDROCODONE BITARTRATE AND ACETAMINOPHEN 1 TABLET: 10; 325 TABLET ORAL at 18:02

## 2022-12-23 RX ADMIN — HYDROCODONE BITARTRATE AND ACETAMINOPHEN 1 TABLET: 10; 325 TABLET ORAL at 10:16

## 2022-12-23 RX ADMIN — INSULIN DETEMIR 10 UNITS: 100 INJECTION, SOLUTION SUBCUTANEOUS at 22:05

## 2022-12-23 RX ADMIN — HYDROCODONE BITARTRATE AND ACETAMINOPHEN 1 TABLET: 10; 325 TABLET ORAL at 22:04

## 2022-12-23 RX ADMIN — INSULIN LISPRO 8 UNITS: 100 INJECTION, SOLUTION INTRAVENOUS; SUBCUTANEOUS at 17:07

## 2022-12-23 RX ADMIN — HYDROCODONE BITARTRATE AND ACETAMINOPHEN 1 TABLET: 10; 325 TABLET ORAL at 01:37

## 2022-12-23 RX ADMIN — COLESEVELAM HYDROCHLORIDE 1250 MG: 625 TABLET, COATED ORAL at 17:07

## 2022-12-23 RX ADMIN — FLUOXETINE 20 MG: 20 CAPSULE ORAL at 20:39

## 2022-12-23 RX ADMIN — INSULIN LISPRO 3 UNITS: 100 INJECTION, SOLUTION INTRAVENOUS; SUBCUTANEOUS at 22:04

## 2022-12-23 NOTE — PROGRESS NOTES
INTENSIVIST   PROGRESS NOTE        SUBJECTIVE     Charly 67 y.o. female is followed for: Shortness of Breath       Septic shock    T2DM    Acute hypoxic respiratory failure    GERD    Influenza A    Hypothyroidism      As an Intensivist, we provide an integrated approach to the ICU patient and family, medical management of comorbid conditions, including but not limited to electrolytes, glycemic control, organ dysfunction, lead interdisciplinary rounds and coordinate the care with all other services, including those from other specialists.     Interval History:  Headaches.    Pain improved but still hurting.    On LFNC.    Device (Oxygen Therapy): nasal cannula, humidified (22 1600): Flow (L/min): 2 (22 1600), Oxygen Concentration (%): 45 (22 1200)     The patient has started, but not completed, their COVID-19 vaccination series.     Temp  Min: 97.3 °F (36.3 °C)  Max: 98.6 °F (37 °C)       History     Last Reviewed by Dylon Pierre MD on 2022 at  6:37 PM    Sections Reviewed    Medical, Family, Surgical, Tobacco, Alcohol, Drug Use, Sexual Activity,   Social Documentation      Problem list reviewed by Dylon Pierre MD on 2022 at  6:37 PM  Medicines reviewed by Dylon Pierre MD on 2022 at  6:37 PM  Allergies reviewed by Dylon Pierre MD on 2022 at  6:37 PM       The patient's relevant past medical, surgical and social history were reviewed and updated in Epic as appropriate.        OBJECTIVE     Vitals:  Temp: 98 °F (36.7 °C) (22 1600) Temp  Min: 97.3 °F (36.3 °C)  Max: 98.6 °F (37 °C)   Temp core:      BP: 117/84 (22 1700) BP  Min: 110/73  Max: 155/89   MAP (non-invasive) Noninvasive MAP (mmHg): 96 (22 1700) Noninvasive MAP (mmHg)  Av.9  Min: 55  Max: 134   Pulse: 75 (22 1700) Pulse  Min: 64  Max: 87   Resp: 19 (22 1600) Resp  Min: 16  Max: 19   SpO2: 93 % (22 1700) SpO2  Min: 91 %  Max: 97 %   Device: nasal cannula, humidified  (12/23/22 1600)    Flow Rate: 2 (12/23/22 1600) Flow (L/min)  Min: 2  Max: 4         12/17/22  1533 12/18/22  0400   Weight: 90.7 kg (199 lb 15.3 oz) 96.1 kg (211 lb 13.8 oz)        Intake/Ouptut 24 hrs (7:00AM - 6:59 AM)  Intake & Output (last 3 days)       12/20 0701  12/21 0700 12/21 0701 12/22 0700 12/22 0701 12/23 0700 12/23 0701 12/24 0700    P.O. 1200 920  680    I.V. (mL/kg) 55 (0.6) 162.6 (1.7) 167 (1.7) 182.1 (1.9)    IV Piggyback  100 100 97.5    Total Intake(mL/kg) 1255 (13.1) 1182.6 (12.3) 267 (2.8) 959.6 (10)    Urine (mL/kg/hr) 3300 (1.4) 3525 (1.5) 2450 (1.1) 400 (0.4)    Stool 0       Total Output 3300 3525 2450 400    Net -2045 -2342.4 -2183 +559.6            Urine Unmeasured Occurrence 1 x 1 x 2 x     Stool Unmeasured Occurrence 2 x           Physical Examination  Telemetry:  Rhythm: normal sinus rhythm (12/23/22 1600)         Constitutional:  No acute distress.   Cardiovascular: RRR.   Normal heart sounds.  No murmurs, gallop or rub.   Respiratory: Normal breath sounds  No adventitious sounds.   Abdominal:  Soft with no tenderness.  No distension.   No HSM.   Extremities: Warm.  Dry.  No cyanosis.  No Edema   Neurological:   Alert, Oriented, Cooperative.  Best Eye Response: 4-->(E4) spontaneous (12/23/22 1600)  Best Motor Response: 6-->(M6) obeys commands (12/23/22 1600)  Best Verbal Response: 5-->(V5) oriented (12/23/22 1600)  [place] Coma Scale Score: 15 (12/23/22 1600)     Results Reviewed:  Laboratory  Microbiology  Radiology  Pathology    Hematology:  Results from last 7 days   Lab Units 12/23/22  0335 12/22/22  0545   WBC 10*3/mm3 6.49 7.72   HEMOGLOBIN g/dL 11.3* 11.3*   MCV fL 87.9 88.0   PLATELETS 10*3/mm3 235 228   NEUTROS ABS 10*3/mm3 2.92 4.34   LYMPHS ABS 10*3/mm3 2.23 2.13   EOS ABS 10*3/mm3 0.23 0.13       Chemistry:  Estimated Creatinine Clearance: 125.8 mL/min (A) (by C-G formula based on SCr of 0.5 mg/dL (L)).    Results from last 7 days   Lab Units 12/23/22  3203  12/22/22  1606 12/22/22  0545 12/21/22  0613   SODIUM mmol/L 140  --  140 144   POTASSIUM mmol/L 4.1 4.2 3.3* 3.7   CHLORIDE mmol/L 97*  --  100 106   CO2 mmol/L 38.0*  --  35.0* 29.0   BUN mg/dL  --   --  16 16   CREATININE mg/dL  --   --  0.50* 0.50*   GLUCOSE mg/dL  --   --  89 109*     Results from last 7 days   Lab Units 12/23/22  0335 12/22/22  0545 12/21/22  0613 12/20/22  0410 12/19/22  0553 12/18/22  0310   CALCIUM mg/dL  --  8.6 8.1*   < > 7.4* 7.2*   MAGNESIUM mg/dL 1.5* 1.5* 1.3*  --  2.3 1.1*   PHOSPHORUS mg/dL  --   --   --   --  3.5 4.6*    < > = values in this interval not displayed.     Coagulation Labs:  Results from last 7 days   Lab Units 12/22/22  0545 12/20/22  0410   PROTIME Seconds 15.8* 17.2*   INR  1.27* 1.41*      Cardiac Labs:  Results from last 7 days   Lab Units 12/23/22  0335 12/22/22  0545 12/21/22  0613 12/20/22  0410 12/18/22  0310 12/17/22  1109   PROBNP pg/mL 2,383.0* 5,201.0* 11,614.0*   < >  --  9,401.0*   CK TOTAL U/L  --   --   --   --   --  160   TROPONIN T ng/mL  --   --   --   --  0.015 0.023    < > = values in this interval not displayed.       Lab Results   Lab Value Date/Time    MRSAPCR Negative 12/17/2022 1609     Serologies    Lab Results   Lab Value Date/Time    COVID19 Not Detected 12/17/2022 1128    FLUAPCR Detected (A) 12/17/2022 1128    FLUBPCR Not Detected 12/17/2022 1128     Urinary Serologies    Lab Results   Component Value Date    STREPPNEUAG Positive (A) 12/17/2022       Lab Results   Component Value Date    LEGANTIGENUR Negative 12/17/2022       Biomarkers:  Results from last 7 days   Lab Units 12/22/22  0545 12/21/22  0613 12/20/22  0410 12/18/22  0310 12/17/22  2133   LACTATE mmol/L  --   --  0.8 2.3* 2.0   PROCALCITONIN ng/mL 1.07* 1.95* 5.71* 30.10*  --      COVID-19  Lab Results   Component Value Date    COVID19 Not Detected 12/17/2022    COVID19 Not Detected 07/26/2022     Images:  XR Chest 1 View    Result Date: 12/23/2022  1. Stable right IJ central  venous catheter. 2. Unchanged right basilar airspace disease concerning for pneumonia with small right pleural effusion. 3. Stable cardiomegaly. 4. No pneumothorax.  This report was finalized on 12/23/2022 7:30 AM by Woodrow Adame MD.        Echo:  Results for orders placed during the hospital encounter of 12/17/22    Adult Transthoracic Echo Complete w/ Color, Spectral and Contrast if Necessary Per Protocol    Interpretation Summary  •  Left ventricular systolic function is normal. Calculated left ventricular EF = 59.5%  •  Left ventricular wall thickness is consistent with mild concentric hypertrophy.  •  Left ventricular diastolic function is consistent with (grade II w/high LAP) pseudonormalization.  •  Left atrial volume is mildly increased.  •  There is calcification of the aortic valve.  •  Estimated right ventricular systolic pressure from tricuspid regurgitation is normal (<35 mmHg). Calculated right ventricular systolic pressure from tricuspid regurgitation is 27 mmHg.  •  Mild dilation of the ascending aorta is present.  4.1 cm      Results: Reviewed.  I reviewed the patient's new laboratory and imaging results.  I independently reviewed the patient's new images.    Medications: Reviewed.    Assessment   A/P     Hospital:  LOS: 6 days   ICU: 6d 2h      Diagnosis   • **Septic shock [A41.9, R65.21]   • Acute hypoxic respiratory failure [J96.01]   • DRISS [N17.9]   • On chronic immunosuppressive therapy [D84.9]       • GERD [K21.9]   • Influenza A [J10.1]   • Obesity (BMI 30-39.9) [E66.9]   • Hypothyroidism [E03.9]   • Anxiety and depression [F41.9, F32.A]   • Gout [M10.9]   • Dyslipidemia [E78.5]   • RA [M06.9]   • T2DM [E11.9]     Charly is a 67 y.o. female admitted on 12/17/2022 with Septic shock (HCC) [A41.9, R65.21]    Assessment/Management/Treatment Plan:    1. Shock: improved. Off pressors  2. Respiratory failure, type 1  a. Influenza A  b. Strep pneumonia (per urinary antigen)  c. Ceftriaxone +  Oseltamivir   3. GERD  4. Rheum  a. RA ? Treatment with MTX  b. Gout  5. Renal  a. DRISS - resolved. sCr WNL 12/19/22   6. NC Anemia, stable.  7. Endocrine  a. Body mass index is 34.2 kg/m². Obese Class II: 35-39.9kg/m2  b. Hypothyroidism ? Treatment with Levothyroxine     Lab Results   Component Value Date    TSH 4.990 (H) 12/17/2022    FREET4 1.41 12/17/2022     c. T2 Diabetes    Lab Results   Lab Value Date/Time    HGBA1C 6.50 (H) 04/01/2019 0703     Results from last 7 days   Lab Units 12/23/22  1611 12/23/22  1132 12/23/22  0705 12/22/22  2040 12/22/22  2038 12/22/22  2037 12/22/22  1808 12/22/22  1550   GLUCOSE mg/dL 257* 136* 118 137* 142* 432* 141* 152*       Diet: Diet: Cardiac Diets, Diabetic Diets; Healthy Heart (2-3 Na+); Consistent Carbohydrate; Texture: Regular Texture (IDDSI 7); Fluid Consistency: Thin (IDDSI 0)  Orders Placed This Encounter      Dietary Nutrition Supplements Boost Breeze (Clear Liquid)      DIET MESSAGE Pt requesting a grilled cheese sandwich, tomato soup and fruit for dinner. Thank you!      Advance Directives: Code Status and Medical Interventions:   Ordered at: 12/17/22 1507     Code Status (Patient has no pulse and is not breathing):    CPR (Attempt to Resuscitate)     Medical Interventions (Patient has pulse or is breathing):    Full Support        DVT prophylaxis:  Medical and mechanical DVT prophylaxis orders are present.     In brief:  1. Continue to replace M  2. Continue antibiotics: ? Ceftriaxone + Oseltamivir   3. CXR today similar to previous one from 12/21/22  4. Wean FiO2 as tolerated for SpO2 > 90%-92%  5. Oral nutrition supplements  6. Goal: Glucose < 180 mg/dL.   1. Continue current doses of insulin  7. PT/OT   8. proBNP improved. No diuretics today.  9. Disposition: Transfer to Telemetry Unit } soon   1. Labs: proBNP, Mg, K in AM    Plan of care and goals reviewed during interdisciplinary rounds.  I discussed the patient's findings and my recommendations with patient  and nursing staff    Level of Risk is High due to: *illness with threat to life or bodily function.     Time: 25 minutes, in direct patient care, with the patient and/or in the ICU or lucero coordinating care with other health care providers. This is non-concurrent time.    I have spent > 50% percent of this time, counseling and discussing management.       [x] Primary Attending Intensive Care Medicine - Nutrition Support   [] Consultant

## 2022-12-23 NOTE — PLAN OF CARE
Goal Outcome Evaluation:  Plan of Care Reviewed With: patient        Progress: improving  Outcome Evaluation: VSS. Pain managed with PRN medication, IV dilaudid added PRN,pt c/o shoulder and headache pain. Pt alert. NSR. On 2L NC, with scant amounts of thick white secretions. Eating. Good UOP. No BM this shift. Replaced magnesium. Up in chair this afternoon.

## 2022-12-23 NOTE — CASE MANAGEMENT/SOCIAL WORK
Continued Stay Note  [facility]     Patient Name: Charly Blevins  MRN: [id number]  Today's Date: 12/23/2022    Admit Date: 12/17/2022    Plan: Ongoing   Discharge Plan     Row Name 12/23/22 1111       Plan    Plan Ongoing    Patient/Family in Agreement with Plan other (see comments)    Plan Comments Pt was discussed in Medical Rounds today. Dr. Pierre states that pt is not medically ready to transfer to floor today. Pt's RN states that pt has a Headache and has received Toradol. IV Dilaudid order has been placed. Pt is currently on 4L NC O2. D/C plan is ongoing.  will continue to follow.    Final Discharge Disposition Code 30 - still a patient               Discharge Codes    No documentation.               Expected Discharge Date and Time     Expected Discharge Date Expected Discharge Time    Dec 26, 2022             Emma Lindsay RN

## 2022-12-24 LAB
ANION GAP SERPL CALCULATED.3IONS-SCNC: 9 MMOL/L (ref 5–15)
BASOPHILS # BLD MANUAL: 0 10*3/MM3 (ref 0–0.2)
BASOPHILS NFR BLD MANUAL: 0 % (ref 0–1.5)
BUN SERPL-MCNC: 19 MG/DL (ref 8–23)
BUN/CREAT SERPL: 30.2 (ref 7–25)
CALCIUM SPEC-SCNC: 8.9 MG/DL (ref 8.6–10.5)
CHLORIDE SERPL-SCNC: 96 MMOL/L (ref 98–107)
CO2 SERPL-SCNC: 34 MMOL/L (ref 22–29)
CREAT SERPL-MCNC: 0.63 MG/DL (ref 0.57–1)
DEPRECATED RDW RBC AUTO: 56.1 FL (ref 37–54)
EGFRCR SERPLBLD CKD-EPI 2021: 96.8 ML/MIN/1.73
EOSINOPHIL # BLD MANUAL: 0.8 10*3/MM3 (ref 0–0.4)
EOSINOPHIL NFR BLD MANUAL: 10 % (ref 0.3–6.2)
ERYTHROCYTE [DISTWIDTH] IN BLOOD BY AUTOMATED COUNT: 17.3 % (ref 12.3–15.4)
GLUCOSE BLDC GLUCOMTR-MCNC: 119 MG/DL (ref 70–130)
GLUCOSE BLDC GLUCOMTR-MCNC: 186 MG/DL (ref 70–130)
GLUCOSE BLDC GLUCOMTR-MCNC: 215 MG/DL (ref 70–130)
GLUCOSE BLDC GLUCOMTR-MCNC: 223 MG/DL (ref 70–130)
GLUCOSE SERPL-MCNC: 116 MG/DL (ref 65–99)
HCT VFR BLD AUTO: 34.2 % (ref 34–46.6)
HGB BLD-MCNC: 10.5 G/DL (ref 12–15.9)
LYMPHOCYTES # BLD MANUAL: 1.92 10*3/MM3 (ref 0.7–3.1)
LYMPHOCYTES NFR BLD MANUAL: 10 % (ref 5–12)
MAGNESIUM SERPL-MCNC: 1.6 MG/DL (ref 1.6–2.4)
MCH RBC QN AUTO: 27.3 PG (ref 26.6–33)
MCHC RBC AUTO-ENTMCNC: 30.7 G/DL (ref 31.5–35.7)
MCV RBC AUTO: 88.8 FL (ref 79–97)
METAMYELOCYTES NFR BLD MANUAL: 1 % (ref 0–0)
MONOCYTES # BLD: 0.8 10*3/MM3 (ref 0.1–0.9)
NEUTROPHILS # BLD AUTO: 4.41 10*3/MM3 (ref 1.7–7)
NEUTROPHILS NFR BLD MANUAL: 50 % (ref 42.7–76)
NEUTS BAND NFR BLD MANUAL: 5 % (ref 0–5)
NT-PROBNP SERPL-MCNC: 1223 PG/ML (ref 0–900)
PLAT MORPH BLD: NORMAL
PLATELET # BLD AUTO: 220 10*3/MM3 (ref 140–450)
PMV BLD AUTO: 10.3 FL (ref 6–12)
POTASSIUM SERPL-SCNC: 4.9 MMOL/L (ref 3.5–5.2)
RBC # BLD AUTO: 3.85 10*6/MM3 (ref 3.77–5.28)
RBC MORPH BLD: NORMAL
SCAN SLIDE: NORMAL
SODIUM SERPL-SCNC: 139 MMOL/L (ref 136–145)
VARIANT LYMPHS NFR BLD MANUAL: 20 % (ref 19.6–45.3)
VARIANT LYMPHS NFR BLD MANUAL: 4 % (ref 0–5)
WBC MORPH BLD: NORMAL
WBC NRBC COR # BLD: 8.02 10*3/MM3 (ref 3.4–10.8)

## 2022-12-24 PROCEDURE — 85007 BL SMEAR W/DIFF WBC COUNT: CPT | Performed by: INTERNAL MEDICINE

## 2022-12-24 PROCEDURE — 80048 BASIC METABOLIC PNL TOTAL CA: CPT | Performed by: INTERNAL MEDICINE

## 2022-12-24 PROCEDURE — 25010000002 HYDROMORPHONE PER 4 MG: Performed by: INTERNAL MEDICINE

## 2022-12-24 PROCEDURE — 0 MAGNESIUM SULFATE 4 GM/100ML SOLUTION: Performed by: INTERNAL MEDICINE

## 2022-12-24 PROCEDURE — 83735 ASSAY OF MAGNESIUM: CPT | Performed by: INTERNAL MEDICINE

## 2022-12-24 PROCEDURE — 83880 ASSAY OF NATRIURETIC PEPTIDE: CPT | Performed by: INTERNAL MEDICINE

## 2022-12-24 PROCEDURE — 85025 COMPLETE CBC W/AUTO DIFF WBC: CPT | Performed by: INTERNAL MEDICINE

## 2022-12-24 PROCEDURE — 99232 SBSQ HOSP IP/OBS MODERATE 35: CPT | Performed by: INTERNAL MEDICINE

## 2022-12-24 PROCEDURE — 82962 GLUCOSE BLOOD TEST: CPT

## 2022-12-24 PROCEDURE — 63710000001 INSULIN DETEMIR PER 5 UNITS: Performed by: INTERNAL MEDICINE

## 2022-12-24 PROCEDURE — 25010000002 CEFTRIAXONE PER 250 MG: Performed by: INTERNAL MEDICINE

## 2022-12-24 PROCEDURE — 63710000001 INSULIN LISPRO (HUMAN) PER 5 UNITS: Performed by: NURSE PRACTITIONER

## 2022-12-24 RX ORDER — LACTULOSE 10 G/15ML
30 SOLUTION ORAL EVERY 8 HOURS PRN
Status: DISCONTINUED | OUTPATIENT
Start: 2022-12-24 | End: 2022-12-25

## 2022-12-24 RX ADMIN — INSULIN LISPRO 5 UNITS: 100 INJECTION, SOLUTION INTRAVENOUS; SUBCUTANEOUS at 20:30

## 2022-12-24 RX ADMIN — FLUOXETINE 20 MG: 20 CAPSULE ORAL at 08:17

## 2022-12-24 RX ADMIN — CEFTRIAXONE 2 G: 2 INJECTION, POWDER, FOR SOLUTION INTRAMUSCULAR; INTRAVENOUS at 11:58

## 2022-12-24 RX ADMIN — HYDROCODONE BITARTRATE AND ACETAMINOPHEN 1 TABLET: 10; 325 TABLET ORAL at 17:23

## 2022-12-24 RX ADMIN — APIXABAN 5 MG: 5 TABLET, FILM COATED ORAL at 08:17

## 2022-12-24 RX ADMIN — COLESEVELAM HYDROCHLORIDE 1250 MG: 625 TABLET, COATED ORAL at 17:23

## 2022-12-24 RX ADMIN — GABAPENTIN 100 MG: 100 CAPSULE ORAL at 08:17

## 2022-12-24 RX ADMIN — FLUOXETINE 20 MG: 20 CAPSULE ORAL at 20:29

## 2022-12-24 RX ADMIN — INSULIN LISPRO 3 UNITS: 100 INJECTION, SOLUTION INTRAVENOUS; SUBCUTANEOUS at 11:58

## 2022-12-24 RX ADMIN — HYDROCODONE BITARTRATE AND ACETAMINOPHEN 1 TABLET: 10; 325 TABLET ORAL at 23:31

## 2022-12-24 RX ADMIN — HYDROCODONE BITARTRATE AND ACETAMINOPHEN 1 TABLET: 10; 325 TABLET ORAL at 13:43

## 2022-12-24 RX ADMIN — ZOLPIDEM TARTRATE 5 MG: 5 TABLET ORAL at 23:31

## 2022-12-24 RX ADMIN — GABAPENTIN 100 MG: 100 CAPSULE ORAL at 20:29

## 2022-12-24 RX ADMIN — ROSUVASTATIN CALCIUM 5 MG: 10 TABLET, COATED ORAL at 08:17

## 2022-12-24 RX ADMIN — INSULIN DETEMIR 10 UNITS: 100 INJECTION, SOLUTION SUBCUTANEOUS at 20:30

## 2022-12-24 RX ADMIN — HYDROCODONE BITARTRATE AND ACETAMINOPHEN 1 TABLET: 10; 325 TABLET ORAL at 09:58

## 2022-12-24 RX ADMIN — APIXABAN 5 MG: 5 TABLET, FILM COATED ORAL at 20:29

## 2022-12-24 RX ADMIN — FOLIC ACID TAB 400 MCG 400 MCG: 400 TAB at 08:17

## 2022-12-24 RX ADMIN — LEVOTHYROXINE SODIUM 225 MCG: 150 TABLET ORAL at 05:36

## 2022-12-24 RX ADMIN — COLESEVELAM HYDROCHLORIDE 1250 MG: 625 TABLET, COATED ORAL at 08:17

## 2022-12-24 RX ADMIN — LIDOCAINE 2 PATCH: 700 PATCH TOPICAL at 09:59

## 2022-12-24 RX ADMIN — HYDROMORPHONE HYDROCHLORIDE 0.5 MG: 1 INJECTION, SOLUTION INTRAMUSCULAR; INTRAVENOUS; SUBCUTANEOUS at 20:29

## 2022-12-24 RX ADMIN — HYDROCODONE BITARTRATE AND ACETAMINOPHEN 1 TABLET: 10; 325 TABLET ORAL at 05:39

## 2022-12-24 RX ADMIN — SUMATRIPTAN SUCCINATE 100 MG: 50 TABLET ORAL at 12:00

## 2022-12-24 RX ADMIN — INSULIN LISPRO 5 UNITS: 100 INJECTION, SOLUTION INTRAVENOUS; SUBCUTANEOUS at 17:23

## 2022-12-24 RX ADMIN — INSULIN DETEMIR 10 UNITS: 100 INJECTION, SOLUTION SUBCUTANEOUS at 08:16

## 2022-12-24 RX ADMIN — HYDROMORPHONE HYDROCHLORIDE 0.5 MG: 1 INJECTION, SOLUTION INTRAMUSCULAR; INTRAVENOUS; SUBCUTANEOUS at 15:00

## 2022-12-24 RX ADMIN — MAGNESIUM SULFATE HEPTAHYDRATE 4 G: 40 INJECTION, SOLUTION INTRAVENOUS at 05:36

## 2022-12-24 NOTE — PLAN OF CARE
Goal Outcome Evaluation:  Plan of Care Reviewed With: patient, daughter        Progress: improving  Outcome Evaluation: .    VSS, NSR per monitor, O2 on 2L NC. Pt c/o pain in left shoulder and head, controlled with prn Norco. Pt rested well overnight between care. 1000mL UOP, no BM.

## 2022-12-24 NOTE — PROGRESS NOTES
INTENSIVIST   PROGRESS NOTE        SUBJECTIVE     Charly 67 y.o. female is followed for: Shortness of Breath       Septic shock    T2DM    Acute hypoxic respiratory failure    GERD    Influenza A    Hypothyroidism      As an Intensivist, we provide an integrated approach to the ICU patient and family, medical management of comorbid conditions, including but not limited to electrolytes, glycemic control, organ dysfunction, lead interdisciplinary rounds and coordinate the care with all other services, including those from other specialists.     Interval History:  Still headaches, same as yesterday.    Pain ongoing. Oral opioids helping. Occ need for an IV dose.    Breathing better.    She is on Home O2, usually at 2 lpm.    On LFNC 2 lpm at present.    Device (Oxygen Therapy): humidified, nasal cannula (22 0600): Flow (L/min): 2 (22 0600), Oxygen Concentration (%): 45 (22 1200)     Not able to sleep well here.    Temp  Min: 97.3 °F (36.3 °C)  Max: 98.4 °F (36.9 °C)       History     Last Reviewed by Dylon Pierre MD on 2022 at  6:37 PM    Sections Reviewed    Medical, Family, Surgical, Tobacco, Alcohol, Drug Use, Sexual Activity,   Social Documentation      Problem list reviewed by Dylon Pierre MD on 2022 at  6:37 PM  Medicines reviewed by Dylon Pierre MD on 2022 at  6:37 PM  Allergies reviewed by Dylon Pierre MD on 2022 at  6:37 PM       The patient's relevant past medical, surgical and social history were reviewed and updated in Epic as appropriate.        OBJECTIVE     Vitals:  Temp: 98.4 °F (36.9 °C) (22 0400) Temp  Min: 97.3 °F (36.3 °C)  Max: 98.4 °F (36.9 °C)   Temp core:      BP: 123/79 (22 0600) BP  Min: 110/73  Max: 140/96   MAP (non-invasive) Noninvasive MAP (mmHg): 92 (22 0600) Noninvasive MAP (mmHg)  Av.5  Min: 55  Max: 134   Pulse: 75 (22 0600) Pulse  Min: 68  Max: 81   Resp: 20 (22 0600) Resp  Min: 18  Max: 22   SpO2:  91 % (12/24/22 0600) SpO2  Min: 89 %  Max: 95 %   Device: humidified, nasal cannula (12/24/22 0600)    Flow Rate: 2 (12/24/22 0600) Flow (L/min)  Min: 2  Max: 3         12/17/22  1533 12/18/22  0400   Weight: 90.7 kg (199 lb 15.3 oz) 96.1 kg (211 lb 13.8 oz)        Intake/Ouptut 24 hrs (7:00AM - 6:59 AM)  Intake & Output (last 3 days)       12/21 0701 12/22 0700 12/22 0701 12/23 0700 12/23 0701 12/24 0700 12/24 0701 12/25 0700    P.O. 920  980     I.V. (mL/kg) 162.6 (1.7) 167 (1.7) 182.1 (1.9)     IV Piggyback 100 100 97.5     Total Intake(mL/kg) 1182.6 (12.3) 267 (2.8) 1259.6 (13.1)     Urine (mL/kg/hr) 3525 (1.5) 2450 (1.1) 1400 (0.6)     Stool        Total Output 3525 2450 1400     Net -2342.4 -2183 -140.4             Urine Unmeasured Occurrence 1 x 2 x          Physical Examination  Telemetry:  Rhythm: normal sinus rhythm (12/24/22 0600)         Constitutional:  No acute distress.   Cardiovascular: RRR.   Normal heart sounds.  No murmurs, gallop or rub.   Respiratory: Normal breath sounds  No adventitious sounds.   Abdominal:  Soft with no tenderness.  No distension.   No HSM.   Extremities: Warm.  Dry.  No cyanosis.  No Edema   Neurological:   Alert, Oriented, Cooperative.  Best Eye Response: 4-->(E4) spontaneous (12/24/22 0600)  Best Motor Response: 6-->(M6) obeys commands (12/24/22 0600)  Best Verbal Response: 5-->(V5) oriented (12/24/22 0600)  [place] Coma Scale Score: 15 (12/24/22 0600)     Results Reviewed:  Laboratory  Microbiology  Radiology  Pathology    Hematology:  Results from last 7 days   Lab Units 12/24/22  0334 12/23/22  0335 12/22/22  0545   WBC 10*3/mm3 8.02 6.49 7.72   HEMOGLOBIN g/dL 10.5* 11.3* 11.3*   MCV fL 88.8 87.9 88.0   PLATELETS 10*3/mm3 220 235 228   NEUTROS ABS 10*3/mm3 4.41 2.92 4.34   LYMPHS ABS 10*3/mm3  --  2.23 2.13   EOS ABS 10*3/mm3 0.80* 0.23 0.13       Chemistry:  Estimated Creatinine Clearance: 99.8 mL/min (by C-G formula based on SCr of 0.63 mg/dL).    Results from  last 7 days   Lab Units 12/24/22  0334 12/23/22  0335 12/22/22  1606 12/22/22  0545   SODIUM mmol/L 139 140  --  140   POTASSIUM mmol/L 4.9 4.1   < > 3.3*   CHLORIDE mmol/L 96* 97*  --  100   CO2 mmol/L 34.0* 38.0*  --  35.0*   BUN mg/dL 19  --   --  16   CREATININE mg/dL 0.63  --   --  0.50*   GLUCOSE mg/dL 116*  --   --  89    < > = values in this interval not displayed.     Results from last 7 days   Lab Units 12/24/22  0334 12/23/22  0335 12/22/22  0545 12/20/22  0410 12/19/22  0553 12/18/22  0310   CALCIUM mg/dL 8.9  --  8.6   < > 7.4* 7.2*   MAGNESIUM mg/dL 1.6 1.5* 1.5*   < > 2.3 1.1*   PHOSPHORUS mg/dL  --   --   --   --  3.5 4.6*    < > = values in this interval not displayed.     Cardiac Labs:  Results from last 7 days   Lab Units 12/24/22  0334 12/23/22  0335 12/22/22  0545 12/20/22 0410 12/18/22  0310   PROBNP pg/mL 1,223.0* 2,383.0* 5,201.0*   < >  --    TROPONIN T ng/mL  --   --   --   --  0.015    < > = values in this interval not displayed.       Lab Results   Lab Value Date/Time    MRSAPCR Negative 12/17/2022 1609     Serologies    Lab Results   Lab Value Date/Time    COVID19 Not Detected 12/17/2022 1128    FLUAPCR Detected (A) 12/17/2022 1128    FLUBPCR Not Detected 12/17/2022 1128     Urinary Serologies    Lab Results   Component Value Date    STREPPNEUAG Positive (A) 12/17/2022       Lab Results   Component Value Date    LEGANTIGENUR Negative 12/17/2022       Biomarkers:  Results from last 7 days   Lab Units 12/22/22  0545 12/21/22  0613 12/20/22  0410 12/18/22  0310 12/18/22  0310 12/17/22  2133   LACTATE mmol/L  --   --  0.8  --  2.3* 2.0   PROCALCITONIN ng/mL 1.07* 1.95* 5.71*   < > 30.10*  --     < > = values in this interval not displayed.     COVID-19  Lab Results   Component Value Date    COVID19 Not Detected 12/17/2022    COVID19 Not Detected 07/26/2022     Images:  XR Chest 1 View    Result Date: 12/23/2022  1. Stable right IJ central venous catheter. 2. Unchanged right basilar airspace  disease concerning for pneumonia with small right pleural effusion. 3. Stable cardiomegaly. 4. No pneumothorax.  This report was finalized on 12/23/2022 7:30 AM by Woodrow Adame MD.        Echo:  Results for orders placed during the hospital encounter of 12/17/22    Adult Transthoracic Echo Complete w/ Color, Spectral and Contrast if Necessary Per Protocol    Interpretation Summary  •  Left ventricular systolic function is normal. Calculated left ventricular EF = 59.5%  •  Left ventricular wall thickness is consistent with mild concentric hypertrophy.  •  Left ventricular diastolic function is consistent with (grade II w/high LAP) pseudonormalization.  •  Left atrial volume is mildly increased.  •  There is calcification of the aortic valve.  •  Estimated right ventricular systolic pressure from tricuspid regurgitation is normal (<35 mmHg). Calculated right ventricular systolic pressure from tricuspid regurgitation is 27 mmHg.  •  Mild dilation of the ascending aorta is present.  4.1 cm      Results: Reviewed.  I reviewed the patient's new laboratory and imaging results.  I independently reviewed the patient's new images.    Medications: Reviewed.    Assessment   A/P     Hospital:  LOS: 7 days   ICU: 6d 19h      Diagnosis   • **Septic shock [A41.9, R65.21]   • Acute hypoxic respiratory failure [J96.01]   • DRISS [N17.9]   • On chronic immunosuppressive therapy [D84.9]       • GERD [K21.9]   • Influenza A [J10.1]   • Obesity (BMI 30-39.9) [E66.9]   • Hypothyroidism [E03.9]   • Anxiety and depression [F41.9, F32.A]   • Gout [M10.9]   • Dyslipidemia [E78.5]   • RA [M06.9]   • T2DM [E11.9]     Charly is a 67 y.o. female admitted on 12/17/2022 with Septic shock (HCC) [A41.9, R65.21]    Assessment/Management/Treatment Plan:    1. Shock: improved. Off pressors  2. Respiratory failure, type 1  a. Influenza A  b. Strep pneumonia (per urinary antigen)  c. Ceftriaxone (until 12/25/22) + Oseltamivir  (completed)  3. GERD  4. Rheum  a. RA ? Treatment with MTX  b. Gout  5. Renal  a. DRISS - resolved. sCr WNL 12/19/22   6. NC Anemia, stable.  7. Dyslipidemia ? Treatment with statin  8. Endocrine  a. Body mass index is 34.2 kg/m². Obese Class II: 35-39.9kg/m2  b. Hypothyroidism ? Treatment with Levothyroxine     Lab Results   Component Value Date    TSH 4.990 (H) 12/17/2022    FREET4 1.41 12/17/2022     c. T2 Diabetes    Lab Results   Lab Value Date/Time    HGBA1C 6.50 (H) 04/01/2019 0703     Results from last 7 days   Lab Units 12/24/22  1112 12/24/22  0704 12/23/22  2108 12/23/22  1611 12/23/22  1132 12/23/22  0705 12/22/22  2040 12/22/22  2038   GLUCOSE mg/dL 186* 119 187* 257* 136* 118 137* 142*       Diet: Diet: Cardiac Diets, Diabetic Diets; Healthy Heart (2-3 Na+); Consistent Carbohydrate; Texture: Regular Texture (IDDSI 7); Fluid Consistency: Thin (IDDSI 0)  Orders Placed This Encounter      Dietary Nutrition Supplements Boost Breeze (Clear Liquid)      DIET MESSAGE Pt requesting a grilled cheese sandwich, tomato soup and fruit for dinner. Thank you!      Advance Directives: Code Status and Medical Interventions:   Ordered at: 12/17/22 1507     Code Status (Patient has no pulse and is not breathing):    CPR (Attempt to Resuscitate)     Medical Interventions (Patient has pulse or is breathing):    Full Support        DVT prophylaxis:  Medical and mechanical DVT prophylaxis orders are present.     In brief:  1. Continue to replace Magnesium  2. Continue antibiotics: ? Ceftriaxone (last dose tomorrow) + Oseltamivir (completed on 12/21/22)  3. Goal: Glucose < 180 mg/dL.   1. Continue current doses of insulin  4. PT/OT   5. proBNP improving. No diuretics today.  6. CxR PA and Lat in AM  7. Labs in AM: CBC, CMP, Mg, PCT, pro BNP  8. Disposition: Transfer to Telemetry Unit    1. Home soon?    Plan of care and goals reviewed during interdisciplinary rounds.  I discussed the patient's findings and my recommendations  with patient and nursing staff    Level of Risk is High due to: *illness with threat to life or bodily function.     OK to floor.    Hospitalist Team will assist with medical management, and assume Primary Attending, once on the Floor.    Thank you.      Time: 25 minutes, in direct patient care, with the patient and/or in the ICU or lucero coordinating care with other health care providers. This is non-concurrent time.    I have spent > 50% percent of this time, counseling and discussing management.       [x] Primary Attending Intensive Care Medicine - Nutrition Support   [] Consultant

## 2022-12-25 ENCOUNTER — APPOINTMENT (OUTPATIENT)
Dept: GENERAL RADIOLOGY | Facility: HOSPITAL | Age: 67
DRG: 871 | End: 2022-12-25
Payer: MEDICARE

## 2022-12-25 LAB
ALBUMIN SERPL-MCNC: 3.1 G/DL (ref 3.5–5.2)
ALBUMIN/GLOB SERPL: 1.4 G/DL
ALP SERPL-CCNC: 78 U/L (ref 39–117)
ALT SERPL W P-5'-P-CCNC: 24 U/L (ref 1–33)
ANION GAP SERPL CALCULATED.3IONS-SCNC: 5 MMOL/L (ref 5–15)
AST SERPL-CCNC: 17 U/L (ref 1–32)
BASOPHILS # BLD AUTO: 0.03 10*3/MM3 (ref 0–0.2)
BASOPHILS NFR BLD AUTO: 0.3 % (ref 0–1.5)
BILIRUB SERPL-MCNC: 0.2 MG/DL (ref 0–1.2)
BUN SERPL-MCNC: 19 MG/DL (ref 8–23)
BUN/CREAT SERPL: 26 (ref 7–25)
CALCIUM SPEC-SCNC: 9.1 MG/DL (ref 8.6–10.5)
CHLORIDE SERPL-SCNC: 96 MMOL/L (ref 98–107)
CO2 SERPL-SCNC: 36 MMOL/L (ref 22–29)
CREAT SERPL-MCNC: 0.73 MG/DL (ref 0.57–1)
DEPRECATED RDW RBC AUTO: 58.4 FL (ref 37–54)
EGFRCR SERPLBLD CKD-EPI 2021: 89.7 ML/MIN/1.73
EOSINOPHIL # BLD AUTO: 0.4 10*3/MM3 (ref 0–0.4)
EOSINOPHIL NFR BLD AUTO: 3.9 % (ref 0.3–6.2)
ERYTHROCYTE [DISTWIDTH] IN BLOOD BY AUTOMATED COUNT: 17.5 % (ref 12.3–15.4)
GLOBULIN UR ELPH-MCNC: 2.2 GM/DL
GLUCOSE BLDC GLUCOMTR-MCNC: 136 MG/DL (ref 70–130)
GLUCOSE BLDC GLUCOMTR-MCNC: 179 MG/DL (ref 70–130)
GLUCOSE BLDC GLUCOMTR-MCNC: 184 MG/DL (ref 70–130)
GLUCOSE BLDC GLUCOMTR-MCNC: 233 MG/DL (ref 70–130)
GLUCOSE SERPL-MCNC: 184 MG/DL (ref 65–99)
HBA1C MFR BLD: 7.3 % (ref 4.8–5.6)
HCT VFR BLD AUTO: 37.4 % (ref 34–46.6)
HGB BLD-MCNC: 11 G/DL (ref 12–15.9)
IMM GRANULOCYTES # BLD AUTO: 0.42 10*3/MM3 (ref 0–0.05)
IMM GRANULOCYTES NFR BLD AUTO: 4.1 % (ref 0–0.5)
LYMPHOCYTES # BLD AUTO: 2.43 10*3/MM3 (ref 0.7–3.1)
LYMPHOCYTES NFR BLD AUTO: 23.9 % (ref 19.6–45.3)
MAGNESIUM SERPL-MCNC: 1.3 MG/DL (ref 1.6–2.4)
MCH RBC QN AUTO: 26.7 PG (ref 26.6–33)
MCHC RBC AUTO-ENTMCNC: 29.4 G/DL (ref 31.5–35.7)
MCV RBC AUTO: 90.8 FL (ref 79–97)
MONOCYTES # BLD AUTO: 0.78 10*3/MM3 (ref 0.1–0.9)
MONOCYTES NFR BLD AUTO: 7.7 % (ref 5–12)
NEUTROPHILS NFR BLD AUTO: 6.09 10*3/MM3 (ref 1.7–7)
NEUTROPHILS NFR BLD AUTO: 60.1 % (ref 42.7–76)
NRBC BLD AUTO-RTO: 0 /100 WBC (ref 0–0.2)
NT-PROBNP SERPL-MCNC: 563.7 PG/ML (ref 0–900)
PLATELET # BLD AUTO: 253 10*3/MM3 (ref 140–450)
PMV BLD AUTO: 11.4 FL (ref 6–12)
POTASSIUM SERPL-SCNC: 4.1 MMOL/L (ref 3.5–5.2)
PROCALCITONIN SERPL-MCNC: 0.28 NG/ML (ref 0–0.25)
PROT SERPL-MCNC: 5.3 G/DL (ref 6–8.5)
RBC # BLD AUTO: 4.12 10*6/MM3 (ref 3.77–5.28)
SODIUM SERPL-SCNC: 137 MMOL/L (ref 136–145)
WBC NRBC COR # BLD: 10.15 10*3/MM3 (ref 3.4–10.8)

## 2022-12-25 PROCEDURE — 63710000001 PREDNISONE PER 5 MG: Performed by: INTERNAL MEDICINE

## 2022-12-25 PROCEDURE — 83036 HEMOGLOBIN GLYCOSYLATED A1C: CPT | Performed by: INTERNAL MEDICINE

## 2022-12-25 PROCEDURE — 63710000001 INSULIN LISPRO (HUMAN) PER 5 UNITS: Performed by: NURSE PRACTITIONER

## 2022-12-25 PROCEDURE — 99233 SBSQ HOSP IP/OBS HIGH 50: CPT | Performed by: INTERNAL MEDICINE

## 2022-12-25 PROCEDURE — 25010000002 ONDANSETRON PER 1 MG: Performed by: INTERNAL MEDICINE

## 2022-12-25 PROCEDURE — 84145 PROCALCITONIN (PCT): CPT | Performed by: INTERNAL MEDICINE

## 2022-12-25 PROCEDURE — 63710000001 INSULIN DETEMIR PER 5 UNITS: Performed by: INTERNAL MEDICINE

## 2022-12-25 PROCEDURE — 80053 COMPREHEN METABOLIC PANEL: CPT | Performed by: INTERNAL MEDICINE

## 2022-12-25 PROCEDURE — 71046 X-RAY EXAM CHEST 2 VIEWS: CPT

## 2022-12-25 PROCEDURE — 25010000002 HYDROMORPHONE PER 4 MG: Performed by: INTERNAL MEDICINE

## 2022-12-25 PROCEDURE — 25010000002 MAGNESIUM SULFATE 2 GM/50ML SOLUTION: Performed by: INTERNAL MEDICINE

## 2022-12-25 PROCEDURE — 83880 ASSAY OF NATRIURETIC PEPTIDE: CPT | Performed by: INTERNAL MEDICINE

## 2022-12-25 PROCEDURE — 85025 COMPLETE CBC W/AUTO DIFF WBC: CPT | Performed by: INTERNAL MEDICINE

## 2022-12-25 PROCEDURE — 25010000002 CEFTRIAXONE PER 250 MG: Performed by: INTERNAL MEDICINE

## 2022-12-25 PROCEDURE — 82962 GLUCOSE BLOOD TEST: CPT

## 2022-12-25 PROCEDURE — 83735 ASSAY OF MAGNESIUM: CPT | Performed by: INTERNAL MEDICINE

## 2022-12-25 RX ORDER — MAGNESIUM SULFATE HEPTAHYDRATE 40 MG/ML
2 INJECTION, SOLUTION INTRAVENOUS AS NEEDED
Status: DISCONTINUED | OUTPATIENT
Start: 2022-12-25 | End: 2023-01-03 | Stop reason: HOSPADM

## 2022-12-25 RX ORDER — MAGNESIUM SULFATE HEPTAHYDRATE 40 MG/ML
4 INJECTION, SOLUTION INTRAVENOUS AS NEEDED
Status: DISCONTINUED | OUTPATIENT
Start: 2022-12-25 | End: 2023-01-03 | Stop reason: HOSPADM

## 2022-12-25 RX ORDER — PREDNISONE 1 MG/1
5 TABLET ORAL
Status: DISCONTINUED | OUTPATIENT
Start: 2022-12-25 | End: 2022-12-30

## 2022-12-25 RX ORDER — MAGNESIUM SULFATE HEPTAHYDRATE 40 MG/ML
6 INJECTION, SOLUTION INTRAVENOUS AS NEEDED
Status: DISCONTINUED | OUTPATIENT
Start: 2022-12-25 | End: 2022-12-25

## 2022-12-25 RX ORDER — POLYETHYLENE GLYCOL 3350 17 G/17G
17 POWDER, FOR SOLUTION ORAL DAILY
Status: DISCONTINUED | OUTPATIENT
Start: 2022-12-25 | End: 2023-01-03 | Stop reason: HOSPADM

## 2022-12-25 RX ADMIN — FLUOXETINE 20 MG: 20 CAPSULE ORAL at 21:05

## 2022-12-25 RX ADMIN — APIXABAN 5 MG: 5 TABLET, FILM COATED ORAL at 21:05

## 2022-12-25 RX ADMIN — INSULIN DETEMIR 10 UNITS: 100 INJECTION, SOLUTION SUBCUTANEOUS at 21:16

## 2022-12-25 RX ADMIN — LEVOTHYROXINE SODIUM 225 MCG: 150 TABLET ORAL at 06:21

## 2022-12-25 RX ADMIN — POLYETHYLENE GLYCOL 3350 17 G: 17 POWDER, FOR SOLUTION ORAL at 13:35

## 2022-12-25 RX ADMIN — FLUOXETINE 20 MG: 20 CAPSULE ORAL at 11:32

## 2022-12-25 RX ADMIN — COLESEVELAM HYDROCHLORIDE 1250 MG: 625 TABLET, COATED ORAL at 11:34

## 2022-12-25 RX ADMIN — INSULIN LISPRO 5 UNITS: 100 INJECTION, SOLUTION INTRAVENOUS; SUBCUTANEOUS at 21:15

## 2022-12-25 RX ADMIN — HYDROCODONE BITARTRATE AND ACETAMINOPHEN 1 TABLET: 10; 325 TABLET ORAL at 17:53

## 2022-12-25 RX ADMIN — ONDANSETRON 4 MG: 2 INJECTION INTRAMUSCULAR; INTRAVENOUS at 13:35

## 2022-12-25 RX ADMIN — COLESEVELAM HYDROCHLORIDE 1250 MG: 625 TABLET, COATED ORAL at 17:53

## 2022-12-25 RX ADMIN — HYDROMORPHONE HYDROCHLORIDE 0.5 MG: 1 INJECTION, SOLUTION INTRAMUSCULAR; INTRAVENOUS; SUBCUTANEOUS at 06:27

## 2022-12-25 RX ADMIN — MAGNESIUM SULFATE HEPTAHYDRATE 2 G: 2 INJECTION, SOLUTION INTRAVENOUS at 14:03

## 2022-12-25 RX ADMIN — METOPROLOL TARTRATE 25 MG: 25 TABLET, FILM COATED ORAL at 13:35

## 2022-12-25 RX ADMIN — MAGNESIUM SULFATE HEPTAHYDRATE 2 G: 2 INJECTION, SOLUTION INTRAVENOUS at 16:07

## 2022-12-25 RX ADMIN — MAGNESIUM SULFATE HEPTAHYDRATE 2 G: 2 INJECTION, SOLUTION INTRAVENOUS at 17:55

## 2022-12-25 RX ADMIN — ZOLPIDEM TARTRATE 5 MG: 5 TABLET ORAL at 21:16

## 2022-12-25 RX ADMIN — INSULIN DETEMIR 10 UNITS: 100 INJECTION, SOLUTION SUBCUTANEOUS at 11:33

## 2022-12-25 RX ADMIN — HYDROMORPHONE HYDROCHLORIDE 0.5 MG: 1 INJECTION, SOLUTION INTRAMUSCULAR; INTRAVENOUS; SUBCUTANEOUS at 00:20

## 2022-12-25 RX ADMIN — PREDNISONE 5 MG: 5 TABLET ORAL at 13:35

## 2022-12-25 RX ADMIN — GABAPENTIN 100 MG: 100 CAPSULE ORAL at 11:32

## 2022-12-25 RX ADMIN — INSULIN LISPRO 3 UNITS: 100 INJECTION, SOLUTION INTRAVENOUS; SUBCUTANEOUS at 11:36

## 2022-12-25 RX ADMIN — ROSUVASTATIN CALCIUM 5 MG: 10 TABLET, COATED ORAL at 11:33

## 2022-12-25 RX ADMIN — Medication 400 MG: at 13:35

## 2022-12-25 RX ADMIN — HYDROCODONE BITARTRATE AND ACETAMINOPHEN 1 TABLET: 10; 325 TABLET ORAL at 09:41

## 2022-12-25 RX ADMIN — FOLIC ACID TAB 400 MCG 400 MCG: 400 TAB at 11:33

## 2022-12-25 RX ADMIN — GABAPENTIN 100 MG: 100 CAPSULE ORAL at 21:05

## 2022-12-25 RX ADMIN — APIXABAN 5 MG: 5 TABLET, FILM COATED ORAL at 11:33

## 2022-12-25 RX ADMIN — CEFTRIAXONE 2 G: 2 INJECTION, POWDER, FOR SOLUTION INTRAMUSCULAR; INTRAVENOUS at 11:42

## 2022-12-25 RX ADMIN — INSULIN LISPRO 3 UNITS: 100 INJECTION, SOLUTION INTRAVENOUS; SUBCUTANEOUS at 17:53

## 2022-12-25 NOTE — PLAN OF CARE
Goal Outcome Evaluation:      Pt VSS, on 2-3L NC. . BM - none. Declined PRN lactulose. Up to chair part of day. Norco Q4H prn, Imitrex, Dilaudid given for pain. Mag replaced. Orders to tele floor.

## 2022-12-25 NOTE — PROGRESS NOTES
[facility]  PROGRESS NOTE    Patient Name: Charly Blevins  : 1955  MRN: [id number]    Date of Admission: 2022  Primary Care Physician: Boyd Molina MD    Subjective   Subjective     CC:  Cough    HPI:  Feels well today, was hopeful to go home, but 2-3 liters n/c.  Cough improving    ROS:  Gen- No fevers, chills  CV- No chest pain, palpitations  GI- No N/V/D, abd pain    Objective   Objective     Vital Signs:   Temp:  [97.3 °F (36.3 °C)-98.5 °F (36.9 °C)] 98.5 °F (36.9 °C)  Heart Rate:  [69-80] 80  Resp:  [16-18] 18  BP: ()/(70-89) 133/89  Flow (L/min):  [2-3] 2     Physical Exam:  Constitutional: No acute distress, awake, alert older female  HENT: NCAT, mucous membranes moist, n/c in place  Respiratory: Upper airway sounds present, respiratory effort normal on 3 liter n/c  Cardiovascular: RRR, no murmurs, rubs, or gallops  Gastrointestinal: Soft, nontender, nondistended  Musculoskeletal: Muscle tone within normal limits, no joint effusions appreciated  Psychiatric: Appropriate affect, cooperative  Neurologic: Alert and oriented, facial movements symmetric and spontaneous movement of all 4 extremities grossly equal bilaterally, speech clear  Skin: No rashes    Results Reviewed: CXR personally reviewed and interpreted: patchy infiltrates R>L  LAB RESULTS:      Lab 22  1040 22  0334 22  0335 22  0545 22  0613 22  0410   WBC 10.15 8.02 6.49 7.72 9.90 14.52*   HEMOGLOBIN 11.0* 10.5* 11.3* 11.3* 10.4* 9.3*   HEMATOCRIT 37.4 34.2 36.9 36.1 33.8* 30.4*   PLATELETS 253 220 235 228 236 250   NEUTROS ABS 6.09 4.41 2.92 4.34 7.56* 13.07*   IMMATURE GRANS (ABS) 0.42*  --  0.18* 0.10* 0.04  --    LYMPHS ABS 2.43  --  2.23 2.13 1.41  --    MONOS ABS 0.78  --  0.91* 1.01* 0.88  --    EOS ABS 0.40 0.80* 0.23 0.13 0.00 0.00   MCV 90.8 88.8 87.9 88.0 89.4 89.4   PROCALCITONIN 0.28*  --   --  1.07* 1.95* 5.71*   LACTATE  --    --   --   --   --  0.8   PROTIME  --   --   --  15.8*  --  17.2*         Lab 12/25/22  1040 12/24/22  0334 12/23/22  0335 12/22/22  1606 12/22/22  0545 12/21/22  0613 12/20/22  2112 12/20/22  0410 12/19/22  0553   SODIUM 137 139 140  --  140 144  --  143 140   POTASSIUM 4.1 4.9 4.1 4.2 3.3* 3.7   < > 3.4* 3.8   CHLORIDE 96* 96* 97*  --  100 106  --  109* 109*   CO2 36.0* 34.0* 38.0*  --  35.0* 29.0  --  23.0 23.0   ANION GAP 5.0 9.0 5.0  --  5.0 9.0  --  11.0 8.0   BUN 19 19  --   --  16 16  --  22 26*   CREATININE 0.73 0.63  --   --  0.50* 0.50*  --  0.73 0.95   EGFR 89.7 96.8  --   --  102.3 102.3  --  89.7 65.4   GLUCOSE 184* 116*  --   --  89 109*  --  101* 149*   CALCIUM 9.1 8.9  --   --  8.6 8.1*  --  7.8* 7.4*   MAGNESIUM 1.3* 1.6 1.5*  --  1.5* 1.3*  --   --  2.3   PHOSPHORUS  --   --   --   --   --   --   --   --  3.5    < > = values in this interval not displayed.         Lab 12/25/22  1040   TOTAL PROTEIN 5.3*   ALBUMIN 3.10*   GLOBULIN 2.2   ALT (SGPT) 24   AST (SGOT) 17   BILIRUBIN 0.2   ALK PHOS 78         Lab 12/25/22  1040 12/24/22  0334 12/23/22  0335 12/22/22  0545 12/21/22  0613 12/20/22  0410   PROBNP 563.7 1,223.0* 2,383.0* 5,201.0* 11,614.0* 10,170.0*   PROTIME  --   --   --  15.8*  --  17.2*   INR  --   --   --  1.27*  --  1.41*                 Brief Urine Lab Results  (Last result in the past 365 days)      Color   Clarity   Blood   Leuk Est   Nitrite   Protein   CREAT   Urine HCG        12/17/22 1612             82.2         12/17/22 1612 Yellow   Clear   Negative   Negative   Negative   Trace                 Microbiology Results Abnormal     Procedure Component Value - Date/Time    Blood Culture - Blood, Arm, Left [[id number]]  (Normal) Collected: 12/17/22 1201    Lab Status: Final result Specimen: Blood from Arm, Left Updated: 12/22/22 1215     Blood Culture No growth at 5 days    Blood Culture - Blood, Arm, Right [[id number]]  (Normal) Collected: 12/17/22 1201    Lab Status: Final result  Specimen: Blood from Arm, Right Updated: 12/22/22 1215     Blood Culture No growth at 5 days    Legionella Antigen, Urine - Urine, Urine, Clean Catch [[id number]]  (Normal) Collected: 12/17/22 1612    Lab Status: Final result Specimen: Urine, Clean Catch Updated: 12/17/22 2105     LEGIONELLA ANTIGEN, URINE Negative    MRSA Screen, PCR (Inpatient) - Swab, Nares [[id number]]  (Normal) Collected: 12/17/22 1609    Lab Status: Final result Specimen: Swab from Nares Updated: 12/17/22 1815     MRSA PCR Negative    Narrative:      The negative predictive value of this diagnostic test is high and should only be used to consider de-escalating anti-MRSA therapy. A positive result may indicate colonization with MRSA and must be correlated clinically.  MRSA Negative          XR Chest 2 View    Result Date: 12/25/2022  DATE OF EXAM: 12/25/2022 9:47 AM  PROCEDURE: XR CHEST 2 VW-  INDICATIONS: influenza and Pneumococcal pneumonia; A41.9-Sepsis, unspecified organism; R65.21-Severe sepsis with septic shock; J10.1-Influenza due to other identified influenza virus with other respiratory manifestations; J15.9-Unspecified bacterial pneumonia; N17.9-Acute kidney failure, unspecified; J96.01-Acute respiratory failure with hypoxia  COMPARISON: 12/23/2022  TECHNIQUE: Two radiologic views of the chest, PA and lateral, were obtained.  FINDINGS: Implanted loop recorder and right IJ catheter are again noted. Heart is mildly enlarged. Vasculature appears mildly cephalized. Patchy disease in the right and left midlung and right lung base is again noted, mildly improved on the right. No new pulmonary parenchymal disease effusion or pneumothorax is seen.      Impression: Patchy bilateral pulmonary disease, mildly improved in the right base, stable elsewhere.  This report was finalized on 12/25/2022 10:10 AM by Dr. Heath Prado MD.        Results for orders placed during the hospital encounter of 12/17/22    Adult Transthoracic Echo Complete w/ Color,  Spectral and Contrast if Necessary Per Protocol    Interpretation Summary  •  Left ventricular systolic function is normal. Calculated left ventricular EF = 59.5%  •  Left ventricular wall thickness is consistent with mild concentric hypertrophy.  •  Left ventricular diastolic function is consistent with (grade II w/high LAP) pseudonormalization.  •  Left atrial volume is mildly increased.  •  There is calcification of the aortic valve.  •  Estimated right ventricular systolic pressure from tricuspid regurgitation is normal (<35 mmHg). Calculated right ventricular systolic pressure from tricuspid regurgitation is 27 mmHg.  •  Mild dilation of the ascending aorta is present.  4.1 cm      I have reviewed the medications:  Scheduled Meds:apixaban, 5 mg, Oral, BID  cefTRIAXone, 2 g, Intravenous, Q24H  colesevelam, 1,250 mg, Oral, BID With Meals  FLUoxetine, 20 mg, Oral, BID  folic acid, 400 mcg, Oral, Daily  gabapentin, 100 mg, Oral, Q12H  insulin detemir, 10 Units, Subcutaneous, Q12H  insulin lispro, 0-14 Units, Subcutaneous, 4x Daily With Meals & Nightly  levothyroxine, 225 mcg, Oral, Q AM  lidocaine, 2 patch, Transdermal, Q24H  polyethylene glycol, 17 g, Oral, Daily  rosuvastatin, 5 mg, Oral, Daily      Continuous Infusions:   PRN Meds:.•  calcium carbonate  •  dextrose  •  HYDROcodone-acetaminophen  •  magnesium sulfate  •  magnesium sulfate  •  ondansetron  •  potassium chloride  •  sodium chloride  •  sodium chloride  •  SUMAtriptan  •  zolpidem    Assessment & Plan   Assessment & Plan     Active Hospital Problems    Diagnosis  POA   • **Septic shock [A41.9, R65.21]  Yes   • Acute hypoxic respiratory failure [J96.01]  Yes   • DRISS [N17.9]  Yes   • On chronic immunosuppressive therapy [D84.9]  Yes   • GERD [K21.9]  Yes   • Influenza A [J10.1]  Yes   • Obesity (BMI 30-39.9) [E66.9]  Yes   • Hypothyroidism [E03.9]  Yes   • Anxiety and depression [F41.9, F32.A]  Yes   • Gout [M10.9]  Yes   • Dyslipidemia [E78.5]  Yes    • RA [M06.9]  Yes   • T2DM [E11.9]  Yes      Resolved Hospital Problems   No resolved problems to display.        Brief Hospital Course to date:  Charly Blevins is a 67 y.o. female w RA on chronic MTX/prednisone, DMII, TIFFANY, hypothyroidism who presented with hypoxic respiratory failure. Found to be flu A+, strep pneum +    1) Acute hypoxic respiratory failure and septic shock 2/2 flu and strep pneumonia  - completes ceftraixone course today, tamiflu completed 12/21  - wean O2 as able for sat 90%    2) RA on MTX and chronic prednisone, immunocompromised patient  - initially on stress dose steroids, transitioned back to home prednisone 5 mg today    3) Recurrent hypomagnesemia  - IV Mg per protocol, start PO Mg given recurrences  -consider stopping home PO ppi    H/o Afib s/p cardioversion - restart home metoprolol. Need home med rec for flecainide (?), on apixaban    DMII - on basal/bolus regimen + SSI here, A1c pending  HLD - statin  Hypothyroidism - home levothyroxine  TIFFANY - uses nocturnal n/c at home  BMI 34 - complicates exam  GERD - ppi    D/c central line when pIV in place    Expected Discharge Location and Transportation: home  Expected Discharge likely 12/26 v 12/27  Expected Discharge Date and Time     Expected Discharge Date Expected Discharge Time    Dec 26, 2022            DVT prophylaxis:  Medical and mechanical DVT prophylaxis orders are present.          CODE STATUS:   Code Status and Medical Interventions:   Ordered at: 12/17/22 1507     Code Status (Patient has no pulse and is not breathing):    CPR (Attempt to Resuscitate)     Medical Interventions (Patient has pulse or is breathing):    Full Support       Bernadine Suero MD  12/25/22

## 2022-12-26 PROBLEM — J13 PNEUMONIA DUE TO STREPTOCOCCUS PNEUMONIAE: Status: ACTIVE | Noted: 2022-12-26

## 2022-12-26 LAB
ANION GAP SERPL CALCULATED.3IONS-SCNC: 5 MMOL/L (ref 5–15)
BUN SERPL-MCNC: 17 MG/DL (ref 8–23)
BUN/CREAT SERPL: 26.6 (ref 7–25)
CALCIUM SPEC-SCNC: 9.2 MG/DL (ref 8.6–10.5)
CHLORIDE SERPL-SCNC: 99 MMOL/L (ref 98–107)
CO2 SERPL-SCNC: 35 MMOL/L (ref 22–29)
CREAT SERPL-MCNC: 0.64 MG/DL (ref 0.57–1)
EGFRCR SERPLBLD CKD-EPI 2021: 96.4 ML/MIN/1.73
GLUCOSE BLDC GLUCOMTR-MCNC: 149 MG/DL (ref 70–130)
GLUCOSE BLDC GLUCOMTR-MCNC: 203 MG/DL (ref 70–130)
GLUCOSE BLDC GLUCOMTR-MCNC: 268 MG/DL (ref 70–130)
GLUCOSE BLDC GLUCOMTR-MCNC: 274 MG/DL (ref 70–130)
GLUCOSE BLDC GLUCOMTR-MCNC: 540 MG/DL (ref 70–130)
GLUCOSE SERPL-MCNC: 141 MG/DL (ref 65–99)
MAGNESIUM SERPL-MCNC: 2.2 MG/DL (ref 1.6–2.4)
POTASSIUM SERPL-SCNC: 4.4 MMOL/L (ref 3.5–5.2)
SODIUM SERPL-SCNC: 139 MMOL/L (ref 136–145)

## 2022-12-26 PROCEDURE — 97165 OT EVAL LOW COMPLEX 30 MIN: CPT

## 2022-12-26 PROCEDURE — 99232 SBSQ HOSP IP/OBS MODERATE 35: CPT | Performed by: STUDENT IN AN ORGANIZED HEALTH CARE EDUCATION/TRAINING PROGRAM

## 2022-12-26 PROCEDURE — 63710000001 INSULIN DETEMIR PER 5 UNITS: Performed by: INTERNAL MEDICINE

## 2022-12-26 PROCEDURE — 97530 THERAPEUTIC ACTIVITIES: CPT

## 2022-12-26 PROCEDURE — 63710000001 PREDNISONE PER 5 MG: Performed by: INTERNAL MEDICINE

## 2022-12-26 PROCEDURE — 97161 PT EVAL LOW COMPLEX 20 MIN: CPT

## 2022-12-26 PROCEDURE — 94799 UNLISTED PULMONARY SVC/PX: CPT

## 2022-12-26 PROCEDURE — 80048 BASIC METABOLIC PNL TOTAL CA: CPT | Performed by: INTERNAL MEDICINE

## 2022-12-26 PROCEDURE — 63710000001 INSULIN LISPRO (HUMAN) PER 5 UNITS: Performed by: NURSE PRACTITIONER

## 2022-12-26 PROCEDURE — 83735 ASSAY OF MAGNESIUM: CPT | Performed by: INTERNAL MEDICINE

## 2022-12-26 PROCEDURE — 94761 N-INVAS EAR/PLS OXIMETRY MLT: CPT

## 2022-12-26 PROCEDURE — 82962 GLUCOSE BLOOD TEST: CPT

## 2022-12-26 RX ORDER — COLESEVELAM 180 1/1
1250 TABLET ORAL 2 TIMES DAILY WITH MEALS
COMMUNITY

## 2022-12-26 RX ORDER — SPIRONOLACTONE 25 MG/1
25 TABLET ORAL DAILY
COMMUNITY
End: 2023-01-25 | Stop reason: HOSPADM

## 2022-12-26 RX ADMIN — COLESEVELAM HYDROCHLORIDE 1250 MG: 625 TABLET, COATED ORAL at 10:53

## 2022-12-26 RX ADMIN — HYDROCODONE BITARTRATE AND ACETAMINOPHEN 1 TABLET: 10; 325 TABLET ORAL at 15:46

## 2022-12-26 RX ADMIN — APIXABAN 5 MG: 5 TABLET, FILM COATED ORAL at 22:19

## 2022-12-26 RX ADMIN — HYDROCODONE BITARTRATE AND ACETAMINOPHEN 1 TABLET: 10; 325 TABLET ORAL at 10:55

## 2022-12-26 RX ADMIN — HYDROCODONE BITARTRATE AND ACETAMINOPHEN 1 TABLET: 10; 325 TABLET ORAL at 22:19

## 2022-12-26 RX ADMIN — INSULIN LISPRO 8 UNITS: 100 INJECTION, SOLUTION INTRAVENOUS; SUBCUTANEOUS at 22:20

## 2022-12-26 RX ADMIN — COLESEVELAM HYDROCHLORIDE 1250 MG: 625 TABLET, COATED ORAL at 18:45

## 2022-12-26 RX ADMIN — METOPROLOL TARTRATE 25 MG: 25 TABLET, FILM COATED ORAL at 22:19

## 2022-12-26 RX ADMIN — FOLIC ACID TAB 400 MCG 400 MCG: 400 TAB at 10:55

## 2022-12-26 RX ADMIN — APIXABAN 5 MG: 5 TABLET, FILM COATED ORAL at 10:57

## 2022-12-26 RX ADMIN — HYDROCODONE BITARTRATE AND ACETAMINOPHEN 1 TABLET: 10; 325 TABLET ORAL at 05:06

## 2022-12-26 RX ADMIN — Medication 400 MG: at 10:55

## 2022-12-26 RX ADMIN — PREDNISONE 5 MG: 5 TABLET ORAL at 10:56

## 2022-12-26 RX ADMIN — LEVOTHYROXINE SODIUM 225 MCG: 150 TABLET ORAL at 05:04

## 2022-12-26 RX ADMIN — INSULIN LISPRO 5 UNITS: 100 INJECTION, SOLUTION INTRAVENOUS; SUBCUTANEOUS at 18:43

## 2022-12-26 RX ADMIN — GABAPENTIN 100 MG: 100 CAPSULE ORAL at 10:55

## 2022-12-26 RX ADMIN — GABAPENTIN 100 MG: 100 CAPSULE ORAL at 22:19

## 2022-12-26 RX ADMIN — FLUOXETINE 20 MG: 20 CAPSULE ORAL at 10:54

## 2022-12-26 RX ADMIN — INSULIN DETEMIR 10 UNITS: 100 INJECTION, SOLUTION SUBCUTANEOUS at 22:19

## 2022-12-26 RX ADMIN — ZOLPIDEM TARTRATE 5 MG: 5 TABLET ORAL at 22:19

## 2022-12-26 RX ADMIN — LIDOCAINE 2 PATCH: 700 PATCH TOPICAL at 10:54

## 2022-12-26 RX ADMIN — FLUOXETINE 20 MG: 20 CAPSULE ORAL at 22:19

## 2022-12-26 RX ADMIN — INSULIN DETEMIR 10 UNITS: 100 INJECTION, SOLUTION SUBCUTANEOUS at 10:56

## 2022-12-26 RX ADMIN — ROSUVASTATIN CALCIUM 5 MG: 10 TABLET, COATED ORAL at 15:47

## 2022-12-26 RX ADMIN — INSULIN LISPRO 8 UNITS: 100 INJECTION, SOLUTION INTRAVENOUS; SUBCUTANEOUS at 13:41

## 2022-12-26 NOTE — PLAN OF CARE
Goal Outcome Evaluation:  Plan of Care Reviewed With: patient           Outcome Evaluation: Pt presents with generalized weakness, decreased activity tolerance, balance deficits, and pain limiting her functional mobility. Pt performed bed mobility min A, STS CGA, and ambulated 18' min A w/ a RW demonstrating a forward flexed posture and slow gait speed requiring intermittent assist for RW management. Gait distance limited by c/o fatigue. IPPT warranted. Recommend dc SnF.

## 2022-12-26 NOTE — CASE MANAGEMENT/SOCIAL WORK
Continued Stay Note  Psychiatric     Patient Name: Charly Blevins  MRN: [id number]  Today's Date: 12/26/2022    Admit Date: 12/17/2022    Plan: Home   Discharge Plan     Row Name 12/26/22 1320       Plan    Plan Home    Patient/Family in Agreement with Plan yes    Plan Comments CM spoke w/pt in room. Pt remains on 4L of oxygen, pt states that she currently has oxygen @ home thru [employer]. CM spoke w/ pt regarding PT rec.s of rehab @ d/c. Pt declined, stated that  her son lives w/her part time and she has caregivers that come to her house 2 days a week, but have agreed to come everyday and stay all night w/pt. States that she has multiple DME @ home as well, and may be agreeable to HH @ d/c. Pt stated that she will think about it. CM informed pt that [facility] has inpt rehab and that could be an option, but pt still declined.    Final Discharge Disposition Code 01 - home or self-care               Discharge Codes    No documentation.               Expected Discharge Date and Time     Expected Discharge Date Expected Discharge Time    Dec 28, 2022             Brian Zuleta RN

## 2022-12-26 NOTE — PROGRESS NOTES
[facility] Medicine Services  PROGRESS NOTE    Patient Name: Charly Blevins  : 1955  MRN: [id number]    Date of Admission: 2022  Primary Care Physician: Boyd Molina MD    Subjective   Subjective     CC:  Cough    HPI:  Reports continued coughing with sputum. No hemoptysis. Feels SOA at rest and with movement. Denied chest pain. Was up to 6L NC this AM - I weaned to 4L while in the room. Had a BM in the last 24h.     ROS:  Gen- No fevers, chills  CV- No chest pain, palpitations  Resp- + cough, dyspnea  GI- No N/V/D, abd pain    Objective   Objective     Vital Signs:   Temp:  [97.4 °F (36.3 °C)-98.8 °F (37.1 °C)] 97.4 °F (36.3 °C)  Heart Rate:  [61-78] 69  Resp:  [16-18] 16  BP: ()/(60-87) 108/80  Flow (L/min):  [2-4] 4     Physical Exam:  Constitutional: No acute distress, awake, alert older female  HENT: NCAT, mucous membranes moist, n/c in place  Respiratory: Upper airway sounds present, respiratory effort normal, crackles in the bilateral bases, no rales  Cardiovascular: RRR, no murmurs, rubs, or gallops  Gastrointestinal: Soft, nontender, nondistended  Musculoskeletal: Muscle tone within normal limits, no joint effusions appreciated  Psychiatric: Appropriate affect, cooperative  Neurologic: Alert, facial movements symmetric and spontaneous movement of all 4 extremities grossly equal bilaterally, speech clear  Skin: No rashes on exposed skin    LAB RESULTS:      Lab 22  1040 22  0334 22  0335 22  0545 22  0613 22  0410   WBC 10.15 8.02 6.49 7.72 9.90 14.52*   HEMOGLOBIN 11.0* 10.5* 11.3* 11.3* 10.4* 9.3*   HEMATOCRIT 37.4 34.2 36.9 36.1 33.8* 30.4*   PLATELETS 253 220 235 228 236 250   NEUTROS ABS 6.09 4.41 2.92 4.34 7.56* 13.07*   IMMATURE GRANS (ABS) 0.42*  --  0.18* 0.10* 0.04  --    LYMPHS ABS 2.43  --  2.23 2.13 1.41  --    MONOS ABS 0.78  --  0.91* 1.01* 0.88  --    EOS ABS 0.40 0.80* 0.23 0.13 0.00 0.00   MCV 90.8  88.8 87.9 88.0 89.4 89.4   PROCALCITONIN 0.28*  --   --  1.07* 1.95* 5.71*   LACTATE  --   --   --   --   --  0.8   PROTIME  --   --   --  15.8*  --  17.2*         Lab 12/26/22  0536 12/25/22  1040 12/24/22  0334 12/23/22  0335 12/22/22  1606 12/22/22  0545 12/21/22  0613   SODIUM 139 137 139 140  --  140 144   POTASSIUM 4.4 4.1 4.9 4.1 4.2 3.3* 3.7   CHLORIDE 99 96* 96* 97*  --  100 106   CO2 35.0* 36.0* 34.0* 38.0*  --  35.0* 29.0   ANION GAP 5.0 5.0 9.0 5.0  --  5.0 9.0   BUN 17 19 19  --   --  16 16   CREATININE 0.64 0.73 0.63  --   --  0.50* 0.50*   EGFR 96.4 89.7 96.8  --   --  102.3 102.3   GLUCOSE 141* 184* 116*  --   --  89 109*   CALCIUM 9.2 9.1 8.9  --   --  8.6 8.1*   MAGNESIUM 2.2 1.3* 1.6 1.5*  --  1.5* 1.3*   HEMOGLOBIN A1C  --  7.30*  --   --   --   --   --          Lab 12/25/22  1040   TOTAL PROTEIN 5.3*   ALBUMIN 3.10*   GLOBULIN 2.2   ALT (SGPT) 24   AST (SGOT) 17   BILIRUBIN 0.2   ALK PHOS 78         Lab 12/25/22  1040 12/24/22  0334 12/23/22  0335 12/22/22  0545 12/21/22  0613 12/20/22  0410   PROBNP 563.7 1,223.0* 2,383.0* 5,201.0* 11,614.0* 10,170.0*   PROTIME  --   --   --  15.8*  --  17.2*   INR  --   --   --  1.27*  --  1.41*                 Brief Urine Lab Results  (Last result in the past 365 days)      Color   Clarity   Blood   Leuk Est   Nitrite   Protein   CREAT   Urine HCG        12/17/22 1612             82.2         12/17/22 1612 Yellow   Clear   Negative   Negative   Negative   Trace                 Microbiology Results Abnormal     Procedure Component Value - Date/Time    Blood Culture - Blood, Arm, Left [[id number]]  (Normal) Collected: 12/17/22 1201    Lab Status: Final result Specimen: Blood from Arm, Left Updated: 12/22/22 1215     Blood Culture No growth at 5 days    Blood Culture - Blood, Arm, Right [[id number]]  (Normal) Collected: 12/17/22 1201    Lab Status: Final result Specimen: Blood from Arm, Right Updated: 12/22/22 1215     Blood Culture No growth at 5 days     Legionella Antigen, Urine - Urine, Urine, Clean Catch [[id number]]  (Normal) Collected: 12/17/22 1612    Lab Status: Final result Specimen: Urine, Clean Catch Updated: 12/17/22 2105     LEGIONELLA ANTIGEN, URINE Negative    MRSA Screen, PCR (Inpatient) - Swab, Nares [[id number]]  (Normal) Collected: 12/17/22 1609    Lab Status: Final result Specimen: Swab from Nares Updated: 12/17/22 1815     MRSA PCR Negative    Narrative:      The negative predictive value of this diagnostic test is high and should only be used to consider de-escalating anti-MRSA therapy. A positive result may indicate colonization with MRSA and must be correlated clinically.  MRSA Negative          XR Chest 2 View    Result Date: 12/25/2022  DATE OF EXAM: 12/25/2022 9:47 AM  PROCEDURE: XR CHEST 2 VW-  INDICATIONS: influenza and Pneumococcal pneumonia; A41.9-Sepsis, unspecified organism; R65.21-Severe sepsis with septic shock; J10.1-Influenza due to other identified influenza virus with other respiratory manifestations; J15.9-Unspecified bacterial pneumonia; N17.9-Acute kidney failure, unspecified; J96.01-Acute respiratory failure with hypoxia  COMPARISON: 12/23/2022  TECHNIQUE: Two radiologic views of the chest, PA and lateral, were obtained.  FINDINGS: Implanted loop recorder and right IJ catheter are again noted. Heart is mildly enlarged. Vasculature appears mildly cephalized. Patchy disease in the right and left midlung and right lung base is again noted, mildly improved on the right. No new pulmonary parenchymal disease effusion or pneumothorax is seen.      Impression: Patchy bilateral pulmonary disease, mildly improved in the right base, stable elsewhere.  This report was finalized on 12/25/2022 10:10 AM by Dr. Heath Prado MD.        Results for orders placed during the hospital encounter of 12/17/22    Adult Transthoracic Echo Complete w/ Color, Spectral and Contrast if Necessary Per Protocol    Interpretation Summary  •  Left ventricular  systolic function is normal. Calculated left ventricular EF = 59.5%  •  Left ventricular wall thickness is consistent with mild concentric hypertrophy.  •  Left ventricular diastolic function is consistent with (grade II w/high LAP) pseudonormalization.  •  Left atrial volume is mildly increased.  •  There is calcification of the aortic valve.  •  Estimated right ventricular systolic pressure from tricuspid regurgitation is normal (<35 mmHg). Calculated right ventricular systolic pressure from tricuspid regurgitation is 27 mmHg.  •  Mild dilation of the ascending aorta is present.  4.1 cm      I have reviewed the medications:  Scheduled Meds:apixaban, 5 mg, Oral, BID  colesevelam, 1,250 mg, Oral, BID With Meals  FLUoxetine, 20 mg, Oral, BID  folic acid, 400 mcg, Oral, Daily  gabapentin, 100 mg, Oral, Q12H  insulin detemir, 10 Units, Subcutaneous, Q12H  insulin lispro, 0-14 Units, Subcutaneous, 4x Daily With Meals & Nightly  levothyroxine, 225 mcg, Oral, Q AM  lidocaine, 2 patch, Transdermal, Q24H  magnesium oxide, 400 mg, Oral, Daily  metoprolol tartrate, 25 mg, Oral, Q12H  polyethylene glycol, 17 g, Oral, Daily  predniSONE, 5 mg, Oral, Daily With Breakfast  rosuvastatin, 5 mg, Oral, Daily      Continuous Infusions:Pharmacy Consult,       PRN Meds:.•  calcium carbonate  •  dextrose  •  HYDROcodone-acetaminophen  •  magnesium sulfate **OR** magnesium sulfate **OR** magnesium sulfate  •  ondansetron  •  Pharmacy Consult  •  potassium chloride  •  sodium chloride  •  sodium chloride  •  SUMAtriptan  •  zolpidem    Assessment & Plan   Assessment & Plan     Active Hospital Problems    Diagnosis  POA   • **Septic shock [A41.9, R65.21]  Yes     Priority: High   • Pneumonia due to Streptococcus pneumoniae (HCC) [J13]  Yes   • Acute hypoxic respiratory failure [J96.01]  Yes   • DRISS [N17.9]  Yes   • On chronic immunosuppressive therapy [D84.9]  Yes   • GERD [K21.9]  Yes   • Influenza A [J10.1]  Yes   • Obesity (BMI 30-39.9)  [E66.9]  Yes   • Hypothyroidism [E03.9]  Yes   • Anxiety and depression [F41.9, F32.A]  Yes   • Gout [M10.9]  Yes   • Dyslipidemia [E78.5]  Yes   • RA [M06.9]  Yes   • T2DM [E11.9]  Yes      Resolved Hospital Problems   No resolved problems to display.        Brief Hospital Course to date:  Charly Blevins is a 67 y.o. female w RA on chronic MTX/prednisone, DMII, TIFFANY, hypothyroidism, severe macular degeneration, hard of hearing, who presented with hypoxic respiratory failure. Found to be flu A+, strep pneum +.      1) Acute hypoxic respiratory failure and septic shock 2/2 flu and strep pneumonia  - completed 7 days of ceftraixone, tamiflu completed 12/21  - wean O2 as able for sat 90%, on 4L currently  - flutter valve ordered    2) RA on MTX and chronic prednisone, immunocompromised patient  - initially on stress dose steroids, transitioned back to home prednisone 5 mg today    3) Recurrent hypomagnesemia  - IV Mg per protocol, start PO Mg given recurrences  -consider stopping home PO ppi    4) Deconditioning  - PT recommended SNF    H/o Afib s/p cardioversion - restart home metoprolol. Need home med rec for flecainide --> patient unsure of medications, pharmacy to assist re: medication rec, on apixaban    DMII - on basal/bolus regimen + SSI here, A1c 7.3%  HLD - statin  Hypothyroidism - home levothyroxine  TIFFANY - uses nocturnal n/c at home  BMI 34 - complicates exam  GERD - ppi    D/c central line when pIV in place    Expected Discharge Location and Transportation: home  Expected Discharge   Expected Discharge Date and Time     Expected Discharge Date Expected Discharge Time    Dec 28, 2022            DVT prophylaxis:  Medical and mechanical DVT prophylaxis orders are present.     AM-PAC 6 Clicks Score (PT): 18 (12/26/22 5873)    CODE STATUS:   Code Status and Medical Interventions:   Ordered at: 12/17/22 6556     Code Status (Patient has no pulse and is not breathing):    CPR (Attempt to Resuscitate)     Medical  Interventions (Patient has pulse or is breathing):    Full Support       Barbi Curtis MD  12/26/22

## 2022-12-26 NOTE — PLAN OF CARE
Goal Outcome Evaluation:  Plan of Care Reviewed With: patient        Progress: no change  Outcome Evaluation: OT eval completed, Pt demonstrates deficits in strength, balance, and activity tolerance compared to baseline ADL completion and functional transfers, recom IPOT would benefit from IRF, if home recommend 24/7 assist and HHOT

## 2022-12-26 NOTE — THERAPY EVALUATION
Patient Name: Charly Blevins  : 1955    MRN: [id number]                              Today's Date: 2022       Admit Date: 2022    Visit Dx:     ICD-10-CM ICD-9-CM   1. Septic shock (Formerly [facility])  A41.9 038.9    R65.21 785.52     995.92   2. Influenza A  J10.1 487.1   3. Bacterial pneumonia  J15.9 482.9   4. DRISS (acute kidney injury) (Formerly [facility])  N17.9 584.9   5. Acute hypoxic respiratory failure  J96.01 518.81     Patient Active Problem List   Diagnosis   • Closed intertrochanteric fracture of hip, right, initial encounter (Formerly [facility])   • Pubic ramus fracture (Formerly [facility])   • HTN (hypertension)   • Dyslipidemia   • T2DM   • RA   • Atrial fibrillation (Formerly [facility])   • Acute hypoxic respiratory failure   • DRISS   • On chronic immunosuppressive therapy   • GERD   • Influenza A   • Obesity (BMI 30-39.9)   • Hypothyroidism   • Anxiety and depression   • Gout   • Septic shock     Past Medical History:   Diagnosis Date   • Anxiety and depression 2022   • Atrial fibrillation (Formerly [facility]) 2015    CARDIOVERSION - NO REOCCURANCE SINCE    • Diabetes mellitus (Formerly [facility])    • Gout 2022   • Hyperlipidemia    • Hypertension    • Hypothyroidism 2022   • Obesity (BMI 30-39.9) 2022     Past Surgical History:   Procedure Laterality Date   • APPENDECTOMY     • CHOLECYSTECTOMY     • COLOSTOMY      RESULTED FROM HYSTERECTOMY    • COLOSTOMY CLOSURE      WHILE DOING COLOSTOMY CLOSURE; ENDED UP HAVING A ILEOSTOMY   • HEMORRHOIDECTOMY     • HIP TROCHANTERIC NAILING WITH INTRAMEDULLARY HIP SCREW Right 3/27/2019    Procedure: HIP TROCANTERIC NAILING WITH INTRAMEDULLARY HIP SCREW RIGHT;  Surgeon: Jona Tom MD;  [facility];  Service: Orthopedics   • HYSTERECTOMY     • LUMBAR DISCECTOMY      L4 - L5   • OTHER SURGICAL HISTORY      TUBAL REVERSAL   • THYROIDECTOMY     • TUBAL ABDOMINAL LIGATION      X 2      General Information     Row Name 22 1030          Physical Therapy Time and Intention    Document Type evaluation   -FW     Mode of Treatment physical therapy  -FW     Row Name 12/26/22 1030          General Information    Patient Profile Reviewed yes  -FW     Prior Level of Function independent:;all household mobility;gait;transfer;community mobility;ADL's;min assist:;home management;using stairs  SPC for short community distances, rollator and 2RW for household, wc for long community distances  -FW     Existing Precautions/Restrictions fall;oxygen therapy device and L/min  [place], visual deficits  -FW     Barriers to Rehab medically complex;previous functional deficit;visual deficit;hearing deficit;impaired sensation  -FW     Row Name 12/26/22 1030          Living Environment    People in Home child(buddy), adult  pt noted that she splits time between [facility] and [facility]  -FW     Row Name 12/26/22 1030          Home Main Entrance    Number of Stairs, Main Entrance two  -FW     Row Name 12/26/22 1030          Stairs Within Home, Primary    Number of Stairs, Within Home, Primary none  -FW     Row Name 12/26/22 1030          Cognition    Orientation Status (Cognition) oriented x 4  -FW     Row Name 12/26/22 1030          Safety Issues, Functional Mobility    Safety Issues Affecting Function (Mobility) safety precaution awareness;safety precautions follow-through/compliance  -FW     Impairments Affecting Function (Mobility) balance;endurance/activity tolerance;pain;strength;shortness of breath;sensation/sensory awareness  -FW           User Key  (r) = Recorded By, (t) = Taken By, (c) = Cosigned By    Initials Name Provider Type    FW John Gross PT Physical Therapist               Mobility     Row Name 12/26/22 1033          Bed Mobility    Bed Mobility rolling right;sidelying-sit  -FW     Rolling Right [place] (Bed Mobility) standby assist  -FW     Sidelying-Sit [place] (Bed Mobility) minimum assist (75% patient effort)  -FW     Assistive Device (Bed Mobility) head of bed elevated  -FW     Row Name  12/26/22 1033          Sit-Stand Transfer    Sit-Stand [place] (Transfers) contact guard;verbal cues;nonverbal cues (demo/gesture)  -FW     Assistive Device (Sit-Stand Transfers) walker, front-wheeled  -FW     Row Name 12/26/22 1033          Gait/Stairs (Locomotion)    [place] Level (Gait) minimum assist (75% patient effort)  -FW     Assistive Device (Gait) walker, front-wheeled  -FW     Distance in Feet (Gait) 18  -FW     Deviations/Abnormal Patterns (Gait) bilateral deviations;gait speed decreased;stride length decreased  -FW     Bilateral Gait Deviations forward flexed posture;heel strike decreased  -FW     Comment, (Gait/Stairs) slow but steady gait with c/o fatigue/SOB; no LOB nor buckling throughout- min A for intermittent RW management  -FW           User Key  (r) = Recorded By, (t) = Taken By, (c) = Cosigned By    Initials Name Provider Type    FW John Gross PT Physical Therapist               Obj/Interventions     Row Name 12/26/22 1035          Range of Motion Comprehensive    Comment, General Range of Motion RKE limited 10% otherwise BLE WFL  -FW     Row Name 12/26/22 1035          Strength Comprehensive (MMT)    General Manual Muscle Testing (MMT) Assessment lower extremity strength deficits identified  -FW     Comment, General Manual Muscle Testing (MMT) Assessment BLE 4/5 grossly  -FW     Row Name 12/26/22 1035          Balance    Balance Assessment sitting static balance;sitting dynamic balance;standing static balance;standing dynamic balance  -FW     Static Sitting Balance supervision  -FW     Dynamic Sitting Balance standby assist  -FW     Position, Sitting Balance sitting edge of bed  -FW     Static Standing Balance contact guard  -FW     Dynamic Standing Balance contact guard  -FW     Position/Device Used, Standing Balance supported;walker, front-wheeled  -FW     Row Name 12/26/22 1035          Sensory Assessment (Somatosensory)    Sensory Assessment (Somatosensory) bilateral LE   -FW     Bilateral LE Sensory Assessment general sensation;light touch awareness;impaired  -FW           User Key  (r) = Recorded By, (t) = Taken By, (c) = Cosigned By    Initials Name Provider Type    FW John Gross PT Physical Therapist               Goals/Plan     Row Name 12/26/22 1040          Bed Mobility Goal 1 (PT)    Activity/Assistive Device (Bed Mobility Goal 1, PT) sit to supine/supine to sit;rolling to left;rolling to right  -FW     [place] Level/Cues Needed (Bed Mobility Goal 1, PT) standby assist  -FW     Time Frame (Bed Mobility Goal 1, PT) long term goal (LTG);10 days  -FW     Row Name 12/26/22 1040          Transfer Goal 1 (PT)    Activity/Assistive Device (Transfer Goal 1, PT) sit-to-stand/stand-to-sit;bed-to-chair/chair-to-bed  -FW     [place] Level/Cues Needed (Transfer Goal 1, PT) standby assist  -FW     Time Frame (Transfer Goal 1, PT) long term goal (LTG);10 days  -FW     Row Name 12/26/22 1040          Gait Training Goal 1 (PT)    Activity/Assistive Device (Gait Training Goal 1, PT) gait (walking locomotion);assistive device use;forward stepping;improve balance and speed;walker, rolling  -FW     [place] Level (Gait Training Goal 1, PT) contact guard required  -FW     Distance (Gait Training Goal 1, PT) 50  -FW     Row Name 12/26/22 1040          Therapy Assessment/Plan (PT)    Planned Therapy Interventions (PT) manual therapy techniques;bed mobility training;balance training;gait training;patient/family education;strengthening;stretching;swiss ball techniques;transfer training;stair training;home exercise program  -FW           User Key  (r) = Recorded By, (t) = Taken By, (c) = Cosigned By    Initials Name Provider Type    John Babcock PT Physical Therapist               Clinical Impression     Row Name 12/26/22 1036          Pain    Pretreatment Pain Rating 8/10  -FW     Posttreatment Pain Rating 8/10  -FW     Pain Location - head  -FW     Pain  Intervention(s) Repositioned;Ambulation/increased activity  -FW     Row Name 12/26/22 1036          Plan of Care Review    Plan of Care Reviewed With patient  -FW     Outcome Evaluation Pt presents with generalized weakness, decreased activity tolerance, balance deficits, and pain limiting her functional mobility. Pt performed bed mobility min A, STS CGA, and ambulated 18' min A w/ a RW demonstrating a forward flexed posture and slow gait speed requiring intermittent assist for RW management. Gait distance limited by c/o fatigue. IPPT warranted. Recommend dc SnF.  -     Row Name 12/26/22 1036          Therapy Assessment/Plan (PT)    Patient/Family Therapy Goals Statement (PT) to return home without rehab  -FW     Rehab Potential (PT) fair, will monitor progress closely  -FW     Criteria for Skilled Interventions Met (PT) yes;meets criteria;skilled treatment is necessary  -FW     Therapy Frequency (PT) daily  -FW     Row Name 12/26/22 1036          Vital Signs    Pre Systolic BP Rehab 105  -FW     Pre Treatment Diastolic BP 75  no c/o dizziness/lightheadedness  -FW     Pre SpO2 (%) 92  -FW     O2 Delivery Pre Treatment nasal cannula  -FW     Intra SpO2 (%) 90  -FW     O2 Delivery Intra Treatment nasal cannula  -FW     Post SpO2 (%) 93  -FW     O2 Delivery Post Treatment nasal cannula  -FW     Pre Patient Position Supine  -FW     Intra Patient Position Standing  -FW     Post Patient Position Sitting  -FW     Row Name 12/26/22 1036          Positioning and Restraints    Pre-Treatment Position in bed  -FW     Post Treatment Position chair  -FW     In Chair notified nsg;reclined;sitting;call light within reach;encouraged to call for assist;exit alarm on;legs elevated;waffle cushion  -           User Key  (r) = Recorded By, (t) = Taken By, (c) = Cosigned By    Initials Name Provider Type    FW John Gross, PT Physical Therapist               Outcome Measures     Row Name 12/26/22 1041          How much help  from another person do you currently need...    Turning from your back to your side while in flat bed without using bedrails? 4  -FW     Moving from lying on back to sitting on the side of a flat bed without bedrails? 3  -FW     Moving to and from a bed to a chair (including a wheelchair)? 3  -FW     Standing up from a chair using your arms (e.g., wheelchair, bedside chair)? 3  -FW     Climbing 3-5 steps with a railing? 2  -FW     To walk in hospital room? 3  -FW     AM-PAC 6 Clicks Score (PT) 18  -FW     Highest level of mobility 6 --> Walked 10 steps or more  -FW     Row Name 12/26/22 1041          Functional Assessment    Outcome Measure Options AM-PAC 6 Clicks Basic Mobility (PT)  -           User Key  (r) = Recorded By, (t) = Taken By, (c) = Cosigned By    Initials Name Provider Type     John Gross PT Physical Therapist                             Physical Therapy Education     Title: PT OT SLP Therapies (In Progress)     Topic: Physical Therapy (In Progress)     Point: Mobility training (Done)     Learning Progress Summary           Patient Acceptance, E, VU by  at 12/26/2022 1041                   Point: Home exercise program (Not Started)     Learner Progress:  Not documented in this visit.          Point: Body mechanics (Done)     Learning Progress Summary           Patient Acceptance, E, VU by  at 12/26/2022 1041                   Point: Precautions (Done)     Learning Progress Summary           Patient Acceptance, E, VU by  at 12/26/2022 1041                               User Key     Initials Effective Dates Name Provider Type Discipline     05/05/22 -  John Gross PT Physical Therapist PT              PT Recommendation and Plan  Planned Therapy Interventions (PT): manual therapy techniques, bed mobility training, balance training, gait training, patient/family education, strengthening, stretching, swiss ball techniques, transfer training, stair training, home exercise  program  Plan of Care Reviewed With: patient  Outcome Evaluation: Pt presents with generalized weakness, decreased activity tolerance, balance deficits, and pain limiting her functional mobility. Pt performed bed mobility min A, STS CGA, and ambulated 18' min A w/ a RW demonstrating a forward flexed posture and slow gait speed requiring intermittent assist for RW management. Gait distance limited by c/o fatigue. IPPT warranted. Recommend dc SnF.     Time Calculation:    PT Charges     Row Name 12/26/22 1043             Time Calculation    Start Time 0940  -FW      PT Received On 12/26/22  -FW      PT Goal Re-Cert Due Date 01/05/23  -FW         Timed Charges    59579 - PT Therapeutic Activity Minutes 8  -FW         Untimed Charges    PT Eval/Re-eval Minutes 46  -FW         Total Minutes    Timed Charges Total Minutes 8  -FW      Untimed Charges Total Minutes 46  -FW       Total Minutes 54  -FW            User Key  (r) = Recorded By, (t) = Taken By, (c) = Cosigned By    Initials Name Provider Type    FW John Gross, PT Physical Therapist              Therapy Charges for Today     Code Description Service Date Service Provider Modifiers Qty    69786766093 HC PT THERAPEUTIC ACT EA 15 MIN 12/26/2022 John Gross, PT GP 1    49386118838 HC PT EVAL LOW COMPLEXITY 4 12/26/2022 John Gross, PT GP 1          PT G-Codes  Outcome Measure Options: AM-PAC 6 Clicks Basic Mobility (PT)  AM-PAC 6 Clicks Score (PT): 18  PT Discharge Summary  Anticipated Discharge Disposition (PT): skilled nursing facility    John Gross PT  12/26/2022

## 2022-12-26 NOTE — THERAPY EVALUATION
Patient Name: Charly Blevins  : 1955    MRN: [id number]                              Today's Date: 2022       Admit Date: 2022    Visit Dx:     ICD-10-CM ICD-9-CM   1. Septic shock ([facility])  A41.9 038.9    R65.21 785.52     995.92   2. Influenza A  J10.1 487.1   3. Bacterial pneumonia  J15.9 482.9   4. DRISS (acute kidney injury) ([facility])  N17.9 584.9   5. Acute hypoxic respiratory failure  J96.01 518.81     Patient Active Problem List   Diagnosis   • Closed intertrochanteric fracture of hip, right, initial encounter ([facility])   • Pubic ramus fracture ([facility])   • HTN (hypertension)   • Dyslipidemia   • T2DM   • RA   • Atrial fibrillation ([facility])   • Acute hypoxic respiratory failure   • DRISS   • On chronic immunosuppressive therapy   • GERD   • Influenza A   • Obesity (BMI 30-39.9)   • Hypothyroidism   • Anxiety and depression   • Gout   • Septic shock   • Pneumonia due to Streptococcus pneumoniae ([facility])     Past Medical History:   Diagnosis Date   • Anxiety and depression 2022   • Atrial fibrillation ([facility]) 2015    CARDIOVERSION - NO REOCCURANCE SINCE    • Diabetes mellitus ([facility])    • Gout 2022   • Hyperlipidemia    • Hypertension    • Hypothyroidism 2022   • Obesity (BMI 30-39.9) 2022     Past Surgical History:   Procedure Laterality Date   • APPENDECTOMY     • CHOLECYSTECTOMY     • COLOSTOMY      RESULTED FROM HYSTERECTOMY    • COLOSTOMY CLOSURE      WHILE DOING COLOSTOMY CLOSURE; ENDED UP HAVING A ILEOSTOMY   • HEMORRHOIDECTOMY     • HIP TROCHANTERIC NAILING WITH INTRAMEDULLARY HIP SCREW Right 3/27/2019    Procedure: HIP TROCANTERIC NAILING WITH INTRAMEDULLARY HIP SCREW RIGHT;  Surgeon: Jona Tom MD;  Location: [facility];  Service: Orthopedics   • HYSTERECTOMY     • LUMBAR DISCECTOMY      L4 - L5   • OTHER SURGICAL HISTORY      TUBAL REVERSAL   • THYROIDECTOMY     • TUBAL ABDOMINAL LIGATION      X 2      General Information     Row Name 22 0135          OT Time and  Intention    Document Type evaluation  -KF     Mode of Treatment occupational therapy  -KF     Row Name 12/26/22 1535          General Information    Patient Profile Reviewed yes  -KF     Prior Level of Function independent:;all household mobility;bed mobility;ADL's;max assist:;community mobility  due to visual deficits does not drive; I prior with rollator and SPC; O2 2L in PM  -KF     Existing Precautions/Restrictions fall;oxygen therapy device and L/min  -KF     Barriers to Rehab visual deficit;hearing deficit;medically complex;previous functional deficit  amplifier R ear  -KF     Row Name 12/26/22 1535          Occupational Profile    Environmental Supports and Barriers (Occupational Profile) has also hired caregiver during the day and assist of son  -KF     Row Name 12/26/22 1535          Living Environment    People in Home child(buddy), adult  -     Row Name 12/26/22 1535          Home Main Entrance    Number of Stairs, Main Entrance two  -KF     Stair Railings, Main Entrance none  -KF     Row Name 12/26/22 1535          Stairs Within Home, Primary    Stairs, Within Home, Primary can stay on main level for bathroom and bedroom  -KF     Number of Stairs, Within Home, Primary none  -KF     Row Name 12/26/22 1535          Cognition    Orientation Status (Cognition) oriented x 4  -KF     Row Name 12/26/22 1535          Safety Issues, Functional Mobility    Safety Issues Affecting Function (Mobility) awareness of need for assistance;insight into deficits/self-awareness;safety precaution awareness  -KF     Impairments Affecting Function (Mobility) balance;endurance/activity tolerance;pain;strength;shortness of breath;sensation/sensory awareness  -KF           User Key  (r) = Recorded By, (t) = Taken By, (c) = Cosigned By    Initials Name Provider Type    KF Guerline Portillo OT Occupational Therapist                 Mobility/ADL's     Row Name 12/26/22 1536          Bed Mobility    Comment, (Bed Mobility) [facility]  upon arrival  -KF     Row Name 12/26/22 1536          Transfers    Transfers sit-stand transfer;stand-sit transfer  -KF     Comment, (Transfers) STS x 2 reps HHA  -KF     Row Name 12/26/22 1536          Sit-Stand Transfer    Sit-Stand [place] (Transfers) contact guard;verbal cues;nonverbal cues (demo/gesture)  -KF     Assistive Device (Sit-Stand Transfers) --  HHA  -KF     Row Name 12/26/22 1536          Stand-Sit Transfer    Stand-Sit [place] (Transfers) standby assist  -KF     Assistive Device (Stand-Sit Transfers) --  HHA  -KF     Row Name 12/26/22 1536          Functional Mobility    Functional Mobility- Safety Issues supplemental O2  -KF     Functional Mobility- Comment deferred to PT  -KF     Row Name 12/26/22 1536          Activities of Daily Living    BADL Assessment/Intervention lower body dressing;upper body dressing;grooming  -KF     Row Name 12/26/22 1536          Lower Body Dressing Assessment/Training    [place] Level (Lower Body Dressing) doff;don;socks;moderate assist (50% patient effort)  -KF     Position (Lower Body Dressing) supported sitting  -KF     Comment, (Lower Body Dressing) increased assist at this time for RLE  -KF     Row Name 12/26/22 1536          Upper Body Dressing Assessment/Training    [place] Level (Upper Body Dressing) doff;front opening garment;minimum assist (75% patient effort)  -KF     Position (Upper Body Dressing) unsupported sitting  -KF     Comment, (Upper Body Dressing) assist due to line management  -KF     Row Name 12/26/22 1536          Grooming Assessment/Training    [place] Level (Grooming) wash face, hands;set up  -KF     Position (Grooming) supported sitting  -KF           User Key  (r) = Recorded By, (t) = Taken By, (c) = Cosigned By    Initials Name Provider Type    KF Guerline Portillo, OT Occupational Therapist               Obj/Interventions     Row Name 12/26/22 1539          Sensory Assessment (Somatosensory)    Sensory Assessment  (Somatosensory) bilateral UE  -KF     Bilateral UE Sensory Assessment general sensation;impaired  -     Sensory Subjective Reports tingling  -     Row Name 12/26/22 1539          Vision Assessment/Intervention    Visual Impairment/Limitations legally blind;corrective lenses for reading  per Pt due to macular degeneration  -KF     Row Name 12/26/22 1539          Range of Motion Comprehensive    General Range of Motion bilateral upper extremity ROM WFL  -     Row Name 12/26/22 1539          Strength Comprehensive (MMT)    General Manual Muscle Testing (MMT) Assessment upper extremity strength deficits identified  -KF     Comment, General Manual Muscle Testing (MMT) Assessment BUE grossly demonstrates 4/5 hands, deferred shoulder MMT and pull/push due to pain at this time  -     Row Name 12/26/22 1539          Motor Skills    Motor Skills coordination  -     Coordination WFL;bilateral;upper extremity;bimanual skills  -     Row Name 12/26/22 1539          Balance    Balance Assessment sitting static balance;sitting dynamic balance;standing static balance  -     Static Sitting Balance supervision  -KF     Dynamic Sitting Balance supervision  -KF     Position, Sitting Balance unsupported;sitting in chair  -KF     Static Standing Balance standby assist  -KF     Dynamic Standing Balance contact guard  -KF     Position/Device Used, Standing Balance unsupported;supported  HHA at times however no LOB  -KF     Balance Interventions standing;sit to stand;supported;minimal challenge;manual resistance applied during activity;UE activity with balance activity;weight shifting activity  -     Comment, Balance no LOB with weightshifting in standing MIP and against perturbations in all directions  -           User Key  (r) = Recorded By, (t) = Taken By, (c) = Cosigned By    Initials Name Provider Type    KF Guerline Portillo OT Occupational Therapist               Goals/Plan     Row Name 12/26/22 1545          Bed  Mobility Goal 1 (OT)    Activity/Assistive Device (Bed Mobility Goal 1, OT) sit to supine/supine to sit  -KF     [place] Level/Cues Needed (Bed Mobility Goal 1, OT) contact guard required  -KF     Time Frame (Bed Mobility Goal 1, OT) long term goal (LTG);10 days  -KF     Progress/Outcomes (Bed Mobility Goal 1, OT) new goal;goal ongoing  -KF     Row Name 12/26/22 1546          Transfer Goal 1 (OT)    Activity/Assistive Device (Transfer Goal 1, OT) bed-to-chair/chair-to-bed;walker, rolling;toilet  -KF     [place] Level/Cues Needed (Transfer Goal 1, OT) contact guard required  -KF     Time Frame (Transfer Goal 1, OT) long term goal (LTG);10 days  -KF     Progress/Outcome (Transfer Goal 1, OT) new goal;goal ongoing  -KF     Row Name 12/26/22 1544          Dressing Goal 1 (OT)    Activity/Device (Dressing Goal 1, OT) lower body dressing  AE PRN socks/brief  -KF     [place]/Cues Needed (Dressing Goal 1, OT) contact guard required  -KF     Time Frame (Dressing Goal 1, OT) long term goal (LTG);10 days  -KF     Progress/Outcome (Dressing Goal 1, OT) new goal;goal ongoing  -KF     Row Name 12/26/22 1549          Therapy Assessment/Plan (OT)    Planned Therapy Interventions (OT) activity tolerance training;adaptive equipment training;BADL retraining;transfer/mobility retraining;patient/caregiver education/training;strengthening exercise;ROM/therapeutic exercise;occupation/activity based interventions;functional balance retraining  -KF           User Key  (r) = Recorded By, (t) = Taken By, (c) = Cosigned By    Initials Name Provider Type    KF Guerline Portillo OT Occupational Therapist               Clinical Impression     Row Name 12/26/22 1544          Pain Assessment    Pretreatment Pain Rating 9/10  -KF     Posttreatment Pain Rating 9/10  -KF     Pain Location generalized  -KF     Pain Location - head  -KF     Pre/Posttreatment Pain Comment also reports shoulder discomfort as well primarily in LUE; RN  notified  -KF     Pain Intervention(s) Repositioned;Ambulation/increased activity  -     Row Name 12/26/22 1541          Plan of Care Review    Plan of Care Reviewed With patient  -KF     Progress no change  -KF     Outcome Evaluation OT eval completed, Pt demonstrates deficits in strength, balance, and activity tolerance compared to baseline ADL completion and functional transfers, recom IPOT would benefit from IRF if home recommend 24/7 assist and HHOT  -     Row Name 12/26/22 1541          Therapy Assessment/Plan (OT)    Rehab Potential (OT) good, to achieve stated therapy goals  -     Criteria for Skilled Therapeutic Interventions Met (OT) yes;meets criteria;skilled treatment is necessary  -KF     Therapy Frequency (OT) daily  -     Row Name 12/26/22 1541          Therapy Plan Review/Discharge Plan (OT)    Anticipated Discharge Disposition (OT) inpatient rehabilitation facility  -     Row Name 12/26/22 1541          Vital Signs    Pre Systolic BP Rehab 108  RN cleared VSS  -KF     Pre Treatment Diastolic BP 81  -KF     Pre SpO2 (%) 94  -KF     O2 Delivery Pre Treatment supplemental O2  -KF     Intra SpO2 (%) 88  -KF     O2 Delivery Intra Treatment supplemental O2  -KF     Post SpO2 (%) 94  -KF     O2 Delivery Post Treatment supplemental O2  -KF     Pre Patient Position Sitting  -KF     Intra Patient Position Standing  -KF     Post Patient Position Sitting  -KF     Rest Breaks  1  -     Row Name 12/26/22 1541          Positioning and Restraints    Pre-Treatment Position sitting in chair/recliner  -KF     Post Treatment Position chair  -KF     In Chair notified nsg;reclined;sitting;call light within reach;encouraged to call for assist;exit alarm on;with nsg;waffle cushion;legs elevated;RUE elevated;LUE elevated;heels elevated  -KF           User Key  (r) = Recorded By, (t) = Taken By, (c) = Cosigned By    Initials Name Provider Type    Guerline Baumann, OT Occupational Therapist                Outcome Measures     Row Name 12/26/22 1548          How much help from another is currently needed...    Putting on and taking off regular lower body clothing? 3  -KF     Bathing (including washing, rinsing, and drying) 2  -KF     Toileting (which includes using toilet bed pan or urinal) 3  -KF     Putting on and taking off regular upper body clothing 4  -KF     Taking care of personal grooming (such as brushing teeth) 3  -KF     Eating meals 4  -KF     AM-PAC 6 Clicks Score (OT) 19  -KF     Row Name 12/26/22 1041          How much help from another person do you currently need...    Turning from your back to your side while in flat bed without using bedrails? 4  -FW     Moving from lying on back to sitting on the side of a flat bed without bedrails? 3  -FW     Moving to and from a bed to a chair (including a wheelchair)? 3  -FW     Standing up from a chair using your arms (e.g., wheelchair, bedside chair)? 3  -FW     Climbing 3-5 steps with a railing? 2  -FW     To walk in hospital room? 3  -FW     AM-PAC 6 Clicks Score (PT) 18  -FW     Highest level of mobility 6 --> Walked 10 steps or more  -FW     Row Name 12/26/22 1548 12/26/22 1041       Functional Assessment    Outcome Measure Options AM-PAC 6 Clicks Daily Activity (OT)  -KF AM-PAC 6 Clicks Basic Mobility (PT)  -FW          User Key  (r) = Recorded By, (t) = Taken By, (c) = Cosigned By    Initials Name Provider Type    Guerline Baumann OT Occupational Therapist     John Gross, MEL Physical Therapist                Occupational Therapy Education     Title: PT OT SLP Therapies (In Progress)     Topic: Occupational Therapy (In Progress)     Point: ADL training (Done)     Description:   Instruct learner(s) on proper safety adaptation and remediation techniques during self care or transfers.   Instruct in proper use of assistive devices.              Learning Progress Summary           Patient Acceptance, E,TB,D, VU,DU,NR by  at 12/26/2022 8123                    Point: Home exercise program (Not Started)     Description:   Instruct learner(s) on appropriate technique for monitoring, assisting and/or progressing therapeutic exercises/activities.              Learner Progress:  Not documented in this visit.          Point: Precautions (Done)     Description:   Instruct learner(s) on prescribed precautions during self-care and functional transfers.              Learning Progress Summary           Patient Acceptance, E,TB,D, VU,DU,NR by  at 12/26/2022 1549                   Point: Body mechanics (Done)     Description:   Instruct learner(s) on proper positioning and spine alignment during self-care, functional mobility activities and/or exercises.              Learning Progress Summary           Patient Acceptance, E,TB,D, VU,DU,NR by  at 12/26/2022 1549                               User Key     Initials Effective Dates Name Provider Type Discipline     06/16/21 -  Guerline Portillo, OT Occupational Therapist OT              OT Recommendation and Plan  Planned Therapy Interventions (OT): activity tolerance training, adaptive equipment training, BADL retraining, transfer/mobility retraining, patient/caregiver education/training, strengthening exercise, ROM/therapeutic exercise, occupation/activity based interventions, functional balance retraining  Therapy Frequency (OT): daily  Plan of Care Review  Plan of Care Reviewed With: patient  Progress: no change  Outcome Evaluation: OT eval completed, Pt demonstrates deficits in strength, balance, and activity tolerance compared to baseline ADL completion and functional transfers, recom IPOT would benefit from IRF if home recommend 24/7 assist and HHOT     Time Calculation:    Time Calculation- OT     Row Name 12/26/22 1517             Time Calculation- OT    OT Start Time 1517  -      OT Received On 12/26/22  -      OT Goal Re-Cert Due Date 01/05/23  -         Timed Charges    36153 - OT Therapeutic  Activity Minutes 5  -KF      73881 - OT Self Care/Mgmt Minutes 3  -KF         Untimed Charges    OT Eval/Re-eval Minutes 31  -KF         Total Minutes    Timed Charges Total Minutes 8  -KF      Untimed Charges Total Minutes 31  -KF       Total Minutes 39  -KF            User Key  (r) = Recorded By, (t) = Taken By, (c) = Cosigned By    Initials Name Provider Type     Guerline Portillo, OT Occupational Therapist              Therapy Charges for Today     Code Description Service Date Service Provider Modifiers Qty    [id number]  OT THERAPEUTIC ACT EA 15 MIN 12/26/2022 Guerline Portillo OT GO 1    [id number]  OT EVAL LOW COMPLEXITY 3 12/26/2022 Guerline Portillo OT GO 1               Guerline Portillo OT  12/26/2022

## 2022-12-27 LAB
GLUCOSE BLDC GLUCOMTR-MCNC: 135 MG/DL (ref 70–130)
GLUCOSE BLDC GLUCOMTR-MCNC: 267 MG/DL (ref 70–130)
GLUCOSE BLDC GLUCOMTR-MCNC: 280 MG/DL (ref 70–130)
GLUCOSE BLDC GLUCOMTR-MCNC: 317 MG/DL (ref 70–130)

## 2022-12-27 PROCEDURE — 99232 SBSQ HOSP IP/OBS MODERATE 35: CPT | Performed by: HOSPITALIST

## 2022-12-27 PROCEDURE — 63710000001 PREDNISONE PER 5 MG: Performed by: INTERNAL MEDICINE

## 2022-12-27 PROCEDURE — 25010000002 ONDANSETRON PER 1 MG: Performed by: INTERNAL MEDICINE

## 2022-12-27 PROCEDURE — 82962 GLUCOSE BLOOD TEST: CPT

## 2022-12-27 PROCEDURE — 63710000001 INSULIN LISPRO (HUMAN) PER 5 UNITS: Performed by: NURSE PRACTITIONER

## 2022-12-27 PROCEDURE — 63710000001 INSULIN DETEMIR PER 5 UNITS: Performed by: INTERNAL MEDICINE

## 2022-12-27 RX ORDER — CYCLOBENZAPRINE HCL 10 MG
10 TABLET ORAL 3 TIMES DAILY PRN
Status: DISCONTINUED | OUTPATIENT
Start: 2022-12-27 | End: 2023-01-03 | Stop reason: HOSPADM

## 2022-12-27 RX ORDER — LEVOTHYROXINE SODIUM 0.12 MG/1
125 TABLET ORAL EVERY MORNING
Status: DISCONTINUED | OUTPATIENT
Start: 2022-12-28 | End: 2023-01-03 | Stop reason: HOSPADM

## 2022-12-27 RX ORDER — SPIRONOLACTONE 25 MG/1
25 TABLET ORAL DAILY
Status: DISCONTINUED | OUTPATIENT
Start: 2022-12-27 | End: 2023-01-03 | Stop reason: HOSPADM

## 2022-12-27 RX ORDER — TRAZODONE HYDROCHLORIDE 100 MG/1
100 TABLET ORAL NIGHTLY
Status: DISCONTINUED | OUTPATIENT
Start: 2022-12-27 | End: 2023-01-03 | Stop reason: HOSPADM

## 2022-12-27 RX ORDER — FLECAINIDE ACETATE 50 MG/1
100 TABLET ORAL EVERY 12 HOURS SCHEDULED
Status: DISCONTINUED | OUTPATIENT
Start: 2022-12-27 | End: 2023-01-03 | Stop reason: HOSPADM

## 2022-12-27 RX ADMIN — TRAZODONE HYDROCHLORIDE 100 MG: 100 TABLET ORAL at 20:52

## 2022-12-27 RX ADMIN — HYDROCODONE BITARTRATE AND ACETAMINOPHEN 1 TABLET: 10; 325 TABLET ORAL at 18:21

## 2022-12-27 RX ADMIN — FLUOXETINE 20 MG: 20 CAPSULE ORAL at 20:52

## 2022-12-27 RX ADMIN — POLYETHYLENE GLYCOL 3350 17 G: 17 POWDER, FOR SOLUTION ORAL at 10:54

## 2022-12-27 RX ADMIN — FLUOXETINE 20 MG: 20 CAPSULE ORAL at 10:57

## 2022-12-27 RX ADMIN — FLECAINIDE ACETATE 100 MG: 50 TABLET ORAL at 20:52

## 2022-12-27 RX ADMIN — INSULIN DETEMIR 10 UNITS: 100 INJECTION, SOLUTION SUBCUTANEOUS at 10:55

## 2022-12-27 RX ADMIN — GABAPENTIN 100 MG: 100 CAPSULE ORAL at 20:52

## 2022-12-27 RX ADMIN — INSULIN LISPRO 8 UNITS: 100 INJECTION, SOLUTION INTRAVENOUS; SUBCUTANEOUS at 18:21

## 2022-12-27 RX ADMIN — INSULIN DETEMIR 10 UNITS: 100 INJECTION, SOLUTION SUBCUTANEOUS at 20:52

## 2022-12-27 RX ADMIN — FLECAINIDE ACETATE 100 MG: 50 TABLET ORAL at 12:18

## 2022-12-27 RX ADMIN — APIXABAN 5 MG: 5 TABLET, FILM COATED ORAL at 10:57

## 2022-12-27 RX ADMIN — LIDOCAINE 2 PATCH: 700 PATCH TOPICAL at 10:53

## 2022-12-27 RX ADMIN — HYDROCODONE BITARTRATE AND ACETAMINOPHEN 1 TABLET: 10; 325 TABLET ORAL at 05:46

## 2022-12-27 RX ADMIN — SPIRONOLACTONE 25 MG: 25 TABLET ORAL at 10:56

## 2022-12-27 RX ADMIN — COLESEVELAM HYDROCHLORIDE 1250 MG: 625 TABLET, COATED ORAL at 18:22

## 2022-12-27 RX ADMIN — Medication 12.5 MG: at 20:52

## 2022-12-27 RX ADMIN — COLESEVELAM HYDROCHLORIDE 1250 MG: 625 TABLET, COATED ORAL at 10:52

## 2022-12-27 RX ADMIN — ROSUVASTATIN CALCIUM 5 MG: 10 TABLET, COATED ORAL at 12:16

## 2022-12-27 RX ADMIN — ZOLPIDEM TARTRATE 5 MG: 5 TABLET ORAL at 23:44

## 2022-12-27 RX ADMIN — FOLIC ACID TAB 400 MCG 400 MCG: 400 TAB at 10:57

## 2022-12-27 RX ADMIN — INSULIN LISPRO 10 UNITS: 100 INJECTION, SOLUTION INTRAVENOUS; SUBCUTANEOUS at 20:52

## 2022-12-27 RX ADMIN — GABAPENTIN 100 MG: 100 CAPSULE ORAL at 10:57

## 2022-12-27 RX ADMIN — INSULIN LISPRO 8 UNITS: 100 INJECTION, SOLUTION INTRAVENOUS; SUBCUTANEOUS at 12:16

## 2022-12-27 RX ADMIN — ONDANSETRON 4 MG: 2 INJECTION INTRAMUSCULAR; INTRAVENOUS at 12:24

## 2022-12-27 RX ADMIN — Medication 10 ML: at 20:52

## 2022-12-27 RX ADMIN — Medication 12.5 MG: at 10:59

## 2022-12-27 RX ADMIN — Medication 400 MG: at 10:56

## 2022-12-27 RX ADMIN — LEVOTHYROXINE SODIUM 225 MCG: 150 TABLET ORAL at 05:46

## 2022-12-27 RX ADMIN — PREDNISONE 5 MG: 5 TABLET ORAL at 10:57

## 2022-12-27 RX ADMIN — HYDROCODONE BITARTRATE AND ACETAMINOPHEN 1 TABLET: 10; 325 TABLET ORAL at 10:57

## 2022-12-27 RX ADMIN — APIXABAN 5 MG: 5 TABLET, FILM COATED ORAL at 20:52

## 2022-12-27 NOTE — CASE MANAGEMENT/SOCIAL WORK
Discharge Planning Assessment  [facility]     Patient Name: Charly Blevins  MRN: [id number]  Today's Date: 12/27/2022    Admit Date: 12/17/2022    Plan: Home with    Discharge Needs Assessment    No documentation.                Discharge Plan     Row Name 12/27/22 1447       Plan    Plan Home with     Plan Comments I spoke with patient at  and she agree with HH and no preference of  but likes the idea of [facility] [place], [facility]. I called referral to [facility]  for nursing Pt and OT and referral accepted. I placed order in [facility] for HH. Patient told me she still plans home and her son stays with her at times. She looked at me and said I won't be alone. Cris    Final Discharge Disposition Code 06 - home with home health care              Continued Care and Services - Admitted Since 12/17/2022     Home Medical Care     Service Provider Request Status Selected Services Address Phone Fax Patient Preferred    [facility]  Selected Home Health Services [address] [address] [phone] --              Expected Discharge Date and Time     Expected Discharge Date Expected Discharge Time    Dec 28, 2022          Demographic Summary    No documentation.                Functional Status    No documentation.                Psychosocial    No documentation.                Abuse/Neglect    No documentation.                Legal    No documentation.                Substance Abuse    No documentation.                Patient Forms    No documentation.                   Josy Hernandez RN

## 2022-12-27 NOTE — PROGRESS NOTES
[facility] Medicine Services  PROGRESS NOTE    Patient Name: Charly Blevins  : 1955  MRN: [id number]    Date of Admission: 2022  Primary Care Physician: Boyd Molina MD    Subjective   Subjective     CC:  Cough    HPI:  Reports continued coughing with sputum. Patient's oxygenation improved and now on 3L NC.    ROS:  Gen- No fevers, chills  CV- No chest pain, palpitations  Resp- + cough, dyspnea  GI- No N/V/D, abd pain    Objective   Objective     Vital Signs:   Temp:  [97.1 °F (36.2 °C)-98.4 °F (36.9 °C)] 98.4 °F (36.9 °C)  Heart Rate:  [55-71] 59  Resp:  [16] 16  BP: ()/(60-80) 123/74  Flow (L/min):  [3-4] 3     Physical Exam:  Constitutional: No acute distress, awake, alert older female  HENT: NCAT, mucous membranes moist, n/c in place  Respiratory: Upper airway sounds present, respiratory effort normal, crackles in the bilateral bases, no rales, stable on 3L NC  Cardiovascular: RRR, no murmurs, rubs, or gallops  Gastrointestinal: Soft, nontender, nondistended  Musculoskeletal: Muscle tone within normal limits, no joint effusions appreciated  Psychiatric: Appropriate affect, cooperative  Neurologic: Alert, facial movements symmetric and spontaneous movement of all 4 extremities grossly equal bilaterally, speech clear  Skin: No rashes on exposed skin    LAB RESULTS:      Lab 22  1040 22  0334 22  0335 22  0545 22  0613   WBC 10.15 8.02 6.49 7.72 9.90   HEMOGLOBIN 11.0* 10.5* 11.3* 11.3* 10.4*   HEMATOCRIT 37.4 34.2 36.9 36.1 33.8*   PLATELETS 253 220 235 228 236   NEUTROS ABS 6.09 4.41 2.92 4.34 7.56*   IMMATURE GRANS (ABS) 0.42*  --  0.18* 0.10* 0.04   LYMPHS ABS 2.43  --  2.23 2.13 1.41   MONOS ABS 0.78  --  0.91* 1.01* 0.88   EOS ABS 0.40 0.80* 0.23 0.13 0.00   MCV 90.8 88.8 87.9 88.0 89.4   PROCALCITONIN 0.28*  --   --  1.07* 1.95*   PROTIME  --   --   --  15.8*  --          Lab 22  0536 22  1040 22  0334  12/23/22  0335 12/22/22  1606 12/22/22  0545 12/21/22  0613   SODIUM 139 137 139 140  --  140 144   POTASSIUM 4.4 4.1 4.9 4.1 4.2 3.3* 3.7   CHLORIDE 99 96* 96* 97*  --  100 106   CO2 35.0* 36.0* 34.0* 38.0*  --  35.0* 29.0   ANION GAP 5.0 5.0 9.0 5.0  --  5.0 9.0   BUN 17 19 19  --   --  16 16   CREATININE 0.64 0.73 0.63  --   --  0.50* 0.50*   EGFR 96.4 89.7 96.8  --   --  102.3 102.3   GLUCOSE 141* 184* 116*  --   --  89 109*   CALCIUM 9.2 9.1 8.9  --   --  8.6 8.1*   MAGNESIUM 2.2 1.3* 1.6 1.5*  --  1.5* 1.3*   HEMOGLOBIN A1C  --  7.30*  --   --   --   --   --          Lab 12/25/22  1040   TOTAL PROTEIN 5.3*   ALBUMIN 3.10*   GLOBULIN 2.2   ALT (SGPT) 24   AST (SGOT) 17   BILIRUBIN 0.2   ALK PHOS 78         Lab 12/25/22  1040 12/24/22  0334 12/23/22  0335 12/22/22  0545 12/21/22  0613   PROBNP 563.7 1,223.0* 2,383.0* 5,201.0* 11,614.0*   PROTIME  --   --   --  15.8*  --    INR  --   --   --  1.27*  --                  Brief Urine Lab Results  (Last result in the past 365 days)      Color   Clarity   Blood   Leuk Est   Nitrite   Protein   CREAT   Urine HCG        12/17/22 1612             82.2         12/17/22 1612 Yellow   Clear   Negative   Negative   Negative   Trace                 Microbiology Results Abnormal     Procedure Component Value - Date/Time    Blood Culture - Blood, Arm, Left [[id number]]  (Normal) Collected: 12/17/22 1201    Lab Status: Final result Specimen: Blood from Arm, Left Updated: 12/22/22 1215     Blood Culture No growth at 5 days    Blood Culture - Blood, Arm, Right [[id number]]  (Normal) Collected: 12/17/22 1201    Lab Status: Final result Specimen: Blood from Arm, Right Updated: 12/22/22 1215     Blood Culture No growth at 5 days    Legionella Antigen, Urine - Urine, Urine, Clean Catch [[id number]]  (Normal) Collected: 12/17/22 1612    Lab Status: Final result Specimen: Urine, Clean Catch Updated: 12/17/22 2105     LEGIONELLA ANTIGEN, URINE Negative    MRSA Screen, PCR (Inpatient) -  Swab, Nares [[id number]]  (Normal) Collected: 12/17/22 1609    Lab Status: Final result Specimen: Swab from Nares Updated: 12/17/22 1815     MRSA PCR Negative    Narrative:      The negative predictive value of this diagnostic test is high and should only be used to consider de-escalating anti-MRSA therapy. A positive result may indicate colonization with MRSA and must be correlated clinically.  MRSA Negative          XR Chest 2 View    Result Date: 12/25/2022  DATE OF EXAM: 12/25/2022 9:47 AM  PROCEDURE: XR CHEST 2 VW-  INDICATIONS: influenza and Pneumococcal pneumonia; A41.9-Sepsis, unspecified organism; R65.21-Severe sepsis with septic shock; J10.1-Influenza due to other identified influenza virus with other respiratory manifestations; J15.9-Unspecified bacterial pneumonia; N17.9-Acute kidney failure, unspecified; J96.01-Acute respiratory failure with hypoxia  COMPARISON: 12/23/2022  TECHNIQUE: Two radiologic views of the chest, PA and lateral, were obtained.  FINDINGS: Implanted loop recorder and right IJ catheter are again noted. Heart is mildly enlarged. Vasculature appears mildly cephalized. Patchy disease in the right and left midlung and right lung base is again noted, mildly improved on the right. No new pulmonary parenchymal disease effusion or pneumothorax is seen.      Impression: Patchy bilateral pulmonary disease, mildly improved in the right base, stable elsewhere.  This report was finalized on 12/25/2022 10:10 AM by Dr. Heath Prado MD.        Results for orders placed during the hospital encounter of 12/17/22    Adult Transthoracic Echo Complete w/ Color, Spectral and Contrast if Necessary Per Protocol    Interpretation Summary  •  Left ventricular systolic function is normal. Calculated left ventricular EF = 59.5%  •  Left ventricular wall thickness is consistent with mild concentric hypertrophy.  •  Left ventricular diastolic function is consistent with (grade II w/high LAP) pseudonormalization.  •   Left atrial volume is mildly increased.  •  There is calcification of the aortic valve.  •  Estimated right ventricular systolic pressure from tricuspid regurgitation is normal (<35 mmHg). Calculated right ventricular systolic pressure from tricuspid regurgitation is 27 mmHg.  •  Mild dilation of the ascending aorta is present.  4.1 cm      I have reviewed the medications:  Scheduled Meds:apixaban, 5 mg, Oral, BID  colesevelam, 1,250 mg, Oral, BID With Meals  FLUoxetine, 20 mg, Oral, BID  folic acid, 400 mcg, Oral, Daily  gabapentin, 100 mg, Oral, Q12H  insulin detemir, 10 Units, Subcutaneous, Q12H  insulin lispro, 0-14 Units, Subcutaneous, 4x Daily With Meals & Nightly  levothyroxine, 225 mcg, Oral, Q AM  lidocaine, 2 patch, Transdermal, Q24H  magnesium oxide, 400 mg, Oral, Daily  metoprolol tartrate, 25 mg, Oral, Q12H  polyethylene glycol, 17 g, Oral, Daily  predniSONE, 5 mg, Oral, Daily With Breakfast  rosuvastatin, 5 mg, Oral, Daily      Continuous Infusions:Pharmacy Consult,       PRN Meds:.•  calcium carbonate  •  dextrose  •  HYDROcodone-acetaminophen  •  magnesium sulfate **OR** magnesium sulfate **OR** magnesium sulfate  •  ondansetron  •  Pharmacy Consult  •  potassium chloride  •  sodium chloride  •  sodium chloride  •  SUMAtriptan  •  zolpidem    Assessment & Plan   Assessment & Plan     Active Hospital Problems    Diagnosis  POA   • **Septic shock [A41.9, R65.21]  Yes   • Pneumonia due to Streptococcus pneumoniae (HCC) [J13]  Yes   • Acute hypoxic respiratory failure [J96.01]  Yes   • DRISS [N17.9]  Yes   • On chronic immunosuppressive therapy [D84.9]  Yes   • GERD [K21.9]  Yes   • Influenza A [J10.1]  Yes   • Obesity (BMI 30-39.9) [E66.9]  Yes   • Hypothyroidism [E03.9]  Yes   • Anxiety and depression [F41.9, F32.A]  Yes   • Gout [M10.9]  Yes   • Dyslipidemia [E78.5]  Yes   • RA [M06.9]  Yes   • T2DM [E11.9]  Yes      Resolved Hospital Problems   No resolved problems to display.        Brief Hospital  Course to date:  Charly Blevins is a 67 y.o. female w RA on chronic MTX/prednisone, DMII, TIFFANY, hypothyroidism, severe macular degeneration, hard of hearing, who presented with hypoxic respiratory failure. Found to be flu A+, strep pneum +.      This patient's problems and plans were partially entered by my partner and updated as appropriate by me 12/27/22.    All problems are new to me today.    1) Acute hypoxic respiratory failure and septic shock 2/2 flu and strep pneumonia  - completed 7 days of ceftraixone, tamiflu completed 12/21  - wean O2 as able, on 3L currently  - flutter valve ordered    2) RA on MTX and chronic prednisone, immunocompromised patient  - initially on stress dose steroids, transitioned back to home prednisone 5 mg today    3) Recurrent hypomagnesemia  - IV Mg per protocol, start PO Mg given recurrences  -consider stopping home PO ppi    4) Deconditioning  - PT recommended SNF    H/o Afib s/p cardioversion - restart home metoprolol. Need home med rec for flecainide --> patient unsure of medications, pharmacy to assist re: medication rec, on apixaban    DMII - on basal/bolus regimen + SSI here, A1c 7.3%  HLD - statin  Hypothyroidism - home levothyroxine  TIFFANY - uses nocturnal n/c at home  BMI 34 - complicates exam  GERD - ppi    D/c central line when pIV in place    Expected Discharge Location and Transportation: home  Expected Discharge   Expected Discharge Date and Time     Expected Discharge Date Expected Discharge Time    Dec 28, 2022            DVT prophylaxis:  Medical and mechanical DVT prophylaxis orders are present.     AM-PAC 6 Clicks Score (PT): 18 (12/27/22 0800)    CODE STATUS:   Code Status and Medical Interventions:   Ordered at: 12/17/22 1507     Code Status (Patient has no pulse and is not breathing):    CPR (Attempt to Resuscitate)     Medical Interventions (Patient has pulse or is breathing):    Full Support       Cecy Herrmann,   12/27/22

## 2022-12-28 LAB
ALBUMIN SERPL-MCNC: 3.1 G/DL (ref 3.5–5.2)
ALBUMIN/GLOB SERPL: 1.3 G/DL
ALP SERPL-CCNC: 64 U/L (ref 39–117)
ALT SERPL W P-5'-P-CCNC: 22 U/L (ref 1–33)
ANION GAP SERPL CALCULATED.3IONS-SCNC: 5 MMOL/L (ref 5–15)
AST SERPL-CCNC: 17 U/L (ref 1–32)
BILIRUB SERPL-MCNC: 0.2 MG/DL (ref 0–1.2)
BUN SERPL-MCNC: 31 MG/DL (ref 8–23)
BUN/CREAT SERPL: 40.3 (ref 7–25)
CALCIUM SPEC-SCNC: 9.3 MG/DL (ref 8.6–10.5)
CHLORIDE SERPL-SCNC: 103 MMOL/L (ref 98–107)
CO2 SERPL-SCNC: 33 MMOL/L (ref 22–29)
CREAT SERPL-MCNC: 0.77 MG/DL (ref 0.57–1)
DEPRECATED RDW RBC AUTO: 57.8 FL (ref 37–54)
EGFRCR SERPLBLD CKD-EPI 2021: 84.1 ML/MIN/1.73
ERYTHROCYTE [DISTWIDTH] IN BLOOD BY AUTOMATED COUNT: 17.3 % (ref 12.3–15.4)
GLOBULIN UR ELPH-MCNC: 2.3 GM/DL
GLUCOSE BLDC GLUCOMTR-MCNC: 148 MG/DL (ref 70–130)
GLUCOSE BLDC GLUCOMTR-MCNC: 213 MG/DL (ref 70–130)
GLUCOSE BLDC GLUCOMTR-MCNC: 272 MG/DL (ref 70–130)
GLUCOSE BLDC GLUCOMTR-MCNC: 347 MG/DL (ref 70–130)
GLUCOSE SERPL-MCNC: 151 MG/DL (ref 65–99)
HCT VFR BLD AUTO: 37.2 % (ref 34–46.6)
HGB BLD-MCNC: 11 G/DL (ref 12–15.9)
MCH RBC QN AUTO: 27 PG (ref 26.6–33)
MCHC RBC AUTO-ENTMCNC: 29.6 G/DL (ref 31.5–35.7)
MCV RBC AUTO: 91.4 FL (ref 79–97)
PLATELET # BLD AUTO: 269 10*3/MM3 (ref 140–450)
PMV BLD AUTO: 11.4 FL (ref 6–12)
POTASSIUM SERPL-SCNC: 5 MMOL/L (ref 3.5–5.2)
PROT SERPL-MCNC: 5.4 G/DL (ref 6–8.5)
RBC # BLD AUTO: 4.07 10*6/MM3 (ref 3.77–5.28)
SODIUM SERPL-SCNC: 141 MMOL/L (ref 136–145)
WBC NRBC COR # BLD: 9.84 10*3/MM3 (ref 3.4–10.8)

## 2022-12-28 PROCEDURE — 63710000001 INSULIN DETEMIR PER 5 UNITS: Performed by: INTERNAL MEDICINE

## 2022-12-28 PROCEDURE — 25010000002 ONDANSETRON PER 1 MG: Performed by: INTERNAL MEDICINE

## 2022-12-28 PROCEDURE — 99232 SBSQ HOSP IP/OBS MODERATE 35: CPT | Performed by: HOSPITALIST

## 2022-12-28 PROCEDURE — 85027 COMPLETE CBC AUTOMATED: CPT | Performed by: HOSPITALIST

## 2022-12-28 PROCEDURE — 97535 SELF CARE MNGMENT TRAINING: CPT

## 2022-12-28 PROCEDURE — 82962 GLUCOSE BLOOD TEST: CPT

## 2022-12-28 PROCEDURE — 63710000001 PREDNISONE PER 5 MG: Performed by: INTERNAL MEDICINE

## 2022-12-28 PROCEDURE — 80053 COMPREHEN METABOLIC PANEL: CPT | Performed by: HOSPITALIST

## 2022-12-28 PROCEDURE — 63710000001 INSULIN LISPRO (HUMAN) PER 5 UNITS: Performed by: INTERNAL MEDICINE

## 2022-12-28 RX ADMIN — GABAPENTIN 100 MG: 100 CAPSULE ORAL at 20:19

## 2022-12-28 RX ADMIN — PREDNISONE 5 MG: 5 TABLET ORAL at 08:41

## 2022-12-28 RX ADMIN — TRAZODONE HYDROCHLORIDE 100 MG: 100 TABLET ORAL at 20:19

## 2022-12-28 RX ADMIN — HYDROCODONE BITARTRATE AND ACETAMINOPHEN 1 TABLET: 10; 325 TABLET ORAL at 20:23

## 2022-12-28 RX ADMIN — GABAPENTIN 100 MG: 100 CAPSULE ORAL at 08:41

## 2022-12-28 RX ADMIN — FLECAINIDE ACETATE 100 MG: 50 TABLET ORAL at 20:19

## 2022-12-28 RX ADMIN — APIXABAN 5 MG: 5 TABLET, FILM COATED ORAL at 08:41

## 2022-12-28 RX ADMIN — INSULIN LISPRO 5 UNITS: 100 INJECTION, SOLUTION INTRAVENOUS; SUBCUTANEOUS at 08:42

## 2022-12-28 RX ADMIN — FLECAINIDE ACETATE 100 MG: 50 TABLET ORAL at 08:45

## 2022-12-28 RX ADMIN — LEVOTHYROXINE SODIUM 125 MCG: 125 TABLET ORAL at 08:41

## 2022-12-28 RX ADMIN — INSULIN DETEMIR 10 UNITS: 100 INJECTION, SOLUTION SUBCUTANEOUS at 20:19

## 2022-12-28 RX ADMIN — ZOLPIDEM TARTRATE 5 MG: 5 TABLET ORAL at 22:30

## 2022-12-28 RX ADMIN — Medication 400 MG: at 08:41

## 2022-12-28 RX ADMIN — ROSUVASTATIN CALCIUM 5 MG: 10 TABLET, COATED ORAL at 08:41

## 2022-12-28 RX ADMIN — APIXABAN 5 MG: 5 TABLET, FILM COATED ORAL at 20:19

## 2022-12-28 RX ADMIN — HYDROCODONE BITARTRATE AND ACETAMINOPHEN 1 TABLET: 10; 325 TABLET ORAL at 08:45

## 2022-12-28 RX ADMIN — INSULIN DETEMIR 10 UNITS: 100 INJECTION, SOLUTION SUBCUTANEOUS at 08:46

## 2022-12-28 RX ADMIN — ONDANSETRON 4 MG: 2 INJECTION INTRAMUSCULAR; INTRAVENOUS at 14:26

## 2022-12-28 RX ADMIN — HYDROCODONE BITARTRATE AND ACETAMINOPHEN 1 TABLET: 10; 325 TABLET ORAL at 14:26

## 2022-12-28 RX ADMIN — FOLIC ACID TAB 400 MCG 400 MCG: 400 TAB at 08:41

## 2022-12-28 RX ADMIN — COLESEVELAM HYDROCHLORIDE 1250 MG: 625 TABLET, COATED ORAL at 17:27

## 2022-12-28 RX ADMIN — INSULIN LISPRO 10 UNITS: 100 INJECTION, SOLUTION INTRAVENOUS; SUBCUTANEOUS at 20:20

## 2022-12-28 RX ADMIN — Medication 12.5 MG: at 08:41

## 2022-12-28 RX ADMIN — INSULIN LISPRO 5 UNITS: 100 INJECTION, SOLUTION INTRAVENOUS; SUBCUTANEOUS at 17:27

## 2022-12-28 RX ADMIN — COLESEVELAM HYDROCHLORIDE 1250 MG: 625 TABLET, COATED ORAL at 08:46

## 2022-12-28 RX ADMIN — SPIRONOLACTONE 25 MG: 25 TABLET ORAL at 08:41

## 2022-12-28 RX ADMIN — FLUOXETINE 20 MG: 20 CAPSULE ORAL at 20:19

## 2022-12-28 RX ADMIN — FLUOXETINE 20 MG: 20 CAPSULE ORAL at 08:41

## 2022-12-28 NOTE — THERAPY TREATMENT NOTE
Patient Name: Charly Blevins  : 1955    MRN: [id number]                              Today's Date: 2022       Admit Date: 2022    Visit Dx:     ICD-10-CM ICD-9-CM   1. Septic shock ([facility])  A41.9 038.9    R65.21 785.52     995.92   2. Influenza A  J10.1 487.1   3. Bacterial pneumonia  J15.9 482.9   4. DRISS (acute kidney injury) ([facility])  N17.9 584.9   5. Acute hypoxic respiratory failure  J96.01 518.81     Patient Active Problem List   Diagnosis   • Closed intertrochanteric fracture of hip, right, initial encounter ([facility])   • Pubic ramus fracture ([facility])   • HTN (hypertension)   • Dyslipidemia   • T2DM   • RA   • Atrial fibrillation ([facility])   • Acute hypoxic respiratory failure   • DRISS   • On chronic immunosuppressive therapy   • GERD   • Influenza A   • Obesity (BMI 30-39.9)   • Hypothyroidism   • Anxiety and depression   • Gout   • Septic shock   • Pneumonia due to Streptococcus pneumoniae ([facility])     Past Medical History:   Diagnosis Date   • Anxiety and depression 2022   • Atrial fibrillation ([facility]) 2015    CARDIOVERSION - NO REOCCURANCE SINCE    • Diabetes mellitus ([facility])    • Gout 2022   • Hyperlipidemia    • Hypertension    • Hypothyroidism 2022   • Obesity (BMI 30-39.9) 2022     Past Surgical History:   Procedure Laterality Date   • APPENDECTOMY     • CHOLECYSTECTOMY     • COLOSTOMY      RESULTED FROM HYSTERECTOMY    • COLOSTOMY CLOSURE      WHILE DOING COLOSTOMY CLOSURE; ENDED UP HAVING A ILEOSTOMY   • HEMORRHOIDECTOMY     • HIP TROCHANTERIC NAILING WITH INTRAMEDULLARY HIP SCREW Right 3/27/2019    Procedure: HIP TROCANTERIC NAILING WITH INTRAMEDULLARY HIP SCREW RIGHT;  Surgeon: Jona Tom MD;  Location: [facility];  Service: Orthopedics   • HYSTERECTOMY     • LUMBAR DISCECTOMY      L4 - L5   • OTHER SURGICAL HISTORY      TUBAL REVERSAL   • THYROIDECTOMY     • TUBAL ABDOMINAL LIGATION      X 2      General Information     Row Name 22 3432          OT Time and  Intention    Document Type therapy note (daily note)  -     Mode of Treatment occupational therapy  -     Row Name 12/28/22 1532          General Information    Patient Profile Reviewed yes  -     Existing Precautions/Restrictions fall;oxygen therapy device and L/min  -     Barriers to Rehab visual deficit;hearing deficit;medically complex;previous functional deficit  -     Row Name 12/28/22 1532          Cognition    Orientation Status (Cognition) oriented x 4  -     Row Name 12/28/22 1532          Safety Issues, Functional Mobility    Safety Issues Affecting Function (Mobility) safety precautions follow-through/compliance;safety precaution awareness;insight into deficits/self-awareness  -     Impairments Affecting Function (Mobility) balance;endurance/activity tolerance;pain;strength;shortness of breath;sensation/sensory awareness  -           User Key  (r) = Recorded By, (t) = Taken By, (c) = Cosigned By    Initials Name Provider Type     Madalyn Cho OT Occupational Therapist                 Mobility/ADL's     Row Name 12/28/22 1532          Bed Mobility    Bed Mobility scooting/bridging;supine-sit  -     Rolling Right [place] (Bed Mobility) supervision;verbal cues  -     Scooting/Bridging [place] (Bed Mobility) supervision;verbal cues  -     Assistive Device (Bed Mobility) head of bed elevated;bed rails  -     Row Name 12/28/22 1532          Transfers    Transfers sit-stand transfer;bed-chair transfer  -     Row Name 12/28/22 1532          Bed-Chair Transfer    Bed-Chair [place] (Transfers) contact guard;verbal cues  -     Assistive Device (Bed-Chair Transfers) other (see comments)  UE support  -     Row Name 12/28/22 1532          Sit-Stand Transfer    Sit-Stand [place] (Transfers) contact guard;verbal cues;nonverbal cues (demo/gesture)  -     Assistive Device (Sit-Stand Transfers) other (see comments)  UE support  -     Row Name 12/28/22 1532           Activities of Daily Living    BADL Assessment/Intervention lower body dressing  -     Row Name 12/28/22 1532          Lower Body Dressing Assessment/Training    Cable Level (Lower Body Dressing) doff;don;socks;maximum assist (25% patient effort)  -HM     Position (Lower Body Dressing) unsupported sitting  -     Comment, (Lower Body Dressing) Pt reports she has sock aid at home as needed. May benefit from reacher.  -     Row Name 12/28/22 1532          Upper Body Dressing Assessment/Training    Cable Level (Upper Body Dressing) doff;don;other (see comments);moderate assist (50% patient effort)  gown  -     Position (Upper Body Dressing) unsupported sitting;edge of bed sitting  -           User Key  (r) = Recorded By, (t) = Taken By, (c) = Cosigned By    Initials Name Provider Type     Madalyn Cho, SIDNEY Occupational Therapist               Obj/Interventions     Row Name 12/28/22 1538          Sensory Assessment (Somatosensory)    Sensory Assessment (Somatosensory) sensation intact  -     Row Name 12/28/22 1538          Balance    Balance Assessment sitting static balance;sitting dynamic balance;standing static balance;standing dynamic balance  -     Static Sitting Balance supervision  -     Dynamic Sitting Balance supervision  -     Position, Sitting Balance unsupported;sitting edge of bed  -     Static Standing Balance contact guard  -     Dynamic Standing Balance minimal assist  -     Position/Device Used, Standing Balance supported  UE support  -     Balance Interventions sitting;occupation based/functional task  -           User Key  (r) = Recorded By, (t) = Taken By, (c) = Cosigned By    Initials Name Provider Type     Madalyn Cho, SIDNEY Occupational Therapist               Goals/Plan    No documentation.                Clinical Impression     Row Name 12/28/22 1538          Pain Assessment    Pretreatment Pain Rating 0/10 - no pain  -      Posttreatment Pain Rating 0/10 - no pain  -     Row Name 12/28/22 1538          Plan of Care Review    Plan of Care Reviewed With patient  -     Progress improving  -     Outcome Evaluation Pt is pleasent and cooperative. Completes bed mobility with supervision and transfers with CGA and UE support. Pt maxA for all LBD this date and reports AE use at home as needed. ModA to complete UBD this date. Recommend d/c to IPR.  -     Row Name 12/28/22 1538          Therapy Assessment/Plan (OT)    Rehab Potential (OT) good, to achieve stated therapy goals  -     Criteria for Skilled Therapeutic Interventions Met (OT) yes;meets criteria;skilled treatment is necessary  -     Therapy Frequency (OT) daily  -     Row Name 12/28/22 1538          Therapy Plan Review/Discharge Plan (OT)    Anticipated Discharge Disposition (OT) inpatient rehabilitation facility  -     Row Name 12/28/22 1538          Vital Signs    Pre Systolic BP Rehab 109  RN cleared for tx; VSS  -     Pre Treatment Diastolic BP 69  -HM     Post Systolic BP Rehab 107  -HM     Post Treatment Diastolic BP 69  -HM     Pre Patient Position Supine  -     Intra Patient Position Standing  -     Post Patient Position Sitting  -     Row Name 12/28/22 1538          Positioning and Restraints    Pre-Treatment Position in bed  -     Post Treatment Position chair  -HM     In Chair notified nsg;reclined;call light within reach;encouraged to call for assist;exit alarm on  -           User Key  (r) = Recorded By, (t) = Taken By, (c) = Cosigned By    Initials Name Provider Type     Madalyn Cho, OT Occupational Therapist               Outcome Measures     Row Name 12/28/22 7880          How much help from another is currently needed...    Putting on and taking off regular lower body clothing? 1  -HM     Bathing (including washing, rinsing, and drying) 2  -HM     Toileting (which includes using toilet bed pan or urinal) 3  -HM     Putting on and  taking off regular upper body clothing 3  -HM     Taking care of personal grooming (such as brushing teeth) 3  -HM     Eating meals 4  -     AM-PAC 6 Clicks Score (OT) 16  -     Row Name 12/28/22 0815          How much help from another person do you currently need...    Turning from your back to your side while in flat bed without using bedrails? 4  -CB     Moving from lying on back to sitting on the side of a flat bed without bedrails? 3  -CB     Moving to and from a bed to a chair (including a wheelchair)? 3  -CB     Standing up from a chair using your arms (e.g., wheelchair, bedside chair)? 3  -CB     Climbing 3-5 steps with a railing? 2  -CB     To walk in hospital room? 3  -CB     AM-PAC 6 Clicks Score (PT) 18  -CB     Highest level of mobility 6 --> Walked 10 steps or more  -     Row Name 12/28/22 1540          Functional Assessment    Outcome Measure Options AM-PAC 6 Clicks Daily Activity (OT)  -           User Key  (r) = Recorded By, (t) = Taken By, (c) = Cosigned By    Initials Name Provider Type    CB Wanda Banks, RN Registered Nurse     Madalyn Cho OT Occupational Therapist                Occupational Therapy Education     Title: PT OT SLP Therapies (In Progress)     Topic: Occupational Therapy (In Progress)     Point: ADL training (Done)     Description:   Instruct learner(s) on proper safety adaptation and remediation techniques during self care or transfers.   Instruct in proper use of assistive devices.              Learning Progress Summary           Patient Acceptance, TB,D,E, VU,NR by  at 12/28/2022 1541    Acceptance, E,TB,D, VU,DU,NR by  at 12/26/2022 1549                   Point: Home exercise program (Not Started)     Description:   Instruct learner(s) on appropriate technique for monitoring, assisting and/or progressing therapeutic exercises/activities.              Learner Progress:  Not documented in this visit.          Point: Precautions (Done)     Description:    Instruct learner(s) on prescribed precautions during self-care and functional transfers.              Learning Progress Summary           Patient Acceptance, TB,D,E, VU,NR by  at 12/28/2022 1541    Acceptance, E,TB,D, VU,DU,NR by  at 12/26/2022 1549                   Point: Body mechanics (Done)     Description:   Instruct learner(s) on proper positioning and spine alignment during self-care, functional mobility activities and/or exercises.              Learning Progress Summary           Patient Acceptance, TB,D,E, VU,NR by  at 12/28/2022 1541    Acceptance, E,TB,D, VU,DU,NR by KF at 12/26/2022 1549                               User Key     Initials Effective Dates Name Provider Type Discipline     10/25/22 -  Madalyn Cho, OT Occupational Therapist OT     06/16/21 -  Guerline Portillo, OT Occupational Therapist OT              OT Recommendation and Plan  Therapy Frequency (OT): daily  Plan of Care Review  Plan of Care Reviewed With: patient  Progress: improving  Outcome Evaluation: Pt is pleasent and cooperative. Completes bed mobility with supervision and transfers with CGA and UE support. Pt maxA for all LBD this date and reports AE use at home as needed. ModA to complete UBD this date. Recommend d/c to IPR.     Time Calculation:    Time Calculation- OT     Row Name 12/28/22 1447             Time Calculation- OT    OT Start Time 1447  -HM      OT Received On 12/28/22  -HM         Timed Charges    22779 - OT Self Care/Mgmt Minutes 24  -HM         Total Minutes    Timed Charges Total Minutes 24  -HM       Total Minutes 24  -HM            User Key  (r) = Recorded By, (t) = Taken By, (c) = Cosigned By    Initials Name Provider Type     Madalyn Cho, OT Occupational Therapist              Therapy Charges for Today     Code Description Service Date Service Provider Modifiers Qty    33168785520 HC OT SELF CARE/MGMT/TRAIN EA 15 MIN 12/28/2022 Madalyn Cho, OT GO 2               Madalyn SALMON  [place], SIDNEY  12/28/2022

## 2022-12-28 NOTE — PLAN OF CARE
Goal Outcome Evaluation:  Plan of Care Reviewed With: patient        Progress: improving  Outcome Evaluation: Pt is pleasent and cooperative. Completes bed mobility with supervision and transfers with CGA and UE support. Pt maxA for all LBD this date and reports AE use at home as needed. ModA to complete UBD this date. Recommend d/c to IPR.

## 2022-12-28 NOTE — PROGRESS NOTES
[facility]  PROGRESS NOTE    Patient Name: Charly Blevins  : 1955  MRN: [id number]    Date of Admission: 2022  Primary Care Physician: Boyd Molina MD    Subjective   Subjective     CC:  Cough    HPI:  Reports continued coughing with sputum. Patient's oxygenation stable, continues to require 3L NC. She is hopeful to go home tomorrow.    ROS:  Gen- No fevers, chills  CV- No chest pain, palpitations  Resp- + cough, +dyspnea  GI- No N/V/D, abd pain    Objective   Objective     Vital Signs:   Temp:  [97.7 °F (36.5 °C)-98.8 °F (37.1 °C)] 98.2 °F (36.8 °C)  Heart Rate:  [55-67] 59  Resp:  [16-18] 18  BP: (103-131)/(62-76) 107/65  Flow (L/min):  [3] 3     Physical Exam:  Constitutional: No acute distress, awake, alert older female  HENT: NCAT, mucous membranes moist, n/c in place  Respiratory: decreased breath sounds b/l, respiratory effort normal, no rales, stable on 3L NC  Cardiovascular: RRR, no murmurs, rubs, or gallops  Gastrointestinal: Soft, nontender, nondistended  Musculoskeletal: Muscle tone within normal limits, no joint effusions appreciated  Psychiatric: Appropriate affect, cooperative  Neurologic: Alert, facial movements symmetric and spontaneous movement of all 4 extremities grossly equal bilaterally, speech clear  Skin: No rashes on exposed skin    LAB RESULTS:      Lab 22  0611 22  1040 22  0334 22  0335 22  0545   WBC 9.84 10.15 8.02 6.49 7.72   HEMOGLOBIN 11.0* 11.0* 10.5* 11.3* 11.3*   HEMATOCRIT 37.2 37.4 34.2 36.9 36.1   PLATELETS 269 253 220 235 228   NEUTROS ABS  --  6.09 4.41 2.92 4.34   IMMATURE GRANS (ABS)  --  0.42*  --  0.18* 0.10*   LYMPHS ABS  --  2.43  --  2.23 2.13   MONOS ABS  --  0.78  --  0.91* 1.01*   EOS ABS  --  0.40 0.80* 0.23 0.13   MCV 91.4 90.8 88.8 87.9 88.0   PROCALCITONIN  --  0.28*  --   --  1.07*   PROTIME  --   --   --   --  15.8*         Lab 22  0611 22  0536  12/25/22  1040 12/24/22  0334 12/23/22  0335 12/22/22  1606 12/22/22  0545   SODIUM 141 139 137 139 140  --  140   POTASSIUM 5.0 4.4 4.1 4.9 4.1   < > 3.3*   CHLORIDE 103 99 96* 96* 97*  --  100   CO2 33.0* 35.0* 36.0* 34.0* 38.0*  --  35.0*   ANION GAP 5.0 5.0 5.0 9.0 5.0  --  5.0   BUN 31* 17 19 19  --   --  16   CREATININE 0.77 0.64 0.73 0.63  --   --  0.50*   EGFR 84.1 96.4 89.7 96.8  --   --  102.3   GLUCOSE 151* 141* 184* 116*  --   --  89   CALCIUM 9.3 9.2 9.1 8.9  --   --  8.6   MAGNESIUM  --  2.2 1.3* 1.6 1.5*  --  1.5*   HEMOGLOBIN A1C  --   --  7.30*  --   --   --   --     < > = values in this interval not displayed.         Lab 12/28/22  0611 12/25/22  1040   TOTAL PROTEIN 5.4* 5.3*   ALBUMIN 3.1* 3.10*   GLOBULIN 2.3 2.2   ALT (SGPT) 22 24   AST (SGOT) 17 17   BILIRUBIN 0.2 0.2   ALK PHOS 64 78         Lab 12/25/22  1040 12/24/22  0334 12/23/22  0335 12/22/22  0545   PROBNP 563.7 1,223.0* 2,383.0* 5,201.0*   PROTIME  --   --   --  15.8*   INR  --   --   --  1.27*                 Brief Urine Lab Results  (Last result in the past 365 days)      Color   Clarity   Blood   Leuk Est   Nitrite   Protein   CREAT   Urine HCG        12/17/22 1612             82.2         12/17/22 1612 Yellow   Clear   Negative   Negative   Negative   Trace                 Microbiology Results Abnormal     Procedure Component Value - Date/Time    Blood Culture - Blood, Arm, Left [[id number]]  (Normal) Collected: 12/17/22 1201    Lab Status: Final result Specimen: Blood from Arm, Left Updated: 12/22/22 1215     Blood Culture No growth at 5 days    Blood Culture - Blood, Arm, Right [[id number]]  (Normal) Collected: 12/17/22 1201    Lab Status: Final result Specimen: Blood from Arm, Right Updated: 12/22/22 1215     Blood Culture No growth at 5 days    Legionella Antigen, Urine - Urine, Urine, Clean Catch [[id number]]  (Normal) Collected: 12/17/22 1612    Lab Status: Final result Specimen: Urine, Clean Catch Updated: 12/17/22 4254      LEGIONELLA ANTIGEN, URINE Negative    MRSA Screen, PCR (Inpatient) - Swab, Nares [[id number]]  (Normal) Collected: 12/17/22 1609    Lab Status: Final result Specimen: Swab from Nares Updated: 12/17/22 1815     MRSA PCR Negative    Narrative:      The negative predictive value of this diagnostic test is high and should only be used to consider de-escalating anti-MRSA therapy. A positive result may indicate colonization with MRSA and must be correlated clinically.  MRSA Negative          No radiology results from the last 24 hrs    Results for orders placed during the hospital encounter of 12/17/22    Adult Transthoracic Echo Complete w/ Color, Spectral and Contrast if Necessary Per Protocol    Interpretation Summary  •  Left ventricular systolic function is normal. Calculated left ventricular EF = 59.5%  •  Left ventricular wall thickness is consistent with mild concentric hypertrophy.  •  Left ventricular diastolic function is consistent with (grade II w/high LAP) pseudonormalization.  •  Left atrial volume is mildly increased.  •  There is calcification of the aortic valve.  •  Estimated right ventricular systolic pressure from tricuspid regurgitation is normal (<35 mmHg). Calculated right ventricular systolic pressure from tricuspid regurgitation is 27 mmHg.  •  Mild dilation of the ascending aorta is present.  4.1 cm      I have reviewed the medications:  Scheduled Meds:apixaban, 5 mg, Oral, BID  colesevelam, 1,250 mg, Oral, BID With Meals  flecainide, 100 mg, Oral, Q12H  FLUoxetine, 20 mg, Oral, BID  folic acid, 400 mcg, Oral, Daily  gabapentin, 100 mg, Oral, Q12H  insulin detemir, 10 Units, Subcutaneous, Q12H  insulin lispro, 0-14 Units, Subcutaneous, 4x Daily With Meals & Nightly  levothyroxine, 125 mcg, Oral, QAM  lidocaine, 2 patch, Transdermal, Q24H  magnesium oxide, 400 mg, Oral, Daily  metoprolol tartrate, 12.5 mg, Oral, BID  polyethylene glycol, 17 g, Oral, Daily  predniSONE, 5 mg, Oral, Daily With  Breakfast  rosuvastatin, 5 mg, Oral, Daily  spironolactone, 25 mg, Oral, Daily  traZODone, 100 mg, Oral, Nightly      Continuous Infusions:   PRN Meds:.•  calcium carbonate  •  cyclobenzaprine  •  dextrose  •  HYDROcodone-acetaminophen  •  magnesium sulfate **OR** magnesium sulfate **OR** magnesium sulfate  •  ondansetron  •  potassium chloride  •  sodium chloride  •  sodium chloride  •  SUMAtriptan  •  zolpidem    Assessment & Plan   Assessment & Plan     Active Hospital Problems    Diagnosis  POA   • **Septic shock [A41.9, R65.21]  Yes   • Pneumonia due to Streptococcus pneumoniae (HCC) [J13]  Yes   • Acute hypoxic respiratory failure [J96.01]  Yes   • DRISS [N17.9]  Yes   • On chronic immunosuppressive therapy [D84.9]  Yes   • GERD [K21.9]  Yes   • Influenza A [J10.1]  Yes   • Obesity (BMI 30-39.9) [E66.9]  Yes   • Hypothyroidism [E03.9]  Yes   • Anxiety and depression [F41.9, F32.A]  Yes   • Gout [M10.9]  Yes   • Dyslipidemia [E78.5]  Yes   • RA [M06.9]  Yes   • T2DM [E11.9]  Yes      Resolved Hospital Problems   No resolved problems to display.        Brief Hospital Course to date:  Charly Blevins is a 67 y.o. female w RA on chronic MTX/prednisone, DMII, TIFFANY, hypothyroidism, severe macular degeneration, hard of hearing, who presented with hypoxic respiratory failure. Found to be flu A+, strep pneum +.      This patient's problems and plans were partially entered by my partner and updated as appropriate by me 12/28/22.    1) Acute hypoxic respiratory failure and septic shock 2/2 flu and strep pneumonia  - completed 7 days of ceftraixone, tamiflu completed 12/21  - wean O2 as able, on 3L currently  - flutter valve ordered    2) RA on MTX and chronic prednisone, immunocompromised patient  - initially on stress dose steroids, transitioned back to home prednisone 5 mg today    3) Recurrent hypomagnesemia  - IV Mg per protocol, start PO Mg given recurrences  -consider stopping home PO ppi    4) Deconditioning  - PT  recommended SNF    H/o Afib s/p cardioversion - restart home metoprolol. Need home med rec for flecainide --> patient unsure of medications, pharmacy to assist re: medication rec, on apixaban    DMII - on basal/bolus regimen + SSI here, A1c 7.3%  HLD - statin  Hypothyroidism - home levothyroxine  TIFFANY - uses nocturnal n/c at home  BMI 34 - complicates exam  GERD - ppi    D/c central line when pIV in place    Expected Discharge Location and Transportation: home with   Expected Discharge   Expected Discharge Date and Time     Expected Discharge Date Expected Discharge Time    Dec 29, 2022            DVT prophylaxis:  Medical and mechanical DVT prophylaxis orders are present.     AM-PAC 6 Clicks Score (PT): 18 (12/28/22 1777)    CODE STATUS:   Code Status and Medical Interventions:   Ordered at: 12/17/22 1124     Code Status (Patient has no pulse and is not breathing):    CPR (Attempt to Resuscitate)     Medical Interventions (Patient has pulse or is breathing):    Full Support       Cecy Herrmann, DO  12/28/22

## 2022-12-28 NOTE — PLAN OF CARE
Goal Outcome Evaluation:  Plan of Care Reviewed With: patient           Outcome Evaluation: Patient A/O x 4. VSS on 3 L NC. Rested well overnight after PRN sleep meds given as requested. No complaints of pain.

## 2022-12-28 NOTE — PROGRESS NOTES
Patient is agreeable to [facility]. Message to Dr Molina to follow home health and sign orders. Gita UMANA, [facility]-Liaison

## 2022-12-28 NOTE — CASE MANAGEMENT/SOCIAL WORK
"Continued Stay Note  [facility]     Patient Name: Charly Blevins  MRN: [id number]  Today's Date: 12/28/2022    Admit Date: 12/17/2022    Plan: Home w/HH   Discharge Plan     Row Name 12/28/22 1558       Plan    Plan Home w/HH    Patient/Family in Agreement with Plan yes    Plan Comments CM spoke w/pt in room. Plan remains to be home w/[facility]. Pt remains on 3L oxygen. Pt stated,\" I'll go whenever they think I'm ready.\"    Final Discharge Disposition Code 06 - home with home health care               Discharge Codes    No documentation.               Expected Discharge Date and Time     Expected Discharge Date Expected Discharge Time    Dec 28, 2022             Brian Zuleta RN    "

## 2022-12-29 LAB
ALBUMIN SERPL-MCNC: 3.3 G/DL (ref 3.5–5.2)
ALBUMIN/GLOB SERPL: 1.5 G/DL
ALP SERPL-CCNC: 62 U/L (ref 39–117)
ALT SERPL W P-5'-P-CCNC: 22 U/L (ref 1–33)
ANION GAP SERPL CALCULATED.3IONS-SCNC: 7 MMOL/L (ref 5–15)
AST SERPL-CCNC: 18 U/L (ref 1–32)
BILIRUB SERPL-MCNC: 0.2 MG/DL (ref 0–1.2)
BUN SERPL-MCNC: 30 MG/DL (ref 8–23)
BUN/CREAT SERPL: 40 (ref 7–25)
CALCIUM SPEC-SCNC: 9.4 MG/DL (ref 8.6–10.5)
CHLORIDE SERPL-SCNC: 104 MMOL/L (ref 98–107)
CO2 SERPL-SCNC: 33 MMOL/L (ref 22–29)
CREAT SERPL-MCNC: 0.75 MG/DL (ref 0.57–1)
DEPRECATED RDW RBC AUTO: 59.7 FL (ref 37–54)
EGFRCR SERPLBLD CKD-EPI 2021: 86.8 ML/MIN/1.73
ERYTHROCYTE [DISTWIDTH] IN BLOOD BY AUTOMATED COUNT: 17.5 % (ref 12.3–15.4)
GLOBULIN UR ELPH-MCNC: 2.2 GM/DL
GLUCOSE BLDC GLUCOMTR-MCNC: 204 MG/DL (ref 70–130)
GLUCOSE BLDC GLUCOMTR-MCNC: 292 MG/DL (ref 70–130)
GLUCOSE BLDC GLUCOMTR-MCNC: 428 MG/DL (ref 70–130)
GLUCOSE SERPL-MCNC: 146 MG/DL (ref 65–99)
HCT VFR BLD AUTO: 35.8 % (ref 34–46.6)
HGB BLD-MCNC: 10.5 G/DL (ref 12–15.9)
MCH RBC QN AUTO: 27.3 PG (ref 26.6–33)
MCHC RBC AUTO-ENTMCNC: 29.3 G/DL (ref 31.5–35.7)
MCV RBC AUTO: 93 FL (ref 79–97)
PLATELET # BLD AUTO: 261 10*3/MM3 (ref 140–450)
PMV BLD AUTO: 11.2 FL (ref 6–12)
POTASSIUM SERPL-SCNC: 5 MMOL/L (ref 3.5–5.2)
PROT SERPL-MCNC: 5.5 G/DL (ref 6–8.5)
RBC # BLD AUTO: 3.85 10*6/MM3 (ref 3.77–5.28)
SODIUM SERPL-SCNC: 144 MMOL/L (ref 136–145)
WBC NRBC COR # BLD: 9.11 10*3/MM3 (ref 3.4–10.8)

## 2022-12-29 PROCEDURE — 97530 THERAPEUTIC ACTIVITIES: CPT

## 2022-12-29 PROCEDURE — 63710000001 INSULIN LISPRO (HUMAN) PER 5 UNITS: Performed by: INTERNAL MEDICINE

## 2022-12-29 PROCEDURE — 80053 COMPREHEN METABOLIC PANEL: CPT | Performed by: HOSPITALIST

## 2022-12-29 PROCEDURE — 82962 GLUCOSE BLOOD TEST: CPT

## 2022-12-29 PROCEDURE — 99232 SBSQ HOSP IP/OBS MODERATE 35: CPT | Performed by: HOSPITALIST

## 2022-12-29 PROCEDURE — 63710000001 INSULIN DETEMIR PER 5 UNITS: Performed by: INTERNAL MEDICINE

## 2022-12-29 PROCEDURE — 85027 COMPLETE CBC AUTOMATED: CPT | Performed by: HOSPITALIST

## 2022-12-29 PROCEDURE — 25010000002 METHYLPREDNISOLONE PER 125 MG: Performed by: HOSPITALIST

## 2022-12-29 PROCEDURE — 63710000001 PREDNISONE PER 5 MG: Performed by: INTERNAL MEDICINE

## 2022-12-29 RX ORDER — METHYLPREDNISOLONE SODIUM SUCCINATE 125 MG/2ML
60 INJECTION, POWDER, LYOPHILIZED, FOR SOLUTION INTRAMUSCULAR; INTRAVENOUS EVERY 8 HOURS
Status: DISCONTINUED | OUTPATIENT
Start: 2022-12-29 | End: 2022-12-30

## 2022-12-29 RX ADMIN — INSULIN LISPRO 8 UNITS: 100 INJECTION, SOLUTION INTRAVENOUS; SUBCUTANEOUS at 17:02

## 2022-12-29 RX ADMIN — TRAZODONE HYDROCHLORIDE 100 MG: 100 TABLET ORAL at 20:14

## 2022-12-29 RX ADMIN — GABAPENTIN 100 MG: 100 CAPSULE ORAL at 08:50

## 2022-12-29 RX ADMIN — FOLIC ACID TAB 400 MCG 400 MCG: 400 TAB at 08:49

## 2022-12-29 RX ADMIN — SPIRONOLACTONE 25 MG: 25 TABLET ORAL at 08:49

## 2022-12-29 RX ADMIN — HYDROCODONE BITARTRATE AND ACETAMINOPHEN 1 TABLET: 10; 325 TABLET ORAL at 17:05

## 2022-12-29 RX ADMIN — METHYLPREDNISOLONE SODIUM SUCCINATE 60 MG: 125 INJECTION, POWDER, FOR SOLUTION INTRAMUSCULAR; INTRAVENOUS at 20:14

## 2022-12-29 RX ADMIN — FLECAINIDE ACETATE 100 MG: 50 TABLET ORAL at 20:30

## 2022-12-29 RX ADMIN — INSULIN DETEMIR 10 UNITS: 100 INJECTION, SOLUTION SUBCUTANEOUS at 20:18

## 2022-12-29 RX ADMIN — LEVOTHYROXINE SODIUM 125 MCG: 125 TABLET ORAL at 08:48

## 2022-12-29 RX ADMIN — FLECAINIDE ACETATE 100 MG: 50 TABLET ORAL at 08:50

## 2022-12-29 RX ADMIN — HYDROCODONE BITARTRATE AND ACETAMINOPHEN 1 TABLET: 10; 325 TABLET ORAL at 11:58

## 2022-12-29 RX ADMIN — POLYETHYLENE GLYCOL 3350 17 G: 17 POWDER, FOR SOLUTION ORAL at 08:48

## 2022-12-29 RX ADMIN — ZOLPIDEM TARTRATE 5 MG: 5 TABLET ORAL at 22:16

## 2022-12-29 RX ADMIN — INSULIN DETEMIR 10 UNITS: 100 INJECTION, SOLUTION SUBCUTANEOUS at 08:51

## 2022-12-29 RX ADMIN — COLESEVELAM HYDROCHLORIDE 1250 MG: 625 TABLET, COATED ORAL at 17:02

## 2022-12-29 RX ADMIN — FLUOXETINE 20 MG: 20 CAPSULE ORAL at 08:49

## 2022-12-29 RX ADMIN — METHYLPREDNISOLONE SODIUM SUCCINATE 60 MG: 125 INJECTION, POWDER, FOR SOLUTION INTRAMUSCULAR; INTRAVENOUS at 11:57

## 2022-12-29 RX ADMIN — LIDOCAINE 2 PATCH: 700 PATCH TOPICAL at 08:47

## 2022-12-29 RX ADMIN — Medication 400 MG: at 08:48

## 2022-12-29 RX ADMIN — INSULIN LISPRO 5 UNITS: 100 INJECTION, SOLUTION INTRAVENOUS; SUBCUTANEOUS at 11:58

## 2022-12-29 RX ADMIN — COLESEVELAM HYDROCHLORIDE 1250 MG: 625 TABLET, COATED ORAL at 08:51

## 2022-12-29 RX ADMIN — ROSUVASTATIN CALCIUM 5 MG: 10 TABLET, COATED ORAL at 08:49

## 2022-12-29 RX ADMIN — GABAPENTIN 100 MG: 100 CAPSULE ORAL at 20:14

## 2022-12-29 RX ADMIN — APIXABAN 5 MG: 5 TABLET, FILM COATED ORAL at 20:14

## 2022-12-29 RX ADMIN — APIXABAN 5 MG: 5 TABLET, FILM COATED ORAL at 08:48

## 2022-12-29 RX ADMIN — INSULIN LISPRO 5 UNITS: 100 INJECTION, SOLUTION INTRAVENOUS; SUBCUTANEOUS at 08:50

## 2022-12-29 RX ADMIN — Medication 12.5 MG: at 20:14

## 2022-12-29 RX ADMIN — INSULIN LISPRO 14 UNITS: 100 INJECTION, SOLUTION INTRAVENOUS; SUBCUTANEOUS at 20:30

## 2022-12-29 RX ADMIN — FLUOXETINE 20 MG: 20 CAPSULE ORAL at 20:15

## 2022-12-29 RX ADMIN — Medication 12.5 MG: at 08:49

## 2022-12-29 RX ADMIN — PREDNISONE 5 MG: 5 TABLET ORAL at 08:49

## 2022-12-29 RX ADMIN — HYDROCODONE BITARTRATE AND ACETAMINOPHEN 1 TABLET: 10; 325 TABLET ORAL at 22:16

## 2022-12-29 NOTE — PLAN OF CARE
Goal Outcome Evaluation:           Progress: improving  Outcome Evaluation: Pt improved ambulation distance this session to a total of 100ft with CGA and RW for support. Pt required 2 standing rest breaks d/t fatigue. Pt requires increased time for all mobility with cues throughout to improve mechanics of transfers and gait. Continue to progress per pt tolerance. Continue to recommend D/C to IRF for best functional outcome, if pt refuses home with 24/7 assist and HHPT.

## 2022-12-29 NOTE — PROGRESS NOTES
[id number] received call from Dr Molina's office. He will sign home health orders. Gita UMANA, [facility]-Liaison

## 2022-12-29 NOTE — PROGRESS NOTES
[facility]  PROGRESS NOTE    Patient Name: Charly Blevins  : 1955  MRN: [id number]    Date of Admission: 2022  Primary Care Physician: Boyd Molina MD    Subjective   Subjective     CC:  Cough    HPI:  Reports continued coughing with sputum. Patient's oxygenation stable, continues to require 3L NC. She state she lives alone and has been ambulating on oxygen and doing ok but is a little nervous to go home alone today.    ROS:  Gen- No fevers, chills  CV- No chest pain, palpitations  Resp- + cough, +dyspnea  GI- No N/V/D, abd pain    Objective   Objective     Vital Signs:   Temp:  [96.8 °F (36 °C)-98.2 °F (36.8 °C)] 98.2 °F (36.8 °C)  Heart Rate:  [52-59] 59  Resp:  [16-20] 16  BP: (104-141)/(69-72) 115/69  Flow (L/min):  [3] 3     Physical Exam:  Constitutional: No acute distress, awake, alert older female  HENT: NCAT, mucous membranes moist, n/c in place  Respiratory: decreased breath sounds b/l, respiratory effort normal, no rales, stable on 3L NC  Cardiovascular: RRR, no murmurs, rubs, or gallops  Gastrointestinal: Soft, nontender, nondistended  Musculoskeletal: Muscle tone within normal limits, no joint effusions appreciated  Psychiatric: Appropriate affect, cooperative  Neurologic: Alert, facial movements symmetric and spontaneous movement of all 4 extremities grossly equal bilaterally, speech clear  Skin: No rashes on exposed skin    LAB RESULTS:      Lab 22  0538 22  0611 22  1040 22  0334 22  0335   WBC 9.11 9.84 10.15 8.02 6.49   HEMOGLOBIN 10.5* 11.0* 11.0* 10.5* 11.3*   HEMATOCRIT 35.8 37.2 37.4 34.2 36.9   PLATELETS 261 269 253 220 235   NEUTROS ABS  --   --  6.09 4.41 2.92   IMMATURE GRANS (ABS)  --   --  0.42*  --  0.18*   LYMPHS ABS  --   --  2.43  --  2.23   MONOS ABS  --   --  0.78  --  0.91*   EOS ABS  --   --  0.40 0.80* 0.23   MCV 93.0 91.4 90.8 88.8 87.9   PROCALCITONIN  --   --  0.28*  --   --           Lab 12/29/22  0538 12/28/22  0611 12/26/22  0536 12/25/22  1040 12/24/22  0334 12/23/22  0335   SODIUM 144 141 139 137 139 140   POTASSIUM 5.0 5.0 4.4 4.1 4.9 4.1   CHLORIDE 104 103 99 96* 96* 97*   CO2 33.0* 33.0* 35.0* 36.0* 34.0* 38.0*   ANION GAP 7.0 5.0 5.0 5.0 9.0 5.0   BUN 30* 31* 17 19 19  --    CREATININE 0.75 0.77 0.64 0.73 0.63  --    EGFR 86.8 84.1 96.4 89.7 96.8  --    GLUCOSE 146* 151* 141* 184* 116*  --    CALCIUM 9.4 9.3 9.2 9.1 8.9  --    MAGNESIUM  --   --  2.2 1.3* 1.6 1.5*   HEMOGLOBIN A1C  --   --   --  7.30*  --   --          Lab 12/29/22  0538 12/28/22  0611 12/25/22  1040   TOTAL PROTEIN 5.5* 5.4* 5.3*   ALBUMIN 3.3* 3.1* 3.10*   GLOBULIN 2.2 2.3 2.2   ALT (SGPT) 22 22 24   AST (SGOT) 18 17 17   BILIRUBIN 0.2 0.2 0.2   ALK PHOS 62 64 78         Lab 12/25/22  1040 12/24/22  0334 12/23/22  0335   PROBNP 563.7 1,223.0* 2,383.0*                 Brief Urine Lab Results  (Last result in the past 365 days)      Color   Clarity   Blood   Leuk Est   Nitrite   Protein   CREAT   Urine HCG        12/17/22 1612             82.2         12/17/22 1612 Yellow   Clear   Negative   Negative   Negative   Trace                 Microbiology Results Abnormal     Procedure Component Value - Date/Time    Blood Culture - Blood, Arm, Left [[id number]]  (Normal) Collected: 12/17/22 1201    Lab Status: Final result Specimen: Blood from Arm, Left Updated: 12/22/22 1215     Blood Culture No growth at 5 days    Blood Culture - Blood, Arm, Right [[id number]]  (Normal) Collected: 12/17/22 1201    Lab Status: Final result Specimen: Blood from Arm, Right Updated: 12/22/22 1215     Blood Culture No growth at 5 days    Legionella Antigen, Urine - Urine, Urine, Clean Catch [[id number]]  (Normal) Collected: 12/17/22 1612    Lab Status: Final result Specimen: Urine, Clean Catch Updated: 12/17/22 2105     LEGIONELLA ANTIGEN, URINE Negative    MRSA Screen, PCR (Inpatient) - Swab, Nares [[id number]]  (Normal) Collected: 12/17/22  1609    Lab Status: Final result Specimen: Swab from Nares Updated: 12/17/22 1815     MRSA PCR Negative    Narrative:      The negative predictive value of this diagnostic test is high and should only be used to consider de-escalating anti-MRSA therapy. A positive result may indicate colonization with MRSA and must be correlated clinically.  MRSA Negative          No radiology results from the last 24 hrs    Results for orders placed during the hospital encounter of 12/17/22    Adult Transthoracic Echo Complete w/ Color, Spectral and Contrast if Necessary Per Protocol    Interpretation Summary  •  Left ventricular systolic function is normal. Calculated left ventricular EF = 59.5%  •  Left ventricular wall thickness is consistent with mild concentric hypertrophy.  •  Left ventricular diastolic function is consistent with (grade II w/high LAP) pseudonormalization.  •  Left atrial volume is mildly increased.  •  There is calcification of the aortic valve.  •  Estimated right ventricular systolic pressure from tricuspid regurgitation is normal (<35 mmHg). Calculated right ventricular systolic pressure from tricuspid regurgitation is 27 mmHg.  •  Mild dilation of the ascending aorta is present.  4.1 cm      I have reviewed the medications:  Scheduled Meds:apixaban, 5 mg, Oral, BID  colesevelam, 1,250 mg, Oral, BID With Meals  flecainide, 100 mg, Oral, Q12H  FLUoxetine, 20 mg, Oral, BID  folic acid, 400 mcg, Oral, Daily  gabapentin, 100 mg, Oral, Q12H  insulin detemir, 10 Units, Subcutaneous, Q12H  insulin lispro, 0-14 Units, Subcutaneous, 4x Daily With Meals & Nightly  levothyroxine, 125 mcg, Oral, QAM  lidocaine, 2 patch, Transdermal, Q24H  magnesium oxide, 400 mg, Oral, Daily  metoprolol tartrate, 12.5 mg, Oral, BID  polyethylene glycol, 17 g, Oral, Daily  predniSONE, 5 mg, Oral, Daily With Breakfast  rosuvastatin, 5 mg, Oral, Daily  spironolactone, 25 mg, Oral, Daily  traZODone, 100 mg, Oral, Nightly      Continuous  Infusions:   PRN Meds:.•  calcium carbonate  •  cyclobenzaprine  •  dextrose  •  HYDROcodone-acetaminophen  •  magnesium sulfate **OR** magnesium sulfate **OR** magnesium sulfate  •  ondansetron  •  potassium chloride  •  sodium chloride  •  sodium chloride  •  SUMAtriptan  •  zolpidem    Assessment & Plan   Assessment & Plan     Active Hospital Problems    Diagnosis  POA   • **Septic shock [A41.9, R65.21]  Yes   • Pneumonia due to Streptococcus pneumoniae (HCC) [J13]  Yes   • Acute hypoxic respiratory failure [J96.01]  Yes   • DRISS [N17.9]  Yes   • On chronic immunosuppressive therapy [D84.9]  Yes   • GERD [K21.9]  Yes   • Influenza A [J10.1]  Yes   • Obesity (BMI 30-39.9) [E66.9]  Yes   • Hypothyroidism [E03.9]  Yes   • Anxiety and depression [F41.9, F32.A]  Yes   • Gout [M10.9]  Yes   • Dyslipidemia [E78.5]  Yes   • RA [M06.9]  Yes   • T2DM [E11.9]  Yes      Resolved Hospital Problems   No resolved problems to display.        Brief Hospital Course to date:  Charly Blevins is a 67 y.o. female w RA on chronic MTX/prednisone, DMII, TIFFANY, hypothyroidism, severe macular degeneration, hard of hearing, who presented with hypoxic respiratory failure. Found to be flu A+, strep pneum +.      This patient's problems and plans were partially entered by my partner and updated as appropriate by me 12/29/22.    1) Acute hypoxic respiratory failure and septic shock 2/2 flu and strep pneumonia  - completed 7 days of ceftraixone, tamiflu completed 12/21  - wean O2 as able, on 3L currently  - flutter valve ordered  - solumedrol 60 IV Q8H    2) RA on MTX and chronic prednisone, immunocompromised patient  - initially on stress dose steroids, transitioned back to home prednisone 5 mg today    3) Recurrent hypomagnesemia  - IV Mg per protocol, start PO Mg given recurrences  -consider stopping home PO ppi    4) Deconditioning  - PT recommended SNF    H/o Afib s/p cardioversion - restart home metoprolol. Need home med rec for flecainide  --> patient unsure of medications, pharmacy to assist re: medication rec, on apixaban    DMII - on basal/bolus regimen + SSI here, A1c 7.3%  HLD - statin  Hypothyroidism - home levothyroxine  ITFFANY - uses nocturnal n/c at home  BMI 34 - complicates exam  GERD - ppi    D/c central line when pIV in place    Expected Discharge Location and Transportation: home with   Expected Discharge   Expected Discharge Date and Time     Expected Discharge Date Expected Discharge Time    Dec 29, 2022            DVT prophylaxis:  Medical and mechanical DVT prophylaxis orders are present.     AM-PAC 6 Clicks Score (PT): 18 (12/29/22 2978)    CODE STATUS:   Code Status and Medical Interventions:   Ordered at: 12/17/22 2582     Code Status (Patient has no pulse and is not breathing):    CPR (Attempt to Resuscitate)     Medical Interventions (Patient has pulse or is breathing):    Full Support       Cecy Herrmann,   12/29/22

## 2022-12-29 NOTE — THERAPY TREATMENT NOTE
Patient Name: Charly Blevins  : 1955    MRN: [id number]                              Today's Date: 2022       Admit Date: 2022    Visit Dx:     ICD-10-CM ICD-9-CM   1. Septic shock ([facility])  A41.9 038.9    R65.21 785.52     995.92   2. Influenza A  J10.1 487.1   3. Bacterial pneumonia  J15.9 482.9   4. DRISS (acute kidney injury) ([facility])  N17.9 584.9   5. Acute hypoxic respiratory failure  J96.01 518.81     Patient Active Problem List   Diagnosis   • Closed intertrochanteric fracture of hip, right, initial encounter ([facility])   • Pubic ramus fracture ([facility])   • HTN (hypertension)   • Dyslipidemia   • T2DM   • RA   • Atrial fibrillation ([facility])   • Acute hypoxic respiratory failure   • DRISS   • On chronic immunosuppressive therapy   • GERD   • Influenza A   • Obesity (BMI 30-39.9)   • Hypothyroidism   • Anxiety and depression   • Gout   • Septic shock   • Pneumonia due to Streptococcus pneumoniae ([facility])     Past Medical History:   Diagnosis Date   • Anxiety and depression 2022   • Atrial fibrillation ([facility]) 2015    CARDIOVERSION - NO REOCCURANCE SINCE    • Diabetes mellitus ([facility])    • Gout 2022   • Hyperlipidemia    • Hypertension    • Hypothyroidism 2022   • Obesity (BMI 30-39.9) 2022     Past Surgical History:   Procedure Laterality Date   • APPENDECTOMY     • CHOLECYSTECTOMY     • COLOSTOMY      RESULTED FROM HYSTERECTOMY    • COLOSTOMY CLOSURE      WHILE DOING COLOSTOMY CLOSURE; ENDED UP HAVING A ILEOSTOMY   • HEMORRHOIDECTOMY     • HIP TROCHANTERIC NAILING WITH INTRAMEDULLARY HIP SCREW Right 3/27/2019    Procedure: HIP TROCANTERIC NAILING WITH INTRAMEDULLARY HIP SCREW RIGHT;  Surgeon: Jona Tom MD;  Location: [facility];  Service: Orthopedics   • HYSTERECTOMY     • LUMBAR DISCECTOMY      L4 - L5   • OTHER SURGICAL HISTORY      TUBAL REVERSAL   • THYROIDECTOMY     • TUBAL ABDOMINAL LIGATION      X 2      General Information     Row Name 22 0933          Physical  Therapy Time and Intention    Document Type therapy note (daily note)  -AE     Mode of Treatment physical therapy  -AE     Row Name 12/29/22 0933          General Information    Patient Profile Reviewed yes  -AE     Existing Precautions/Restrictions fall;oxygen therapy device and L/min  -AE     Barriers to Rehab medically complex;previous functional deficit;hearing deficit  -AE     Row Name 12/29/22 0933          Cognition    Orientation Status (Cognition) oriented x 4  -AE     Row Name 12/29/22 0933          Safety Issues, Functional Mobility    Safety Issues Affecting Function (Mobility) awareness of need for assistance;insight into deficits/self-awareness;safety precaution awareness;sequencing abilities  -AE     Impairments Affecting Function (Mobility) balance;pain;strength;shortness of breath;endurance/activity tolerance;postural/trunk control  -AE           User Key  (r) = Recorded By, (t) = Taken By, (c) = Cosigned By    Initials Name Provider Type    AE Audie Linares, PT Physical Therapist               Mobility     Row Name 12/29/22 0934          Bed Mobility    Bed Mobility scooting/bridging;supine-sit;sit-supine  -AE     Scooting/Bridging [place] (Bed Mobility) supervision  -AE     Supine-Sit [place] (Bed Mobility) contact guard;1 person assist;verbal cues;nonverbal cues (demo/gesture)  -AE     Sit-Supine [place] (Bed Mobility) contact guard;1 person assist;verbal cues;nonverbal cues (demo/gesture)  -AE     Assistive Device (Bed Mobility) bed rails;head of bed elevated  -AE     Comment, (Bed Mobility) VCs for hand placement and sequencing. Pt requires increased time for all mobility but does not need manual assistance with bed mobility.  -AE     Row Name 12/29/22 0934          Transfers    Comment, (Transfers) VCs for hand placement and sequencing. Pt required RW for increased support and to improve upright standing posture.  -AE     Row Name 12/29/22 0934          Sit-Stand Transfer     Sit-Stand [place] (Transfers) contact guard;1 person assist  -AE     Assistive Device (Sit-Stand Transfers) walker, front-wheeled  -AE     Row Name 12/29/22 0934          Gait/Stairs (Locomotion)    [place] Level (Gait) contact guard;1 person assist  -AE     Assistive Device (Gait) walker, front-wheeled  -AE     Distance in Feet (Gait) 25+25+50  -AE     Deviations/Abnormal Patterns (Gait) bilateral deviations;gage decreased;gait speed decreased;stride length decreased  -AE     Bilateral Gait Deviations forward flexed posture;heel strike decreased  -AE     Comment, (Gait/Stairs) Pt demo step through gait pattern with slowed gage, short steps, and decreased gait speed. Pt required 2 standing rest breaks 2/2 fatigue this session. Pt requires intermittent cues to improve upright posture and RW management.  -AE           User Key  (r) = Recorded By, (t) = Taken By, (c) = Cosigned By    Initials Name Provider Type    AE Audie Linares PT Physical Therapist               Obj/Interventions     Row Name 12/29/22 0939          Balance    Balance Assessment sitting static balance;sitting dynamic balance;sit to stand dynamic balance;standing static balance;standing dynamic balance  -AE     Static Sitting Balance standby assist  -AE     Dynamic Sitting Balance standby assist  -AE     Position, Sitting Balance unsupported;sitting edge of bed  -AE     Sit to Stand Dynamic Balance contact guard  -AE     Static Standing Balance contact guard  -AE     Dynamic Standing Balance contact guard  -AE     Position/Device Used, Standing Balance supported;walker, front-wheeled  -AE           User Key  (r) = Recorded By, (t) = Taken By, (c) = Cosigned By    Initials Name Provider Type    AE Audie Linares PT Physical Therapist               Goals/Plan    No documentation.                Clinical Impression     Row Name 12/29/22 4542          Pain    Pretreatment Pain Rating 0/10 - no pain  -AE     Posttreatment Pain  Rating 0/10 - no pain  -AE     Row Name 12/29/22 0940          Plan of Care Review    Progress improving  -AE     Outcome Evaluation Pt improved ambulation distance this session to a total of 100ft with CGA and RW for support. Pt required 2 standing rest breaks d/t fatigue. Pt requires increased time for all mobility with cues throughout to improve mechanics of transfers and gait. Continue to progress per pt tolerance. Continue to recommend D/C to IRF for best functional outcome, if pt refuses home with 24/7 assist and HHPT.  -AE     Row Name 12/29/22 0940          Vital Signs    Pre Systolic BP Rehab 115  -AE     Pre Treatment Diastolic BP 69  -AE     Pretreatment Heart Rate (beats/min) 62  -AE     Posttreatment Heart Rate (beats/min) 66  -AE     Pre SpO2 (%) 93  -AE     O2 Delivery Pre Treatment nasal cannula  -AE     O2 Delivery Intra Treatment nasal cannula  -AE     Post SpO2 (%) 94  -AE     O2 Delivery Post Treatment nasal cannula  -AE     Pre Patient Position Supine  -AE     Intra Patient Position Standing  -AE     Post Patient Position Supine  -AE     Row Name 12/29/22 0940          Positioning and Restraints    Pre-Treatment Position in bed  -AE     Post Treatment Position bed  -AE     In Bed notified nsg;fowlers;call light within reach;encouraged to call for assist;exit alarm on;side rails up x2;legs elevated;heels elevated  -AE           User Key  (r) = Recorded By, (t) = Taken By, (c) = Cosigned By    Initials Name Provider Type    AE Audie Linares, PT Physical Therapist               Outcome Measures     Row Name 12/29/22 0944 12/29/22 0745       How much help from another person do you currently need...    Turning from your back to your side while in flat bed without using bedrails? 4  -AE 4  -TH    Moving from lying on back to sitting on the side of a flat bed without bedrails? 3  -AE 3  -TH    Moving to and from a bed to a chair (including a wheelchair)? 3  -AE 3  -TH    Standing up from a chair  using your arms (e.g., wheelchair, bedside chair)? 3  -AE 3  -TH    Climbing 3-5 steps with a railing? 2  -AE 2  -TH    To walk in hospital room? 3  -AE 3  -TH    AM-PAC 6 Clicks Score (PT) 18  -AE 18  -TH    Highest level of mobility 6 --> Walked 10 steps or more  -AE 6 --> Walked 10 steps or more  -TH    Row Name 12/29/22 0944          Functional Assessment    Outcome Measure Options AM-PAC 6 Clicks Basic Mobility (PT)  -AE           User Key  (r) = Recorded By, (t) = Taken By, (c) = Cosigned By    Initials Name Provider Type    AE Audie Linares, PT Physical Therapist     Dimitris Godinez, RN Registered Nurse                             Physical Therapy Education     Title: PT OT SLP Therapies (In Progress)     Topic: Physical Therapy (In Progress)     Point: Mobility training (Done)     Learning Progress Summary           Patient Acceptance, E, VU by AE at 12/29/2022 0820    Acceptance, E, VU by FW at 12/26/2022 1041                   Point: Home exercise program (Not Started)     Learner Progress:  Not documented in this visit.          Point: Body mechanics (Done)     Learning Progress Summary           Patient Acceptance, E, VU by AE at 12/29/2022 0820    Acceptance, E, VU by FW at 12/26/2022 1041                   Point: Precautions (Done)     Learning Progress Summary           Patient Acceptance, E, VU by AE at 12/29/2022 0820    Acceptance, E, VU by FW at 12/26/2022 1041                               User Key     Initials Effective Dates Name Provider Type Discipline    FW 05/05/22 -  John Gross, PT Physical Therapist PT    AE 09/21/21 -  Audie Linares, PT Physical Therapist PT              PT Recommendation and Plan     Progress: improving  Outcome Evaluation: Pt improved ambulation distance this session to a total of 100ft with CGA and RW for support. Pt required 2 standing rest breaks d/t fatigue. Pt requires increased time for all mobility with cues throughout to improve mechanics of  transfers and gait. Continue to progress per pt tolerance. Continue to recommend D/C to IRF for best functional outcome, if pt refuses home with 24/7 assist and HHPT.     Time Calculation:    PT Charges     Row Name 12/29/22 0944             Time Calculation    Start Time 0820  -AE      PT Received On 12/29/22  -AE      PT Goal Re-Cert Due Date 01/05/23  -AE         Time Calculation- PT    Total Timed Code Minutes- PT 23 minute(s)  -AE         Timed Charges    51735 - PT Therapeutic Activity Minutes 23  -AE         Total Minutes    Timed Charges Total Minutes 23  -AE       Total Minutes 23  -AE            User Key  (r) = Recorded By, (t) = Taken By, (c) = Cosigned By    Initials Name Provider Type    AE Audie Linares, MEL Physical Therapist              Therapy Charges for Today     Code Description Service Date Service Provider Modifiers Qty    [id number] HC PT THERAPEUTIC ACT EA 15 MIN 12/29/2022 Audie Linares PT GP 2          PT G-Codes  Outcome Measure Options: AM-PAC 6 Clicks Basic Mobility (PT)  AM-PAC 6 Clicks Score (PT): 18  AM-PAC 6 Clicks Score (OT): 16  PT Discharge Summary  Anticipated Discharge Disposition (PT): inpatient rehabilitation facility    Audie Linares PT  12/29/2022

## 2022-12-29 NOTE — PLAN OF CARE
Goal Outcome Evaluation:  Plan of Care Reviewed With: patient           Outcome Evaluation: Patient A/O x 4. VSS on 3 L NC. Patient c/o pain in back and shoulder, PRN Norco given once with relief. Patient refused miralax yesterday, states she is afraid she will not make it to the commode. Last charted BM on 12/20 but per patient is passing flatus. No other complaints at this time.

## 2022-12-30 LAB
ALBUMIN SERPL-MCNC: 3.3 G/DL (ref 3.5–5.2)
ALBUMIN/GLOB SERPL: 1.4 G/DL
ALP SERPL-CCNC: 67 U/L (ref 39–117)
ALT SERPL W P-5'-P-CCNC: 18 U/L (ref 1–33)
ANION GAP SERPL CALCULATED.3IONS-SCNC: 10 MMOL/L (ref 5–15)
AST SERPL-CCNC: 11 U/L (ref 1–32)
BILIRUB SERPL-MCNC: 0.2 MG/DL (ref 0–1.2)
BUN SERPL-MCNC: 32 MG/DL (ref 8–23)
BUN/CREAT SERPL: 49.2 (ref 7–25)
CALCIUM SPEC-SCNC: 8.9 MG/DL (ref 8.6–10.5)
CHLORIDE SERPL-SCNC: 99 MMOL/L (ref 98–107)
CO2 SERPL-SCNC: 27 MMOL/L (ref 22–29)
CREAT SERPL-MCNC: 0.65 MG/DL (ref 0.57–1)
DEPRECATED RDW RBC AUTO: 56.3 FL (ref 37–54)
EGFRCR SERPLBLD CKD-EPI 2021: 96 ML/MIN/1.73
ERYTHROCYTE [DISTWIDTH] IN BLOOD BY AUTOMATED COUNT: 17.1 % (ref 12.3–15.4)
GLOBULIN UR ELPH-MCNC: 2.4 GM/DL
GLUCOSE BLDC GLUCOMTR-MCNC: 263 MG/DL (ref 70–130)
GLUCOSE BLDC GLUCOMTR-MCNC: 292 MG/DL (ref 70–130)
GLUCOSE BLDC GLUCOMTR-MCNC: 306 MG/DL (ref 70–130)
GLUCOSE BLDC GLUCOMTR-MCNC: 401 MG/DL (ref 70–130)
GLUCOSE BLDC GLUCOMTR-MCNC: 406 MG/DL (ref 70–130)
GLUCOSE BLDC GLUCOMTR-MCNC: 502 MG/DL (ref 70–130)
GLUCOSE SERPL-MCNC: 336 MG/DL (ref 65–99)
HCT VFR BLD AUTO: 36.2 % (ref 34–46.6)
HGB BLD-MCNC: 11 G/DL (ref 12–15.9)
MCH RBC QN AUTO: 27.4 PG (ref 26.6–33)
MCHC RBC AUTO-ENTMCNC: 30.4 G/DL (ref 31.5–35.7)
MCV RBC AUTO: 90 FL (ref 79–97)
PLATELET # BLD AUTO: 294 10*3/MM3 (ref 140–450)
PMV BLD AUTO: 11.3 FL (ref 6–12)
POTASSIUM SERPL-SCNC: 4.8 MMOL/L (ref 3.5–5.2)
PROT SERPL-MCNC: 5.7 G/DL (ref 6–8.5)
RBC # BLD AUTO: 4.02 10*6/MM3 (ref 3.77–5.28)
SODIUM SERPL-SCNC: 136 MMOL/L (ref 136–145)
WBC NRBC COR # BLD: 17.62 10*3/MM3 (ref 3.4–10.8)

## 2022-12-30 PROCEDURE — 63710000001 INSULIN LISPRO (HUMAN) PER 5 UNITS: Performed by: INTERNAL MEDICINE

## 2022-12-30 PROCEDURE — 85027 COMPLETE CBC AUTOMATED: CPT | Performed by: HOSPITALIST

## 2022-12-30 PROCEDURE — 63710000001 PREDNISONE PER 5 MG: Performed by: INTERNAL MEDICINE

## 2022-12-30 PROCEDURE — 25010000002 ONDANSETRON PER 1 MG: Performed by: INTERNAL MEDICINE

## 2022-12-30 PROCEDURE — 63710000001 INSULIN DETEMIR PER 5 UNITS: Performed by: INTERNAL MEDICINE

## 2022-12-30 PROCEDURE — 25010000002 METHYLPREDNISOLONE PER 125 MG: Performed by: HOSPITALIST

## 2022-12-30 PROCEDURE — 63710000001 PREDNISONE PER 1 MG: Performed by: HOSPITALIST

## 2022-12-30 PROCEDURE — 99232 SBSQ HOSP IP/OBS MODERATE 35: CPT | Performed by: HOSPITALIST

## 2022-12-30 PROCEDURE — 80053 COMPREHEN METABOLIC PANEL: CPT | Performed by: HOSPITALIST

## 2022-12-30 PROCEDURE — 82962 GLUCOSE BLOOD TEST: CPT

## 2022-12-30 RX ORDER — PREDNISONE 20 MG/1
40 TABLET ORAL
Status: DISCONTINUED | OUTPATIENT
Start: 2022-12-30 | End: 2022-12-31

## 2022-12-30 RX ADMIN — ZOLPIDEM TARTRATE 5 MG: 5 TABLET ORAL at 22:47

## 2022-12-30 RX ADMIN — POLYETHYLENE GLYCOL 3350 17 G: 17 POWDER, FOR SOLUTION ORAL at 08:51

## 2022-12-30 RX ADMIN — FLECAINIDE ACETATE 100 MG: 50 TABLET ORAL at 08:50

## 2022-12-30 RX ADMIN — FLECAINIDE ACETATE 100 MG: 50 TABLET ORAL at 20:31

## 2022-12-30 RX ADMIN — FLUOXETINE 20 MG: 20 CAPSULE ORAL at 20:31

## 2022-12-30 RX ADMIN — GABAPENTIN 100 MG: 100 CAPSULE ORAL at 20:31

## 2022-12-30 RX ADMIN — TRAZODONE HYDROCHLORIDE 100 MG: 100 TABLET ORAL at 20:31

## 2022-12-30 RX ADMIN — INSULIN LISPRO 10 UNITS: 100 INJECTION, SOLUTION INTRAVENOUS; SUBCUTANEOUS at 12:08

## 2022-12-30 RX ADMIN — Medication 12.5 MG: at 08:49

## 2022-12-30 RX ADMIN — COLESEVELAM HYDROCHLORIDE 1250 MG: 625 TABLET, COATED ORAL at 17:17

## 2022-12-30 RX ADMIN — METHYLPREDNISOLONE SODIUM SUCCINATE 60 MG: 125 INJECTION, POWDER, FOR SOLUTION INTRAMUSCULAR; INTRAVENOUS at 05:28

## 2022-12-30 RX ADMIN — HYDROCODONE BITARTRATE AND ACETAMINOPHEN 1 TABLET: 10; 325 TABLET ORAL at 22:47

## 2022-12-30 RX ADMIN — INSULIN LISPRO 14 UNITS: 100 INJECTION, SOLUTION INTRAVENOUS; SUBCUTANEOUS at 21:08

## 2022-12-30 RX ADMIN — HYDROCODONE BITARTRATE AND ACETAMINOPHEN 1 TABLET: 10; 325 TABLET ORAL at 09:11

## 2022-12-30 RX ADMIN — PREDNISONE 40 MG: 20 TABLET ORAL at 11:31

## 2022-12-30 RX ADMIN — INSULIN DETEMIR 10 UNITS: 100 INJECTION, SOLUTION SUBCUTANEOUS at 21:08

## 2022-12-30 RX ADMIN — SPIRONOLACTONE 25 MG: 25 TABLET ORAL at 08:49

## 2022-12-30 RX ADMIN — APIXABAN 5 MG: 5 TABLET, FILM COATED ORAL at 08:50

## 2022-12-30 RX ADMIN — HYDROCODONE BITARTRATE AND ACETAMINOPHEN 1 TABLET: 10; 325 TABLET ORAL at 15:01

## 2022-12-30 RX ADMIN — LEVOTHYROXINE SODIUM 125 MCG: 125 TABLET ORAL at 06:36

## 2022-12-30 RX ADMIN — Medication 400 MG: at 08:51

## 2022-12-30 RX ADMIN — INSULIN LISPRO 8 UNITS: 100 INJECTION, SOLUTION INTRAVENOUS; SUBCUTANEOUS at 08:52

## 2022-12-30 RX ADMIN — APIXABAN 5 MG: 5 TABLET, FILM COATED ORAL at 20:31

## 2022-12-30 RX ADMIN — ONDANSETRON 4 MG: 2 INJECTION INTRAMUSCULAR; INTRAVENOUS at 20:31

## 2022-12-30 RX ADMIN — COLESEVELAM HYDROCHLORIDE 1250 MG: 625 TABLET, COATED ORAL at 08:49

## 2022-12-30 RX ADMIN — LIDOCAINE 2 PATCH: 700 PATCH TOPICAL at 08:51

## 2022-12-30 RX ADMIN — PREDNISONE 5 MG: 5 TABLET ORAL at 08:50

## 2022-12-30 RX ADMIN — ROSUVASTATIN CALCIUM 5 MG: 10 TABLET, COATED ORAL at 08:49

## 2022-12-30 RX ADMIN — FLUOXETINE 20 MG: 20 CAPSULE ORAL at 08:50

## 2022-12-30 RX ADMIN — FOLIC ACID TAB 400 MCG 400 MCG: 400 TAB at 08:50

## 2022-12-30 RX ADMIN — INSULIN LISPRO 14 UNITS: 100 INJECTION, SOLUTION INTRAVENOUS; SUBCUTANEOUS at 17:17

## 2022-12-30 RX ADMIN — INSULIN DETEMIR 10 UNITS: 100 INJECTION, SOLUTION SUBCUTANEOUS at 08:52

## 2022-12-30 RX ADMIN — GABAPENTIN 100 MG: 100 CAPSULE ORAL at 08:49

## 2022-12-30 NOTE — NURSING NOTE
Prior to central line removal, order for the removal of catheter was verified, patient was assessed, necessary materials were gathered and patient was educated regarding procedure .    Patient was positioned flat to ensure that the insertion site was at or below the level of the heart.    Hands were washed, clean gloves were applied and central line dressing was gently removed. Catheter exit site was not cultured.     A new pair of clean gloves were then applied. Insertion site was cleansed with 2% Chlorhexidine swab using a circular motion beginning at the insertion site and moving outward for 30 seconds and allowed to dry.     Clamp on line was not present.     Patient was instructed to perform Valsalva maneuver as catheter was withdrawn.     The central line was grasped at the insertion site and slowly pulled outward parallel to the skin. Resistance was not met.    After central line was completely removed, a sterile 4x4 gauze pad was used to apply light pressure until bleeding stopped. At that time, petroleum-based gauze and a sterile occlusive dressing was applied to exit site.     Patient was instructed to keep dressing in place for at least 24 hours and to remain in a flat or reclining position for 30 minutes post-removal.     Catheter was inspected after removal and was intact. Tip of central line was not sent for culture. Patient tolerated procedure.      Prior to central line removal, order for the removal of catheter was verified, patient was assessed, necessary materials were gathered and patient was educated regarding procedure .    Patient was positioned flat to ensure that the insertion site was at or below the level of the heart.    Hands were washed, clean gloves were applied and central line dressing was gently removed. Catheter exit site was not cultured.     A new pair of clean gloves were then applied. Insertion site was cleansed with 2% Chlorhexidine swab using a circular motion beginning at  the insertion site and moving outward for 30 seconds and allowed to dry.     Clamp on line was not present.     Patient was instructed to perform Valsalva maneuver as catheter was withdrawn.     The central line was grasped at the insertion site and slowly pulled outward parallel to the skin. Resistance was not met.    After central line was completely removed, a sterile 4x4 gauze pad was used to apply light pressure until bleeding stopped. At that time, petroleum-based gauze and a sterile occlusive dressing was applied to exit site.     Patient was instructed to keep dressing in place for at least 24 hours and to remain in a flat or reclining position for 30 minutes post-removal.     Catheter was inspected after removal and was intact. Tip of central line was not sent for culture. Patient tolerated procedure.

## 2022-12-30 NOTE — CASE MANAGEMENT/SOCIAL WORK
Continued Stay Note  [facility]     Patient Name: Charly Blevins  MRN: [id number]  Today's Date: 12/30/2022    Admit Date: 12/17/2022    Plan: Home w/HH [facility]   Discharge Plan     Row Name 12/30/22 1523       Plan    Plan Home w/HH [facility]    Patient/Family in Agreement with Plan yes    Plan Comments CM spoke w/pt, plan remains home w/HH. [facility] accepted and will follow pt once discharged. Pt has oxygen @ home thru [employer]. Pt stated that she has transportation and family that will stay w/her. No other d/c needs verbalized.    Final Discharge Disposition Code 06 - home with home health care               Discharge Codes    No documentation.               Expected Discharge Date and Time     Expected Discharge Date Expected Discharge Time    Dec 30, 2022             Brian Zuleta RN

## 2022-12-30 NOTE — PROGRESS NOTES
[facility]  PROGRESS NOTE    Patient Name: Charly Blevins  : 1955  MRN: [id number]    Date of Admission: 2022  Primary Care Physician: Boyd Molina MD    Subjective   Subjective     CC:  Cough    HPI:  Patient states she isn't feeling much better this am. Patient's oxygenation stable, continues to require 3L NC. She state she lives alone and has been ambulating on oxygen and doing ok but is a little nervous to go home alone today.    ROS:  Gen- No fevers, chills  CV- No chest pain, palpitations  Resp- + cough, +dyspnea  GI- No N/V/D, abd pain    Objective   Objective     Vital Signs:   Temp:  [97.1 °F (36.2 °C)-98.8 °F (37.1 °C)] 97.9 °F (36.6 °C)  Heart Rate:  [49-63] 55  Resp:  [16-18] 18  BP: ()/(47-76) 115/74  Flow (L/min):  [3] 3     Physical Exam:  Constitutional: No acute distress, awake, alert older female  HENT: NCAT, mucous membranes moist, n/c in place  Respiratory: decreased breath sounds b/l, respiratory effort normal, no rales, stable on 3L NC  Cardiovascular: RRR, no murmurs, rubs, or gallops  Gastrointestinal: Soft, nontender, nondistended  Musculoskeletal: Muscle tone within normal limits, no joint effusions appreciated  Psychiatric: Appropriate affect, cooperative  Neurologic: Alert, facial movements symmetric and spontaneous movement of all 4 extremities grossly equal bilaterally, speech clear  Skin: No rashes on exposed skin    LAB RESULTS:      Lab 22  0538 22  0611 22  1040 22  0334   WBC 9.11 9.84 10.15 8.02   HEMOGLOBIN 10.5* 11.0* 11.0* 10.5*   HEMATOCRIT 35.8 37.2 37.4 34.2   PLATELETS 261 269 253 220   NEUTROS ABS  --   --  6.09 4.41   IMMATURE GRANS (ABS)  --   --  0.42*  --    LYMPHS ABS  --   --  2.43  --    MONOS ABS  --   --  0.78  --    EOS ABS  --   --  0.40 0.80*   MCV 93.0 91.4 90.8 88.8   PROCALCITONIN  --   --  0.28*  --          Lab 22  0538 22  0611 22  0536  12/25/22  1040 12/24/22  0334   SODIUM 144 141 139 137 139   POTASSIUM 5.0 5.0 4.4 4.1 4.9   CHLORIDE 104 103 99 96* 96*   CO2 33.0* 33.0* 35.0* 36.0* 34.0*   ANION GAP 7.0 5.0 5.0 5.0 9.0   BUN 30* 31* 17 19 19   CREATININE 0.75 0.77 0.64 0.73 0.63   EGFR 86.8 84.1 96.4 89.7 96.8   GLUCOSE 146* 151* 141* 184* 116*   CALCIUM 9.4 9.3 9.2 9.1 8.9   MAGNESIUM  --   --  2.2 1.3* 1.6   HEMOGLOBIN A1C  --   --   --  7.30*  --          Lab 12/29/22  0538 12/28/22  0611 12/25/22  1040   TOTAL PROTEIN 5.5* 5.4* 5.3*   ALBUMIN 3.3* 3.1* 3.10*   GLOBULIN 2.2 2.3 2.2   ALT (SGPT) 22 22 24   AST (SGOT) 18 17 17   BILIRUBIN 0.2 0.2 0.2   ALK PHOS 62 64 78         Lab 12/25/22  1040 12/24/22  0334   PROBNP 563.7 1,223.0*                 Brief Urine Lab Results  (Last result in the past 365 days)      Color   Clarity   Blood   Leuk Est   Nitrite   Protein   CREAT   Urine HCG        12/17/22 1612             82.2         12/17/22 1612 Yellow   Clear   Negative   Negative   Negative   Trace                 Microbiology Results Abnormal     Procedure Component Value - Date/Time    Blood Culture - Blood, Arm, Left [[id number]]  (Normal) Collected: 12/17/22 1201    Lab Status: Final result Specimen: Blood from Arm, Left Updated: 12/22/22 1215     Blood Culture No growth at 5 days    Blood Culture - Blood, Arm, Right [[id number]]  (Normal) Collected: 12/17/22 1201    Lab Status: Final result Specimen: Blood from Arm, Right Updated: 12/22/22 1215     Blood Culture No growth at 5 days    Legionella Antigen, Urine - Urine, Urine, Clean Catch [[id number]]  (Normal) Collected: 12/17/22 1612    Lab Status: Final result Specimen: Urine, Clean Catch Updated: 12/17/22 2105     LEGIONELLA ANTIGEN, URINE Negative    MRSA Screen, PCR (Inpatient) - Swab, Nares [[id number]]  (Normal) Collected: 12/17/22 1609    Lab Status: Final result Specimen: Swab from Nares Updated: 12/17/22 1815     MRSA PCR Negative    Narrative:      The negative predictive value  of this diagnostic test is high and should only be used to consider de-escalating anti-MRSA therapy. A positive result may indicate colonization with MRSA and must be correlated clinically.  MRSA Negative          No radiology results from the last 24 hrs    Results for orders placed during the hospital encounter of 12/17/22    Adult Transthoracic Echo Complete w/ Color, Spectral and Contrast if Necessary Per Protocol    Interpretation Summary  •  Left ventricular systolic function is normal. Calculated left ventricular EF = 59.5%  •  Left ventricular wall thickness is consistent with mild concentric hypertrophy.  •  Left ventricular diastolic function is consistent with (grade II w/high LAP) pseudonormalization.  •  Left atrial volume is mildly increased.  •  There is calcification of the aortic valve.  •  Estimated right ventricular systolic pressure from tricuspid regurgitation is normal (<35 mmHg). Calculated right ventricular systolic pressure from tricuspid regurgitation is 27 mmHg.  •  Mild dilation of the ascending aorta is present.  4.1 cm      I have reviewed the medications:  Scheduled Meds:apixaban, 5 mg, Oral, BID  colesevelam, 1,250 mg, Oral, BID With Meals  flecainide, 100 mg, Oral, Q12H  FLUoxetine, 20 mg, Oral, BID  folic acid, 400 mcg, Oral, Daily  gabapentin, 100 mg, Oral, Q12H  insulin detemir, 10 Units, Subcutaneous, Q12H  insulin lispro, 0-14 Units, Subcutaneous, 4x Daily With Meals & Nightly  levothyroxine, 125 mcg, Oral, QAM  lidocaine, 2 patch, Transdermal, Q24H  magnesium oxide, 400 mg, Oral, Daily  metoprolol tartrate, 12.5 mg, Oral, BID  polyethylene glycol, 17 g, Oral, Daily  predniSONE, 40 mg, Oral, Daily With Breakfast  rosuvastatin, 5 mg, Oral, Daily  spironolactone, 25 mg, Oral, Daily  traZODone, 100 mg, Oral, Nightly      Continuous Infusions:   PRN Meds:.•  calcium carbonate  •  cyclobenzaprine  •  dextrose  •  HYDROcodone-acetaminophen  •  magnesium sulfate **OR** magnesium  sulfate **OR** magnesium sulfate  •  ondansetron  •  potassium chloride  •  sodium chloride  •  sodium chloride  •  SUMAtriptan  •  zolpidem    Assessment & Plan   Assessment & Plan     Active Hospital Problems    Diagnosis  POA   • **Septic shock [A41.9, R65.21]  Yes   • Pneumonia due to Streptococcus pneumoniae ([facility]) [J13]  Yes   • Acute hypoxic respiratory failure [J96.01]  Yes   • DRISS [N17.9]  Yes   • On chronic immunosuppressive therapy [D84.9]  Yes   • GERD [K21.9]  Yes   • Influenza A [J10.1]  Yes   • Obesity (BMI 30-39.9) [E66.9]  Yes   • Hypothyroidism [E03.9]  Yes   • Anxiety and depression [F41.9, F32.A]  Yes   • Gout [M10.9]  Yes   • Dyslipidemia [E78.5]  Yes   • RA [M06.9]  Yes   • T2DM [E11.9]  Yes      Resolved Hospital Problems   No resolved problems to display.        Brief Hospital Course to date:  Charly Blevins is a 67 y.o. female w RA on chronic MTX/prednisone, DMII, TIFFANY, hypothyroidism, severe macular degeneration, hard of hearing, who presented with hypoxic respiratory failure. Found to be flu A+, strep pneum +.      This patient's problems and plans were partially entered by my partner and updated as appropriate by me 12/30/22.    1) Acute hypoxic respiratory failure and septic shock 2/2 flu and strep pneumonia  - completed 7 days of ceftraixone, tamiflu completed 12/21  - wean O2 as able, on 3L currently  - flutter valve ordered  - solumedrol 60 IV Q8H stopped- changed olvin Prednisone 40mg PO daily x 5  Days  - hold home prednisone dose    2) RA on MTX and chronic prednisone, immunocompromised patient  - solumedrol 60 IV Q8H stopped- changed olvin Prednisone 40mg PO daily x 5  Days  - hold home prednisone dose    3) Recurrent hypomagnesemia  - IV Mg per protocol, start PO Mg given recurrences  -consider stopping home PO ppi    4) Deconditioning  - PT recommended SNF    *Remove central line today    H/o Afib s/p cardioversion - restart home metoprolol. Need home med rec for flecainide -->  patient unsure of medications, pharmacy to assist re: medication rec, on apixaban    DMII - on basal/bolus regimen + SSI here, A1c 7.3%  HLD - statin  Hypothyroidism - home levothyroxine  TIFFANY - uses nocturnal n/c at home  BMI 34 - complicates exam  GERD - ppi    D/c central line when pIV in place    Expected Discharge Location and Transportation: home with   Expected Discharge   Expected Discharge Date and Time     Expected Discharge Date Expected Discharge Time    Dec 31, 2022            DVT prophylaxis:  Medical and mechanical DVT prophylaxis orders are present.     AM-PAC 6 Clicks Score (PT): 18 (12/30/22 0800)    CODE STATUS:   Code Status and Medical Interventions:   Ordered at: 12/17/22 1507     Code Status (Patient has no pulse and is not breathing):    CPR (Attempt to Resuscitate)     Medical Interventions (Patient has pulse or is breathing):    Full Support       Cecy Herrmann,   12/30/22

## 2022-12-30 NOTE — PLAN OF CARE
Goal Outcome Evaluation:      Patient to go home tomorrow, CVL removed, PIV placed, vitals stable, BG elevated, MD aware

## 2022-12-31 LAB
ALBUMIN SERPL-MCNC: 3.2 G/DL (ref 3.5–5.2)
ALBUMIN/GLOB SERPL: 1.8 G/DL
ALP SERPL-CCNC: 56 U/L (ref 39–117)
ALT SERPL W P-5'-P-CCNC: 15 U/L (ref 1–33)
ANION GAP SERPL CALCULATED.3IONS-SCNC: 9 MMOL/L (ref 5–15)
AST SERPL-CCNC: 11 U/L (ref 1–32)
BILIRUB SERPL-MCNC: 0.2 MG/DL (ref 0–1.2)
BUN SERPL-MCNC: 29 MG/DL (ref 8–23)
BUN/CREAT SERPL: 40.8 (ref 7–25)
CALCIUM SPEC-SCNC: 9.3 MG/DL (ref 8.6–10.5)
CHLORIDE SERPL-SCNC: 99 MMOL/L (ref 98–107)
CO2 SERPL-SCNC: 29 MMOL/L (ref 22–29)
CREAT SERPL-MCNC: 0.71 MG/DL (ref 0.57–1)
DEPRECATED RDW RBC AUTO: 57.5 FL (ref 37–54)
EGFRCR SERPLBLD CKD-EPI 2021: 92.7 ML/MIN/1.73
ERYTHROCYTE [DISTWIDTH] IN BLOOD BY AUTOMATED COUNT: 17.2 % (ref 12.3–15.4)
GLOBULIN UR ELPH-MCNC: 1.8 GM/DL
GLUCOSE BLDC GLUCOMTR-MCNC: 143 MG/DL (ref 70–130)
GLUCOSE BLDC GLUCOMTR-MCNC: 262 MG/DL (ref 70–130)
GLUCOSE BLDC GLUCOMTR-MCNC: 299 MG/DL (ref 70–130)
GLUCOSE BLDC GLUCOMTR-MCNC: 343 MG/DL (ref 70–130)
GLUCOSE BLDC GLUCOMTR-MCNC: 380 MG/DL (ref 70–130)
GLUCOSE SERPL-MCNC: 323 MG/DL (ref 65–99)
HCT VFR BLD AUTO: 35.2 % (ref 34–46.6)
HGB BLD-MCNC: 10.5 G/DL (ref 12–15.9)
MAGNESIUM SERPL-MCNC: 1.3 MG/DL (ref 1.6–2.4)
MCH RBC QN AUTO: 27.2 PG (ref 26.6–33)
MCHC RBC AUTO-ENTMCNC: 29.8 G/DL (ref 31.5–35.7)
MCV RBC AUTO: 91.2 FL (ref 79–97)
PLATELET # BLD AUTO: 294 10*3/MM3 (ref 140–450)
PMV BLD AUTO: 11 FL (ref 6–12)
POTASSIUM SERPL-SCNC: 4.6 MMOL/L (ref 3.5–5.2)
PROT SERPL-MCNC: 5 G/DL (ref 6–8.5)
RBC # BLD AUTO: 3.86 10*6/MM3 (ref 3.77–5.28)
SODIUM SERPL-SCNC: 137 MMOL/L (ref 136–145)
WBC NRBC COR # BLD: 12.79 10*3/MM3 (ref 3.4–10.8)

## 2022-12-31 PROCEDURE — 63710000001 INSULIN DETEMIR PER 5 UNITS: Performed by: INTERNAL MEDICINE

## 2022-12-31 PROCEDURE — 80053 COMPREHEN METABOLIC PANEL: CPT | Performed by: HOSPITALIST

## 2022-12-31 PROCEDURE — 99232 SBSQ HOSP IP/OBS MODERATE 35: CPT | Performed by: PHYSICIAN ASSISTANT

## 2022-12-31 PROCEDURE — 25010000002 ONDANSETRON PER 1 MG: Performed by: INTERNAL MEDICINE

## 2022-12-31 PROCEDURE — 85027 COMPLETE CBC AUTOMATED: CPT | Performed by: HOSPITALIST

## 2022-12-31 PROCEDURE — 82962 GLUCOSE BLOOD TEST: CPT

## 2022-12-31 PROCEDURE — 63710000001 PREDNISONE PER 1 MG: Performed by: HOSPITALIST

## 2022-12-31 PROCEDURE — 63710000001 INSULIN LISPRO (HUMAN) PER 5 UNITS: Performed by: PHYSICIAN ASSISTANT

## 2022-12-31 PROCEDURE — 83735 ASSAY OF MAGNESIUM: CPT | Performed by: PHYSICIAN ASSISTANT

## 2022-12-31 PROCEDURE — 25010000002 MAGNESIUM SULFATE 2 GM/50ML SOLUTION: Performed by: INTERNAL MEDICINE

## 2022-12-31 PROCEDURE — 63710000001 INSULIN DETEMIR PER 5 UNITS: Performed by: PHYSICIAN ASSISTANT

## 2022-12-31 RX ORDER — INSULIN LISPRO 100 [IU]/ML
0-9 INJECTION, SOLUTION INTRAVENOUS; SUBCUTANEOUS
Status: DISCONTINUED | OUTPATIENT
Start: 2022-12-31 | End: 2023-01-03 | Stop reason: HOSPADM

## 2022-12-31 RX ORDER — ALLOPURINOL 300 MG/1
300 TABLET ORAL DAILY
Status: DISCONTINUED | OUTPATIENT
Start: 2022-12-31 | End: 2023-01-03 | Stop reason: HOSPADM

## 2022-12-31 RX ORDER — PREDNISONE 20 MG/1
20 TABLET ORAL
Status: DISCONTINUED | OUTPATIENT
Start: 2023-01-01 | End: 2023-01-01

## 2022-12-31 RX ADMIN — APIXABAN 5 MG: 5 TABLET, FILM COATED ORAL at 09:47

## 2022-12-31 RX ADMIN — HYDROCODONE BITARTRATE AND ACETAMINOPHEN 1 TABLET: 10; 325 TABLET ORAL at 23:12

## 2022-12-31 RX ADMIN — Medication 12.5 MG: at 09:48

## 2022-12-31 RX ADMIN — HYDROCODONE BITARTRATE AND ACETAMINOPHEN 1 TABLET: 10; 325 TABLET ORAL at 10:00

## 2022-12-31 RX ADMIN — METFORMIN HYDROCHLORIDE 1000 MG: 1000 TABLET, FILM COATED ORAL at 17:44

## 2022-12-31 RX ADMIN — APIXABAN 5 MG: 5 TABLET, FILM COATED ORAL at 20:53

## 2022-12-31 RX ADMIN — ONDANSETRON 4 MG: 2 INJECTION INTRAMUSCULAR; INTRAVENOUS at 18:59

## 2022-12-31 RX ADMIN — MAGNESIUM SULFATE HEPTAHYDRATE 2 G: 2 INJECTION, SOLUTION INTRAVENOUS at 14:17

## 2022-12-31 RX ADMIN — MAGNESIUM SULFATE HEPTAHYDRATE 2 G: 2 INJECTION, SOLUTION INTRAVENOUS at 18:53

## 2022-12-31 RX ADMIN — ALLOPURINOL 300 MG: 300 TABLET ORAL at 12:38

## 2022-12-31 RX ADMIN — LEVOTHYROXINE SODIUM 125 MCG: 125 TABLET ORAL at 06:44

## 2022-12-31 RX ADMIN — TRAZODONE HYDROCHLORIDE 100 MG: 100 TABLET ORAL at 20:53

## 2022-12-31 RX ADMIN — INSULIN LISPRO 8 UNITS: 100 INJECTION, SOLUTION INTRAVENOUS; SUBCUTANEOUS at 17:44

## 2022-12-31 RX ADMIN — GABAPENTIN 100 MG: 100 CAPSULE ORAL at 09:47

## 2022-12-31 RX ADMIN — ROSUVASTATIN CALCIUM 5 MG: 10 TABLET, COATED ORAL at 09:47

## 2022-12-31 RX ADMIN — FLUOXETINE 20 MG: 20 CAPSULE ORAL at 20:53

## 2022-12-31 RX ADMIN — PREDNISONE 40 MG: 20 TABLET ORAL at 09:47

## 2022-12-31 RX ADMIN — METFORMIN HYDROCHLORIDE 1000 MG: 1000 TABLET, FILM COATED ORAL at 09:48

## 2022-12-31 RX ADMIN — FLUOXETINE 20 MG: 20 CAPSULE ORAL at 09:47

## 2022-12-31 RX ADMIN — ZOLPIDEM TARTRATE 5 MG: 5 TABLET ORAL at 23:12

## 2022-12-31 RX ADMIN — COLESEVELAM HYDROCHLORIDE 1250 MG: 625 TABLET, COATED ORAL at 09:50

## 2022-12-31 RX ADMIN — FLECAINIDE ACETATE 100 MG: 50 TABLET ORAL at 09:49

## 2022-12-31 RX ADMIN — INSULIN LISPRO 6 UNITS: 100 INJECTION, SOLUTION INTRAVENOUS; SUBCUTANEOUS at 12:38

## 2022-12-31 RX ADMIN — GABAPENTIN 100 MG: 100 CAPSULE ORAL at 20:54

## 2022-12-31 RX ADMIN — LIDOCAINE 2 PATCH: 700 PATCH TOPICAL at 09:50

## 2022-12-31 RX ADMIN — INSULIN DETEMIR 10 UNITS: 100 INJECTION, SOLUTION SUBCUTANEOUS at 09:49

## 2022-12-31 RX ADMIN — Medication 800 MG: at 20:55

## 2022-12-31 RX ADMIN — INSULIN LISPRO 7 UNITS: 100 INJECTION, SOLUTION INTRAVENOUS; SUBCUTANEOUS at 21:00

## 2022-12-31 RX ADMIN — EMPAGLIFLOZIN 10 MG: 10 TABLET, FILM COATED ORAL at 17:44

## 2022-12-31 RX ADMIN — INSULIN DETEMIR 10 UNITS: 100 INJECTION, SOLUTION SUBCUTANEOUS at 21:01

## 2022-12-31 RX ADMIN — FLECAINIDE ACETATE 100 MG: 50 TABLET ORAL at 20:53

## 2022-12-31 RX ADMIN — FOLIC ACID TAB 400 MCG 400 MCG: 400 TAB at 09:47

## 2022-12-31 RX ADMIN — Medication 400 MG: at 10:02

## 2022-12-31 RX ADMIN — SPIRONOLACTONE 25 MG: 25 TABLET ORAL at 09:48

## 2022-12-31 RX ADMIN — COLESEVELAM HYDROCHLORIDE 1250 MG: 625 TABLET, COATED ORAL at 17:44

## 2022-12-31 RX ADMIN — MAGNESIUM SULFATE HEPTAHYDRATE 2 G: 2 INJECTION, SOLUTION INTRAVENOUS at 21:11

## 2022-12-31 NOTE — PROGRESS NOTES
[facility] Medicine Services  PROGRESS NOTE    Patient Name: Charly Blevins  : 1955  MRN: [id number]    Date of Admission: 2022  Primary Care Physician: Boyd Molina MD    Subjective     CC: f/u respiratory failure, hyperglycemia     HPI:  In bed. Feeling about the same. Has weaned to 2L NC. Developed hyperglycemia with glucose >500 last night - she felt poorly and was nauseated during this time. Explained remaining in hospital today - she is comfortable with this and reports she is in no hurry to go home. She continues to decline rehab placement.     ROS:  Gen- No fevers, chills  CV- No chest pain, palpitations  Resp- (+) cough/dyspnea   GI- No N/V/D, abd pain    Objective     Vital Signs:   Temp:  [97.6 °F (36.4 °C)-98.5 °F (36.9 °C)] 98.1 °F (36.7 °C)  Heart Rate:  [50-67] 56  Resp:  [16-18] 16  BP: ()/(62-78) 108/62  Flow (L/min):  [2-3] 2     Physical Exam:  Constitutional: No acute distress, awake, alert and conversant. Laying in bed. Chronically ill appearing   HENT: NCAT, mucous membranes moist  Respiratory: Clear to auscultation bilaterally, respiratory effort normal. On 2L NC   Cardiovascular: RRR, no murmurs, rubs, or gallops  Gastrointestinal: Positive bowel sounds, soft, nontender, nondistended  Musculoskeletal: No bilateral ankle edema  Psychiatric: Appropriate affect, cooperative  Neurologic: Oriented x 3, moves all extremities spontaneously without focal deficits, speech clear    LAB RESULTS:      Lab 22  1220 22  1054 22  0538 22  0611 22  1040   WBC 12.79* 17.62* 9.11 9.84 10.15   HEMOGLOBIN 10.5* 11.0* 10.5* 11.0* 11.0*   HEMATOCRIT 35.2 36.2 35.8 37.2 37.4   PLATELETS 294 294 261 269 253   NEUTROS ABS  --   --   --   --  6.09   IMMATURE GRANS (ABS)  --   --   --   --  0.42*   LYMPHS ABS  --   --   --   --  2.43   MONOS ABS  --   --   --   --  0.78   EOS ABS  --   --   --   --  0.40   MCV 91.2 90.0 93.0 91.4  90.8   PROCALCITONIN  --   --   --   --  0.28*         Lab 12/31/22  1220 12/30/22  1054 12/29/22  0538 12/28/22  0611 12/26/22  0536 12/25/22  1040   SODIUM 137 136 144 141 139 137   POTASSIUM 4.6 4.8 5.0 5.0 4.4 4.1   CHLORIDE 99 99 104 103 99 96*   CO2 29.0 27.0 33.0* 33.0* 35.0* 36.0*   ANION GAP 9.0 10.0 7.0 5.0 5.0 5.0   BUN 29* 32* 30* 31* 17 19   CREATININE 0.71 0.65 0.75 0.77 0.64 0.73   EGFR 92.7 96.0 86.8 84.1 96.4 89.7   GLUCOSE 323* 336* 146* 151* 141* 184*   CALCIUM 9.3 8.9 9.4 9.3 9.2 9.1   MAGNESIUM 1.3*  --   --   --  2.2 1.3*   HEMOGLOBIN A1C  --   --   --   --   --  7.30*         Lab 12/31/22  1220 12/30/22  1054 12/29/22  0538 12/28/22  0611 12/25/22  1040   TOTAL PROTEIN 5.0* 5.7* 5.5* 5.4* 5.3*   ALBUMIN 3.2* 3.3* 3.3* 3.1* 3.10*   GLOBULIN 1.8 2.4 2.2 2.3 2.2   ALT (SGPT) 15 18 22 22 24   AST (SGOT) 11 11 18 17 17   BILIRUBIN 0.2 0.2 0.2 0.2 0.2   ALK PHOS 56 67 62 64 78         Lab 12/25/22  1040   PROBNP 563.7     Brief Urine Lab Results  (Last result in the past 365 days)      Color   Clarity   Blood   Leuk Est   Nitrite   Protein   CREAT   Urine HCG        12/17/22 1612             82.2         12/17/22 1612 Yellow   Clear   Negative   Negative   Negative   Trace               Microbiology Results Abnormal     Procedure Component Value - Date/Time    Blood Culture - Blood, Arm, Left [[id number]]  (Normal) Collected: 12/17/22 1201    Lab Status: Final result Specimen: Blood from Arm, Left Updated: 12/22/22 1215     Blood Culture No growth at 5 days    Blood Culture - Blood, Arm, Right [[id number]]  (Normal) Collected: 12/17/22 1201    Lab Status: Final result Specimen: Blood from Arm, Right Updated: 12/22/22 1215     Blood Culture No growth at 5 days    Legionella Antigen, Urine - Urine, Urine, Clean Catch [[id number]]  (Normal) Collected: 12/17/22 1612    Lab Status: Final result Specimen: Urine, Clean Catch Updated: 12/17/22 2105     LEGIONELLA ANTIGEN, URINE Negative    MRSA Screen, PCR  (Inpatient) - Swab, Nares [[id number]]  (Normal) Collected: 12/17/22 1603    Lab Status: Final result Specimen: Swab from Nares Updated: 12/17/22 1815     MRSA PCR Negative    Narrative:      The negative predictive value of this diagnostic test is high and should only be used to consider de-escalating anti-MRSA therapy. A positive result may indicate colonization with MRSA and must be correlated clinically.  MRSA Negative        No radiology results from the last 24 hrs    Results for orders placed during the hospital encounter of 12/17/22    Adult Transthoracic Echo Complete w/ Color, Spectral and Contrast if Necessary Per Protocol    Interpretation Summary  •  Left ventricular systolic function is normal. Calculated left ventricular EF = 59.5%  •  Left ventricular wall thickness is consistent with mild concentric hypertrophy.  •  Left ventricular diastolic function is consistent with (grade II w/high LAP) pseudonormalization.  •  Left atrial volume is mildly increased.  •  There is calcification of the aortic valve.  •  Estimated right ventricular systolic pressure from tricuspid regurgitation is normal (<35 mmHg). Calculated right ventricular systolic pressure from tricuspid regurgitation is 27 mmHg.  •  Mild dilation of the ascending aorta is present.  4.1 cm    I have reviewed the medications:  Scheduled Meds:allopurinol, 300 mg, Oral, Daily  apixaban, 5 mg, Oral, BID  colesevelam, 1,250 mg, Oral, BID With Meals  flecainide, 100 mg, Oral, Q12H  FLUoxetine, 20 mg, Oral, BID  folic acid, 400 mcg, Oral, Daily  gabapentin, 100 mg, Oral, Q12H  insulin detemir, 10 Units, Subcutaneous, Q12H  insulin lispro, 0-9 Units, Subcutaneous, 4x Daily With Meals & Nightly  levothyroxine, 125 mcg, Oral, QAM  lidocaine, 2 patch, Transdermal, Q24H  magnesium oxide, 400 mg, Oral, Daily  metFORMIN, 1,000 mg, Oral, BID With Meals  metoprolol tartrate, 12.5 mg, Oral, BID  polyethylene glycol, 17 g, Oral, Daily  predniSONE, 40 mg, Oral,  Daily With Breakfast  rosuvastatin, 5 mg, Oral, Daily  spironolactone, 25 mg, Oral, Daily  traZODone, 100 mg, Oral, Nightly      Continuous Infusions:   PRN Meds:.•  calcium carbonate  •  cyclobenzaprine  •  dextrose  •  HYDROcodone-acetaminophen  •  magnesium sulfate **OR** magnesium sulfate **OR** magnesium sulfate  •  ondansetron  •  potassium chloride  •  sodium chloride  •  sodium chloride  •  SUMAtriptan  •  zolpidem    Assessment & Plan   Assessment & Plan     Active Hospital Problems    Diagnosis  POA   • **Septic shock [A41.9, R65.21]  Yes   • Pneumonia due to Streptococcus pneumoniae (HCC) [J13]  Yes   • Acute hypoxic respiratory failure [J96.01]  Yes   • DRISS [N17.9]  Yes   • On chronic immunosuppressive therapy [D84.9]  Yes   • GERD [K21.9]  Yes   • Influenza A [J10.1]  Yes   • Obesity (BMI 30-39.9) [E66.9]  Yes   • Hypothyroidism [E03.9]  Yes   • Anxiety and depression [F41.9, F32.A]  Yes   • Gout [M10.9]  Yes   • Dyslipidemia [E78.5]  Yes   • RA [M06.9]  Yes   • T2DM [E11.9]  Yes      Resolved Hospital Problems   No resolved problems to display.     Brief Hospital Course to date:  Charly Blevins is a 67 y.o. female with PMH significant for HTN, HLD, non-insulin dependent DMII, atrial fibrillation, GERD, rheumatoid arthritis (immunosuppressed on Methotrexate / chronic prednisone), macular degeneration and TIFFANY. Admitted to  ICU 12/17/22 for acute hypoxic respiratory failure / septic shock secondary to RML pneumonia due to S. Pneumoniae and Influenza A. Hospital medicine assumed care on 12/25/22. PT/OT recommend rehab - patient declines.     All problems are new to me today. I have reviewed and updated the patient's chart.     Acute hypoxic respiratory failure and septic shock 2/2 flu and strep pneumonia  - She has completed Tamiflu and 7 days of IV Ceftriaxone  - s/p pulse dose streroids - Solumedrol 60mg IV Q8H x 3 doses on 12/29 - then transitioned to Prednisone 40mg PO daily.  Developed significant hyperglycemia. Decrease prednisone to 20mg PO daily  - Wean O2 as able - on 2L NC today  - OPEP    RA on MTX and chronic prednisone  Immunosuppressed host  - s/p Solumedrol, tapered Prednisone 40mg to 20mg   - Holding home prednisone 5mg daily     Recurrent hypomagnesemia  - s/p IV magnesium - now on Magnesium 400mg PO daily  - Mag today 1.3. Increase Mag ox to 800mg BID  - Consider stopping home PO PPI    Non-insulin dependent DMII  Steroid-induced hyperglycemia  - Hgb A1c 7.3% - at home on Metformin 1000mg BID and Farxiga 10mg daily  - Significant hyperglycemia after Solumedrol with glucose > 500 yesterday  - Stop high-dose steroids  - Start Metformin 1000mg BID and Empaglifozin (formulary substitute for home Farxiga) 10mg daily   - DC AM Levemir as steroids tapered - continue SSI and Levemir 10 units QHS    Deconditioning  - PT recommended SNF. Patient declines. Home with HH     H/o Afib s/p cardioversion, continue Metoprolol, Flecanide and Eliquis   HLD, continue statin  Hypothyroidism, continue Levothyroxine  TIFFANY - uses nocturnal n/c at home  BMI 34 - complicates all aspects of care  GERD, on PPI - consider DC in light of hypomagnesemia     Expected Discharge Location and Transportation: home with HH via private vehicle   Expected Discharge - if glucose stable  Expected Discharge Date and Time     Expected Discharge Date Expected Discharge Time    Jan 1, 2023         DVT prophylaxis:Medical and mechanical DVT prophylaxis orders are present.     AM-PAC 6 Clicks Score (PT): 18 (12/31/22 0800)    CODE STATUS:   Code Status and Medical Interventions:   Ordered at: 12/17/22 1507     Code Status (Patient has no pulse and is not breathing):    CPR (Attempt to Resuscitate)     Medical Interventions (Patient has pulse or is breathing):    Full Support     Alpa Lang PA-C  12/31/22

## 2022-12-31 NOTE — PLAN OF CARE
Goal Outcome Evaluation:  Plan of Care Reviewed With: patient        Progress: improving  Outcome Evaluation: Vital signs wnl. Oxygen level greater than 90% on 2 liters. No complaints of shortness of breath. Pain medication given as needed. Patient alert and oriented x 4. Will continue to monitor.

## 2023-01-01 ENCOUNTER — APPOINTMENT (OUTPATIENT)
Dept: GENERAL RADIOLOGY | Facility: HOSPITAL | Age: 68
DRG: 871 | End: 2023-01-01
Payer: MEDICARE

## 2023-01-01 LAB
DEPRECATED RDW RBC AUTO: 58.1 FL (ref 37–54)
ERYTHROCYTE [DISTWIDTH] IN BLOOD BY AUTOMATED COUNT: 17.2 % (ref 12.3–15.4)
GLUCOSE BLDC GLUCOMTR-MCNC: 146 MG/DL (ref 70–130)
GLUCOSE BLDC GLUCOMTR-MCNC: 230 MG/DL (ref 70–130)
GLUCOSE BLDC GLUCOMTR-MCNC: 280 MG/DL (ref 70–130)
GLUCOSE BLDC GLUCOMTR-MCNC: 301 MG/DL (ref 70–130)
HCT VFR BLD AUTO: 34.2 % (ref 34–46.6)
HGB BLD-MCNC: 10.2 G/DL (ref 12–15.9)
MAGNESIUM SERPL-MCNC: 2.1 MG/DL (ref 1.6–2.4)
MCH RBC QN AUTO: 27.3 PG (ref 26.6–33)
MCHC RBC AUTO-ENTMCNC: 29.8 G/DL (ref 31.5–35.7)
MCV RBC AUTO: 91.7 FL (ref 79–97)
PLATELET # BLD AUTO: 302 10*3/MM3 (ref 140–450)
PMV BLD AUTO: 11.6 FL (ref 6–12)
RBC # BLD AUTO: 3.73 10*6/MM3 (ref 3.77–5.28)
WBC NRBC COR # BLD: 11.34 10*3/MM3 (ref 3.4–10.8)

## 2023-01-01 PROCEDURE — 99232 SBSQ HOSP IP/OBS MODERATE 35: CPT | Performed by: INTERNAL MEDICINE

## 2023-01-01 PROCEDURE — 71045 X-RAY EXAM CHEST 1 VIEW: CPT

## 2023-01-01 PROCEDURE — 63710000001 PREDNISONE PER 1 MG: Performed by: PHYSICIAN ASSISTANT

## 2023-01-01 PROCEDURE — 97110 THERAPEUTIC EXERCISES: CPT

## 2023-01-01 PROCEDURE — 85027 COMPLETE CBC AUTOMATED: CPT | Performed by: PHYSICIAN ASSISTANT

## 2023-01-01 PROCEDURE — 94799 UNLISTED PULMONARY SVC/PX: CPT

## 2023-01-01 PROCEDURE — 97116 GAIT TRAINING THERAPY: CPT

## 2023-01-01 PROCEDURE — 94640 AIRWAY INHALATION TREATMENT: CPT

## 2023-01-01 PROCEDURE — 63710000001 INSULIN LISPRO (HUMAN) PER 5 UNITS: Performed by: PHYSICIAN ASSISTANT

## 2023-01-01 PROCEDURE — 83735 ASSAY OF MAGNESIUM: CPT | Performed by: PHYSICIAN ASSISTANT

## 2023-01-01 PROCEDURE — 63710000001 INSULIN DETEMIR PER 5 UNITS: Performed by: PHYSICIAN ASSISTANT

## 2023-01-01 PROCEDURE — 82962 GLUCOSE BLOOD TEST: CPT

## 2023-01-01 PROCEDURE — 97530 THERAPEUTIC ACTIVITIES: CPT

## 2023-01-01 RX ORDER — IPRATROPIUM BROMIDE AND ALBUTEROL SULFATE 2.5; .5 MG/3ML; MG/3ML
3 SOLUTION RESPIRATORY (INHALATION)
Status: DISCONTINUED | OUTPATIENT
Start: 2023-01-01 | End: 2023-01-03 | Stop reason: HOSPADM

## 2023-01-01 RX ORDER — PREDNISONE 1 MG/1
5 TABLET ORAL
Status: DISCONTINUED | OUTPATIENT
Start: 2023-01-01 | End: 2023-01-03 | Stop reason: HOSPADM

## 2023-01-01 RX ADMIN — Medication 12.5 MG: at 08:24

## 2023-01-01 RX ADMIN — EMPAGLIFLOZIN 10 MG: 10 TABLET, FILM COATED ORAL at 08:24

## 2023-01-01 RX ADMIN — INSULIN LISPRO 6 UNITS: 100 INJECTION, SOLUTION INTRAVENOUS; SUBCUTANEOUS at 12:02

## 2023-01-01 RX ADMIN — Medication 12.5 MG: at 21:25

## 2023-01-01 RX ADMIN — ROSUVASTATIN CALCIUM 5 MG: 10 TABLET, COATED ORAL at 08:24

## 2023-01-01 RX ADMIN — TRAZODONE HYDROCHLORIDE 100 MG: 100 TABLET ORAL at 21:25

## 2023-01-01 RX ADMIN — GABAPENTIN 100 MG: 100 CAPSULE ORAL at 21:25

## 2023-01-01 RX ADMIN — APIXABAN 5 MG: 5 TABLET, FILM COATED ORAL at 21:26

## 2023-01-01 RX ADMIN — HYDROCODONE BITARTRATE AND ACETAMINOPHEN 1 TABLET: 10; 325 TABLET ORAL at 14:35

## 2023-01-01 RX ADMIN — Medication 800 MG: at 08:25

## 2023-01-01 RX ADMIN — APIXABAN 5 MG: 5 TABLET, FILM COATED ORAL at 08:24

## 2023-01-01 RX ADMIN — FLUOXETINE 20 MG: 20 CAPSULE ORAL at 08:24

## 2023-01-01 RX ADMIN — PREDNISONE 20 MG: 20 TABLET ORAL at 08:25

## 2023-01-01 RX ADMIN — LIDOCAINE 2 PATCH: 700 PATCH TOPICAL at 08:25

## 2023-01-01 RX ADMIN — HYDROCODONE BITARTRATE AND ACETAMINOPHEN 1 TABLET: 10; 325 TABLET ORAL at 21:25

## 2023-01-01 RX ADMIN — COLESEVELAM HYDROCHLORIDE 1250 MG: 625 TABLET, COATED ORAL at 08:26

## 2023-01-01 RX ADMIN — COLESEVELAM HYDROCHLORIDE 1250 MG: 625 TABLET, COATED ORAL at 17:00

## 2023-01-01 RX ADMIN — METFORMIN HYDROCHLORIDE 1000 MG: 1000 TABLET, FILM COATED ORAL at 17:00

## 2023-01-01 RX ADMIN — IPRATROPIUM BROMIDE AND ALBUTEROL SULFATE 3 ML: 2.5; .5 SOLUTION RESPIRATORY (INHALATION) at 16:51

## 2023-01-01 RX ADMIN — FLUOXETINE 20 MG: 20 CAPSULE ORAL at 21:25

## 2023-01-01 RX ADMIN — LEVOTHYROXINE SODIUM 125 MCG: 125 TABLET ORAL at 06:21

## 2023-01-01 RX ADMIN — ALLOPURINOL 300 MG: 300 TABLET ORAL at 08:24

## 2023-01-01 RX ADMIN — INSULIN LISPRO 4 UNITS: 100 INJECTION, SOLUTION INTRAVENOUS; SUBCUTANEOUS at 21:26

## 2023-01-01 RX ADMIN — INSULIN DETEMIR 10 UNITS: 100 INJECTION, SOLUTION SUBCUTANEOUS at 21:26

## 2023-01-01 RX ADMIN — IPRATROPIUM BROMIDE AND ALBUTEROL SULFATE 3 ML: 2.5; .5 SOLUTION RESPIRATORY (INHALATION) at 19:59

## 2023-01-01 RX ADMIN — Medication 10 ML: at 21:26

## 2023-01-01 RX ADMIN — FLECAINIDE ACETATE 100 MG: 50 TABLET ORAL at 21:25

## 2023-01-01 RX ADMIN — GABAPENTIN 100 MG: 100 CAPSULE ORAL at 08:24

## 2023-01-01 RX ADMIN — HYDROCODONE BITARTRATE AND ACETAMINOPHEN 1 TABLET: 10; 325 TABLET ORAL at 08:31

## 2023-01-01 RX ADMIN — INSULIN LISPRO 7 UNITS: 100 INJECTION, SOLUTION INTRAVENOUS; SUBCUTANEOUS at 17:00

## 2023-01-01 RX ADMIN — METFORMIN HYDROCHLORIDE 1000 MG: 1000 TABLET, FILM COATED ORAL at 08:25

## 2023-01-01 RX ADMIN — FOLIC ACID TAB 400 MCG 400 MCG: 400 TAB at 08:24

## 2023-01-01 RX ADMIN — Medication 800 MG: at 21:25

## 2023-01-01 RX ADMIN — SPIRONOLACTONE 25 MG: 25 TABLET ORAL at 08:24

## 2023-01-01 RX ADMIN — ZOLPIDEM TARTRATE 5 MG: 5 TABLET ORAL at 21:25

## 2023-01-01 NOTE — THERAPY TREATMENT NOTE
Patient Name: Charly Blevins  : 1955    MRN: [id number]                              Today's Date: 2023       Admit Date: 2022    Visit Dx:     ICD-10-CM ICD-9-CM   1. Septic shock ([facility])  A41.9 038.9    R65.21 785.52     995.92   2. Influenza A  J10.1 487.1   3. Bacterial pneumonia  J15.9 482.9   4. DRISS (acute kidney injury) ([facility])  N17.9 584.9   5. Acute hypoxic respiratory failure  J96.01 518.81     Patient Active Problem List   Diagnosis   • Closed intertrochanteric fracture of hip, right, initial encounter ([facility])   • Pubic ramus fracture ([facility])   • HTN (hypertension)   • Dyslipidemia   • T2DM   • RA   • Atrial fibrillation ([facility])   • Acute hypoxic respiratory failure   • DRISS   • On chronic immunosuppressive therapy   • GERD   • Influenza A   • Obesity (BMI 30-39.9)   • Hypothyroidism   • Anxiety and depression   • Gout   • Septic shock   • Pneumonia due to Streptococcus pneumoniae ([facility])     Past Medical History:   Diagnosis Date   • Anxiety and depression 2022   • Atrial fibrillation ([facility]) 2015    CARDIOVERSION - NO REOCCURANCE SINCE    • Diabetes mellitus ([facility])    • Gout 2022   • Hyperlipidemia    • Hypertension    • Hypothyroidism 2022   • Obesity (BMI 30-39.9) 2022     Past Surgical History:   Procedure Laterality Date   • APPENDECTOMY     • CHOLECYSTECTOMY     • COLOSTOMY      RESULTED FROM HYSTERECTOMY    • COLOSTOMY CLOSURE      WHILE DOING COLOSTOMY CLOSURE; ENDED UP HAVING A ILEOSTOMY   • HEMORRHOIDECTOMY     • HIP TROCHANTERIC NAILING WITH INTRAMEDULLARY HIP SCREW Right 3/27/2019    Procedure: HIP TROCANTERIC NAILING WITH INTRAMEDULLARY HIP SCREW RIGHT;  Surgeon: Jona Tom MD;  Location: [facility] beds at the [facility];  Service: Orthopedics   • HYSTERECTOMY     • LUMBAR DISCECTOMY      L4 - L5   • OTHER SURGICAL HISTORY      TUBAL REVERSAL   • THYROIDECTOMY     • TUBAL ABDOMINAL LIGATION      X 2      General Information     Row Name 23 1523          Physical  Therapy Time and Intention    Document Type therapy note (daily note)  -     Mode of Treatment physical therapy  -     Row Name 01/01/23 1523          General Information    Patient Profile Reviewed yes  -     Existing Precautions/Restrictions fall;oxygen therapy device and L/min  -     Barriers to Rehab medically complex;previous functional deficit;hearing deficit  -     Row Name 01/01/23 1523          Cognition    Orientation Status (Cognition) oriented x 4  -     Row Name 01/01/23 1523          Safety Issues, Functional Mobility    Safety Issues Affecting Function (Mobility) awareness of need for assistance;insight into deficits/self-awareness;safety precaution awareness;safety precautions follow-through/compliance  -     Impairments Affecting Function (Mobility) balance;endurance/activity tolerance;shortness of breath;strength;pain;postural/trunk control  -           User Key  (r) = Recorded By, (t) = Taken By, (c) = Cosigned By    Initials Name Provider Type     Angela Valladares, PT Physical Therapist               Mobility     Row Name 01/01/23 1524          Bed Mobility    Comment, (Bed Mobility) Received [facility] upon arrival and returned to chair.  -     Row Name 01/01/23 1524          Sit-Stand Transfer    Sit-Stand [place] (Transfers) contact guard;verbal cues  -     Assistive Device (Sit-Stand Transfers) walker, front-wheeled  -BA     Comment, (Sit-Stand Transfer) STS x 2 reps from chair and toilet.  Increased effort from lower height surface, i.e. toilet.  VCs/TCs for sequencing and hand placement.  Reported no dizziness upon standing.  -     Row Name 01/01/23 1524          Gait/Stairs (Locomotion)    [place] Level (Gait) contact guard;verbal cues  -     Assistive Device (Gait) walker, front-wheeled  -BA     Distance in Feet (Gait) 20+80+70  -     Deviations/Abnormal Patterns (Gait) bilateral deviations;gage decreased;gait speed decreased;stride length decreased   -     Bilateral Gait Deviations forward flexed posture;heel strike decreased  -     Comment, (Gait/Stairs) Demonstrated step through gait pattern with decreased gage and step length.  Ambulated to restroom on RA and O2 sats decreased to 83%; recovered to >90% in ~1-1.5 min with seated rest and focus on PLB.  1.5L O2 via NC reapplied and then ambulated 150ft in hallway requiring 1 standing rest break d/t fatigue and CORTES.  O2 sats remained b/w 93-96% on 1.5L.  VCs/TCs provided for occasional walker management, PLB, upright posture, to stay closer to FWW, and improved stride length.  Gait distance limited by fatigue.  -           User Key  (r) = Recorded By, (t) = Taken By, (c) = Cosigned By    Initials Name Provider Type     Angela Valladares PT Physical Therapist               Obj/Interventions     Row Name 01/01/23 1530          Motor Skills    Therapeutic Exercise hip;knee;ankle  -     Row Name 01/01/23 1530          Hip (Therapeutic Exercise)    Hip (Therapeutic Exercise) strengthening exercise  -     Hip Strengthening (Therapeutic Exercise) bilateral;marching while seated;aBduction;aDduction;10 repetitions;heel slides;5 repetitions;sitting  -     Row Name 01/01/23 1530          Knee (Therapeutic Exercise)    Knee (Therapeutic Exercise) strengthening exercise  -     Knee Strengthening (Therapeutic Exercise) bilateral;LAQ (long arc quad);10 repetitions;SLR (straight leg raise);5 repetitions;sitting  -     Row Name 01/01/23 1530          Ankle (Therapeutic Exercise)    Ankle (Therapeutic Exercise) AROM (active range of motion)  -     Ankle AROM (Therapeutic Exercise) bilateral;dorsiflexion;plantarflexion;sitting;10 repetitions  -     Row Name 01/01/23 1530          Balance    Balance Assessment sitting static balance;sitting dynamic balance;sit to stand dynamic balance;standing static balance;standing dynamic balance  -     Static Sitting Balance supervision  -     Dynamic Sitting  Balance standby assist  -BA     Position, Sitting Balance unsupported;sitting in chair;other (see comments)  sitting on toilet  -BA     Sit to Stand Dynamic Balance contact guard;verbal cues  -BA     Static Standing Balance standby assist  -BA     Dynamic Standing Balance contact guard;verbal cues  -BA     Position/Device Used, Standing Balance supported;walker, front-wheeled  -BA     Balance Interventions sitting;sit to stand;standing;supported;static;dynamic;occupation based/functional task  -BA     Comment, Balance Adequate static/dynamic sitting balance on toilet for hygiene activity; no LOB noted.  Intermittent mild instability with ambulation activity with FWW; no overt LOB noted.  -BA           User Key  (r) = Recorded By, (t) = Taken By, (c) = Cosigned By    Initials Name Provider Type    Angela Cha, PT Physical Therapist               Goals/Plan    No documentation.                Clinical Impression     Row Name 01/01/23 1532          Pain    Pretreatment Pain Rating 8/10  -BA     Posttreatment Pain Rating 8/10  -BA     Pain Location generalized  -     Pain Location - shoulder;foot  -     Pre/Posttreatment Pain Comment Reported c/o chronic L shoulder and B foot pain at 8/10.  Requested pain meds.  RN notified and provided pt with pain medicine during session.  Tolerated activity.  -BA     Pain Intervention(s) Ambulation/increased activity;Repositioned  -     Row Name 01/01/23 0829          Plan of Care Review    Plan of Care Reviewed With patient  -BA     Progress improving  -     Outcome Evaluation Improved performance and toleration to activity today noted by increased ambulation distance with decreased rest breaks.  Con to be limited by decreased functional endurance, weakness, fatigue, and decreased balance.  Ambulated 20ft to restroom with CGA and FWW while on RA; O2 sats decreased to 83% and recovered to >90% in ~1-1.5 min with seated rest and focus on PLB.  Ambulated an additional  150ft in hallway with CGA and FWW while on 1.5L O2; required 1 standing rest break d/t fatigue and CORTES. O2 sats remained stable b/w 93-96% on 1.5L.  Edu pt on BLE HEP and participated in seated BLE ther ex.  Con to progress pt as able per PT POC.  Rec IPR upon d/c for best fxl outcome.  Pt requesting to return home; if going home would recommend increased assistance with 24/7 care for a little while and  PT.  -     Row Name 01/01/23 1532          Vital Signs    Pre Systolic BP Rehab 107  VSS; RN cleared for activity.  -BA     Pre Treatment Diastolic BP 65  -BA     Pre SpO2 (%) 94  -BA     O2 Delivery Pre Treatment nasal cannula  1.5L  -BA     Intra SpO2 (%) 83   -BA     O2 Delivery Intra Treatment room air  -BA     Post SpO2 (%) 95  -BA     O2 Delivery Post Treatment nasal cannula  1.5L  -BA     Pre Patient Position Sitting  -BA     Intra Patient Position Standing  -BA     Post Patient Position Sitting  -     Row Name 01/01/23 1532          Positioning and Restraints    Pre-Treatment Position sitting in chair/recliner  -BA     Post Treatment Position chair  -BA     In Chair notified nsg;reclined;sitting;call light within reach;encouraged to call for assist;exit alarm on;waffle cushion;legs elevated  -BA           User Key  (r) = Recorded By, (t) = Taken By, (c) = Cosigned By    Initials Name Provider Type    Angela Cha, PT Physical Therapist               Outcome Measures     Row Name 01/01/23 1539 01/01/23 0831       How much help from another person do you currently need...    Turning from your back to your side while in flat bed without using bedrails? 4  -BA 4  -CB    Moving from lying on back to sitting on the side of a flat bed without bedrails? 3  -BA 3  -CB    Moving to and from a bed to a chair (including a wheelchair)? 3  -BA 3  -CB    Standing up from a chair using your arms (e.g., wheelchair, bedside chair)? 3  -BA 3  -CB    Climbing 3-5 steps with a railing? 3  -BA 2  -CB    To walk in  hospital room? 3  -BA 3  -CB    AM-PAC 6 Clicks Score (PT) 19  -BA 18  -CB    Highest level of mobility 6 --> Walked 10 steps or more  -BA 6 --> Walked 10 steps or more  -CB    Row Name 01/01/23 1539          Functional Assessment    Outcome Measure Options AM-PAC 6 Clicks Basic Mobility (PT)  -           User Key  (r) = Recorded By, (t) = Taken By, (c) = Cosigned By    Initials Name Provider Type    CB Wanda Banks, RN Registered Nurse    BA Angela Valladares, PT Physical Therapist                             Physical Therapy Education     Title: PT OT SLP Therapies (In Progress)     Topic: Physical Therapy (Done)     Point: Mobility training (Done)     Learning Progress Summary           Patient Acceptance, E, VU,NR by BA at 1/1/2023 1540    Acceptance, E, VU by AE at 12/29/2022 0820    Acceptance, E, VU by FW at 12/26/2022 1041                   Point: Home exercise program (Done)     Learning Progress Summary           Patient Acceptance, E, VU,NR by BA at 1/1/2023 1540                   Point: Body mechanics (Done)     Learning Progress Summary           Patient Acceptance, E, VU,NR by BA at 1/1/2023 1540    Acceptance, E, VU by AE at 12/29/2022 0820    Acceptance, E, VU by FW at 12/26/2022 1041                   Point: Precautions (Done)     Learning Progress Summary           Patient Acceptance, E, VU,NR by BA at 1/1/2023 1540    Acceptance, E, VU by AE at 12/29/2022 0820    Acceptance, E, VU by FW at 12/26/2022 1041                               User Key     Initials Effective Dates Name Provider Type Discipline     05/05/22 -  John Gross, PT Physical Therapist PT    BA 09/21/21 -  Angela Valladares, PT Physical Therapist PT    AE 09/21/21 -  Audie Linares, PT Physical Therapist PT              PT Recommendation and Plan     Plan of Care Reviewed With: patient  Progress: improving  Outcome Evaluation: Improved performance and toleration to activity today noted by increased ambulation distance  with decreased rest breaks.  Con to be limited by decreased functional endurance, weakness, fatigue, and decreased balance.  Ambulated 20ft to restroom with CGA and FWW while on RA; O2 sats decreased to 83% and recovered to >90% in ~1-1.5 min with seated rest and focus on PLB.  Ambulated an additional 150ft in hallway with CGA and FWW while on 1.5L O2; required 1 standing rest break d/t fatigue and CORTES. O2 sats remained stable b/w 93-96% on 1.5L.  Edu pt on BLE HEP and participated in seated BLE ther ex.  Con to progress pt as able per PT POC.  Rec IPR upon d/c for best fxl outcome.  Pt requesting to return home; if going home would recommend increased assistance with 24/7 care for a little while and  PT.     Time Calculation:    PT Charges     Row Name 01/01/23 1637             Time Calculation    Start Time 1426  -BA      PT Received On 01/01/23  -BA         Time Calculation- PT    Total Timed Code Minutes- PT 43 minute(s)  -BA         Timed Charges    93589 - PT Therapeutic Exercise Minutes 12  -BA      38586 - Gait Training Minutes  18  -BA      52992 - PT Therapeutic Activity Minutes 13  -BA         Total Minutes    Timed Charges Total Minutes 43  -BA       Total Minutes 43  -BA            User Key  (r) = Recorded By, (t) = Taken By, (c) = Cosigned By    Initials Name Provider Type    Angela Cha, MEL Physical Therapist              Therapy Charges for Today     Code Description Service Date Service Provider Modifiers Qty    05832455987 HC PT THER PROC EA 15 MIN 1/1/2023 Angela Valladares, PT GP 1    03144448534 HC GAIT TRAINING EA 15 MIN 1/1/2023 Angela Valladares, PT GP 1    03153001109 HC PT THERAPEUTIC ACT EA 15 MIN 1/1/2023 Angela Valladares, PT GP 1          PT G-Codes  Outcome Measure Options: AM-PAC 6 Clicks Basic Mobility (PT)  AM-PAC 6 Clicks Score (PT): 19  AM-PAC 6 Clicks Score (OT): 16  PT Discharge Summary  Anticipated Discharge Disposition (PT): inpatient rehabilitation  facility    Angela Valladares, PT  1/1/2023

## 2023-01-01 NOTE — PROGRESS NOTES
[facility] Medicine Services  PROGRESS NOTE    Patient Name: Charly Blevins  : 1955  MRN: [id number]    Date of Admission: 2022  Primary Care Physician: Boyd Molina MD    Subjective   Subjective     CC:  Cough    HPI:  Feeling week. Feels some pressure in right side of chest at times that causes her to cough.  Still resistant to the idea of rehab    ROS:  Gen- No fevers, chills  CV- No chest pain, palpitations  GI- No N/V/D, abd pain    Objective   Objective     Vital Signs:   Temp:  [97.5 °F (36.4 °C)-98.2 °F (36.8 °C)] 97.5 °F (36.4 °C)  Heart Rate:  [50-63] 60  Resp:  [16-18] 16  BP: ()/(59-67) 107/65  Flow (L/min):  [2] 2     Physical Exam:  Constitutional: No acute distress, awake, alert older female  HENT: NCAT, mucous membranes moist, n/c in place  Respiratory: Mild crackles right lower lobes otherwise CTAB with sats 91% on room air and no increased wob  Cardiovascular: RRR, no murmurs, rubs, or gallops  Gastrointestinal: Soft, nontender, nondistended  Musculoskeletal: Muscle tone within normal limits, no joint effusions appreciated  Psychiatric: Appropriate affect, cooperative  Neurologic: Alert and oriented, facial movements symmetric and spontaneous movement of all 4 extremities grossly equal bilaterally, speech clear  Skin: No rashes    Results Reviewed: CXR personally reviewed and interpreted with unchanged R>L pulm opacities. No acute change from previous or clear worsening    LAB RESULTS:      Lab 23  0728 22  1220 22  1054 22  0538 22  0611   WBC 11.34* 12.79* 17.62* 9.11 9.84   HEMOGLOBIN 10.2* 10.5* 11.0* 10.5* 11.0*   HEMATOCRIT 34.2 35.2 36.2 35.8 37.2   PLATELETS 302 294 294 261 269   MCV 91.7 91.2 90.0 93.0 91.4         Lab 23  0728 22  1220 22  1054 22  0538 22  0611 22  0536   SODIUM  --  137 136 144 141 139   POTASSIUM  --  4.6 4.8 5.0 5.0 4.4   CHLORIDE  --  99 99 104  103 99   CO2  --  29.0 27.0 33.0* 33.0* 35.0*   ANION GAP  --  9.0 10.0 7.0 5.0 5.0   BUN  --  29* 32* 30* 31* 17   CREATININE  --  0.71 0.65 0.75 0.77 0.64   EGFR  --  92.7 96.0 86.8 84.1 96.4   GLUCOSE  --  323* 336* 146* 151* 141*   CALCIUM  --  9.3 8.9 9.4 9.3 9.2   MAGNESIUM 2.1 1.3*  --   --   --  2.2         Lab 12/31/22  1220 12/30/22  1054 12/29/22  0538 12/28/22  0611   TOTAL PROTEIN 5.0* 5.7* 5.5* 5.4*   ALBUMIN 3.2* 3.3* 3.3* 3.1*   GLOBULIN 1.8 2.4 2.2 2.3   ALT (SGPT) 15 18 22 22   AST (SGOT) 11 11 18 17   BILIRUBIN 0.2 0.2 0.2 0.2   ALK PHOS 56 67 62 64                     Brief Urine Lab Results  (Last result in the past 365 days)      Color   Clarity   Blood   Leuk Est   Nitrite   Protein   CREAT   Urine HCG        12/17/22 1612             82.2         12/17/22 1612 Yellow   Clear   Negative   Negative   Negative   Trace                 Microbiology Results Abnormal     Procedure Component Value - Date/Time    Blood Culture - Blood, Arm, Left [[id number]]  (Normal) Collected: 12/17/22 1201    Lab Status: Final result Specimen: Blood from Arm, Left Updated: 12/22/22 1215     Blood Culture No growth at 5 days    Blood Culture - Blood, Arm, Right [[id number]]  (Normal) Collected: 12/17/22 1201    Lab Status: Final result Specimen: Blood from Arm, Right Updated: 12/22/22 1215     Blood Culture No growth at 5 days    Legionella Antigen, Urine - Urine, Urine, Clean Catch [[id number]]  (Normal) Collected: 12/17/22 1612    Lab Status: Final result Specimen: Urine, Clean Catch Updated: 12/17/22 2105     LEGIONELLA ANTIGEN, URINE Negative    MRSA Screen, PCR (Inpatient) - Swab, Nares [[id number]]  (Normal) Collected: 12/17/22 1609    Lab Status: Final result Specimen: Swab from Nares Updated: 12/17/22 1815     MRSA PCR Negative    Narrative:      The negative predictive value of this diagnostic test is high and should only be used to consider de-escalating anti-MRSA therapy. A positive result may indicate  colonization with MRSA and must be correlated clinically.  MRSA Negative          XR Chest 1 View    Result Date: 1/1/2023  DATE OF EXAM: 1/1/2023 8:17 AM  PROCEDURE: XR CHEST 1 VW-  INDICATIONS: right-sided pleuritic pain post PNA; A41.9-Sepsis, unspecified organism; R65.21-Severe sepsis with septic shock; J10.1-Influenza due to other identified influenza virus with other respiratory manifestations; J15.9-Unspecified bacterial pneumonia; N17.9-Acute kidney failure, unspecified; J96.01-Acute respiratory failure with hypoxia  COMPARISON: 12/25/2022  TECHNIQUE: Single radiographic AP view of the chest was obtained.  FINDINGS: Right-sided airspace disease appears somewhat less conspicuous with minimal persisting opacity. The left lung is clear. There is no significant effusion or distinct pneumothorax. Unchanged cardiac enlargement.      Impression: Right-sided airspace disease appears somewhat less conspicuous with minimal persisting opacity. The left lung is clear. There is no significant effusion or distinct pneumothorax. Unchanged cardiac enlargement.  This report was finalized on 1/1/2023 10:44 AM by Conrado Mcdaniels.        Results for orders placed during the hospital encounter of 12/17/22    Adult Transthoracic Echo Complete w/ Color, Spectral and Contrast if Necessary Per Protocol    Interpretation Summary  •  Left ventricular systolic function is normal. Calculated left ventricular EF = 59.5%  •  Left ventricular wall thickness is consistent with mild concentric hypertrophy.  •  Left ventricular diastolic function is consistent with (grade II w/high LAP) pseudonormalization.  •  Left atrial volume is mildly increased.  •  There is calcification of the aortic valve.  •  Estimated right ventricular systolic pressure from tricuspid regurgitation is normal (<35 mmHg). Calculated right ventricular systolic pressure from tricuspid regurgitation is 27 mmHg.  •  Mild dilation of the ascending aorta is present.  4.1  cm      I have reviewed the medications:  Scheduled Meds:allopurinol, 300 mg, Oral, Daily  apixaban, 5 mg, Oral, BID  colesevelam, 1,250 mg, Oral, BID With Meals  empagliflozin, 10 mg, Oral, Daily  flecainide, 100 mg, Oral, Q12H  FLUoxetine, 20 mg, Oral, BID  folic acid, 400 mcg, Oral, Daily  gabapentin, 100 mg, Oral, Q12H  insulin detemir, 10 Units, Subcutaneous, Nightly  insulin lispro, 0-9 Units, Subcutaneous, 4x Daily With Meals & Nightly  levothyroxine, 125 mcg, Oral, QAM  lidocaine, 2 patch, Transdermal, Q24H  magnesium oxide, 800 mg, Oral, BID  metFORMIN, 1,000 mg, Oral, BID With Meals  metoprolol tartrate, 12.5 mg, Oral, BID  polyethylene glycol, 17 g, Oral, Daily  predniSONE, 20 mg, Oral, Daily With Breakfast  rosuvastatin, 5 mg, Oral, Daily  spironolactone, 25 mg, Oral, Daily  traZODone, 100 mg, Oral, Nightly      Continuous Infusions:   PRN Meds:.•  calcium carbonate  •  cyclobenzaprine  •  dextrose  •  HYDROcodone-acetaminophen  •  magnesium sulfate **OR** magnesium sulfate **OR** magnesium sulfate  •  ondansetron  •  potassium chloride  •  sodium chloride  •  sodium chloride  •  SUMAtriptan  •  zolpidem    Assessment & Plan   Assessment & Plan     Active Hospital Problems    Diagnosis  POA   • **Septic shock [A41.9, R65.21]  Yes   • Pneumonia due to Streptococcus pneumoniae (HCC) [J13]  Yes   • Acute hypoxic respiratory failure [J96.01]  Yes   • DRISS [N17.9]  Yes   • On chronic immunosuppressive therapy [D84.9]  Yes   • GERD [K21.9]  Yes   • Influenza A [J10.1]  Yes   • Obesity (BMI 30-39.9) [E66.9]  Yes   • Hypothyroidism [E03.9]  Yes   • Anxiety and depression [F41.9, F32.A]  Yes   • Gout [M10.9]  Yes   • Dyslipidemia [E78.5]  Yes   • RA [M06.9]  Yes   • T2DM [E11.9]  Yes      Resolved Hospital Problems   No resolved problems to display.        Brief Hospital Course to date:  Charly Blevins is a 67 y.o. female w RA on chronic MTX/prednisone, DMII, TIFFANY, hypothyroidism who presented with hypoxic  respiratory failure. Found to be flu A+, strep pneum +. Downgraded from ICU on 12/26.     1) Acute hypoxic respiratory failure and septic shock 2/2 flu and strep pneumonia  - s/p tamiflu and rocephin courses  - on steroids, different doses throughout stay. On prednisone 20 mg now, taper orders placed back to home dose prednisone 5 mg  - likely to require some home O2 at discharge. Will need outpatient pulm f/u for PFT's after d/c    2) RA on MTX and chronic prednisone, immunocompromised patient  - on chronic prednisone 5 mg daily x years w rheumatologist OP    3) Recurrent hypomagnesemia  - IV Mg per protocol, PO Mg daily dosing  - d/w patient and will d/c PPI to improve GI absorption. No clear other secondary causes    4) DMII c/b steroid-induced expected hyperglycemia   -improved. Taper steroids as above  -restart home metformin + empaglifozin + SSI    H/o Afib s/p cardioversion - metoprolol, flecanide, eliquis  DMII - on basal/bolus regimen + SSI here, A1c 7.3  HLD - statin  Hypothyroidism - home levothyroxine  TIFFANY - uses nocturnal n/c at home  BMI 34 - complicates exam  GERD - ppi    Significant deconditioning. Will d/c telemetry, encourage up to bathroom today as trial for home. Likely home in AM christopher if continuing to refuse rehab placement    Expected Discharge Location and Transportation: home  Expected Discharge 1/2      DVT prophylaxis:  Medical and mechanical DVT prophylaxis orders are present.     AM-PAC 6 Clicks Score (PT): 18 (01/01/23 6203)    CODE STATUS:   Code Status and Medical Interventions:   Ordered at: 12/17/22 9286     Code Status (Patient has no pulse and is not breathing):    CPR (Attempt to Resuscitate)     Medical Interventions (Patient has pulse or is breathing):    Full Support       Bernadine Suero MD  01/01/23

## 2023-01-01 NOTE — PLAN OF CARE
Goal Outcome Evaluation:  Plan of Care Reviewed With: patient        Progress: improving  Outcome Evaluation: Improved performance and toleration to activity today noted by increased ambulation distance with decreased rest breaks.  Con to be limited by decreased functional endurance, weakness, fatigue, and decreased balance.  Ambulated 20ft to restroom with CGA and FWW while on RA; O2 sats decreased to 83% and recovered to >90% in ~1-1.5 min with seated rest and focus on PLB.  Ambulated an additional 150ft in hallway with CGA and FWW while on 1.5L O2; required 1 standing rest break d/t fatigue and CORTES. O2 sats remained stable b/w 93-96% on 1.5L.  Edu pt on BLE HEP and participated in seated BLE ther ex.  Con to progress pt as able per PT POC.  Rec IPR upon d/c for best fxl outcome.  Pt requesting to return home; if going home would recommend increased assistance with 24/7 care for a little while and  PT.

## 2023-01-02 LAB
ANION GAP SERPL CALCULATED.3IONS-SCNC: 7 MMOL/L (ref 5–15)
BUN SERPL-MCNC: 33 MG/DL (ref 8–23)
BUN/CREAT SERPL: 48.5 (ref 7–25)
CALCIUM SPEC-SCNC: 8.9 MG/DL (ref 8.6–10.5)
CHLORIDE SERPL-SCNC: 103 MMOL/L (ref 98–107)
CO2 SERPL-SCNC: 32 MMOL/L (ref 22–29)
CREAT SERPL-MCNC: 0.68 MG/DL (ref 0.57–1)
EGFRCR SERPLBLD CKD-EPI 2021: 95 ML/MIN/1.73
GLUCOSE BLDC GLUCOMTR-MCNC: 146 MG/DL (ref 70–130)
GLUCOSE BLDC GLUCOMTR-MCNC: 181 MG/DL (ref 70–130)
GLUCOSE BLDC GLUCOMTR-MCNC: 199 MG/DL (ref 70–130)
GLUCOSE BLDC GLUCOMTR-MCNC: 261 MG/DL (ref 70–130)
GLUCOSE SERPL-MCNC: 154 MG/DL (ref 65–99)
MAGNESIUM SERPL-MCNC: 1.7 MG/DL (ref 1.6–2.4)
POTASSIUM SERPL-SCNC: 4.8 MMOL/L (ref 3.5–5.2)
SODIUM SERPL-SCNC: 142 MMOL/L (ref 136–145)

## 2023-01-02 PROCEDURE — 63710000001 INSULIN LISPRO (HUMAN) PER 5 UNITS: Performed by: PHYSICIAN ASSISTANT

## 2023-01-02 PROCEDURE — 97535 SELF CARE MNGMENT TRAINING: CPT

## 2023-01-02 PROCEDURE — 94799 UNLISTED PULMONARY SVC/PX: CPT

## 2023-01-02 PROCEDURE — 63710000001 INSULIN DETEMIR PER 5 UNITS: Performed by: INTERNAL MEDICINE

## 2023-01-02 PROCEDURE — 63710000001 PREDNISONE PER 5 MG: Performed by: INTERNAL MEDICINE

## 2023-01-02 PROCEDURE — 25010000002 ONDANSETRON PER 1 MG: Performed by: INTERNAL MEDICINE

## 2023-01-02 PROCEDURE — 99232 SBSQ HOSP IP/OBS MODERATE 35: CPT | Performed by: INTERNAL MEDICINE

## 2023-01-02 PROCEDURE — 82962 GLUCOSE BLOOD TEST: CPT

## 2023-01-02 PROCEDURE — 83735 ASSAY OF MAGNESIUM: CPT | Performed by: INTERNAL MEDICINE

## 2023-01-02 PROCEDURE — 97530 THERAPEUTIC ACTIVITIES: CPT

## 2023-01-02 PROCEDURE — 94761 N-INVAS EAR/PLS OXIMETRY MLT: CPT

## 2023-01-02 PROCEDURE — 80048 BASIC METABOLIC PNL TOTAL CA: CPT | Performed by: INTERNAL MEDICINE

## 2023-01-02 RX ADMIN — Medication 800 MG: at 09:00

## 2023-01-02 RX ADMIN — INSULIN LISPRO 6 UNITS: 100 INJECTION, SOLUTION INTRAVENOUS; SUBCUTANEOUS at 12:58

## 2023-01-02 RX ADMIN — Medication 12.5 MG: at 21:20

## 2023-01-02 RX ADMIN — INSULIN LISPRO 2 UNITS: 100 INJECTION, SOLUTION INTRAVENOUS; SUBCUTANEOUS at 17:19

## 2023-01-02 RX ADMIN — INSULIN LISPRO 2 UNITS: 100 INJECTION, SOLUTION INTRAVENOUS; SUBCUTANEOUS at 21:20

## 2023-01-02 RX ADMIN — ONDANSETRON 4 MG: 2 INJECTION INTRAMUSCULAR; INTRAVENOUS at 12:57

## 2023-01-02 RX ADMIN — ROSUVASTATIN CALCIUM 5 MG: 10 TABLET, COATED ORAL at 08:55

## 2023-01-02 RX ADMIN — IPRATROPIUM BROMIDE AND ALBUTEROL SULFATE 3 ML: 2.5; .5 SOLUTION RESPIRATORY (INHALATION) at 06:39

## 2023-01-02 RX ADMIN — GABAPENTIN 100 MG: 100 CAPSULE ORAL at 21:20

## 2023-01-02 RX ADMIN — HYDROCODONE BITARTRATE AND ACETAMINOPHEN 1 TABLET: 10; 325 TABLET ORAL at 06:03

## 2023-01-02 RX ADMIN — GABAPENTIN 100 MG: 100 CAPSULE ORAL at 08:56

## 2023-01-02 RX ADMIN — SPIRONOLACTONE 25 MG: 25 TABLET ORAL at 08:56

## 2023-01-02 RX ADMIN — METFORMIN HYDROCHLORIDE 1000 MG: 1000 TABLET, FILM COATED ORAL at 17:19

## 2023-01-02 RX ADMIN — IPRATROPIUM BROMIDE AND ALBUTEROL SULFATE 3 ML: 2.5; .5 SOLUTION RESPIRATORY (INHALATION) at 16:19

## 2023-01-02 RX ADMIN — FLUOXETINE 20 MG: 20 CAPSULE ORAL at 21:20

## 2023-01-02 RX ADMIN — COLESEVELAM HYDROCHLORIDE 1250 MG: 625 TABLET, COATED ORAL at 08:54

## 2023-01-02 RX ADMIN — ONDANSETRON 4 MG: 2 INJECTION INTRAMUSCULAR; INTRAVENOUS at 21:28

## 2023-01-02 RX ADMIN — TRAZODONE HYDROCHLORIDE 100 MG: 100 TABLET ORAL at 21:20

## 2023-01-02 RX ADMIN — INSULIN DETEMIR 5 UNITS: 100 INJECTION, SOLUTION SUBCUTANEOUS at 21:20

## 2023-01-02 RX ADMIN — Medication 800 MG: at 21:21

## 2023-01-02 RX ADMIN — IPRATROPIUM BROMIDE AND ALBUTEROL SULFATE 3 ML: 2.5; .5 SOLUTION RESPIRATORY (INHALATION) at 19:51

## 2023-01-02 RX ADMIN — FLECAINIDE ACETATE 100 MG: 50 TABLET ORAL at 08:56

## 2023-01-02 RX ADMIN — LIDOCAINE 2 PATCH: 700 PATCH TOPICAL at 08:56

## 2023-01-02 RX ADMIN — HYDROCODONE BITARTRATE AND ACETAMINOPHEN 1 TABLET: 10; 325 TABLET ORAL at 22:46

## 2023-01-02 RX ADMIN — PREDNISONE 5 MG: 5 TABLET ORAL at 08:55

## 2023-01-02 RX ADMIN — Medication 10 ML: at 08:56

## 2023-01-02 RX ADMIN — LEVOTHYROXINE SODIUM 125 MCG: 125 TABLET ORAL at 06:04

## 2023-01-02 RX ADMIN — HYDROCODONE BITARTRATE AND ACETAMINOPHEN 1 TABLET: 10; 325 TABLET ORAL at 10:36

## 2023-01-02 RX ADMIN — COLESEVELAM HYDROCHLORIDE 1250 MG: 625 TABLET, COATED ORAL at 17:20

## 2023-01-02 RX ADMIN — SUMATRIPTAN SUCCINATE 100 MG: 50 TABLET ORAL at 11:56

## 2023-01-02 RX ADMIN — HYDROCODONE BITARTRATE AND ACETAMINOPHEN 1 TABLET: 10; 325 TABLET ORAL at 17:19

## 2023-01-02 RX ADMIN — EMPAGLIFLOZIN 10 MG: 10 TABLET, FILM COATED ORAL at 08:55

## 2023-01-02 RX ADMIN — FOLIC ACID TAB 400 MCG 400 MCG: 400 TAB at 08:55

## 2023-01-02 RX ADMIN — FLECAINIDE ACETATE 100 MG: 50 TABLET ORAL at 21:28

## 2023-01-02 RX ADMIN — FLUOXETINE 20 MG: 20 CAPSULE ORAL at 08:54

## 2023-01-02 RX ADMIN — APIXABAN 5 MG: 5 TABLET, FILM COATED ORAL at 08:55

## 2023-01-02 RX ADMIN — METFORMIN HYDROCHLORIDE 1000 MG: 1000 TABLET, FILM COATED ORAL at 08:54

## 2023-01-02 RX ADMIN — Medication 12.5 MG: at 08:55

## 2023-01-02 RX ADMIN — APIXABAN 5 MG: 5 TABLET, FILM COATED ORAL at 21:20

## 2023-01-02 RX ADMIN — ALLOPURINOL 300 MG: 300 TABLET ORAL at 08:55

## 2023-01-02 RX ADMIN — ZOLPIDEM TARTRATE 5 MG: 5 TABLET ORAL at 22:44

## 2023-01-02 NOTE — PROGRESS NOTES
Clinical Nutrition     Patient Name: Charly Blevins  YOB: 1955  MRN: [id number]  Date of Encounter: 23 09:14 EST  Admission date: 2022    Reason for Visit   Follow up    EMR reviewed    Yes    Diet Nutrition Related History     Patient reports her appetite is on and off. This is refected in the EMR intakes as sometimes she eats 100% and sometimes only 25% but she is averaging 67% overall. Patient has had some nausea but states the Zofran is working. Patient denies any diarrhea or vomiting. Patient reports drinking her Boost Breeze sent TID.    Previous RD note from 22  Per RN: pt has not been eating because she is afraid of having diarrhea, still had a cough, c/o migraine,  is requesting her pain meds Q 4 hours     Plan to start PT/.OT     Pt sitting up in chair, on HFNC, report she does not want to eat until her medicine has time to work, pt is now taking her home medication welchol, does not like magic cups      Discussed with pt importance of nutrition for recovery, encourage intake, supplement adjusted to Boost Breeze       Current Nutrition Prescription    Diet: Cardiac Diets, Diabetic Diets; Healthy Heart (2-3 Na+); Consistent Carbohydrate; Texture: Regular Texture (IDDSI 7); Fluid Consistency: Thin (IDDSI 0)  Orders Placed This Encounter      Dietary Nutrition Supplements Boost Breeze (Clear Liquid)      DIET MESSAGE Pt requesting a grilled cheese sandwich, tomato soup and fruit for dinner. Thank you!      DIET MESSAGE Please send a lemonade, she does not like tea      DIET MESSAGE Patient is requesting a lemonade. Thank you!      Average Intake from [place]% x 7 meals    Actions:    Follow treatment progress, Care plan reviewed, Advise alternate selection, Interview for preferences, Encourage intake    Monitor Per Protocol    Gwen Griffith,   Time Spent: 20 minutes

## 2023-01-02 NOTE — PROGRESS NOTES
[facility] Medicine Services  PROGRESS NOTE    Patient Name: Charly Blevins  : 1955  MRN: [id number]    Date of Admission: 2022  Primary Care Physician: Boyd Molina MD    Subjective   Subjective     CC:  Cough    HPI:  Weakness persists. Remains resistant to rehab and reports she can get 24 hour care at home.  Shortness of breath improved    ROS:  Gen- No fevers, chills  CV- No chest pain, palpitations  GI- No N/V/D, abd pain    Objective   Objective     Vital Signs:   Temp:  [97.7 °F (36.5 °C)-98.5 °F (36.9 °C)] 98.5 °F (36.9 °C)  Heart Rate:  [56-95] 93  Resp:  [16-20] 18  BP: (101-123)/(64-77) 111/76  Flow (L/min):  [2] 2     Physical Exam:  Constitutional: No acute distress, awake, alert older female lying in bed  HENT: NCAT, mucous membranes moist, n/c in place  Respiratory: Mild crackles right lower lobes otherwise CTAB with sats mid90's on 2 liters n/c  Cardiovascular: RRR, no murmurs, rubs, or gallops  Gastrointestinal: Soft, nontender, nondistended  Musculoskeletal: Muscle tone mildly decreased throughout, no joint effusions appreciated  Psychiatric: Appropriate affect, cooperative  Neurologic: Alert and oriented, facial movements symmetric and spontaneous movement of all 4 extremities grossly equal bilaterally, speech clear  Skin: No rashes    Results Reviewed: hemogram leukocytosis improving as expected with decrease in steroids    LAB RESULTS:      Lab 23  0728 22  1220 22  1054 22  0538 22  0611   WBC 11.34* 12.79* 17.62* 9.11 9.84   HEMOGLOBIN 10.2* 10.5* 11.0* 10.5* 11.0*   HEMATOCRIT 34.2 35.2 36.2 35.8 37.2   PLATELETS 302 294 294 261 269   MCV 91.7 91.2 90.0 93.0 91.4         Lab 23  0600 23  0728 22  1220 22  1054 22  0538 22  0611   SODIUM 142  --  137 136 144 141   POTASSIUM 4.8  --  4.6 4.8 5.0 5.0   CHLORIDE 103  --  99 99 104 103   CO2 32.0*  --  29.0 27.0 33.0* 33.0*   ANION  GAP 7.0  --  9.0 10.0 7.0 5.0   BUN 33*  --  29* 32* 30* 31*   CREATININE 0.68  --  0.71 0.65 0.75 0.77   EGFR 95.0  --  92.7 96.0 86.8 84.1   GLUCOSE 154*  --  323* 336* 146* 151*   CALCIUM 8.9  --  9.3 8.9 9.4 9.3   MAGNESIUM 1.7 2.1 1.3*  --   --   --          Lab 12/31/22  1220 12/30/22  1054 12/29/22  0538 12/28/22  0611   TOTAL PROTEIN 5.0* 5.7* 5.5* 5.4*   ALBUMIN 3.2* 3.3* 3.3* 3.1*   GLOBULIN 1.8 2.4 2.2 2.3   ALT (SGPT) 15 18 22 22   AST (SGOT) 11 11 18 17   BILIRUBIN 0.2 0.2 0.2 0.2   ALK PHOS 56 67 62 64                     Brief Urine Lab Results  (Last result in the past 365 days)      Color   Clarity   Blood   Leuk Est   Nitrite   Protein   CREAT   Urine HCG        12/17/22 1612             82.2         12/17/22 1612 Yellow   Clear   Negative   Negative   Negative   Trace                 Microbiology Results Abnormal     Procedure Component Value - Date/Time    Blood Culture - Blood, Arm, Left [[id number]]  (Normal) Collected: 12/17/22 1201    Lab Status: Final result Specimen: Blood from Arm, Left Updated: 12/22/22 1215     Blood Culture No growth at 5 days    Blood Culture - Blood, Arm, Right [[id number]]  (Normal) Collected: 12/17/22 1201    Lab Status: Final result Specimen: Blood from Arm, Right Updated: 12/22/22 1215     Blood Culture No growth at 5 days    Legionella Antigen, Urine - Urine, Urine, Clean Catch [[id number]]  (Normal) Collected: 12/17/22 1612    Lab Status: Final result Specimen: Urine, Clean Catch Updated: 12/17/22 2105     LEGIONELLA ANTIGEN, URINE Negative    MRSA Screen, PCR (Inpatient) - Swab, Nares [[id number]]  (Normal) Collected: 12/17/22 1609    Lab Status: Final result Specimen: Swab from Nares Updated: 12/17/22 1815     MRSA PCR Negative    Narrative:      The negative predictive value of this diagnostic test is high and should only be used to consider de-escalating anti-MRSA therapy. A positive result may indicate colonization with MRSA and must be correlated  clinically.  MRSA Negative          XR Chest 1 View    Result Date: 1/1/2023  DATE OF EXAM: 1/1/2023 8:17 AM  PROCEDURE: XR CHEST 1 VW-  INDICATIONS: right-sided pleuritic pain post PNA; A41.9-Sepsis, unspecified organism; R65.21-Severe sepsis with septic shock; J10.1-Influenza due to other identified influenza virus with other respiratory manifestations; J15.9-Unspecified bacterial pneumonia; N17.9-Acute kidney failure, unspecified; J96.01-Acute respiratory failure with hypoxia  COMPARISON: 12/25/2022  TECHNIQUE: Single radiographic AP view of the chest was obtained.  FINDINGS: Right-sided airspace disease appears somewhat less conspicuous with minimal persisting opacity. The left lung is clear. There is no significant effusion or distinct pneumothorax. Unchanged cardiac enlargement.      Impression: Right-sided airspace disease appears somewhat less conspicuous with minimal persisting opacity. The left lung is clear. There is no significant effusion or distinct pneumothorax. Unchanged cardiac enlargement.  This report was finalized on 1/1/2023 10:44 AM by Conrado Mcdaniels.        Results for orders placed during the hospital encounter of 12/17/22    Adult Transthoracic Echo Complete w/ Color, Spectral and Contrast if Necessary Per Protocol    Interpretation Summary  •  Left ventricular systolic function is normal. Calculated left ventricular EF = 59.5%  •  Left ventricular wall thickness is consistent with mild concentric hypertrophy.  •  Left ventricular diastolic function is consistent with (grade II w/high LAP) pseudonormalization.  •  Left atrial volume is mildly increased.  •  There is calcification of the aortic valve.  •  Estimated right ventricular systolic pressure from tricuspid regurgitation is normal (<35 mmHg). Calculated right ventricular systolic pressure from tricuspid regurgitation is 27 mmHg.  •  Mild dilation of the ascending aorta is present.  4.1 cm      I have reviewed the  medications:  Scheduled Meds:allopurinol, 300 mg, Oral, Daily  apixaban, 5 mg, Oral, BID  colesevelam, 1,250 mg, Oral, BID With Meals  empagliflozin, 10 mg, Oral, Daily  flecainide, 100 mg, Oral, Q12H  FLUoxetine, 20 mg, Oral, BID  folic acid, 400 mcg, Oral, Daily  gabapentin, 100 mg, Oral, Q12H  insulin detemir, 10 Units, Subcutaneous, Nightly  insulin lispro, 0-9 Units, Subcutaneous, 4x Daily With Meals & Nightly  ipratropium-albuterol, 3 mL, Nebulization, 4x Daily - RT  levothyroxine, 125 mcg, Oral, QAM  lidocaine, 2 patch, Transdermal, Q24H  magnesium oxide, 800 mg, Oral, BID  metFORMIN, 1,000 mg, Oral, BID With Meals  metoprolol tartrate, 12.5 mg, Oral, BID  polyethylene glycol, 17 g, Oral, Daily  predniSONE, 5 mg, Oral, Daily With Breakfast  rosuvastatin, 5 mg, Oral, Daily  spironolactone, 25 mg, Oral, Daily  traZODone, 100 mg, Oral, Nightly      Continuous Infusions:   PRN Meds:.•  calcium carbonate  •  cyclobenzaprine  •  dextrose  •  HYDROcodone-acetaminophen  •  magnesium sulfate **OR** magnesium sulfate **OR** magnesium sulfate  •  ondansetron  •  potassium chloride  •  sodium chloride  •  sodium chloride  •  SUMAtriptan  •  zolpidem    Assessment & Plan   Assessment & Plan     Active Hospital Problems    Diagnosis  POA   • **Septic shock [A41.9, R65.21]  Yes   • Pneumonia due to Streptococcus pneumoniae (HCC) [J13]  Yes   • Acute hypoxic respiratory failure [J96.01]  Yes   • DRISS [N17.9]  Yes   • On chronic immunosuppressive therapy [D84.9]  Yes   • GERD [K21.9]  Yes   • Influenza A [J10.1]  Yes   • Obesity (BMI 30-39.9) [E66.9]  Yes   • Hypothyroidism [E03.9]  Yes   • Anxiety and depression [F41.9, F32.A]  Yes   • Gout [M10.9]  Yes   • Dyslipidemia [E78.5]  Yes   • RA [M06.9]  Yes   • T2DM [E11.9]  Yes      Resolved Hospital Problems   No resolved problems to display.        Brief Hospital Course to date:  Charly Blevins is a 67 y.o. female w RA on chronic MTX/prednisone, DMII, TIFFANY, hypothyroidism who  presented with hypoxic respiratory failure. Found to be flu A+, strep pneum +. Downgraded from ICU on 12/26.     1) Acute hypoxic respiratory failure and septic shock 2/2 flu and strep pneumonia  - s/p tamiflu and rocephin courses  - on steroids, different doses throughout stay. Now returned to home dose prednisone 5 mg daily  - assess for home O2 at discharge. Will need outpatient pulm f/u for PFT's after d/c    2) RA on MTX and chronic prednisone, immunocompromised patient  - on chronic prednisone 5 mg daily x years w rheumatologist OP    3) Recurrent hypomagnesemia - improved  - IV Mg per protocol, PO Mg daily dosing  - d/w patient and will d/c PPI to improve GI absorption. No clear other secondary causes    4) DMII c/b steroid-induced expected hyperglycemia - improving  -restart home metformin + empaglifozin + SSI    H/o Afib s/p cardioversion - metoprolol, flecanide, eliquis  DMII - on basal/bolus regimen + SSI here, A1c 7.3  HLD - statin  Hypothyroidism - home levothyroxine  TIFFANY - uses nocturnal n/c at home  BMI 34 - complicates exam  GERD - ppi    Significant debility. Recs for rehab placement, which patient is very resistant to. Clearly d/w patient arranging 24/7 home care v rehab; she will decide by end of day    Expected Discharge Location and Transportation: home v rehab (see above)  Expected Discharge 1/3      DVT prophylaxis:  Medical and mechanical DVT prophylaxis orders are present.     AM-PAC 6 Clicks Score (PT): 19 (01/02/23 7746)    CODE STATUS:   Code Status and Medical Interventions:   Ordered at: 12/17/22 4335     Code Status (Patient has no pulse and is not breathing):    CPR (Attempt to Resuscitate)     Medical Interventions (Patient has pulse or is breathing):    Full Support       Bernadine Suero MD  01/02/23

## 2023-01-02 NOTE — THERAPY TREATMENT NOTE
Patient Name: Charly Blevins  : 1955    MRN: [id number]                              Today's Date: 2023       Admit Date: 2022    Visit Dx:     ICD-10-CM ICD-9-CM   1. Septic shock ([facility])  A41.9 038.9    R65.21 785.52     995.92   2. Influenza A  J10.1 487.1   3. Bacterial pneumonia  J15.9 482.9   4. DRISS (acute kidney injury) ([facility])  N17.9 584.9   5. Acute hypoxic respiratory failure  J96.01 518.81     Patient Active Problem List   Diagnosis   • Closed intertrochanteric fracture of hip, right, initial encounter ([facility])   • Pubic ramus fracture ([facility])   • HTN (hypertension)   • Dyslipidemia   • T2DM   • RA   • Atrial fibrillation ([facility])   • Acute hypoxic respiratory failure   • DRISS   • On chronic immunosuppressive therapy   • GERD   • Influenza A   • Obesity (BMI 30-39.9)   • Hypothyroidism   • Anxiety and depression   • Gout   • Septic shock   • Pneumonia due to Streptococcus pneumoniae ([facility])     Past Medical History:   Diagnosis Date   • Anxiety and depression 2022   • Atrial fibrillation ([facility]) 2015    CARDIOVERSION - NO REOCCURANCE SINCE    • Diabetes mellitus ([facility])    • Gout 2022   • Hyperlipidemia    • Hypertension    • Hypothyroidism 2022   • Obesity (BMI 30-39.9) 2022     Past Surgical History:   Procedure Laterality Date   • APPENDECTOMY     • CHOLECYSTECTOMY     • COLOSTOMY      RESULTED FROM HYSTERECTOMY    • COLOSTOMY CLOSURE      WHILE DOING COLOSTOMY CLOSURE; ENDED UP HAVING A ILEOSTOMY   • HEMORRHOIDECTOMY     • HIP TROCHANTERIC NAILING WITH INTRAMEDULLARY HIP SCREW Right 3/27/2019    Procedure: HIP TROCANTERIC NAILING WITH INTRAMEDULLARY HIP SCREW RIGHT;  Surgeon: Jona Tom MD;  Location: [facility];  Service: Orthopedics   • HYSTERECTOMY     • LUMBAR DISCECTOMY      L4 - L5   • OTHER SURGICAL HISTORY      TUBAL REVERSAL   • THYROIDECTOMY     • TUBAL ABDOMINAL LIGATION      X 2      General Information     Row Name 23 0947          OT Time and  Intention    Document Type therapy note (daily note)  -     Mode of Treatment occupational therapy  -     Row Name 01/02/23 0947          General Information    Patient Profile Reviewed yes  -     Existing Precautions/Restrictions fall;oxygen therapy device and L/min  -[facility] Name 01/02/23 0947          Cognition    Orientation Status (Cognition) oriented x 4  -[facility] Name 01/02/23 0947          Safety Issues, Functional Mobility    Impairments Affecting Function (Mobility) balance;endurance/activity tolerance;pain;strength;shortness of breath;sensation/sensory awareness  -           User Key  (r) = Recorded By, (t) = Taken By, (c) = Cosigned By    Initials Name Provider Type     Thea Stone OT Occupational Therapist                 Mobility/ADL's     Row Name 01/02/23 0947          Bed Mobility    Bed Mobility supine-sit;sit-supine  -     Supine-Sit [place] (Bed Mobility) supervision  -     Sit-Supine [place] (Bed Mobility) supervision  -[facility] Name 01/02/23 0947          Transfers    Transfers sit-stand transfer  -[facility] Name 01/02/23 09          Sit-Stand Transfer    Sit-Stand [place] (Transfers) supervision  -[facility] Name 01/02/23 0947          Functional Mobility    Functional Mobility- Ind. Level contact guard assist;verbal cues required  -     Functional Mobility- Device other (see comments)  HHA  -     Functional Mobility-Distance (Feet) 25  -     Functional Mobility- Safety Issues supplemental O2  -     Functional Mobility- Comment O2 monitored and rest breaks given as needed.  -[facility] Name 01/02/23 0947          Activities of Daily Living    BADL Assessment/Intervention grooming;upper body dressing  -[facility] Name 01/02/23 0947          Upper Body Dressing Assessment/Training    [place] Level (Upper Body Dressing) upper body dressing skills;pajama/robe;minimum assist (75% patient effort)  -     Position (Upper Body Dressing) edge of  bed sitting  -     Row Name 01/02/23 0947          Grooming Assessment/Training    [place] Level (Grooming) wash face, hands;set up;grooming skills  -           User Key  (r) = Recorded By, (t) = Taken By, (c) = Cosigned By    Initials Name Provider Type    Thea Rangel OT Occupational Therapist               Obj/Interventions     Row Name 01/02/23 0947          Motor Skills    Motor Skills functional endurance  -     Functional Endurance Improving but still limited by O2 dropping with activity.  -     Row Name 01/02/23 0947          Balance    Balance Assessment sitting static balance;sitting dynamic balance;sit to stand dynamic balance;standing dynamic balance;standing static balance  -     Static Sitting Balance independent  -SW     Dynamic Sitting Balance standby assist  -SW     Position, Sitting Balance unsupported  -SW     Sit to Stand Dynamic Balance supervision  -SW     Static Standing Balance supervision  -SW     Dynamic Standing Balance contact guard  -SW     Position/Device Used, Standing Balance supported  -SW     Balance Interventions sitting;sit to stand;standing;supported;static;dynamic;minimal challenge  -           User Key  (r) = Recorded By, (t) = Taken By, (c) = Cosigned By    Initials Name Provider Type    Thea Rangel OT Occupational Therapist               Goals/Plan    No documentation.                Clinical Impression     Row Name 01/02/23 0947          Pain Assessment    Pretreatment Pain Rating 8/10  -SW     Posttreatment Pain Rating 6/10  -SW     Pain Location - Side/Orientation Bilateral  -SW     Pain Location lower  -SW     Pain Location - extremity;foot  -SW     Pain Intervention(s) Medication (See MAR);Repositioned;Ambulation/increased activity  -     Row Name 01/02/23 0947          Plan of Care Review    Plan of Care Reviewed With patient  -SW     Outcome Evaluation OT promoted improved activity tolerance while monitoring O2 during session.  Pt completed   bed mob and sts with supervision, cga for mob with hha, grooming with setup, and demo improved standing endurance.  O2 dropped to 84% with quick recovery with rest and breathing techniques.  Discussed d/c planning with pt indicating she wants to go home.  OT educated on risk of falls and return to hospital if going home in a weakened state and she stated she would do HHOT/HHPT.  -SW     Row Name 01/02/23 0947          Vital Signs    Pre Systolic BP Rehab 122  -SW     Pre Treatment Diastolic BP 69  -SW     Pre SpO2 (%) 97  -SW     O2 Delivery Pre Treatment supplemental O2  -SW     Intra SpO2 (%) 84  -SW     O2 Delivery Intra Treatment supplemental O2  -SW     Post SpO2 (%) 95  -SW     O2 Delivery Post Treatment supplemental O2  -SW     Pre Patient Position Sitting  -SW     Intra Patient Position Standing  -SW     Post Patient Position Sitting  -SW     Row Name 01/02/23 0947          Positioning and Restraints    Pre-Treatment Position in bed  -SW     Post Treatment Position bed  -SW     In Bed notified nsg;fowlers;sitting;call light within reach;encouraged to call for assist;exit alarm on;side rails up x2  -SW           User Key  (r) = Recorded By, (t) = Taken By, (c) = Cosigned By    Initials Name Provider Type    Thea Rangel, SIDNEY Occupational Therapist               Outcome Measures     Row Name 01/02/23 1051          How much help from another is currently needed...    Putting on and taking off regular lower body clothing? 2  -SW     Bathing (including washing, rinsing, and drying) 2  -SW     Toileting (which includes using toilet bed pan or urinal) 3  -SW     Putting on and taking off regular upper body clothing 3  -SW     Taking care of personal grooming (such as brushing teeth) 4  -SW     Eating meals 4  -SW     AM-PAC 6 Clicks Score (OT) 18  -SW     Row Name 01/02/23 1051          Functional Assessment    Outcome Measure Options AM-PAC 6 Clicks Daily Activity (OT)  -SW           User Key  (r) = Recorded By,  (t) = Taken By, (c) = Cosigned By    Initials Name Provider Type     Thea Stone OT Occupational Therapist                Occupational Therapy Education     Title: PT OT SLP Therapies (Done)     Topic: Occupational Therapy (Done)     Point: ADL training (Done)     Description:   Instruct learner(s) on proper safety adaptation and remediation techniques during self care or transfers.   Instruct in proper use of assistive devices.              Learning Progress Summary           Patient Acceptance, E, VU by  at 1/2/2023 1052    Acceptance, TB,D,E, VU,NR by  at 12/28/2022 1541    Acceptance, E,TB,D, VU,DU,NR by  at 12/26/2022 1549                   Point: Home exercise program (Done)     Description:   Instruct learner(s) on appropriate technique for monitoring, assisting and/or progressing therapeutic exercises/activities.              Learning Progress Summary           Patient Acceptance, E, VU by  at 1/2/2023 1052                   Point: Precautions (Done)     Description:   Instruct learner(s) on prescribed precautions during self-care and functional transfers.              Learning Progress Summary           Patient Acceptance, E, VU by  at 1/2/2023 1052    Acceptance, TB,D,E, VU,NR by  at 12/28/2022 1541    Acceptance, E,TB,D, VU,DU,NR by  at 12/26/2022 1549                   Point: Body mechanics (Done)     Description:   Instruct learner(s) on proper positioning and spine alignment during self-care, functional mobility activities and/or exercises.              Learning Progress Summary           Patient Acceptance, TB,D,E, VU,NR by  at 12/28/2022 1541    Acceptance, E,TB,D, VU,DU,NR by  at 12/26/2022 1549                               User Key     Initials Effective Dates Name Provider Type Discipline     10/25/22 -  Madalyn Cho OT Occupational Therapist OT    KF 06/16/21 -  Guerline Portillo OT Occupational Therapist OT     06/16/21 -  Thea Stone OT Occupational Therapist  OT              OT Recommendation and Plan     Plan of Care Review  Plan of Care Reviewed With: patient  Outcome Evaluation: OT promoted improved activity tolerance while monitoring O2 during session.  Pt completed  bed mob and sts with supervision, cga for mob with hha, grooming with setup, and demo improved standing endurance.  O2 dropped to 84% with quick recovery with rest and breathing techniques.  Discussed d/c planning with pt indicating she wants to go home.  OT educated on risk of falls and return to hospital if going home in a weakened state and she stated she would do HHOT/HHPT.     Time Calculation:    Time Calculation- OT     Row Name 01/02/23 0947             Time Calculation- OT    OT Start Time 0947  -SW      OT Received On 01/02/23  -SW         Timed Charges    91263 - OT Therapeutic Activity Minutes 28  -SW      36233 - OT Self Care/Mgmt Minutes 10  -SW         Total Minutes    Timed Charges Total Minutes 38  -SW       Total Minutes 38  -SW            User Key  (r) = Recorded By, (t) = Taken By, (c) = Cosigned By    Initials Name Provider Type    SW Thea Stone OT Occupational Therapist              Therapy Charges for Today     Code Description Service Date Service Provider Modifiers Qty    66922730605  OT THERAPEUTIC ACT EA 15 MIN 1/2/2023 Thea Stone OT GO 2    34847469675 HC OT SELF CARE/MGMT/TRAIN EA 15 MIN 1/2/2023 Thea Stone OT GO 1               Thea Stone OT  1/2/2023

## 2023-01-02 NOTE — PLAN OF CARE
Problem: Fall Injury Risk  Goal: Absence of Fall and Fall-Related Injury  Outcome: Ongoing, Progressing  Intervention: Identify and Manage Contributors  Recent Flowsheet Documentation  Taken 1/1/2023 2000 by Katlin Maxwell RN  Medication Review/Management:   medications reviewed   high-risk medications identified  Intervention: Promote Injury-Free Environment  Recent Flowsheet Documentation  Taken 1/2/2023 0400 by Katlin Maxwell RN  Safety Promotion/Fall Prevention:   clutter free environment maintained   safety round/check completed  Taken 1/2/2023 0200 by Katlin Maxwell RN  Safety Promotion/Fall Prevention:   clutter free environment maintained   safety round/check completed  Taken 1/2/2023 0000 by Katlin Maxwell RN  Safety Promotion/Fall Prevention:   clutter free environment maintained   safety round/check completed  Taken 1/1/2023 2000 by Katlin Maxwell RN  Safety Promotion/Fall Prevention:   clutter free environment maintained   safety round/check completed     Problem: Skin Injury Risk Increased  Goal: Skin Health and Integrity  Outcome: Ongoing, Progressing  Intervention: Optimize Skin Protection  Recent Flowsheet Documentation  Taken 1/2/2023 0200 by Katlin Maxwell RN  Head of Bed (HOB) Positioning: HOB at 20-30 degrees  Taken 1/2/2023 0000 by Katlin Maxwell RN  Head of Bed (HOB) Positioning: HOB at 20-30 degrees  Taken 1/1/2023 2000 by Katlin Maxwell RN  Head of Bed (HOB) Positioning: HOB at 20-30 degrees     Problem: Diabetes Comorbidity  Goal: Blood Glucose Level Within Targeted Range  Outcome: Ongoing, Progressing     Problem: Hypertension Comorbidity  Goal: Blood Pressure in Desired Range  Outcome: Ongoing, Progressing  Intervention: Maintain Blood Pressure Management  Recent Flowsheet Documentation  Taken 1/1/2023 2000 by Katlin Maxwell RN  Medication Review/Management:   medications reviewed   high-risk medications identified     Problem: Osteoarthritis Comorbidity  Goal:  Maintenance of Osteoarthritis Symptom Control  Outcome: Ongoing, Progressing  Intervention: Maintain Osteoarthritis Symptom Control  Recent Flowsheet Documentation  Taken 1/1/2023 2027 by Katlin Maxwell RN  Activity Management: ambulated in room  Taken 1/1/2023 2000 by Katlin Maxwell RN  Medication Review/Management:   medications reviewed   high-risk medications identified     Problem: Adult Inpatient Plan of Care  Goal: Plan of Care Review  Outcome: Ongoing, Progressing  Goal: Patient-Specific Goal (Individualized)  Outcome: Ongoing, Progressing  Goal: Absence of Hospital-Acquired Illness or Injury  Outcome: Ongoing, Progressing  Intervention: Identify and Manage Fall Risk  Recent Flowsheet Documentation  Taken 1/2/2023 0400 by Katlin Maxwell RN  Safety Promotion/Fall Prevention:   clutter free environment maintained   safety round/check completed  Taken 1/2/2023 0200 by Katlin Maxwell RN  Safety Promotion/Fall Prevention:   clutter free environment maintained   safety round/check completed  Taken 1/2/2023 0000 by Katlin Maxwell RN  Safety Promotion/Fall Prevention:   clutter free environment maintained   safety round/check completed  Taken 1/1/2023 2000 by Katlin Maxwell RN  Safety Promotion/Fall Prevention:   clutter free environment maintained   safety round/check completed  Intervention: Prevent Skin Injury  Recent Flowsheet Documentation  Taken 1/2/2023 0200 by Katlin Maxwell RN  Body Position: position changed independently  Taken 1/2/2023 0000 by Katlin Maxwell RN  Body Position: position changed independently  Taken 1/1/2023 2000 by Katlin Maxwell RN  Body Position: position changed independently  Intervention: Prevent and Manage VTE (Venous Thromboembolism) Risk  Recent Flowsheet Documentation  Taken 1/1/2023 2027 by Katlin Maxwell RN  Activity Management: ambulated in room  Taken 1/1/2023 2000 by Katlin Maxwell RN  Range of Motion: active ROM (range of motion)  encouraged  Intervention: Prevent Infection  Recent Flowsheet Documentation  Taken 1/2/2023 0400 by Katlin Maxwell RN  Infection Prevention:   environmental surveillance performed   hand hygiene promoted   rest/sleep promoted   single patient room provided  Taken 1/2/2023 0200 by Katlin Maxwell RN  Infection Prevention:   environmental surveillance performed   hand hygiene promoted   rest/sleep promoted   single patient room provided  Taken 1/1/2023 2000 by Katlin Maxwell RN  Infection Prevention:   environmental surveillance performed   hand hygiene promoted   rest/sleep promoted   single patient room provided  Goal: Optimal Comfort and Wellbeing  Outcome: Ongoing, Progressing  Intervention: Monitor Pain and Promote Comfort  Recent Flowsheet Documentation  Taken 1/1/2023 2000 by Katlin Maxwell RN  Pain Management Interventions:   quiet environment facilitated   see MAR  Intervention: Provide Person-Centered Care  Recent Flowsheet Documentation  Taken 1/1/2023 2000 by Katlin Maxwell RN  Trust Relationship/Rapport:   care explained   choices provided   questions answered   questions encouraged   thoughts/feelings acknowledged  Goal: Readiness for Transition of Care  Outcome: Ongoing, Progressing   Goal Outcome Evaluation:

## 2023-01-02 NOTE — PLAN OF CARE
Goal Outcome Evaluation:  Plan of Care Reviewed With: patient           Outcome Evaluation: OT promoted improved activity tolerance while monitoring O2 during session.  Pt completed  bed mob and sts with supervision, cga for mob with hha, grooming with setup, and demo improved standing endurance.  O2 dropped to 84% with quick recovery with rest and breathing techniques.  Discussed d/c planning with pt indicating she wants to go home.  OT educated on risk of falls and return to hospital if going home in a weakened state and she stated she would do HHOT/HHPT.

## 2023-01-02 NOTE — CASE MANAGEMENT/SOCIAL WORK
Continued Stay Note  [facility]     Patient Name: Charly Blevins  MRN: [id number]  Today's Date: 1/2/2023    Admit Date: 12/17/2022    Plan: Home w/HH and 24 hour caregivers   Discharge Plan     Row Name 01/02/23 1613       Plan    Plan Home w/HH and 24 hour caregivers    Patient/Family in Agreement with Plan yes    Plan Comments CM spoke w/pt in room, pt declines rehab, agreeable to , [facility]  has accepted pt. CM spoke w/daughter Alexandra on phone. 24/7 care has been arranged thru caregivers and family for safe d/c plan.    Final Discharge Disposition Code 06 - home with home health care               Discharge Codes    No documentation.               Expected Discharge Date and Time     Expected Discharge Date Expected Discharge Time    Jan 2, 2023             Brian Zuleta RN

## 2023-01-03 ENCOUNTER — READMISSION MANAGEMENT (OUTPATIENT)
Dept: CALL CENTER | Facility: HOSPITAL | Age: 68
End: 2023-01-03
Payer: MEDICARE

## 2023-01-03 ENCOUNTER — HOME HEALTH ADMISSION (OUTPATIENT)
Dept: HOME HEALTH SERVICES | Facility: HOME HEALTHCARE | Age: 68
End: 2023-01-03
Payer: MEDICARE

## 2023-01-03 VITALS
WEIGHT: 211.86 LBS | SYSTOLIC BLOOD PRESSURE: 120 MMHG | RESPIRATION RATE: 16 BRPM | HEIGHT: 66 IN | OXYGEN SATURATION: 96 % | HEART RATE: 70 BPM | TEMPERATURE: 98.1 F | DIASTOLIC BLOOD PRESSURE: 76 MMHG | BODY MASS INDEX: 34.05 KG/M2

## 2023-01-03 PROBLEM — N17.9 AKI (ACUTE KIDNEY INJURY): Status: RESOLVED | Noted: 2022-12-17 | Resolved: 2023-01-03

## 2023-01-03 PROBLEM — J13 PNEUMONIA DUE TO STREPTOCOCCUS PNEUMONIAE (HCC): Status: RESOLVED | Noted: 2022-12-26 | Resolved: 2023-01-03

## 2023-01-03 PROBLEM — A41.9 SEPTIC SHOCK: Status: RESOLVED | Noted: 2022-12-17 | Resolved: 2023-01-03

## 2023-01-03 PROBLEM — R73.9 STEROID-INDUCED HYPERGLYCEMIA: Status: ACTIVE | Noted: 2023-01-03

## 2023-01-03 PROBLEM — R65.21 SEPTIC SHOCK (HCC): Status: RESOLVED | Noted: 2022-12-17 | Resolved: 2023-01-03

## 2023-01-03 PROBLEM — J10.1 INFLUENZA A: Status: RESOLVED | Noted: 2022-12-17 | Resolved: 2023-01-03

## 2023-01-03 PROBLEM — T38.0X5A STEROID-INDUCED HYPERGLYCEMIA: Status: ACTIVE | Noted: 2023-01-03

## 2023-01-03 LAB — GLUCOSE BLDC GLUCOMTR-MCNC: 148 MG/DL (ref 70–130)

## 2023-01-03 PROCEDURE — 93005 ELECTROCARDIOGRAM TRACING: CPT | Performed by: INTERNAL MEDICINE

## 2023-01-03 PROCEDURE — 82962 GLUCOSE BLOOD TEST: CPT

## 2023-01-03 PROCEDURE — 94799 UNLISTED PULMONARY SVC/PX: CPT

## 2023-01-03 PROCEDURE — 63710000001 PREDNISONE PER 5 MG: Performed by: INTERNAL MEDICINE

## 2023-01-03 PROCEDURE — 94761 N-INVAS EAR/PLS OXIMETRY MLT: CPT

## 2023-01-03 PROCEDURE — 99239 HOSP IP/OBS DSCHRG MGMT >30: CPT | Performed by: INTERNAL MEDICINE

## 2023-01-03 PROCEDURE — 94664 DEMO&/EVAL PT USE INHALER: CPT

## 2023-01-03 PROCEDURE — 93010 ELECTROCARDIOGRAM REPORT: CPT | Performed by: INTERNAL MEDICINE

## 2023-01-03 RX ORDER — LANOLIN ALCOHOL/MO/W.PET/CERES
400 CREAM (GRAM) TOPICAL DAILY
Start: 2023-01-03

## 2023-01-03 RX ORDER — GABAPENTIN 100 MG/1
100 CAPSULE ORAL EVERY 12 HOURS SCHEDULED
Start: 2023-01-03

## 2023-01-03 RX ORDER — LIDOCAINE 50 MG/G
2 PATCH TOPICAL
Qty: 30 EACH | Refills: 0 | Status: ON HOLD | OUTPATIENT
Start: 2023-01-03 | End: 2023-01-25 | Stop reason: SDUPTHER

## 2023-01-03 RX ORDER — FAMOTIDINE 20 MG/1
20 TABLET, FILM COATED ORAL 2 TIMES DAILY
Qty: 60 TABLET | Refills: 1 | Status: SHIPPED | OUTPATIENT
Start: 2023-01-03

## 2023-01-03 RX ORDER — LEVOTHYROXINE SODIUM 0.12 MG/1
125 TABLET ORAL EVERY MORNING
Start: 2023-01-03

## 2023-01-03 RX ORDER — ONDANSETRON 4 MG/1
4 TABLET, FILM COATED ORAL EVERY 8 HOURS PRN
Qty: 30 TABLET | Refills: 0 | Status: SHIPPED | OUTPATIENT
Start: 2023-01-03

## 2023-01-03 RX ADMIN — IPRATROPIUM BROMIDE AND ALBUTEROL SULFATE 3 ML: 2.5; .5 SOLUTION RESPIRATORY (INHALATION) at 07:28

## 2023-01-03 RX ADMIN — FLUOXETINE 20 MG: 20 CAPSULE ORAL at 08:45

## 2023-01-03 RX ADMIN — Medication 10 ML: at 08:45

## 2023-01-03 RX ADMIN — Medication 800 MG: at 08:44

## 2023-01-03 RX ADMIN — COLESEVELAM HYDROCHLORIDE 1250 MG: 625 TABLET, COATED ORAL at 08:46

## 2023-01-03 RX ADMIN — SPIRONOLACTONE 25 MG: 25 TABLET ORAL at 08:44

## 2023-01-03 RX ADMIN — FOLIC ACID TAB 400 MCG 400 MCG: 400 TAB at 08:44

## 2023-01-03 RX ADMIN — METFORMIN HYDROCHLORIDE 1000 MG: 1000 TABLET, FILM COATED ORAL at 08:44

## 2023-01-03 RX ADMIN — HYDROCODONE BITARTRATE AND ACETAMINOPHEN 1 TABLET: 10; 325 TABLET ORAL at 08:51

## 2023-01-03 RX ADMIN — LEVOTHYROXINE SODIUM 125 MCG: 125 TABLET ORAL at 06:46

## 2023-01-03 RX ADMIN — ROSUVASTATIN CALCIUM 5 MG: 10 TABLET, COATED ORAL at 08:45

## 2023-01-03 RX ADMIN — FLECAINIDE ACETATE 100 MG: 50 TABLET ORAL at 08:45

## 2023-01-03 RX ADMIN — APIXABAN 5 MG: 5 TABLET, FILM COATED ORAL at 08:44

## 2023-01-03 RX ADMIN — GABAPENTIN 100 MG: 100 CAPSULE ORAL at 08:44

## 2023-01-03 RX ADMIN — EMPAGLIFLOZIN 10 MG: 10 TABLET, FILM COATED ORAL at 08:45

## 2023-01-03 RX ADMIN — ALLOPURINOL 300 MG: 300 TABLET ORAL at 08:45

## 2023-01-03 RX ADMIN — Medication 12.5 MG: at 08:45

## 2023-01-03 RX ADMIN — PREDNISONE 5 MG: 5 TABLET ORAL at 08:45

## 2023-01-03 NOTE — PLAN OF CARE
Goal Outcome Evaluation:  Plan of Care Reviewed With: patient        Progress: improving  Outcome Evaluation: Vital signs wnl. Oxygen level greater than 90% on 2 liters. Pain medication given as needed. Will continue to monitor.

## 2023-01-03 NOTE — CASE MANAGEMENT/SOCIAL WORK
Case Management Discharge Note      Final Note: CM spoke w/pt in room. Oxygen off, while on room air, pt did not drop below 92%. CM notified [facility] of discharge today. Per pt and daughter, she will have 24 hour supervision. Family will transport home. No other d/c needs verbalized @ this time.         Selected Continued Care - Admitted Since 12/17/2022     Destination    No services have been selected for the patient.              Durable Medical Equipment    No services have been selected for the patient.              Dialysis/Infusion    No services have been selected for the patient.              Home Medical Care     Service Provider Selected Services Address Phone Fax Patient Preferred    [facility] [address] [address] [phone] --          Therapy    No services have been selected for the patient.              Community Resources    No services have been selected for the patient.              Community & DME    No services have been selected for the patient.                       Final Discharge Disposition Code: 06 - home with home health care

## 2023-01-03 NOTE — DISCHARGE SUMMARY
[facility] Medicine Services  DISCHARGE SUMMARY    Patient Name: Charly Blevins  : 1955  MRN: [id number]    Date of Admission: 2022 11:00 AM  Date of Discharge:  1/3/2023  Primary Care Physician: Boyd Molina MD    Consults     No orders found from 2022 to 2022.          Hospital Course     Presenting Problem:   Septic shock (HCC) [A41.9, R65.21]    Active Hospital Problems    Diagnosis  POA   • Steroid-induced hyperglycemia [R73.9, T38.0X5A]  No   • Acute hypoxic respiratory failure [J96.01]  Yes   • On chronic immunosuppressive therapy [D84.9]  Yes   • GERD [K21.9]  Yes   • Obesity (BMI 30-39.9) [E66.9]  Yes   • Hypothyroidism [E03.9]  Yes   • Anxiety and depression [F41.9, F32.A]  Yes   • Gout [M10.9]  Yes   • Dyslipidemia [E78.5]  Yes   • RA [M06.9]  Yes   • T2DM [E11.9]  Yes      Resolved Hospital Problems    Diagnosis Date Resolved POA   • **Septic shock [A41.9, R65.21] 2023 Yes   • Pneumonia due to Streptococcus pneumoniae (HCC) [J13] 2023 Yes   • DRISS [N17.9] 2023 Yes   • Influenza A [J10.1] 2023 Yes          Hospital Course:  Charly Blevins is a 67 y.o. female w RA on chronic MTX/prednisone, DMII, TIFFANY, hypothyroidism who presented with acute hypoxic respiratory failure . Found to be flu A+, strep pneum +. Initially admitted to the ICU as she required pressors for septic shock and BIPAP for respiratory distress. Treated with IV antibiotics, tamiflu and stress dose steroids with slow but consistent improvement. Downgraded from ICU on . Stay c/b hyperglycemia in setting of stress dosed steroids with improvement upon returning to home low-dose steroids and persistent hypomagnesemia that improved w PO replacement and discontinuation of PPI.     Persistent weakness and debility after this challenging stay. I spent >60 minutes during this stay counseling patient on option of inpatient rehab as this was recommended by our  PT/OT assessments repeatedly, but she declines given desire to return home. She and family had assured our staff of 24/7 care upon discharge including home aide workers. We discussed her fall risk, overall debility and higher risk of hospital readmission with this plan. She understood, had capacity to make this decision, and discharged home with safest plan available given this. Provided all support possible.    Discharge Follow Up Recommendations for outpatient labs/diagnostics:  F/u with PCP in 1-2 weeks  F/u with OP pulm (referral placed) for PFTs post-illness    Day of Discharge     HPI:   Feels weak, persistently.   Nausea that is better w zofran    Review of Systems  Gen- No fevers, chills  CV- No chest pain, palpitations    Vital Signs:   Temp:  [97.6 °F (36.4 °C)-98.5 °F (36.9 °C)] 98.1 °F (36.7 °C)  Heart Rate:  [66-70] 70  Resp:  [16-18] 16  BP: ()/(60-77) 120/76  Flow (L/min):  [2] 2      Physical Exam:  Constitutional: No acute distress, awake, alert older female lying in bed  HENT: NCAT, mucous membranes moist, n/c in place  Respiratory: Mild crackles right lower lobe otherwise CTAB with sats remaining >90% on r/a  Cardiovascular: RRR, no murmurs, rubs, or gallops  Gastrointestinal: Soft, nontender, nondistended  Musculoskeletal: Muscle tone mildly decreased throughout, no joint effusions appreciated  Psychiatric: Appropriate affect, cooperative  Neurologic: Alert and oriented, facial movements symmetric and spontaneous movement of all 4 extremities grossly equal bilaterally, speech clear  Skin: No rashes    Pertinent  and/or Most Recent Results     LAB RESULTS:      Lab 01/01/23  0728 12/31/22  1220 12/30/22  1054 12/29/22  0538 12/28/22  0611   WBC 11.34* 12.79* 17.62* 9.11 9.84   HEMOGLOBIN 10.2* 10.5* 11.0* 10.5* 11.0*   HEMATOCRIT 34.2 35.2 36.2 35.8 37.2   PLATELETS 302 294 294 261 269   MCV 91.7 91.2 90.0 93.0 91.4         Lab 01/02/23  0600 01/01/23  0728 12/31/22  1220 12/30/22  1054  12/29/22  0538 12/28/22  0611   SODIUM 142  --  137 136 144 141   POTASSIUM 4.8  --  4.6 4.8 5.0 5.0   CHLORIDE 103  --  99 99 104 103   CO2 32.0*  --  29.0 27.0 33.0* 33.0*   ANION GAP 7.0  --  9.0 10.0 7.0 5.0   BUN 33*  --  29* 32* 30* 31*   CREATININE 0.68  --  0.71 0.65 0.75 0.77   EGFR 95.0  --  92.7 96.0 86.8 84.1   GLUCOSE 154*  --  323* 336* 146* 151*   CALCIUM 8.9  --  9.3 8.9 9.4 9.3   MAGNESIUM 1.7 2.1 1.3*  --   --   --          Lab 12/31/22  1220 12/30/22  1054 12/29/22  0538 12/28/22  0611   TOTAL PROTEIN 5.0* 5.7* 5.5* 5.4*   ALBUMIN 3.2* 3.3* 3.3* 3.1*   GLOBULIN 1.8 2.4 2.2 2.3   ALT (SGPT) 15 18 22 22   AST (SGOT) 11 11 18 17   BILIRUBIN 0.2 0.2 0.2 0.2   ALK PHOS 56 67 62 64                     Brief Urine Lab Results  (Last result in the past 365 days)      Color   Clarity   Blood   Leuk Est   Nitrite   Protein   CREAT   Urine HCG        12/17/22 1612             82.2         12/17/22 1612 Yellow   Clear   Negative   Negative   Negative   Trace               Microbiology Results (last 10 days)     ** No results found for the last 240 hours. **          XR Chest 2 View    Result Date: 12/25/2022  DATE OF EXAM: 12/25/2022 9:47 AM  PROCEDURE: XR CHEST 2 VW-  INDICATIONS: influenza and Pneumococcal pneumonia; A41.9-Sepsis, unspecified organism; R65.21-Severe sepsis with septic shock; J10.1-Influenza due to other identified influenza virus with other respiratory manifestations; J15.9-Unspecified bacterial pneumonia; N17.9-Acute kidney failure, unspecified; J96.01-Acute respiratory failure with hypoxia  COMPARISON: 12/23/2022  TECHNIQUE: Two radiologic views of the chest, PA and lateral, were obtained.  FINDINGS: Implanted loop recorder and right IJ catheter are again noted. Heart is mildly enlarged. Vasculature appears mildly cephalized. Patchy disease in the right and left midlung and right lung base is again noted, mildly improved on the right. No new pulmonary parenchymal disease effusion or  pneumothorax is seen.      Patchy bilateral pulmonary disease, mildly improved in the right base, stable elsewhere.  This report was finalized on 12/25/2022 10:10 AM by Dr. Heath Prado MD.      XR Chest 1 View    Result Date: 1/1/2023  DATE OF EXAM: 1/1/2023 8:17 AM  PROCEDURE: XR CHEST 1 VW-  INDICATIONS: right-sided pleuritic pain post PNA; A41.9-Sepsis, unspecified organism; R65.21-Severe sepsis with septic shock; J10.1-Influenza due to other identified influenza virus with other respiratory manifestations; J15.9-Unspecified bacterial pneumonia; N17.9-Acute kidney failure, unspecified; J96.01-Acute respiratory failure with hypoxia  COMPARISON: 12/25/2022  TECHNIQUE: Single radiographic AP view of the chest was obtained.  FINDINGS: Right-sided airspace disease appears somewhat less conspicuous with minimal persisting opacity. The left lung is clear. There is no significant effusion or distinct pneumothorax. Unchanged cardiac enlargement.      Right-sided airspace disease appears somewhat less conspicuous with minimal persisting opacity. The left lung is clear. There is no significant effusion or distinct pneumothorax. Unchanged cardiac enlargement.  This report was finalized on 1/1/2023 10:44 AM by Conrado Mcdaniels.                Results for orders placed during the hospital encounter of 12/17/22    Adult Transthoracic Echo Complete w/ Color, Spectral and Contrast if Necessary Per Protocol    Interpretation Summary  •  Left ventricular systolic function is normal. Calculated left ventricular EF = 59.5%  •  Left ventricular wall thickness is consistent with mild concentric hypertrophy.  •  Left ventricular diastolic function is consistent with (grade II w/high LAP) pseudonormalization.  •  Left atrial volume is mildly increased.  •  There is calcification of the aortic valve.  •  Estimated right ventricular systolic pressure from tricuspid regurgitation is normal (<35 mmHg). Calculated right ventricular systolic  pressure from tricuspid regurgitation is 27 mmHg.  •  Mild dilation of the ascending aorta is present.  4.1 cm      Plan for Follow-up of Pending Labs/Results: none    Discharge Details        Discharge Medications      New Medications      Instructions Start Date   famotidine 20 MG tablet  Commonly known as: PEPCID   20 mg, Oral, 2 Times Daily      gabapentin 100 MG capsule  Commonly known as: NEURONTIN  Replaces: gabapentin 800 MG tablet   100 mg, Oral, Every 12 Hours Scheduled      lidocaine 5 %  Commonly known as: LIDODERM   2 patches, Transdermal, Every 24 Hours Scheduled, Remove & Discard patch within 12 hours or as directed by MD      magnesium oxide 400 tablet tablet  Commonly known as: MAG-OX   800 mg, Oral, Daily         Changes to Medications      Instructions Start Date   acetaminophen 325 MG tablet  Commonly known as: TYLENOL  What changed:   · reasons to take this  · additional instructions   650 mg, Oral, Every 4 Hours PRN      levothyroxine 125 MCG tablet  Commonly known as: SYNTHROID, LEVOTHROID  What changed: how much to take   125 mcg, Oral, Every Morning         Continue These Medications      Instructions Start Date   allopurinol 300 MG tablet  Commonly known as: ZYLOPRIM   1 tablet, Oral, Daily      apixaban 5 MG tablet tablet  Commonly known as: ELIQUIS   5 mg, Oral, 2 Times Daily      colesevelam 625 MG tablet  Commonly known as: WELCHOL   1,250 mg, Oral, 2 Times Daily With Meals      cyclobenzaprine 10 MG tablet  Commonly known as: FLEXERIL   10 mg, Oral, 3 Times Daily PRN      Farxiga 10 MG tablet  Generic drug: dapagliflozin Propanediol   10 mg, Oral, Daily      flecainide 100 MG tablet  Commonly known as: TAMBOCOR   100 mg, Oral, 2 Times Daily      FLUoxetine 20 MG capsule  Commonly known as: PROzac   20 mg, Oral, 2 Times Daily      folic acid 400 MCG tablet  Commonly known as: FOLVITE   400 mcg, Oral, Daily, OTC      HYDROcodone-acetaminophen  MG per tablet  Commonly known as:  NORCO   1 tablet, Oral, Every 8 Hours PRN      metFORMIN 1000 MG tablet  Commonly known as: GLUCOPHAGE   1,000 mg, Oral, 2 Times Daily With Meals      methotrexate PF 50 MG/2ML chemo syringe   2 mL, Injection, Weekly, Every Tuesday      metoprolol tartrate 25 MG tablet  Commonly known as: LOPRESSOR   12.5 mg, Oral, 2 Times Daily      ondansetron 4 MG tablet  Commonly known as: ZOFRAN   4 mg, Oral, Every 8 Hours PRN      predniSONE 5 MG tablet  Commonly known as: DELTASONE   5 mg, Oral, Daily, For 90 days, started 12-12-22 ongoing therapy      rosuvastatin 20 MG tablet  Commonly known as: CRESTOR   20 mg, Oral, Nightly      spironolactone 25 MG tablet  Commonly known as: ALDACTONE   25 mg, Oral, Daily      terbinafine 250 MG tablet  Commonly known as: lamiSIL   250 mg, Oral, 2 Times Daily      traZODone 100 MG tablet  Commonly known as: DESYREL   100 mg, Oral, Nightly      zolpidem 10 MG tablet  Commonly known as: AMBIEN   5 mg, Oral, Nightly PRN         Stop These Medications    esomeprazole 20 MG capsule  Commonly known as: nexIUM     gabapentin 800 MG tablet  Commonly known as: NEURONTIN  Replaced by: gabapentin 100 MG capsule            No Known Allergies      Discharge Disposition:  Home-Health Care Svc    Diet:  Hospital:No active diet order      Activity: with assistive devices + supervision      Restrictions or Other Recommendations: stop PPI, start pepcid, PO mg supp       CODE STATUS:    Code Status and Medical Interventions:   Ordered at: 12/17/22 1507     Code Status (Patient has no pulse and is not breathing):    CPR (Attempt to Resuscitate)     Medical Interventions (Patient has pulse or is breathing):    Full Support       Future Appointments   Date Time Provider Department Center   1/5/2023 To Be Determined Shara Lo RN [facility] None       Additional Instructions for the Follow-ups that You Need to Schedule     Ambulatory Referral to Home Health   As directed      Face to Face Visit Date:  12/27/2022    Follow-up provider for Plan of Care?: I treated the patient in an acute care facility and will not continue treatment after discharge.    Follow-up provider: LAKEISHA TURCIOS [[id number]]    Reason/Clinical Findings: s/p hosptial admission, flu 12-17-22, sepsis, resp failure and nurys    Describe mobility limitations that make leaving home difficult: weakness and debility    Nursing/Therapeutic Services Requested: Skilled Nursing Physical Therapy Occupational Therapy    Skilled nursing orders: Medication education Cardiopulmonary assessments    PT orders: Therapeutic exercise Strengthening Home safety assessment Gait Training    Weight Bearing Status: As Tolerated    Occupational orders: Activities of daily living Home safety assessment Strengthening    Frequency: 1 Week 1         Discharge Follow-up with PCP   As directed       Currently Documented PCP:    Lakeisha Turcios MD    PCP Phone Number:    [phone]     Follow Up Details: 1 week from hospital discharge         Discharge Follow-up with Specialty: pulmonary (amb referral sent - will call patient)   As directed      Specialty: pulmonary (amb referral sent - will call patient)                     Bernadine Suero MD  01/03/23      Time Spent on Discharge:  I spent  35  minutes on this discharge activity which included: counseling patient, d/w CM face-to-face encounter with the patient, reviewing the data in the system, coordination of the care with the nursing staff as well as consultants, documentation, and entering orders.

## 2023-01-04 LAB
QT INTERVAL: 484 MS
QTC INTERVAL: 484 MS

## 2023-01-04 NOTE — OUTREACH NOTE
Prep Survey    Flowsheet Row Responses   [facility] facility patient discharged from? [place]   Is LACE score < 7 ? No   Eligibility Readm Mgmt   Discharge diagnosis Septic shock   Does the patient have one of the following disease processes/diagnoses(primary or secondary)? Sepsis   Does the patient have Home health ordered? Yes   What is the Home health agency?  [facility]   Is there a DME ordered? No   Medication alerts for this patient see AVS   Prep survey completed? Yes          SHARON ZENDEJAS - Registered Nurse

## 2023-01-05 ENCOUNTER — HOME CARE VISIT (OUTPATIENT)
Dept: HOME HEALTH SERVICES | Facility: HOME HEALTHCARE | Age: 68
End: 2023-01-05
Payer: MEDICARE

## 2023-01-05 VITALS
HEART RATE: 56 BPM | RESPIRATION RATE: 16 BRPM | OXYGEN SATURATION: 89 % | DIASTOLIC BLOOD PRESSURE: 60 MMHG | SYSTOLIC BLOOD PRESSURE: 92 MMHG | TEMPERATURE: 97 F

## 2023-01-05 PROCEDURE — G0495 RN CARE TRAIN/EDU IN HH: HCPCS

## 2023-01-05 NOTE — HOME HEALTH
SOC Note:    Home Health ordered for: disciplines  SN, PT, OT  Reason for Hosp/Primary Dx/Co-morbidities: Influenza A, pneumonia, Acute respiratory hypoxia, DM, HTN, A fib, Depression/anxiety, Gout and RA    Focus of Care: SN for respiratory assessments and education, safety with oxygen use, diabetic education,  fall prevention safety    Current Functional status/mobility/DME:  Dyspneic with minimal exertion, some forgetfulness   poor balance , weakness, impaired judgement at times.  Using walker and stand by assist.  Requires 24/7 supervision.  Pt is not using oxygen when walking and ox sats drop below 86% at times.     HB status/Living Arrangements: Pt lives in her home and currently has 24/7 supervision from paid caregivers and or family  She requies moderate assist of 1 and use of walker to leave hoem    Skin Integrity/wound status: Skin intact with no open wounds    Code Status:Full Code    Fall Risk: Mahc score of 8    POC confirmed with Dr Boyd Molina via [email or web address] message on 1. 5. 23  Plan for next visit:  Respiratory assessments with education.  Diabetic educatoin.  [facility] medicaitons

## 2023-01-06 ENCOUNTER — HOME CARE VISIT (OUTPATIENT)
Dept: HOME HEALTH SERVICES | Facility: HOME HEALTHCARE | Age: 68
End: 2023-01-06
Payer: MEDICARE

## 2023-01-06 ENCOUNTER — READMISSION MANAGEMENT (OUTPATIENT)
Dept: CALL CENTER | Facility: HOSPITAL | Age: 68
End: 2023-01-06
Payer: MEDICARE

## 2023-01-06 VITALS — HEART RATE: 61 BPM | SYSTOLIC BLOOD PRESSURE: 117 MMHG | DIASTOLIC BLOOD PRESSURE: 69 MMHG | OXYGEN SATURATION: 96 %

## 2023-01-06 PROCEDURE — G0152 HHCP-SERV OF OT,EA 15 MIN: HCPCS

## 2023-01-06 NOTE — OUTREACH NOTE
Sepsis Week 1 Survey    Flowsheet Row Responses   [facility] patient discharged from? [place]   Does the patient have one of the following disease processes/diagnoses(primary or secondary)? Sepsis   Week 1 attempt successful? Yes   Call start time 1525   Call end time 1530   Discharge diagnosis Septic shock   Person spoke with today (if not patient) and relationship Patient   Meds reviewed with patient/caregiver? Yes   Is the patient having any side effects they believe may be caused by any medication additions or changes? No   Does the patient have all medications related to this admission filled (includes all antibiotics, inhalers, nebulizers,steroids,etc.) Yes   Is the patient taking all medications as directed (includes completed medication regime)? Yes   Does the patient have a primary care provider?  Yes   Does the patient have an appointment with their PCP within 7 days of discharge? Yes   Has the patient kept scheduled appointments due by today? N/A   What is the Home health agency?   [facility]   Has home health visited the patient within 72 hours of discharge? Yes   Psychosocial issues? No   Did the patient receive a copy of their discharge instructions? Yes   Nursing interventions Reviewed instructions with patient   What is the patient's perception of their health status since discharge? Improving   Nursing interventions Nurse provided patient education   Is the patient/caregiver able to teach back TIME? E xtremely Ill - severe pain, discomfort, shortness of breath, I nfection - may have signs and symptoms of an infection, T emperature - higher or lower than normal, M ental Decline - confused, sleepy, difficult to arouse   Nursing interventions Nurse provided patient education   Is patient/caregiver able to teach back steps to recovery at home? Rest and regain strength, Eat a balanced diet, Learn about sepsis(sepsis.org)   Is the patient/caregiver able to teach back signs and symptoms of  worsening condition: Fever, Shortness of breath/rapid respiratory rate, Altered mental status(confusion/coma)   If the patient is a current smoker, are they able to teach back resources for cessation? Not a smoker   Is the patient/caregiver able to teach back the hierarchy of who to call/visit for symptoms/problems? PCP, Specialist, Home health nurse, Urgent Care, ED, 911 Yes   Week 1 call completed? Yes   Wrap up additional comments Pt states she is doing better, and denies fever, SOB. Pt has a PCP fu appt, but pt shares she has to reschedule as her friend just passed, and needs to be there for the . Pt has HH/PT visits.          JOSE FRANCISCO THOMPSON - Registered Nurse

## 2023-01-06 NOTE — HOME HEALTH
66 y/o F with recent hospitalization with acute hypoxic respiratory failure 2/2. Found to be flu A+, strep pneum +. Initially admitted to the ICU as she required pressors for septic shock and BIPAP for respiratory distress. Persistent weakness and debility after this challenging stay. Inpatient rehab as this was recommended by hospital PT/OT assessments repeatedly, but she declined given desire to return home and agreeable to home health therapy. Pt. seen today for OT evaluation and presents with  decreased strength/endurance limiting safety and function with ADLs. Pt. will benefit from continued OT services to increase strength, endurance, safety, and overall independence with ADLs at home.  PMH: RA on chronic MTX/prednisone, DMII, TIFFANY, hypothyroidism

## 2023-01-09 ENCOUNTER — HOME CARE VISIT (OUTPATIENT)
Dept: HOME HEALTH SERVICES | Facility: HOME HEALTHCARE | Age: 68
End: 2023-01-09
Payer: MEDICARE

## 2023-01-09 PROCEDURE — G0299 HHS/HOSPICE OF RN EA 15 MIN: HCPCS

## 2023-01-10 ENCOUNTER — HOME CARE VISIT (OUTPATIENT)
Dept: HOME HEALTH SERVICES | Facility: HOME HEALTHCARE | Age: 68
End: 2023-01-10
Payer: MEDICARE

## 2023-01-10 VITALS
DIASTOLIC BLOOD PRESSURE: 66 MMHG | OXYGEN SATURATION: 90 % | TEMPERATURE: 98.3 F | SYSTOLIC BLOOD PRESSURE: 116 MMHG | HEART RATE: 76 BPM | RESPIRATION RATE: 16 BRPM

## 2023-01-10 PROCEDURE — G0151 HHCP-SERV OF PT,EA 15 MIN: HCPCS

## 2023-01-11 NOTE — HOME HEALTH
PT Eval note:  Home Health ordered for: disciplines SN, PT, OT   Reason for Hosp/Primary Dx/Co-morbidities: Influenza A, pneumonia, Acute respiratory hypoxia, DM, HTN, A fib, Depression/anxiety, Gout and RA   Focus of Care: HHPT 1wk1, 2wk2, 1wk4 for gait training, balance training, therapeutic exericse, pt education and HEP instruction related to Influenza  Current Functional status/mobility/DME: Dyspneic with minimal exertion, uses 4WW for mobility, has 24/7 assist from family and paid caregivers.    HB status/Living Arrangements: Pt lives in her home and currently has 24/7 supervision from paid caregivers and or family She requies moderate assist of 1 and use of walker to leave home   Skin Integrity/wound status: Skin intact with no open wounds   Code Status:Full Code   Fall Risk: High per [place]

## 2023-01-12 VITALS
HEART RATE: 63 BPM | RESPIRATION RATE: 16 BRPM | SYSTOLIC BLOOD PRESSURE: 100 MMHG | DIASTOLIC BLOOD PRESSURE: 70 MMHG | TEMPERATURE: 96.7 F | OXYGEN SATURATION: 94 %

## 2023-01-12 NOTE — HOME HEALTH
Routine Visit Note: LPN    Skill/education provided: Pulmonary/ catdiac medication education. V/S WDL.Pt c/o pain in ribs due to coughing. Instructed pt to use a pillow when coughing to ease pain and discomfort.    Patient/caregiver response: Pt will continue to take medications as prescribed. Pt will cont o2 at 2lpm via n/c.    Plan for next visit: Pulmonary assessment    Other pertinent info:

## 2023-01-13 ENCOUNTER — HOME CARE VISIT (OUTPATIENT)
Dept: HOME HEALTH SERVICES | Facility: HOME HEALTHCARE | Age: 68
End: 2023-01-13
Payer: MEDICARE

## 2023-01-13 PROCEDURE — G0152 HHCP-SERV OF OT,EA 15 MIN: HCPCS

## 2023-01-15 VITALS
HEART RATE: 57 BPM | SYSTOLIC BLOOD PRESSURE: 108 MMHG | RESPIRATION RATE: 16 BRPM | OXYGEN SATURATION: 94 % | DIASTOLIC BLOOD PRESSURE: 64 MMHG

## 2023-01-16 ENCOUNTER — READMISSION MANAGEMENT (OUTPATIENT)
Dept: CALL CENTER | Facility: HOSPITAL | Age: 68
End: 2023-01-16
Payer: MEDICARE

## 2023-01-16 NOTE — OUTREACH NOTE
Sepsis Week 2 Survey    Flowsheet Row Responses   [facility] patient discharged from? [place]   Does the patient have one of the following disease processes/diagnoses(primary or secondary)? Sepsis   Week 2 attempt successful? Yes   Call start time 1604   Call end time 1606   Discharge diagnosis Septic shock   Person spoke with today (if not patient) and relationship Patient   Meds reviewed with patient/caregiver? Yes   Is the patient having any side effects they believe may be caused by any medication additions or changes? No   Does the patient have all medications related to this admission filled (includes all antibiotics, inhalers, nebulizers,steroids,etc.) Yes   Is the patient taking all medications as directed (includes completed medication regime)? Yes   Does the patient have a primary care provider?  Yes   Comments regarding PCP PCP today   Does the patient have an appointment with their PCP within 7 days of discharge? Yes   Has the patient kept scheduled appointments due by today? Yes   What is the Home health agency?  [facility]   Has home health visited the patient within 72 hours of discharge? Yes   Home health comments    Psychosocial issues? No   Did the patient receive a copy of their discharge instructions? Yes   Nursing interventions Reviewed instructions with patient   What is the patient's perception of their health status since discharge? Improving   If the patient is a current smoker, are they able to teach back resources for cessation? Not a smoker   Is the patient/caregiver able to teach back the hierarchy of who to call/visit for symptoms/problems? PCP, Specialist, Home health nurse, Urgent Care, ED, 911 Yes   Week 2 call completed? Yes   Wrap up additional comments Pt improving.  No needs.          SHAHRAM SIMON - Registered Nurse

## 2023-01-18 ENCOUNTER — HOME CARE VISIT (OUTPATIENT)
Dept: HOME HEALTH SERVICES | Facility: HOME HEALTHCARE | Age: 68
End: 2023-01-18
Payer: MEDICARE

## 2023-01-18 PROCEDURE — G0157 HHC PT ASSISTANT EA 15: HCPCS

## 2023-01-19 ENCOUNTER — APPOINTMENT (OUTPATIENT)
Dept: CT IMAGING | Facility: HOSPITAL | Age: 68
DRG: 193 | End: 2023-01-19
Payer: MEDICARE

## 2023-01-19 ENCOUNTER — APPOINTMENT (OUTPATIENT)
Dept: GENERAL RADIOLOGY | Facility: HOSPITAL | Age: 68
DRG: 193 | End: 2023-01-19
Payer: MEDICARE

## 2023-01-19 ENCOUNTER — HOME CARE VISIT (OUTPATIENT)
Dept: HOME HEALTH SERVICES | Facility: HOME HEALTHCARE | Age: 68
End: 2023-01-19
Payer: MEDICARE

## 2023-01-19 ENCOUNTER — HOSPITAL ENCOUNTER (INPATIENT)
Facility: HOSPITAL | Age: 68
LOS: 5 days | Discharge: NURSING, REHAB OR LONG-TERM CARE | DRG: 193 | End: 2023-01-25
Attending: EMERGENCY MEDICINE | Admitting: HOSPITALIST
Payer: MEDICARE

## 2023-01-19 VITALS
OXYGEN SATURATION: 96 % | DIASTOLIC BLOOD PRESSURE: 62 MMHG | TEMPERATURE: 97.2 F | SYSTOLIC BLOOD PRESSURE: 104 MMHG | RESPIRATION RATE: 16 BRPM | HEART RATE: 76 BPM

## 2023-01-19 VITALS
RESPIRATION RATE: 16 BRPM | OXYGEN SATURATION: 91 % | DIASTOLIC BLOOD PRESSURE: 78 MMHG | HEART RATE: 58 BPM | SYSTOLIC BLOOD PRESSURE: 128 MMHG

## 2023-01-19 DIAGNOSIS — F51.01 PRIMARY INSOMNIA: ICD-10-CM

## 2023-01-19 DIAGNOSIS — R09.02 HYPOXIA: ICD-10-CM

## 2023-01-19 DIAGNOSIS — M06.9 RHEUMATOID ARTHRITIS, INVOLVING UNSPECIFIED SITE, UNSPECIFIED WHETHER RHEUMATOID FACTOR PRESENT: ICD-10-CM

## 2023-01-19 DIAGNOSIS — R41.0 ACUTE CONFUSION: ICD-10-CM

## 2023-01-19 DIAGNOSIS — J18.9 PNEUMONIA OF RIGHT LUNG DUE TO INFECTIOUS ORGANISM, UNSPECIFIED PART OF LUNG: Primary | ICD-10-CM

## 2023-01-19 DIAGNOSIS — G89.29 OTHER CHRONIC PAIN: ICD-10-CM

## 2023-01-19 LAB
ALBUMIN SERPL-MCNC: 3.7 G/DL (ref 3.5–5.2)
ALBUMIN/GLOB SERPL: 1.1 G/DL
ALP SERPL-CCNC: 71 U/L (ref 39–117)
ALT SERPL W P-5'-P-CCNC: 5 U/L (ref 1–33)
AMPHET+METHAMPHET UR QL: NEGATIVE
AMPHETAMINES UR QL: NEGATIVE
ANION GAP SERPL CALCULATED.3IONS-SCNC: 10 MMOL/L (ref 5–15)
ARTERIAL PATENCY WRIST A: ABNORMAL
AST SERPL-CCNC: 17 U/L (ref 1–32)
ATMOSPHERIC PRESS: ABNORMAL MM[HG]
BARBITURATES UR QL SCN: NEGATIVE
BASE EXCESS BLDA CALC-SCNC: 5.9 MMOL/L (ref 0–2)
BASOPHILS # BLD AUTO: 0.01 10*3/MM3 (ref 0–0.2)
BASOPHILS NFR BLD AUTO: 0.1 % (ref 0–1.5)
BDY SITE: ABNORMAL
BENZODIAZ UR QL SCN: POSITIVE
BILIRUB SERPL-MCNC: 0.3 MG/DL (ref 0–1.2)
BILIRUB UR QL STRIP: NEGATIVE
BODY TEMPERATURE: 37 C
BUN SERPL-MCNC: 13 MG/DL (ref 8–23)
BUN/CREAT SERPL: 13.1 (ref 7–25)
BUPRENORPHINE SERPL-MCNC: NEGATIVE NG/ML
CALCIUM SPEC-SCNC: 8.3 MG/DL (ref 8.6–10.5)
CANNABINOIDS SERPL QL: NEGATIVE
CHLORIDE SERPL-SCNC: 98 MMOL/L (ref 98–107)
CLARITY UR: CLEAR
CO2 BLDA-SCNC: 32.9 MMOL/L (ref 22–33)
CO2 SERPL-SCNC: 31 MMOL/L (ref 22–29)
COCAINE UR QL: NEGATIVE
COHGB MFR BLD: 1.5 % (ref 0–2)
COLOR UR: YELLOW
CREAT SERPL-MCNC: 0.99 MG/DL (ref 0.57–1)
D DIMER PPP FEU-MCNC: 0.44 MCGFEU/ML (ref 0–0.68)
D-LACTATE SERPL-SCNC: 1.1 MMOL/L (ref 0.5–2)
DEPRECATED RDW RBC AUTO: 55.7 FL (ref 37–54)
EGFRCR SERPLBLD CKD-EPI 2021: 62.2 ML/MIN/1.73
EOSINOPHIL # BLD AUTO: 0.08 10*3/MM3 (ref 0–0.4)
EOSINOPHIL NFR BLD AUTO: 0.7 % (ref 0.3–6.2)
EPAP: 0
ERYTHROCYTE [DISTWIDTH] IN BLOOD BY AUTOMATED COUNT: 16.5 % (ref 12.3–15.4)
ETHANOL BLD-MCNC: <10 MG/DL (ref 0–10)
FLUAV RNA RESP QL NAA+PROBE: NOT DETECTED
FLUBV RNA RESP QL NAA+PROBE: NOT DETECTED
GLOBULIN UR ELPH-MCNC: 3.3 GM/DL
GLUCOSE SERPL-MCNC: 128 MG/DL (ref 65–99)
GLUCOSE UR STRIP-MCNC: ABNORMAL MG/DL
HCO3 BLDA-SCNC: 31.4 MMOL/L (ref 20–26)
HCT VFR BLD AUTO: 38.2 % (ref 34–46.6)
HCT VFR BLD CALC: 31.7 % (ref 38–51)
HGB BLD-MCNC: 11.4 G/DL (ref 12–15.9)
HGB BLDA-MCNC: 10.3 G/DL (ref 14–18)
HGB UR QL STRIP.AUTO: NEGATIVE
HOLD SPECIMEN: NORMAL
IMM GRANULOCYTES # BLD AUTO: 0.05 10*3/MM3 (ref 0–0.05)
IMM GRANULOCYTES NFR BLD AUTO: 0.4 % (ref 0–0.5)
INHALED O2 CONCENTRATION: 32 %
IPAP: 0
KETONES UR QL STRIP: NEGATIVE
LEUKOCYTE ESTERASE UR QL STRIP.AUTO: NEGATIVE
LYMPHOCYTES # BLD AUTO: 1.52 10*3/MM3 (ref 0.7–3.1)
LYMPHOCYTES NFR BLD AUTO: 13.6 % (ref 19.6–45.3)
MAGNESIUM SERPL-MCNC: 1.6 MG/DL (ref 1.6–2.4)
MCH RBC QN AUTO: 27.9 PG (ref 26.6–33)
MCHC RBC AUTO-ENTMCNC: 29.8 G/DL (ref 31.5–35.7)
MCV RBC AUTO: 93.4 FL (ref 79–97)
METHADONE UR QL SCN: NEGATIVE
METHGB BLD QL: 0.3 % (ref 0–1.5)
MODALITY: ABNORMAL
MONOCYTES # BLD AUTO: 0.74 10*3/MM3 (ref 0.1–0.9)
MONOCYTES NFR BLD AUTO: 6.6 % (ref 5–12)
NEUTROPHILS NFR BLD AUTO: 78.6 % (ref 42.7–76)
NEUTROPHILS NFR BLD AUTO: 8.75 10*3/MM3 (ref 1.7–7)
NITRITE UR QL STRIP: NEGATIVE
NOTE: ABNORMAL
NRBC BLD AUTO-RTO: 0 /100 WBC (ref 0–0.2)
OPIATES UR QL: POSITIVE
OXYCODONE UR QL SCN: NEGATIVE
OXYHGB MFR BLDV: 94.9 % (ref 94–99)
PAW @ PEAK INSP FLOW SETTING VENT: 0 CMH2O
PCO2 BLDA: 49.1 MM HG (ref 35–45)
PCO2 TEMP ADJ BLD: 49.1 MM HG (ref 35–45)
PCP UR QL SCN: NEGATIVE
PH BLDA: 7.42 PH UNITS (ref 7.35–7.45)
PH UR STRIP.AUTO: 7 [PH] (ref 5–8)
PH, TEMP CORRECTED: 7.42 PH UNITS
PLATELET # BLD AUTO: 322 10*3/MM3 (ref 140–450)
PMV BLD AUTO: 9.9 FL (ref 6–12)
PO2 BLDA: 78.8 MM HG (ref 83–108)
PO2 TEMP ADJ BLD: 78.8 MM HG (ref 83–108)
POTASSIUM SERPL-SCNC: 4.9 MMOL/L (ref 3.5–5.2)
PROCALCITONIN SERPL-MCNC: 0.08 NG/ML (ref 0–0.25)
PROPOXYPH UR QL: NEGATIVE
PROT SERPL-MCNC: 7 G/DL (ref 6–8.5)
PROT UR QL STRIP: NEGATIVE
RBC # BLD AUTO: 4.09 10*6/MM3 (ref 3.77–5.28)
SARS-COV-2 RNA RESP QL NAA+PROBE: NOT DETECTED
SODIUM SERPL-SCNC: 139 MMOL/L (ref 136–145)
SP GR UR STRIP: 1.02 (ref 1–1.03)
TOTAL RATE: 0 BREATHS/MINUTE
TRICYCLICS UR QL SCN: NEGATIVE
TROPONIN T SERPL-MCNC: 0.01 NG/ML (ref 0–0.03)
UROBILINOGEN UR QL STRIP: ABNORMAL
WBC NRBC COR # BLD: 11.15 10*3/MM3 (ref 3.4–10.8)
WHOLE BLOOD HOLD COAG: NORMAL
WHOLE BLOOD HOLD SPECIMEN: NORMAL

## 2023-01-19 PROCEDURE — 71045 X-RAY EXAM CHEST 1 VIEW: CPT

## 2023-01-19 PROCEDURE — G0378 HOSPITAL OBSERVATION PER HR: HCPCS

## 2023-01-19 PROCEDURE — 70450 CT HEAD/BRAIN W/O DYE: CPT

## 2023-01-19 PROCEDURE — 82375 ASSAY CARBOXYHB QUANT: CPT

## 2023-01-19 PROCEDURE — 87040 BLOOD CULTURE FOR BACTERIA: CPT | Performed by: EMERGENCY MEDICINE

## 2023-01-19 PROCEDURE — 83605 ASSAY OF LACTIC ACID: CPT | Performed by: EMERGENCY MEDICINE

## 2023-01-19 PROCEDURE — 80053 COMPREHEN METABOLIC PANEL: CPT | Performed by: EMERGENCY MEDICINE

## 2023-01-19 PROCEDURE — 85025 COMPLETE CBC W/AUTO DIFF WBC: CPT | Performed by: EMERGENCY MEDICINE

## 2023-01-19 PROCEDURE — 25010000002 MAGNESIUM SULFATE IN D5W 1G/100ML (PREMIX) 1-5 GM/100ML-% SOLUTION: Performed by: INTERNAL MEDICINE

## 2023-01-19 PROCEDURE — 80306 DRUG TEST PRSMV INSTRMNT: CPT | Performed by: EMERGENCY MEDICINE

## 2023-01-19 PROCEDURE — 99223 1ST HOSP IP/OBS HIGH 75: CPT | Performed by: INTERNAL MEDICINE

## 2023-01-19 PROCEDURE — 81003 URINALYSIS AUTO W/O SCOPE: CPT | Performed by: EMERGENCY MEDICINE

## 2023-01-19 PROCEDURE — 83735 ASSAY OF MAGNESIUM: CPT | Performed by: EMERGENCY MEDICINE

## 2023-01-19 PROCEDURE — 83050 HGB METHEMOGLOBIN QUAN: CPT

## 2023-01-19 PROCEDURE — 87636 SARSCOV2 & INF A&B AMP PRB: CPT | Performed by: EMERGENCY MEDICINE

## 2023-01-19 PROCEDURE — G0152 HHCP-SERV OF OT,EA 15 MIN: HCPCS

## 2023-01-19 PROCEDURE — 84484 ASSAY OF TROPONIN QUANT: CPT | Performed by: EMERGENCY MEDICINE

## 2023-01-19 PROCEDURE — 82077 ASSAY SPEC XCP UR&BREATH IA: CPT | Performed by: EMERGENCY MEDICINE

## 2023-01-19 PROCEDURE — 84145 PROCALCITONIN (PCT): CPT | Performed by: INTERNAL MEDICINE

## 2023-01-19 PROCEDURE — 85379 FIBRIN DEGRADATION QUANT: CPT | Performed by: INTERNAL MEDICINE

## 2023-01-19 PROCEDURE — 99285 EMERGENCY DEPT VISIT HI MDM: CPT

## 2023-01-19 PROCEDURE — G0180 MD CERTIFICATION HHA PATIENT: HCPCS | Performed by: HOSPITALIST

## 2023-01-19 PROCEDURE — 25010000002 METHYLPREDNISOLONE PER 40 MG: Performed by: INTERNAL MEDICINE

## 2023-01-19 PROCEDURE — 93005 ELECTROCARDIOGRAM TRACING: CPT

## 2023-01-19 PROCEDURE — 25010000002 PIPERACILLIN SOD-TAZOBACTAM PER 1 G: Performed by: EMERGENCY MEDICINE

## 2023-01-19 PROCEDURE — 82805 BLOOD GASES W/O2 SATURATION: CPT

## 2023-01-19 PROCEDURE — G0495 RN CARE TRAIN/EDU IN HH: HCPCS

## 2023-01-19 PROCEDURE — 36415 COLL VENOUS BLD VENIPUNCTURE: CPT

## 2023-01-19 PROCEDURE — 93005 ELECTROCARDIOGRAM TRACING: CPT | Performed by: EMERGENCY MEDICINE

## 2023-01-19 PROCEDURE — 25010000002 VANCOMYCIN 10 G RECONSTITUTED SOLUTION: Performed by: EMERGENCY MEDICINE

## 2023-01-19 PROCEDURE — P9612 CATHETERIZE FOR URINE SPEC: HCPCS

## 2023-01-19 PROCEDURE — 36600 WITHDRAWAL OF ARTERIAL BLOOD: CPT

## 2023-01-19 RX ORDER — ALLOPURINOL 300 MG/1
300 TABLET ORAL DAILY
Status: DISCONTINUED | OUTPATIENT
Start: 2023-01-20 | End: 2023-01-25 | Stop reason: HOSPADM

## 2023-01-19 RX ORDER — FLECAINIDE ACETATE 50 MG/1
100 TABLET ORAL 2 TIMES DAILY
Status: DISCONTINUED | OUTPATIENT
Start: 2023-01-19 | End: 2023-01-25 | Stop reason: HOSPADM

## 2023-01-19 RX ORDER — IPRATROPIUM BROMIDE AND ALBUTEROL SULFATE 2.5; .5 MG/3ML; MG/3ML
3 SOLUTION RESPIRATORY (INHALATION) EVERY 4 HOURS PRN
Status: DISCONTINUED | OUTPATIENT
Start: 2023-01-19 | End: 2023-01-25 | Stop reason: HOSPADM

## 2023-01-19 RX ORDER — ACETAMINOPHEN 325 MG/1
650 TABLET ORAL EVERY 4 HOURS PRN
Status: DISCONTINUED | OUTPATIENT
Start: 2023-01-19 | End: 2023-01-25 | Stop reason: HOSPADM

## 2023-01-19 RX ORDER — LEVOTHYROXINE SODIUM 0.12 MG/1
125 TABLET ORAL EVERY MORNING
Status: DISCONTINUED | OUTPATIENT
Start: 2023-01-20 | End: 2023-01-25 | Stop reason: HOSPADM

## 2023-01-19 RX ORDER — METHYLPREDNISOLONE SODIUM SUCCINATE 40 MG/ML
40 INJECTION, POWDER, LYOPHILIZED, FOR SOLUTION INTRAMUSCULAR; INTRAVENOUS DAILY
Status: DISCONTINUED | OUTPATIENT
Start: 2023-01-19 | End: 2023-01-20

## 2023-01-19 RX ORDER — FLUOXETINE HYDROCHLORIDE 20 MG/1
20 CAPSULE ORAL 2 TIMES DAILY
Status: DISCONTINUED | OUTPATIENT
Start: 2023-01-19 | End: 2023-01-25 | Stop reason: HOSPADM

## 2023-01-19 RX ORDER — CYCLOBENZAPRINE HCL 10 MG
10 TABLET ORAL ONCE
Status: COMPLETED | OUTPATIENT
Start: 2023-01-19 | End: 2023-01-19

## 2023-01-19 RX ORDER — INSULIN LISPRO 100 [IU]/ML
0-7 INJECTION, SOLUTION INTRAVENOUS; SUBCUTANEOUS
Status: DISCONTINUED | OUTPATIENT
Start: 2023-01-20 | End: 2023-01-25 | Stop reason: HOSPADM

## 2023-01-19 RX ORDER — PREDNISONE 1 MG/1
5 TABLET ORAL DAILY
Status: DISCONTINUED | OUTPATIENT
Start: 2023-01-20 | End: 2023-01-25 | Stop reason: HOSPADM

## 2023-01-19 RX ORDER — HYDROCODONE BITARTRATE AND ACETAMINOPHEN 5; 325 MG/1; MG/1
1 TABLET ORAL EVERY 4 HOURS PRN
Status: DISCONTINUED | OUTPATIENT
Start: 2023-01-19 | End: 2023-01-25 | Stop reason: HOSPADM

## 2023-01-19 RX ORDER — SODIUM CHLORIDE 0.9 % (FLUSH) 0.9 %
10 SYRINGE (ML) INJECTION EVERY 12 HOURS SCHEDULED
Status: DISCONTINUED | OUTPATIENT
Start: 2023-01-19 | End: 2023-01-25 | Stop reason: HOSPADM

## 2023-01-19 RX ORDER — MAGNESIUM SULFATE 1 G/100ML
3 INJECTION INTRAVENOUS ONCE
Status: COMPLETED | OUTPATIENT
Start: 2023-01-19 | End: 2023-01-19

## 2023-01-19 RX ORDER — DEXTROSE MONOHYDRATE 25 G/50ML
25 INJECTION, SOLUTION INTRAVENOUS
Status: DISCONTINUED | OUTPATIENT
Start: 2023-01-19 | End: 2023-01-25 | Stop reason: HOSPADM

## 2023-01-19 RX ORDER — FAMOTIDINE 20 MG/1
20 TABLET, FILM COATED ORAL 2 TIMES DAILY
Status: DISCONTINUED | OUTPATIENT
Start: 2023-01-19 | End: 2023-01-25 | Stop reason: HOSPADM

## 2023-01-19 RX ORDER — ROSUVASTATIN CALCIUM 20 MG/1
20 TABLET, COATED ORAL NIGHTLY
Status: DISCONTINUED | OUTPATIENT
Start: 2023-01-19 | End: 2023-01-25 | Stop reason: HOSPADM

## 2023-01-19 RX ORDER — NICOTINE POLACRILEX 4 MG
15 LOZENGE BUCCAL
Status: DISCONTINUED | OUTPATIENT
Start: 2023-01-19 | End: 2023-01-25 | Stop reason: HOSPADM

## 2023-01-19 RX ORDER — GABAPENTIN 100 MG/1
100 CAPSULE ORAL EVERY 12 HOURS SCHEDULED
Status: DISCONTINUED | OUTPATIENT
Start: 2023-01-19 | End: 2023-01-25 | Stop reason: HOSPADM

## 2023-01-19 RX ORDER — SODIUM CHLORIDE 9 MG/ML
75 INJECTION, SOLUTION INTRAVENOUS CONTINUOUS
Status: DISCONTINUED | OUTPATIENT
Start: 2023-01-19 | End: 2023-01-21

## 2023-01-19 RX ORDER — IPRATROPIUM BROMIDE AND ALBUTEROL SULFATE 2.5; .5 MG/3ML; MG/3ML
3 SOLUTION RESPIRATORY (INHALATION)
Status: DISCONTINUED | OUTPATIENT
Start: 2023-01-20 | End: 2023-01-25 | Stop reason: HOSPADM

## 2023-01-19 RX ORDER — ONDANSETRON 2 MG/ML
4 INJECTION INTRAMUSCULAR; INTRAVENOUS EVERY 6 HOURS PRN
Status: DISCONTINUED | OUTPATIENT
Start: 2023-01-19 | End: 2023-01-25 | Stop reason: HOSPADM

## 2023-01-19 RX ORDER — SODIUM CHLORIDE 0.9 % (FLUSH) 0.9 %
10 SYRINGE (ML) INJECTION AS NEEDED
Status: DISCONTINUED | OUTPATIENT
Start: 2023-01-19 | End: 2023-01-25 | Stop reason: HOSPADM

## 2023-01-19 RX ORDER — LANOLIN ALCOHOL/MO/W.PET/CERES
400 CREAM (GRAM) TOPICAL DAILY
Status: DISCONTINUED | OUTPATIENT
Start: 2023-01-20 | End: 2023-01-25 | Stop reason: HOSPADM

## 2023-01-19 RX ORDER — SODIUM CHLORIDE 9 MG/ML
40 INJECTION, SOLUTION INTRAVENOUS AS NEEDED
Status: DISCONTINUED | OUTPATIENT
Start: 2023-01-19 | End: 2023-01-25 | Stop reason: HOSPADM

## 2023-01-19 RX ADMIN — METHYLPREDNISOLONE SODIUM SUCCINATE 40 MG: 40 INJECTION, POWDER, FOR SOLUTION INTRAMUSCULAR; INTRAVENOUS at 21:24

## 2023-01-19 RX ADMIN — MAGNESIUM SULFATE HEPTAHYDRATE 3 G: 1 INJECTION, SOLUTION INTRAVENOUS at 21:23

## 2023-01-19 RX ADMIN — VANCOMYCIN HYDROCHLORIDE 1750 MG: 10 INJECTION, POWDER, LYOPHILIZED, FOR SOLUTION INTRAVENOUS at 23:32

## 2023-01-19 RX ADMIN — FAMOTIDINE 20 MG: 20 TABLET, FILM COATED ORAL at 23:27

## 2023-01-19 RX ADMIN — SODIUM CHLORIDE 1000 ML: 9 INJECTION, SOLUTION INTRAVENOUS at 21:32

## 2023-01-19 RX ADMIN — FLUOXETINE 20 MG: 20 CAPSULE ORAL at 23:28

## 2023-01-19 RX ADMIN — ROSUVASTATIN 20 MG: 20 TABLET, FILM COATED ORAL at 23:27

## 2023-01-19 RX ADMIN — CYCLOBENZAPRINE 10 MG: 10 TABLET, FILM COATED ORAL at 23:27

## 2023-01-19 RX ADMIN — APIXABAN 5 MG: 5 TABLET, FILM COATED ORAL at 23:27

## 2023-01-19 RX ADMIN — TAZOBACTAM SODIUM AND PIPERACILLIN SODIUM 3.38 G: 375; 3 INJECTION, SOLUTION INTRAVENOUS at 21:32

## 2023-01-19 RX ADMIN — GABAPENTIN 100 MG: 100 CAPSULE ORAL at 23:27

## 2023-01-19 RX ADMIN — FLECAINIDE ACETATE 100 MG: 50 TABLET ORAL at 23:27

## 2023-01-19 RX ADMIN — Medication 12.5 MG: at 23:27

## 2023-01-19 NOTE — HOME HEALTH
Routine Visit Note:    Skill/education provided: Pt c/o new hamstring pain and has been u/a to walk since MD appt yesterday (MD aware). Family pushing pt on 4 WW. Pt/dtr instr on UB HEP, EC/WS principles w/ ADL skills, jt protection, commode transfer w/ toileting. Recommend family assist when up for transfers and walking.    Patient/caregiver response: Pt tolerated session well. Pt has low vision and family/cg'er assist needed w/ HEP and reviewing EC/WS principles    Plan for next visit: Upgrade HEP as indicated, introduce pulley, functional transfer training, EC/WS w/ ADL skills.    Other pertinent info:

## 2023-01-19 NOTE — HOME HEALTH
Routine Visit Note:    Skill/education provided:  upon arrival pts daughter at her home.  Pt reportedly has had increasing weakness and AMS since tuesday night.   Vitals are stable.  There are crackles in both bases but this is not new for her.  Pt unable to ambulate and must have max assist to transfer.  Monday she was walking with walker and atand by assist.  daughter manages meds so pt is not getting wrong meds.  Daughter did say pt has been more depressed since learning a good friend  this week.  Attempted to contact Dr Evans office for direction.  Unable to reach MD.  Daughter plans to take the pt to ED at [facility] and wants her to go to Rehab posthospitalization    Daughter taking her to [facility] ED

## 2023-01-20 ENCOUNTER — HOME CARE VISIT (OUTPATIENT)
Dept: HOME HEALTH SERVICES | Facility: HOME HEALTHCARE | Age: 68
End: 2023-01-20
Payer: MEDICARE

## 2023-01-20 ENCOUNTER — READMISSION MANAGEMENT (OUTPATIENT)
Dept: CALL CENTER | Facility: HOSPITAL | Age: 68
End: 2023-01-20
Payer: MEDICARE

## 2023-01-20 ENCOUNTER — APPOINTMENT (OUTPATIENT)
Dept: CT IMAGING | Facility: HOSPITAL | Age: 68
DRG: 193 | End: 2023-01-20
Payer: MEDICARE

## 2023-01-20 PROBLEM — H10.9 CONJUNCTIVITIS, LEFT EYE: Status: ACTIVE | Noted: 2023-01-20

## 2023-01-20 PROBLEM — J96.21 ACUTE ON CHRONIC RESPIRATORY FAILURE WITH HYPOXIA: Status: ACTIVE | Noted: 2023-01-20

## 2023-01-20 LAB
ALBUMIN SERPL-MCNC: 3.3 G/DL (ref 3.5–5.2)
ALBUMIN/GLOB SERPL: 1.5 G/DL
ALP SERPL-CCNC: 60 U/L (ref 39–117)
ALT SERPL W P-5'-P-CCNC: 5 U/L (ref 1–33)
ANION GAP SERPL CALCULATED.3IONS-SCNC: 14 MMOL/L (ref 5–15)
AST SERPL-CCNC: 9 U/L (ref 1–32)
BASOPHILS # BLD AUTO: 0.01 10*3/MM3 (ref 0–0.2)
BASOPHILS NFR BLD AUTO: 0.1 % (ref 0–1.5)
BILIRUB SERPL-MCNC: 0.3 MG/DL (ref 0–1.2)
BUN SERPL-MCNC: 14 MG/DL (ref 8–23)
BUN/CREAT SERPL: 18.7 (ref 7–25)
CALCIUM SPEC-SCNC: 8 MG/DL (ref 8.6–10.5)
CHLORIDE SERPL-SCNC: 97 MMOL/L (ref 98–107)
CO2 SERPL-SCNC: 24 MMOL/L (ref 22–29)
CREAT SERPL-MCNC: 0.75 MG/DL (ref 0.57–1)
DEPRECATED RDW RBC AUTO: 52.7 FL (ref 37–54)
EGFRCR SERPLBLD CKD-EPI 2021: 86.8 ML/MIN/1.73
EOSINOPHIL # BLD AUTO: 0.01 10*3/MM3 (ref 0–0.4)
EOSINOPHIL NFR BLD AUTO: 0.1 % (ref 0.3–6.2)
ERYTHROCYTE [DISTWIDTH] IN BLOOD BY AUTOMATED COUNT: 16 % (ref 12.3–15.4)
GLOBULIN UR ELPH-MCNC: 2.2 GM/DL
GLUCOSE BLDC GLUCOMTR-MCNC: 170 MG/DL (ref 70–130)
GLUCOSE BLDC GLUCOMTR-MCNC: 214 MG/DL (ref 70–130)
GLUCOSE BLDC GLUCOMTR-MCNC: 246 MG/DL (ref 70–130)
GLUCOSE SERPL-MCNC: 211 MG/DL (ref 65–99)
HCT VFR BLD AUTO: 34.3 % (ref 34–46.6)
HGB BLD-MCNC: 10.4 G/DL (ref 12–15.9)
IMM GRANULOCYTES # BLD AUTO: 0.05 10*3/MM3 (ref 0–0.05)
IMM GRANULOCYTES NFR BLD AUTO: 0.5 % (ref 0–0.5)
LYMPHOCYTES # BLD AUTO: 1 10*3/MM3 (ref 0.7–3.1)
LYMPHOCYTES NFR BLD AUTO: 10.4 % (ref 19.6–45.3)
MAGNESIUM SERPL-MCNC: 1.8 MG/DL (ref 1.6–2.4)
MCH RBC QN AUTO: 27.5 PG (ref 26.6–33)
MCHC RBC AUTO-ENTMCNC: 30.3 G/DL (ref 31.5–35.7)
MCV RBC AUTO: 90.7 FL (ref 79–97)
MONOCYTES # BLD AUTO: 0.47 10*3/MM3 (ref 0.1–0.9)
MONOCYTES NFR BLD AUTO: 4.9 % (ref 5–12)
MRSA DNA SPEC QL NAA+PROBE: NEGATIVE
NEUTROPHILS NFR BLD AUTO: 8.07 10*3/MM3 (ref 1.7–7)
NEUTROPHILS NFR BLD AUTO: 84 % (ref 42.7–76)
NRBC BLD AUTO-RTO: 0 /100 WBC (ref 0–0.2)
PLATELET # BLD AUTO: 282 10*3/MM3 (ref 140–450)
PMV BLD AUTO: 10.2 FL (ref 6–12)
POTASSIUM SERPL-SCNC: 4.5 MMOL/L (ref 3.5–5.2)
PROT SERPL-MCNC: 5.5 G/DL (ref 6–8.5)
RBC # BLD AUTO: 3.78 10*6/MM3 (ref 3.77–5.28)
SODIUM SERPL-SCNC: 135 MMOL/L (ref 136–145)
WBC NRBC COR # BLD: 9.61 10*3/MM3 (ref 3.4–10.8)

## 2023-01-20 PROCEDURE — 71275 CT ANGIOGRAPHY CHEST: CPT

## 2023-01-20 PROCEDURE — 97110 THERAPEUTIC EXERCISES: CPT

## 2023-01-20 PROCEDURE — 97166 OT EVAL MOD COMPLEX 45 MIN: CPT

## 2023-01-20 PROCEDURE — 97116 GAIT TRAINING THERAPY: CPT

## 2023-01-20 PROCEDURE — 94640 AIRWAY INHALATION TREATMENT: CPT

## 2023-01-20 PROCEDURE — 93005 ELECTROCARDIOGRAM TRACING: CPT | Performed by: INTERNAL MEDICINE

## 2023-01-20 PROCEDURE — 25010000002 ONDANSETRON PER 1 MG: Performed by: INTERNAL MEDICINE

## 2023-01-20 PROCEDURE — 63710000001 PREDNISONE PER 5 MG: Performed by: INTERNAL MEDICINE

## 2023-01-20 PROCEDURE — 93010 ELECTROCARDIOGRAM REPORT: CPT | Performed by: INTERNAL MEDICINE

## 2023-01-20 PROCEDURE — 80053 COMPREHEN METABOLIC PANEL: CPT | Performed by: INTERNAL MEDICINE

## 2023-01-20 PROCEDURE — 87641 MR-STAPH DNA AMP PROBE: CPT

## 2023-01-20 PROCEDURE — 87205 SMEAR GRAM STAIN: CPT | Performed by: INTERNAL MEDICINE

## 2023-01-20 PROCEDURE — 25010000002 VANCOMYCIN PER 500 MG

## 2023-01-20 PROCEDURE — 97530 THERAPEUTIC ACTIVITIES: CPT

## 2023-01-20 PROCEDURE — 25010000002 PIPERACILLIN SOD-TAZOBACTAM PER 1 G: Performed by: INTERNAL MEDICINE

## 2023-01-20 PROCEDURE — 82962 GLUCOSE BLOOD TEST: CPT

## 2023-01-20 PROCEDURE — 99233 SBSQ HOSP IP/OBS HIGH 50: CPT | Performed by: HOSPITALIST

## 2023-01-20 PROCEDURE — 85025 COMPLETE CBC W/AUTO DIFF WBC: CPT | Performed by: INTERNAL MEDICINE

## 2023-01-20 PROCEDURE — 97162 PT EVAL MOD COMPLEX 30 MIN: CPT

## 2023-01-20 PROCEDURE — 0 IOPAMIDOL PER 1 ML: Performed by: INTERNAL MEDICINE

## 2023-01-20 PROCEDURE — 87070 CULTURE OTHR SPECIMN AEROBIC: CPT | Performed by: INTERNAL MEDICINE

## 2023-01-20 PROCEDURE — 63710000001 INSULIN LISPRO (HUMAN) PER 5 UNITS: Performed by: INTERNAL MEDICINE

## 2023-01-20 PROCEDURE — 83735 ASSAY OF MAGNESIUM: CPT | Performed by: INTERNAL MEDICINE

## 2023-01-20 RX ORDER — ERYTHROMYCIN 5 MG/G
OINTMENT OPHTHALMIC NIGHTLY
Status: DISCONTINUED | OUTPATIENT
Start: 2023-01-20 | End: 2023-01-23

## 2023-01-20 RX ORDER — ZOLPIDEM TARTRATE 5 MG/1
5 TABLET ORAL ONCE
Status: COMPLETED | OUTPATIENT
Start: 2023-01-20 | End: 2023-01-21

## 2023-01-20 RX ORDER — DOXYCYCLINE 100 MG/1
100 CAPSULE ORAL EVERY 12 HOURS SCHEDULED
Status: DISCONTINUED | OUTPATIENT
Start: 2023-01-20 | End: 2023-01-25 | Stop reason: HOSPADM

## 2023-01-20 RX ADMIN — TAZOBACTAM SODIUM AND PIPERACILLIN SODIUM 3.38 G: 375; 3 INJECTION, SOLUTION INTRAVENOUS at 21:29

## 2023-01-20 RX ADMIN — ERYTHROMYCIN: 5 OINTMENT OPHTHALMIC at 21:32

## 2023-01-20 RX ADMIN — TAZOBACTAM SODIUM AND PIPERACILLIN SODIUM 3.38 G: 375; 3 INJECTION, SOLUTION INTRAVENOUS at 05:24

## 2023-01-20 RX ADMIN — ROSUVASTATIN 20 MG: 20 TABLET, FILM COATED ORAL at 21:29

## 2023-01-20 RX ADMIN — APIXABAN 5 MG: 5 TABLET, FILM COATED ORAL at 08:44

## 2023-01-20 RX ADMIN — IPRATROPIUM BROMIDE AND ALBUTEROL SULFATE 3 ML: 2.5; .5 SOLUTION RESPIRATORY (INHALATION) at 08:18

## 2023-01-20 RX ADMIN — FAMOTIDINE 20 MG: 20 TABLET, FILM COATED ORAL at 08:44

## 2023-01-20 RX ADMIN — FOLIC ACID TAB 400 MCG 400 MCG: 400 TAB at 08:44

## 2023-01-20 RX ADMIN — LEVOTHYROXINE SODIUM 125 MCG: 125 TABLET ORAL at 08:44

## 2023-01-20 RX ADMIN — EMPAGLIFLOZIN 25 MG: 25 TABLET, FILM COATED ORAL at 08:44

## 2023-01-20 RX ADMIN — Medication 10 ML: at 21:30

## 2023-01-20 RX ADMIN — FAMOTIDINE 20 MG: 20 TABLET, FILM COATED ORAL at 21:29

## 2023-01-20 RX ADMIN — FLECAINIDE ACETATE 100 MG: 50 TABLET ORAL at 21:29

## 2023-01-20 RX ADMIN — ONDANSETRON 4 MG: 2 INJECTION INTRAMUSCULAR; INTRAVENOUS at 21:29

## 2023-01-20 RX ADMIN — PREDNISONE 5 MG: 5 TABLET ORAL at 08:44

## 2023-01-20 RX ADMIN — Medication 10 ML: at 08:51

## 2023-01-20 RX ADMIN — SODIUM CHLORIDE 75 ML/HR: 9 INJECTION, SOLUTION INTRAVENOUS at 14:26

## 2023-01-20 RX ADMIN — Medication 12.5 MG: at 08:44

## 2023-01-20 RX ADMIN — INSULIN LISPRO 2 UNITS: 100 INJECTION, SOLUTION INTRAVENOUS; SUBCUTANEOUS at 17:26

## 2023-01-20 RX ADMIN — HYDROCODONE BITARTRATE AND ACETAMINOPHEN 1 TABLET: 5; 325 TABLET ORAL at 01:36

## 2023-01-20 RX ADMIN — ALLOPURINOL 300 MG: 300 TABLET ORAL at 08:44

## 2023-01-20 RX ADMIN — GABAPENTIN 100 MG: 100 CAPSULE ORAL at 08:44

## 2023-01-20 RX ADMIN — HYDROCODONE BITARTRATE AND ACETAMINOPHEN 1 TABLET: 5; 325 TABLET ORAL at 05:35

## 2023-01-20 RX ADMIN — FLECAINIDE ACETATE 100 MG: 50 TABLET ORAL at 08:44

## 2023-01-20 RX ADMIN — VANCOMYCIN HYDROCHLORIDE 750 MG: 750 INJECTION, SOLUTION INTRAVENOUS at 12:12

## 2023-01-20 RX ADMIN — APIXABAN 5 MG: 5 TABLET, FILM COATED ORAL at 21:29

## 2023-01-20 RX ADMIN — IOPAMIDOL 80 ML: 755 INJECTION, SOLUTION INTRAVENOUS at 01:20

## 2023-01-20 RX ADMIN — DOXYCYCLINE 100 MG: 100 CAPSULE ORAL at 21:29

## 2023-01-20 RX ADMIN — Medication 12.5 MG: at 21:29

## 2023-01-20 RX ADMIN — HYDROCODONE BITARTRATE AND ACETAMINOPHEN 1 TABLET: 5; 325 TABLET ORAL at 12:09

## 2023-01-20 RX ADMIN — FLUOXETINE 20 MG: 20 CAPSULE ORAL at 08:44

## 2023-01-20 RX ADMIN — SODIUM CHLORIDE 75 ML/HR: 9 INJECTION, SOLUTION INTRAVENOUS at 01:41

## 2023-01-20 RX ADMIN — HYDROCODONE BITARTRATE AND ACETAMINOPHEN 1 TABLET: 5; 325 TABLET ORAL at 21:29

## 2023-01-20 RX ADMIN — INSULIN LISPRO 3 UNITS: 100 INJECTION, SOLUTION INTRAVENOUS; SUBCUTANEOUS at 08:50

## 2023-01-20 RX ADMIN — GABAPENTIN 100 MG: 100 CAPSULE ORAL at 21:29

## 2023-01-20 RX ADMIN — TAZOBACTAM SODIUM AND PIPERACILLIN SODIUM 3.38 G: 375; 3 INJECTION, SOLUTION INTRAVENOUS at 14:26

## 2023-01-20 RX ADMIN — ERYTHROMYCIN: 5 OINTMENT OPHTHALMIC at 01:36

## 2023-01-20 RX ADMIN — INSULIN LISPRO 3 UNITS: 100 INJECTION, SOLUTION INTRAVENOUS; SUBCUTANEOUS at 12:12

## 2023-01-20 RX ADMIN — FLUOXETINE 20 MG: 20 CAPSULE ORAL at 21:29

## 2023-01-20 NOTE — HOME HEALTH
PT Routine visit ntoe    Skill/education provided: pt and caregiver education regarding importance of standing/walking frequently throughout the day to improve functional mobility, HEP instruction, LE therapuetic exercises    Pt. response: pt. reports not feeling up to participation in gait or transfers today, vitals WNL's, pain reported in L shoulder     Plan for next visit: HEP review, gait and transfers

## 2023-01-20 NOTE — HOME HEALTH
Routine Visit Note:    Skill/education provided: Pt asleep on couch upon OT arrival. hired cg'er states that pt has been very tired with increase sleeping the past 2 days. Pt's L eye swolen, irritated with matter upon awakening.Warm compresss applied which improved coloration. Dtr arrived home early from work to talk w/ OT/RN re: pt w/ increase confusion, depression as close friend passed away this week, weakness with difficulty walking since tuesday night. Pt unable to walk w/ 4 WW, but needs max assist to transfer and pushed to bathroom. Dtr reports that pt was able to walk up to Monday w/ SBA (last OT visit 1-13, pt was u/a to walk after possible hamstring strain). V/S TIERNEY, RN scheduled at 3, called w/ report/concerns.   Patient/caregiver response: Pt presents w/ increase weakness, fatique, depression, confusion, question infection that has decreased function. Recommend RN consult for MD or ER visit    Plan for next visit:     Other pertinent info:

## 2023-01-20 NOTE — OUTREACH NOTE
Sepsis Week 3 Survey    Flowsheet Row Responses   [facility] facility patient discharged from? [facility]   Does the patient have one of the following disease processes/diagnoses(primary or secondary)? Sepsis   Week 3 attempt successful? No   Unsuccessful attempts Attempt 1   Revoke Readmitted          SOPHY HARMAN - Registered Nurse

## 2023-01-21 LAB
ANION GAP SERPL CALCULATED.3IONS-SCNC: 9 MMOL/L (ref 5–15)
BUN SERPL-MCNC: 12 MG/DL (ref 8–23)
BUN/CREAT SERPL: 15.6 (ref 7–25)
CALCIUM SPEC-SCNC: 8.3 MG/DL (ref 8.6–10.5)
CHLORIDE SERPL-SCNC: 101 MMOL/L (ref 98–107)
CO2 SERPL-SCNC: 29 MMOL/L (ref 22–29)
CREAT SERPL-MCNC: 0.77 MG/DL (ref 0.57–1)
DEPRECATED RDW RBC AUTO: 53 FL (ref 37–54)
EGFRCR SERPLBLD CKD-EPI 2021: 84.1 ML/MIN/1.73
ERYTHROCYTE [DISTWIDTH] IN BLOOD BY AUTOMATED COUNT: 16.2 % (ref 12.3–15.4)
GLUCOSE BLDC GLUCOMTR-MCNC: 132 MG/DL (ref 70–130)
GLUCOSE BLDC GLUCOMTR-MCNC: 212 MG/DL (ref 70–130)
GLUCOSE BLDC GLUCOMTR-MCNC: 372 MG/DL (ref 70–130)
GLUCOSE SERPL-MCNC: 119 MG/DL (ref 65–99)
HCT VFR BLD AUTO: 32.5 % (ref 34–46.6)
HGB BLD-MCNC: 9.9 G/DL (ref 12–15.9)
MCH RBC QN AUTO: 27.6 PG (ref 26.6–33)
MCHC RBC AUTO-ENTMCNC: 30.5 G/DL (ref 31.5–35.7)
MCV RBC AUTO: 90.5 FL (ref 79–97)
NT-PROBNP SERPL-MCNC: 624.5 PG/ML (ref 0–900)
PLATELET # BLD AUTO: 295 10*3/MM3 (ref 140–450)
PMV BLD AUTO: 10.3 FL (ref 6–12)
POTASSIUM SERPL-SCNC: 4.3 MMOL/L (ref 3.5–5.2)
RBC # BLD AUTO: 3.59 10*6/MM3 (ref 3.77–5.28)
SODIUM SERPL-SCNC: 139 MMOL/L (ref 136–145)
WBC NRBC COR # BLD: 7.57 10*3/MM3 (ref 3.4–10.8)

## 2023-01-21 PROCEDURE — 94761 N-INVAS EAR/PLS OXIMETRY MLT: CPT

## 2023-01-21 PROCEDURE — 85027 COMPLETE CBC AUTOMATED: CPT | Performed by: HOSPITALIST

## 2023-01-21 PROCEDURE — 99232 SBSQ HOSP IP/OBS MODERATE 35: CPT | Performed by: HOSPITALIST

## 2023-01-21 PROCEDURE — 63710000001 PREDNISONE PER 5 MG: Performed by: INTERNAL MEDICINE

## 2023-01-21 PROCEDURE — 82962 GLUCOSE BLOOD TEST: CPT

## 2023-01-21 PROCEDURE — 94799 UNLISTED PULMONARY SVC/PX: CPT

## 2023-01-21 PROCEDURE — 80048 BASIC METABOLIC PNL TOTAL CA: CPT | Performed by: HOSPITALIST

## 2023-01-21 PROCEDURE — 25010000002 PIPERACILLIN SOD-TAZOBACTAM PER 1 G: Performed by: INTERNAL MEDICINE

## 2023-01-21 PROCEDURE — 83880 ASSAY OF NATRIURETIC PEPTIDE: CPT | Performed by: HOSPITALIST

## 2023-01-21 PROCEDURE — 94664 DEMO&/EVAL PT USE INHALER: CPT

## 2023-01-21 PROCEDURE — 63710000001 INSULIN LISPRO (HUMAN) PER 5 UNITS: Performed by: INTERNAL MEDICINE

## 2023-01-21 RX ORDER — ZOLPIDEM TARTRATE 5 MG/1
5 TABLET ORAL NIGHTLY PRN
Status: DISCONTINUED | OUTPATIENT
Start: 2023-01-21 | End: 2023-01-25 | Stop reason: HOSPADM

## 2023-01-21 RX ORDER — TRAZODONE HYDROCHLORIDE 50 MG/1
50 TABLET ORAL NIGHTLY
Status: DISCONTINUED | OUTPATIENT
Start: 2023-01-21 | End: 2023-01-22

## 2023-01-21 RX ADMIN — HYDROCODONE BITARTRATE AND ACETAMINOPHEN 1 TABLET: 5; 325 TABLET ORAL at 22:23

## 2023-01-21 RX ADMIN — ERYTHROMYCIN: 5 OINTMENT OPHTHALMIC at 22:24

## 2023-01-21 RX ADMIN — PREDNISONE 5 MG: 5 TABLET ORAL at 08:52

## 2023-01-21 RX ADMIN — APIXABAN 5 MG: 5 TABLET, FILM COATED ORAL at 08:52

## 2023-01-21 RX ADMIN — FLECAINIDE ACETATE 100 MG: 50 TABLET ORAL at 22:24

## 2023-01-21 RX ADMIN — Medication 10 ML: at 22:24

## 2023-01-21 RX ADMIN — ALLOPURINOL 300 MG: 300 TABLET ORAL at 08:52

## 2023-01-21 RX ADMIN — Medication 10 ML: at 08:53

## 2023-01-21 RX ADMIN — FLUOXETINE 20 MG: 20 CAPSULE ORAL at 08:52

## 2023-01-21 RX ADMIN — EMPAGLIFLOZIN 25 MG: 25 TABLET, FILM COATED ORAL at 08:52

## 2023-01-21 RX ADMIN — ROSUVASTATIN 20 MG: 20 TABLET, FILM COATED ORAL at 22:23

## 2023-01-21 RX ADMIN — IPRATROPIUM BROMIDE AND ALBUTEROL SULFATE 3 ML: 2.5; .5 SOLUTION RESPIRATORY (INHALATION) at 18:34

## 2023-01-21 RX ADMIN — INSULIN LISPRO 7 UNITS: 100 INJECTION, SOLUTION INTRAVENOUS; SUBCUTANEOUS at 17:48

## 2023-01-21 RX ADMIN — FOLIC ACID TAB 400 MCG 400 MCG: 400 TAB at 08:52

## 2023-01-21 RX ADMIN — HYDROCODONE BITARTRATE AND ACETAMINOPHEN 1 TABLET: 5; 325 TABLET ORAL at 15:54

## 2023-01-21 RX ADMIN — INSULIN LISPRO 3 UNITS: 100 INJECTION, SOLUTION INTRAVENOUS; SUBCUTANEOUS at 12:37

## 2023-01-21 RX ADMIN — IPRATROPIUM BROMIDE AND ALBUTEROL SULFATE 3 ML: 2.5; .5 SOLUTION RESPIRATORY (INHALATION) at 13:32

## 2023-01-21 RX ADMIN — DOXYCYCLINE 100 MG: 100 CAPSULE ORAL at 22:22

## 2023-01-21 RX ADMIN — ZOLPIDEM TARTRATE 5 MG: 5 TABLET ORAL at 22:23

## 2023-01-21 RX ADMIN — TAZOBACTAM SODIUM AND PIPERACILLIN SODIUM 3.38 G: 375; 3 INJECTION, SOLUTION INTRAVENOUS at 22:24

## 2023-01-21 RX ADMIN — TRAZODONE HYDROCHLORIDE 50 MG: 50 TABLET ORAL at 22:22

## 2023-01-21 RX ADMIN — FAMOTIDINE 20 MG: 20 TABLET, FILM COATED ORAL at 08:53

## 2023-01-21 RX ADMIN — Medication 12.5 MG: at 08:52

## 2023-01-21 RX ADMIN — LEVOTHYROXINE SODIUM 125 MCG: 125 TABLET ORAL at 08:53

## 2023-01-21 RX ADMIN — SODIUM CHLORIDE 75 ML/HR: 9 INJECTION, SOLUTION INTRAVENOUS at 06:06

## 2023-01-21 RX ADMIN — FAMOTIDINE 20 MG: 20 TABLET, FILM COATED ORAL at 22:23

## 2023-01-21 RX ADMIN — TAZOBACTAM SODIUM AND PIPERACILLIN SODIUM 3.38 G: 375; 3 INJECTION, SOLUTION INTRAVENOUS at 06:06

## 2023-01-21 RX ADMIN — ZOLPIDEM TARTRATE 5 MG: 5 TABLET ORAL at 00:04

## 2023-01-21 RX ADMIN — HYDROCODONE BITARTRATE AND ACETAMINOPHEN 1 TABLET: 5; 325 TABLET ORAL at 08:58

## 2023-01-21 RX ADMIN — FLUOXETINE 20 MG: 20 CAPSULE ORAL at 22:23

## 2023-01-21 RX ADMIN — FLECAINIDE ACETATE 100 MG: 50 TABLET ORAL at 08:52

## 2023-01-21 RX ADMIN — GABAPENTIN 100 MG: 100 CAPSULE ORAL at 22:23

## 2023-01-21 RX ADMIN — APIXABAN 5 MG: 5 TABLET, FILM COATED ORAL at 22:23

## 2023-01-21 RX ADMIN — TAZOBACTAM SODIUM AND PIPERACILLIN SODIUM 3.38 G: 375; 3 INJECTION, SOLUTION INTRAVENOUS at 14:10

## 2023-01-21 RX ADMIN — Medication 12.5 MG: at 22:23

## 2023-01-21 RX ADMIN — GABAPENTIN 100 MG: 100 CAPSULE ORAL at 08:52

## 2023-01-21 RX ADMIN — DOXYCYCLINE 100 MG: 100 CAPSULE ORAL at 08:52

## 2023-01-22 ENCOUNTER — APPOINTMENT (OUTPATIENT)
Dept: GENERAL RADIOLOGY | Facility: HOSPITAL | Age: 68
DRG: 193 | End: 2023-01-22
Payer: MEDICARE

## 2023-01-22 LAB
BACTERIA SPEC RESP CULT: NORMAL
GLUCOSE BLDC GLUCOMTR-MCNC: 140 MG/DL (ref 70–130)
GLUCOSE BLDC GLUCOMTR-MCNC: 173 MG/DL (ref 70–130)
GLUCOSE BLDC GLUCOMTR-MCNC: 251 MG/DL (ref 70–130)
GRAM STN SPEC: NORMAL
QT INTERVAL: 514 MS
QTC INTERVAL: 496 MS

## 2023-01-22 PROCEDURE — 94799 UNLISTED PULMONARY SVC/PX: CPT

## 2023-01-22 PROCEDURE — 99232 SBSQ HOSP IP/OBS MODERATE 35: CPT | Performed by: HOSPITALIST

## 2023-01-22 PROCEDURE — 25010000002 PIPERACILLIN SOD-TAZOBACTAM PER 1 G: Performed by: INTERNAL MEDICINE

## 2023-01-22 PROCEDURE — 63710000001 PREDNISONE PER 5 MG: Performed by: INTERNAL MEDICINE

## 2023-01-22 PROCEDURE — 94761 N-INVAS EAR/PLS OXIMETRY MLT: CPT

## 2023-01-22 PROCEDURE — 82962 GLUCOSE BLOOD TEST: CPT

## 2023-01-22 PROCEDURE — 63710000001 INSULIN LISPRO (HUMAN) PER 5 UNITS: Performed by: INTERNAL MEDICINE

## 2023-01-22 PROCEDURE — 94664 DEMO&/EVAL PT USE INHALER: CPT

## 2023-01-22 PROCEDURE — 71045 X-RAY EXAM CHEST 1 VIEW: CPT

## 2023-01-22 RX ORDER — COLESEVELAM 180 1/1
1250 TABLET ORAL 2 TIMES DAILY WITH MEALS
Status: DISCONTINUED | OUTPATIENT
Start: 2023-01-22 | End: 2023-01-25 | Stop reason: HOSPADM

## 2023-01-22 RX ORDER — TRAZODONE HYDROCHLORIDE 100 MG/1
100 TABLET ORAL NIGHTLY
Status: DISCONTINUED | OUTPATIENT
Start: 2023-01-22 | End: 2023-01-25 | Stop reason: HOSPADM

## 2023-01-22 RX ADMIN — IPRATROPIUM BROMIDE AND ALBUTEROL SULFATE 3 ML: 2.5; .5 SOLUTION RESPIRATORY (INHALATION) at 08:58

## 2023-01-22 RX ADMIN — INSULIN LISPRO 2 UNITS: 100 INJECTION, SOLUTION INTRAVENOUS; SUBCUTANEOUS at 16:52

## 2023-01-22 RX ADMIN — TAZOBACTAM SODIUM AND PIPERACILLIN SODIUM 3.38 G: 375; 3 INJECTION, SOLUTION INTRAVENOUS at 13:41

## 2023-01-22 RX ADMIN — COLESEVELAM HYDROCHLORIDE 1250 MG: 625 TABLET, COATED ORAL at 13:41

## 2023-01-22 RX ADMIN — ALLOPURINOL 300 MG: 300 TABLET ORAL at 08:54

## 2023-01-22 RX ADMIN — EMPAGLIFLOZIN 25 MG: 25 TABLET, FILM COATED ORAL at 08:55

## 2023-01-22 RX ADMIN — DOXYCYCLINE 100 MG: 100 CAPSULE ORAL at 22:23

## 2023-01-22 RX ADMIN — FLUOXETINE 20 MG: 20 CAPSULE ORAL at 08:54

## 2023-01-22 RX ADMIN — FOLIC ACID TAB 400 MCG 400 MCG: 400 TAB at 08:54

## 2023-01-22 RX ADMIN — TRAZODONE HYDROCHLORIDE 100 MG: 100 TABLET ORAL at 22:23

## 2023-01-22 RX ADMIN — FLECAINIDE ACETATE 100 MG: 50 TABLET ORAL at 08:53

## 2023-01-22 RX ADMIN — Medication 10 ML: at 22:24

## 2023-01-22 RX ADMIN — DOXYCYCLINE 100 MG: 100 CAPSULE ORAL at 08:54

## 2023-01-22 RX ADMIN — LEVOTHYROXINE SODIUM 125 MCG: 125 TABLET ORAL at 06:05

## 2023-01-22 RX ADMIN — FAMOTIDINE 20 MG: 20 TABLET, FILM COATED ORAL at 08:54

## 2023-01-22 RX ADMIN — INSULIN LISPRO 4 UNITS: 100 INJECTION, SOLUTION INTRAVENOUS; SUBCUTANEOUS at 12:14

## 2023-01-22 RX ADMIN — GABAPENTIN 100 MG: 100 CAPSULE ORAL at 08:54

## 2023-01-22 RX ADMIN — TAZOBACTAM SODIUM AND PIPERACILLIN SODIUM 3.38 G: 375; 3 INJECTION, SOLUTION INTRAVENOUS at 06:05

## 2023-01-22 RX ADMIN — ROSUVASTATIN 20 MG: 20 TABLET, FILM COATED ORAL at 22:22

## 2023-01-22 RX ADMIN — APIXABAN 5 MG: 5 TABLET, FILM COATED ORAL at 08:54

## 2023-01-22 RX ADMIN — PREDNISONE 5 MG: 5 TABLET ORAL at 08:54

## 2023-01-22 RX ADMIN — FLECAINIDE ACETATE 100 MG: 50 TABLET ORAL at 22:22

## 2023-01-22 RX ADMIN — GABAPENTIN 100 MG: 100 CAPSULE ORAL at 22:22

## 2023-01-22 RX ADMIN — FAMOTIDINE 20 MG: 20 TABLET, FILM COATED ORAL at 22:21

## 2023-01-22 RX ADMIN — HYDROCODONE BITARTRATE AND ACETAMINOPHEN 1 TABLET: 5; 325 TABLET ORAL at 22:23

## 2023-01-22 RX ADMIN — HYDROCODONE BITARTRATE AND ACETAMINOPHEN 1 TABLET: 5; 325 TABLET ORAL at 06:05

## 2023-01-22 RX ADMIN — Medication 12.5 MG: at 22:22

## 2023-01-22 RX ADMIN — ZOLPIDEM TARTRATE 5 MG: 5 TABLET ORAL at 22:23

## 2023-01-22 RX ADMIN — Medication 12.5 MG: at 08:55

## 2023-01-22 RX ADMIN — TAZOBACTAM SODIUM AND PIPERACILLIN SODIUM 3.38 G: 375; 3 INJECTION, SOLUTION INTRAVENOUS at 22:21

## 2023-01-22 RX ADMIN — Medication 10 ML: at 08:55

## 2023-01-22 RX ADMIN — ERYTHROMYCIN: 5 OINTMENT OPHTHALMIC at 22:23

## 2023-01-22 RX ADMIN — APIXABAN 5 MG: 5 TABLET, FILM COATED ORAL at 22:22

## 2023-01-22 RX ADMIN — FLUOXETINE 20 MG: 20 CAPSULE ORAL at 22:23

## 2023-01-22 RX ADMIN — IPRATROPIUM BROMIDE AND ALBUTEROL SULFATE 3 ML: 2.5; .5 SOLUTION RESPIRATORY (INHALATION) at 21:26

## 2023-01-23 VITALS
DIASTOLIC BLOOD PRESSURE: 80 MMHG | SYSTOLIC BLOOD PRESSURE: 120 MMHG | OXYGEN SATURATION: 94 % | HEART RATE: 54 BPM | TEMPERATURE: 97.2 F | RESPIRATION RATE: 16 BRPM

## 2023-01-23 LAB
ANION GAP SERPL CALCULATED.3IONS-SCNC: 9 MMOL/L (ref 5–15)
BUN SERPL-MCNC: 19 MG/DL (ref 8–23)
BUN/CREAT SERPL: 18.4 (ref 7–25)
CALCIUM SPEC-SCNC: 9.2 MG/DL (ref 8.6–10.5)
CHLORIDE SERPL-SCNC: 99 MMOL/L (ref 98–107)
CO2 SERPL-SCNC: 33 MMOL/L (ref 22–29)
CREAT SERPL-MCNC: 1.03 MG/DL (ref 0.57–1)
DEPRECATED RDW RBC AUTO: 55.6 FL (ref 37–54)
EGFRCR SERPLBLD CKD-EPI 2021: 59.3 ML/MIN/1.73
ERYTHROCYTE [DISTWIDTH] IN BLOOD BY AUTOMATED COUNT: 16.8 % (ref 12.3–15.4)
GLUCOSE BLDC GLUCOMTR-MCNC: 151 MG/DL (ref 70–130)
GLUCOSE BLDC GLUCOMTR-MCNC: 195 MG/DL (ref 70–130)
GLUCOSE BLDC GLUCOMTR-MCNC: 223 MG/DL (ref 70–130)
GLUCOSE SERPL-MCNC: 142 MG/DL (ref 65–99)
HCT VFR BLD AUTO: 35.3 % (ref 34–46.6)
HGB BLD-MCNC: 10.6 G/DL (ref 12–15.9)
MCH RBC QN AUTO: 27.9 PG (ref 26.6–33)
MCHC RBC AUTO-ENTMCNC: 30 G/DL (ref 31.5–35.7)
MCV RBC AUTO: 92.9 FL (ref 79–97)
PLATELET # BLD AUTO: 306 10*3/MM3 (ref 140–450)
PMV BLD AUTO: 10.2 FL (ref 6–12)
POTASSIUM SERPL-SCNC: 3.9 MMOL/L (ref 3.5–5.2)
RBC # BLD AUTO: 3.8 10*6/MM3 (ref 3.77–5.28)
SODIUM SERPL-SCNC: 141 MMOL/L (ref 136–145)
WBC NRBC COR # BLD: 11.55 10*3/MM3 (ref 3.4–10.8)

## 2023-01-23 PROCEDURE — 63710000001 PREDNISONE PER 5 MG: Performed by: INTERNAL MEDICINE

## 2023-01-23 PROCEDURE — 97110 THERAPEUTIC EXERCISES: CPT

## 2023-01-23 PROCEDURE — 80048 BASIC METABOLIC PNL TOTAL CA: CPT | Performed by: HOSPITALIST

## 2023-01-23 PROCEDURE — 63710000001 INSULIN LISPRO (HUMAN) PER 5 UNITS: Performed by: INTERNAL MEDICINE

## 2023-01-23 PROCEDURE — 94799 UNLISTED PULMONARY SVC/PX: CPT

## 2023-01-23 PROCEDURE — 25010000002 PIPERACILLIN SOD-TAZOBACTAM PER 1 G: Performed by: INTERNAL MEDICINE

## 2023-01-23 PROCEDURE — 94664 DEMO&/EVAL PT USE INHALER: CPT

## 2023-01-23 PROCEDURE — 85027 COMPLETE CBC AUTOMATED: CPT | Performed by: HOSPITALIST

## 2023-01-23 PROCEDURE — 97116 GAIT TRAINING THERAPY: CPT

## 2023-01-23 PROCEDURE — 99232 SBSQ HOSP IP/OBS MODERATE 35: CPT | Performed by: HOSPITALIST

## 2023-01-23 PROCEDURE — 82962 GLUCOSE BLOOD TEST: CPT

## 2023-01-23 PROCEDURE — 94761 N-INVAS EAR/PLS OXIMETRY MLT: CPT

## 2023-01-23 RX ORDER — ERYTHROMYCIN 5 MG/G
OINTMENT OPHTHALMIC EVERY 12 HOURS SCHEDULED
Status: DISCONTINUED | OUTPATIENT
Start: 2023-01-23 | End: 2023-01-25 | Stop reason: HOSPADM

## 2023-01-23 RX ORDER — POLYMYXIN B SULFATE AND TRIMETHOPRIM 1; 10000 MG/ML; [USP'U]/ML
1 SOLUTION OPHTHALMIC
Status: DISCONTINUED | OUTPATIENT
Start: 2023-01-23 | End: 2023-01-24

## 2023-01-23 RX ADMIN — TAZOBACTAM SODIUM AND PIPERACILLIN SODIUM 3.38 G: 375; 3 INJECTION, SOLUTION INTRAVENOUS at 22:45

## 2023-01-23 RX ADMIN — TRAZODONE HYDROCHLORIDE 100 MG: 100 TABLET ORAL at 20:15

## 2023-01-23 RX ADMIN — Medication 10 ML: at 08:48

## 2023-01-23 RX ADMIN — POLYMYXIN B SULFATE AND TRIMETHOPRIM 1 DROP: 10000; 1 SOLUTION OPHTHALMIC at 17:35

## 2023-01-23 RX ADMIN — Medication 12.5 MG: at 08:46

## 2023-01-23 RX ADMIN — PREDNISONE 5 MG: 5 TABLET ORAL at 08:47

## 2023-01-23 RX ADMIN — FLECAINIDE ACETATE 100 MG: 50 TABLET ORAL at 20:15

## 2023-01-23 RX ADMIN — GABAPENTIN 100 MG: 100 CAPSULE ORAL at 08:46

## 2023-01-23 RX ADMIN — TAZOBACTAM SODIUM AND PIPERACILLIN SODIUM 3.38 G: 375; 3 INJECTION, SOLUTION INTRAVENOUS at 15:42

## 2023-01-23 RX ADMIN — POLYMYXIN B SULFATE AND TRIMETHOPRIM 1 DROP: 10000; 1 SOLUTION OPHTHALMIC at 20:15

## 2023-01-23 RX ADMIN — DOXYCYCLINE 100 MG: 100 CAPSULE ORAL at 20:16

## 2023-01-23 RX ADMIN — APIXABAN 5 MG: 5 TABLET, FILM COATED ORAL at 08:47

## 2023-01-23 RX ADMIN — LEVOTHYROXINE SODIUM 125 MCG: 125 TABLET ORAL at 06:41

## 2023-01-23 RX ADMIN — IPRATROPIUM BROMIDE AND ALBUTEROL SULFATE 3 ML: 2.5; .5 SOLUTION RESPIRATORY (INHALATION) at 14:00

## 2023-01-23 RX ADMIN — COLESEVELAM HYDROCHLORIDE 1250 MG: 625 TABLET, COATED ORAL at 08:50

## 2023-01-23 RX ADMIN — ROSUVASTATIN 20 MG: 20 TABLET, FILM COATED ORAL at 20:16

## 2023-01-23 RX ADMIN — IPRATROPIUM BROMIDE AND ALBUTEROL SULFATE 3 ML: 2.5; .5 SOLUTION RESPIRATORY (INHALATION) at 18:33

## 2023-01-23 RX ADMIN — EMPAGLIFLOZIN 25 MG: 25 TABLET, FILM COATED ORAL at 08:47

## 2023-01-23 RX ADMIN — Medication 10 ML: at 20:17

## 2023-01-23 RX ADMIN — ALLOPURINOL 300 MG: 300 TABLET ORAL at 08:47

## 2023-01-23 RX ADMIN — COLESEVELAM HYDROCHLORIDE 1250 MG: 625 TABLET, COATED ORAL at 17:35

## 2023-01-23 RX ADMIN — INSULIN LISPRO 3 UNITS: 100 INJECTION, SOLUTION INTRAVENOUS; SUBCUTANEOUS at 16:50

## 2023-01-23 RX ADMIN — HYDROCODONE BITARTRATE AND ACETAMINOPHEN 1 TABLET: 5; 325 TABLET ORAL at 08:55

## 2023-01-23 RX ADMIN — FAMOTIDINE 20 MG: 20 TABLET, FILM COATED ORAL at 08:47

## 2023-01-23 RX ADMIN — ERYTHROMYCIN: 5 OINTMENT OPHTHALMIC at 12:48

## 2023-01-23 RX ADMIN — POLYMYXIN B SULFATE AND TRIMETHOPRIM 1 DROP: 10000; 1 SOLUTION OPHTHALMIC at 12:48

## 2023-01-23 RX ADMIN — GABAPENTIN 100 MG: 100 CAPSULE ORAL at 20:16

## 2023-01-23 RX ADMIN — INSULIN LISPRO 2 UNITS: 100 INJECTION, SOLUTION INTRAVENOUS; SUBCUTANEOUS at 12:48

## 2023-01-23 RX ADMIN — DOXYCYCLINE 100 MG: 100 CAPSULE ORAL at 08:46

## 2023-01-23 RX ADMIN — FLUOXETINE 20 MG: 20 CAPSULE ORAL at 20:16

## 2023-01-23 RX ADMIN — ERYTHROMYCIN: 5 OINTMENT OPHTHALMIC at 20:15

## 2023-01-23 RX ADMIN — ZOLPIDEM TARTRATE 5 MG: 5 TABLET ORAL at 20:16

## 2023-01-23 RX ADMIN — IPRATROPIUM BROMIDE AND ALBUTEROL SULFATE 3 ML: 2.5; .5 SOLUTION RESPIRATORY (INHALATION) at 06:51

## 2023-01-23 RX ADMIN — FLUOXETINE 20 MG: 20 CAPSULE ORAL at 08:47

## 2023-01-23 RX ADMIN — FOLIC ACID TAB 400 MCG 400 MCG: 400 TAB at 08:46

## 2023-01-23 RX ADMIN — FAMOTIDINE 20 MG: 20 TABLET, FILM COATED ORAL at 20:15

## 2023-01-23 RX ADMIN — HYDROCODONE BITARTRATE AND ACETAMINOPHEN 1 TABLET: 5; 325 TABLET ORAL at 20:16

## 2023-01-23 RX ADMIN — FLECAINIDE ACETATE 100 MG: 50 TABLET ORAL at 08:49

## 2023-01-23 RX ADMIN — Medication 12.5 MG: at 20:16

## 2023-01-23 RX ADMIN — APIXABAN 5 MG: 5 TABLET, FILM COATED ORAL at 20:16

## 2023-01-23 RX ADMIN — TAZOBACTAM SODIUM AND PIPERACILLIN SODIUM 3.38 G: 375; 3 INJECTION, SOLUTION INTRAVENOUS at 06:40

## 2023-01-23 RX ADMIN — INSULIN LISPRO 2 UNITS: 100 INJECTION, SOLUTION INTRAVENOUS; SUBCUTANEOUS at 08:46

## 2023-01-24 LAB
ANION GAP SERPL CALCULATED.3IONS-SCNC: 11 MMOL/L (ref 5–15)
BACTERIA SPEC AEROBE CULT: NORMAL
BACTERIA SPEC AEROBE CULT: NORMAL
BUN SERPL-MCNC: 22 MG/DL (ref 8–23)
BUN/CREAT SERPL: 20.2 (ref 7–25)
CALCIUM SPEC-SCNC: 9.4 MG/DL (ref 8.6–10.5)
CHLORIDE SERPL-SCNC: 101 MMOL/L (ref 98–107)
CO2 SERPL-SCNC: 30 MMOL/L (ref 22–29)
CREAT SERPL-MCNC: 1.09 MG/DL (ref 0.57–1)
DEPRECATED RDW RBC AUTO: 56.7 FL (ref 37–54)
EGFRCR SERPLBLD CKD-EPI 2021: 55.4 ML/MIN/1.73
ERYTHROCYTE [DISTWIDTH] IN BLOOD BY AUTOMATED COUNT: 16.9 % (ref 12.3–15.4)
GLUCOSE BLDC GLUCOMTR-MCNC: 198 MG/DL (ref 70–130)
GLUCOSE BLDC GLUCOMTR-MCNC: 230 MG/DL (ref 70–130)
GLUCOSE BLDC GLUCOMTR-MCNC: 281 MG/DL (ref 70–130)
GLUCOSE SERPL-MCNC: 182 MG/DL (ref 65–99)
HCT VFR BLD AUTO: 37 % (ref 34–46.6)
HGB BLD-MCNC: 11.1 G/DL (ref 12–15.9)
MCH RBC QN AUTO: 28 PG (ref 26.6–33)
MCHC RBC AUTO-ENTMCNC: 30 G/DL (ref 31.5–35.7)
MCV RBC AUTO: 93.2 FL (ref 79–97)
PLATELET # BLD AUTO: 306 10*3/MM3 (ref 140–450)
PMV BLD AUTO: 10 FL (ref 6–12)
POTASSIUM SERPL-SCNC: 4 MMOL/L (ref 3.5–5.2)
QT INTERVAL: 504 MS
QTC INTERVAL: 498 MS
RBC # BLD AUTO: 3.97 10*6/MM3 (ref 3.77–5.28)
SODIUM SERPL-SCNC: 142 MMOL/L (ref 136–145)
WBC NRBC COR # BLD: 13.41 10*3/MM3 (ref 3.4–10.8)

## 2023-01-24 PROCEDURE — 94799 UNLISTED PULMONARY SVC/PX: CPT

## 2023-01-24 PROCEDURE — 80048 BASIC METABOLIC PNL TOTAL CA: CPT | Performed by: HOSPITALIST

## 2023-01-24 PROCEDURE — 25010000002 PIPERACILLIN SOD-TAZOBACTAM PER 1 G: Performed by: INTERNAL MEDICINE

## 2023-01-24 PROCEDURE — 82962 GLUCOSE BLOOD TEST: CPT

## 2023-01-24 PROCEDURE — 85027 COMPLETE CBC AUTOMATED: CPT | Performed by: HOSPITALIST

## 2023-01-24 PROCEDURE — 63710000001 PREDNISONE PER 5 MG: Performed by: INTERNAL MEDICINE

## 2023-01-24 PROCEDURE — 25010000002 PIPERACILLIN SOD-TAZOBACTAM PER 1 G: Performed by: HOSPITALIST

## 2023-01-24 PROCEDURE — 63710000001 INSULIN LISPRO (HUMAN) PER 5 UNITS: Performed by: INTERNAL MEDICINE

## 2023-01-24 PROCEDURE — 99232 SBSQ HOSP IP/OBS MODERATE 35: CPT | Performed by: PHYSICIAN ASSISTANT

## 2023-01-24 RX ORDER — POLYMYXIN B SULFATE AND TRIMETHOPRIM 1; 10000 MG/ML; [USP'U]/ML
1 SOLUTION OPHTHALMIC
Status: DISCONTINUED | OUTPATIENT
Start: 2023-01-24 | End: 2023-01-25 | Stop reason: HOSPADM

## 2023-01-24 RX ORDER — SODIUM CHLORIDE 9 MG/ML
125 INJECTION, SOLUTION INTRAVENOUS CONTINUOUS
Status: ACTIVE | OUTPATIENT
Start: 2023-01-24 | End: 2023-01-24

## 2023-01-24 RX ORDER — KETOTIFEN FUMARATE 0.35 MG/ML
1 SOLUTION/ DROPS OPHTHALMIC 2 TIMES DAILY
Status: DISCONTINUED | OUTPATIENT
Start: 2023-01-24 | End: 2023-01-25 | Stop reason: HOSPADM

## 2023-01-24 RX ADMIN — FOLIC ACID TAB 400 MCG 400 MCG: 400 TAB at 08:20

## 2023-01-24 RX ADMIN — FLECAINIDE ACETATE 100 MG: 50 TABLET ORAL at 08:21

## 2023-01-24 RX ADMIN — APIXABAN 5 MG: 5 TABLET, FILM COATED ORAL at 20:22

## 2023-01-24 RX ADMIN — KETOTIFEN FUMARATE 1 DROP: 0.35 SOLUTION/ DROPS OPHTHALMIC at 20:21

## 2023-01-24 RX ADMIN — POLYMYXIN B SULFATE AND TRIMETHOPRIM 1 DROP: 10000; 1 SOLUTION OPHTHALMIC at 04:01

## 2023-01-24 RX ADMIN — TAZOBACTAM SODIUM AND PIPERACILLIN SODIUM 3.38 G: 375; 3 INJECTION, SOLUTION INTRAVENOUS at 14:27

## 2023-01-24 RX ADMIN — TAZOBACTAM SODIUM AND PIPERACILLIN SODIUM 3.38 G: 375; 3 INJECTION, SOLUTION INTRAVENOUS at 21:35

## 2023-01-24 RX ADMIN — Medication 12.5 MG: at 20:21

## 2023-01-24 RX ADMIN — Medication 12.5 MG: at 08:21

## 2023-01-24 RX ADMIN — TRAZODONE HYDROCHLORIDE 100 MG: 100 TABLET ORAL at 20:21

## 2023-01-24 RX ADMIN — POLYMYXIN B SULFATE AND TRIMETHOPRIM 1 DROP: 10000; 1 SOLUTION OPHTHALMIC at 17:14

## 2023-01-24 RX ADMIN — ALLOPURINOL 300 MG: 300 TABLET ORAL at 08:20

## 2023-01-24 RX ADMIN — INSULIN LISPRO 3 UNITS: 100 INJECTION, SOLUTION INTRAVENOUS; SUBCUTANEOUS at 08:23

## 2023-01-24 RX ADMIN — ERYTHROMYCIN 1 APPLICATION: 5 OINTMENT OPHTHALMIC at 08:27

## 2023-01-24 RX ADMIN — POLYMYXIN B SULFATE AND TRIMETHOPRIM 1 DROP: 10000; 1 SOLUTION OPHTHALMIC at 00:54

## 2023-01-24 RX ADMIN — Medication 10 ML: at 08:28

## 2023-01-24 RX ADMIN — SODIUM CHLORIDE 125 ML/HR: 9 INJECTION, SOLUTION INTRAVENOUS at 17:45

## 2023-01-24 RX ADMIN — SODIUM CHLORIDE 125 ML/HR: 9 INJECTION, SOLUTION INTRAVENOUS at 08:30

## 2023-01-24 RX ADMIN — COLESEVELAM HYDROCHLORIDE 1250 MG: 625 TABLET, COATED ORAL at 17:13

## 2023-01-24 RX ADMIN — DOXYCYCLINE 100 MG: 100 CAPSULE ORAL at 08:20

## 2023-01-24 RX ADMIN — FAMOTIDINE 20 MG: 20 TABLET, FILM COATED ORAL at 20:21

## 2023-01-24 RX ADMIN — LEVOTHYROXINE SODIUM 125 MCG: 125 TABLET ORAL at 06:15

## 2023-01-24 RX ADMIN — ZOLPIDEM TARTRATE 5 MG: 5 TABLET ORAL at 23:23

## 2023-01-24 RX ADMIN — APIXABAN 5 MG: 5 TABLET, FILM COATED ORAL at 08:21

## 2023-01-24 RX ADMIN — KETOTIFEN FUMARATE 1 DROP: 0.35 SOLUTION/ DROPS OPHTHALMIC at 17:48

## 2023-01-24 RX ADMIN — FAMOTIDINE 20 MG: 20 TABLET, FILM COATED ORAL at 08:20

## 2023-01-24 RX ADMIN — FLUOXETINE 20 MG: 20 CAPSULE ORAL at 20:21

## 2023-01-24 RX ADMIN — Medication 10 ML: at 20:22

## 2023-01-24 RX ADMIN — HYDROCODONE BITARTRATE AND ACETAMINOPHEN 1 TABLET: 5; 325 TABLET ORAL at 14:31

## 2023-01-24 RX ADMIN — HYDROCODONE BITARTRATE AND ACETAMINOPHEN 1 TABLET: 5; 325 TABLET ORAL at 08:34

## 2023-01-24 RX ADMIN — FLECAINIDE ACETATE 100 MG: 50 TABLET ORAL at 21:35

## 2023-01-24 RX ADMIN — GABAPENTIN 100 MG: 100 CAPSULE ORAL at 20:21

## 2023-01-24 RX ADMIN — TAZOBACTAM SODIUM AND PIPERACILLIN SODIUM 3.38 G: 375; 3 INJECTION, SOLUTION INTRAVENOUS at 06:15

## 2023-01-24 RX ADMIN — POLYMYXIN B SULFATE AND TRIMETHOPRIM 1 DROP: 10000; 1 SOLUTION OPHTHALMIC at 11:03

## 2023-01-24 RX ADMIN — INSULIN LISPRO 2 UNITS: 100 INJECTION, SOLUTION INTRAVENOUS; SUBCUTANEOUS at 17:12

## 2023-01-24 RX ADMIN — ERYTHROMYCIN: 5 OINTMENT OPHTHALMIC at 21:35

## 2023-01-24 RX ADMIN — FLUOXETINE 20 MG: 20 CAPSULE ORAL at 08:21

## 2023-01-24 RX ADMIN — COLESEVELAM HYDROCHLORIDE 1250 MG: 625 TABLET, COATED ORAL at 08:29

## 2023-01-24 RX ADMIN — HYDROCODONE BITARTRATE AND ACETAMINOPHEN 1 TABLET: 5; 325 TABLET ORAL at 23:23

## 2023-01-24 RX ADMIN — IPRATROPIUM BROMIDE AND ALBUTEROL SULFATE 3 ML: 2.5; .5 SOLUTION RESPIRATORY (INHALATION) at 08:55

## 2023-01-24 RX ADMIN — IPRATROPIUM BROMIDE AND ALBUTEROL SULFATE 3 ML: 2.5; .5 SOLUTION RESPIRATORY (INHALATION) at 14:14

## 2023-01-24 RX ADMIN — INSULIN LISPRO 4 UNITS: 100 INJECTION, SOLUTION INTRAVENOUS; SUBCUTANEOUS at 12:29

## 2023-01-24 RX ADMIN — POLYMYXIN B SULFATE AND TRIMETHOPRIM 1 DROP: 10000; 1 SOLUTION OPHTHALMIC at 14:28

## 2023-01-24 RX ADMIN — ROSUVASTATIN 20 MG: 20 TABLET, FILM COATED ORAL at 20:21

## 2023-01-24 RX ADMIN — DOXYCYCLINE 100 MG: 100 CAPSULE ORAL at 20:21

## 2023-01-24 RX ADMIN — POLYMYXIN B SULFATE AND TRIMETHOPRIM 1 DROP: 10000; 1 SOLUTION OPHTHALMIC at 21:35

## 2023-01-24 RX ADMIN — GABAPENTIN 100 MG: 100 CAPSULE ORAL at 08:20

## 2023-01-24 RX ADMIN — EMPAGLIFLOZIN 25 MG: 25 TABLET, FILM COATED ORAL at 08:20

## 2023-01-24 RX ADMIN — IPRATROPIUM BROMIDE AND ALBUTEROL SULFATE 3 ML: 2.5; .5 SOLUTION RESPIRATORY (INHALATION) at 18:46

## 2023-01-24 RX ADMIN — PREDNISONE 5 MG: 5 TABLET ORAL at 08:20

## 2023-01-25 VITALS
TEMPERATURE: 97.6 F | RESPIRATION RATE: 16 BRPM | SYSTOLIC BLOOD PRESSURE: 133 MMHG | WEIGHT: 195 LBS | OXYGEN SATURATION: 90 % | HEIGHT: 65 IN | BODY MASS INDEX: 32.49 KG/M2 | DIASTOLIC BLOOD PRESSURE: 74 MMHG | HEART RATE: 58 BPM

## 2023-01-25 PROBLEM — H10.32 ACUTE CONJUNCTIVITIS OF LEFT EYE: Status: ACTIVE | Noted: 2023-01-20

## 2023-01-25 PROBLEM — G47.34 NOCTURNAL HYPOXIA: Status: ACTIVE | Noted: 2023-01-25

## 2023-01-25 PROBLEM — G47.00 INSOMNIA: Status: ACTIVE | Noted: 2023-01-25

## 2023-01-25 PROBLEM — J96.01 ACUTE RESPIRATORY FAILURE WITH HYPOXIA: Status: ACTIVE | Noted: 2023-01-25

## 2023-01-25 PROBLEM — J96.21 ACUTE ON CHRONIC RESPIRATORY FAILURE WITH HYPOXIA: Status: RESOLVED | Noted: 2023-01-20 | Resolved: 2023-01-25

## 2023-01-25 PROBLEM — D84.9 IMMUNOSUPPRESSED STATUS (HCC): Status: ACTIVE | Noted: 2023-01-25

## 2023-01-25 LAB
ANION GAP SERPL CALCULATED.3IONS-SCNC: 10 MMOL/L (ref 5–15)
BUN SERPL-MCNC: 24 MG/DL (ref 8–23)
BUN/CREAT SERPL: 24 (ref 7–25)
CALCIUM SPEC-SCNC: 8.7 MG/DL (ref 8.6–10.5)
CHLORIDE SERPL-SCNC: 103 MMOL/L (ref 98–107)
CO2 SERPL-SCNC: 28 MMOL/L (ref 22–29)
CREAT SERPL-MCNC: 1 MG/DL (ref 0.57–1)
DEPRECATED RDW RBC AUTO: 55.8 FL (ref 37–54)
EGFRCR SERPLBLD CKD-EPI 2021: 61.5 ML/MIN/1.73
ERYTHROCYTE [DISTWIDTH] IN BLOOD BY AUTOMATED COUNT: 16.4 % (ref 12.3–15.4)
GLUCOSE BLDC GLUCOMTR-MCNC: 178 MG/DL (ref 70–130)
GLUCOSE SERPL-MCNC: 164 MG/DL (ref 65–99)
HCT VFR BLD AUTO: 33.5 % (ref 34–46.6)
HGB BLD-MCNC: 9.6 G/DL (ref 12–15.9)
MCH RBC QN AUTO: 27.2 PG (ref 26.6–33)
MCHC RBC AUTO-ENTMCNC: 28.7 G/DL (ref 31.5–35.7)
MCV RBC AUTO: 94.9 FL (ref 79–97)
PLATELET # BLD AUTO: 272 10*3/MM3 (ref 140–450)
PMV BLD AUTO: 9.9 FL (ref 6–12)
POTASSIUM SERPL-SCNC: 3.9 MMOL/L (ref 3.5–5.2)
RBC # BLD AUTO: 3.53 10*6/MM3 (ref 3.77–5.28)
SODIUM SERPL-SCNC: 141 MMOL/L (ref 136–145)
WBC NRBC COR # BLD: 10.97 10*3/MM3 (ref 3.4–10.8)

## 2023-01-25 PROCEDURE — 63710000001 PREDNISONE PER 5 MG: Performed by: INTERNAL MEDICINE

## 2023-01-25 PROCEDURE — 97116 GAIT TRAINING THERAPY: CPT

## 2023-01-25 PROCEDURE — 94799 UNLISTED PULMONARY SVC/PX: CPT

## 2023-01-25 PROCEDURE — 85027 COMPLETE CBC AUTOMATED: CPT | Performed by: PHYSICIAN ASSISTANT

## 2023-01-25 PROCEDURE — 82962 GLUCOSE BLOOD TEST: CPT

## 2023-01-25 PROCEDURE — 25010000002 PIPERACILLIN SOD-TAZOBACTAM PER 1 G: Performed by: HOSPITALIST

## 2023-01-25 PROCEDURE — 80048 BASIC METABOLIC PNL TOTAL CA: CPT | Performed by: PHYSICIAN ASSISTANT

## 2023-01-25 PROCEDURE — 97110 THERAPEUTIC EXERCISES: CPT

## 2023-01-25 PROCEDURE — 63710000001 INSULIN LISPRO (HUMAN) PER 5 UNITS: Performed by: INTERNAL MEDICINE

## 2023-01-25 PROCEDURE — 99239 HOSP IP/OBS DSCHRG MGMT >30: CPT | Performed by: PHYSICIAN ASSISTANT

## 2023-01-25 RX ORDER — HYDROCODONE BITARTRATE AND ACETAMINOPHEN 10; 325 MG/1; MG/1
1 TABLET ORAL EVERY 6 HOURS PRN
Refills: 0
Start: 2023-01-25

## 2023-01-25 RX ORDER — AMOXICILLIN AND CLAVULANATE POTASSIUM 875; 125 MG/1; MG/1
1 TABLET, FILM COATED ORAL 2 TIMES DAILY
Start: 2023-01-25

## 2023-01-25 RX ORDER — ZOLPIDEM TARTRATE 10 MG/1
5 TABLET ORAL NIGHTLY
Start: 2023-01-25

## 2023-01-25 RX ORDER — METHOTREXATE 25 MG/ML
50 INJECTION INTRA-ARTERIAL; INTRAMUSCULAR; INTRATHECAL; INTRAVENOUS WEEKLY
Qty: 1.12 ML
Start: 2023-01-25

## 2023-01-25 RX ORDER — KETOTIFEN FUMARATE 0.35 MG/ML
1 SOLUTION/ DROPS OPHTHALMIC 2 TIMES DAILY
Start: 2023-01-25 | End: 2023-02-01

## 2023-01-25 RX ORDER — ERYTHROMYCIN 5 MG/G
OINTMENT OPHTHALMIC EVERY 12 HOURS SCHEDULED
Start: 2023-01-25

## 2023-01-25 RX ORDER — IPRATROPIUM BROMIDE AND ALBUTEROL SULFATE 2.5; .5 MG/3ML; MG/3ML
3 SOLUTION RESPIRATORY (INHALATION)
Qty: 360 ML
Start: 2023-01-25

## 2023-01-25 RX ORDER — DOXYCYCLINE 100 MG/1
100 CAPSULE ORAL EVERY 12 HOURS SCHEDULED
Refills: 0
Start: 2023-01-25

## 2023-01-25 RX ORDER — LIDOCAINE 50 MG/G
2 PATCH TOPICAL
Qty: 30 EACH | Refills: 0
Start: 2023-01-25

## 2023-01-25 RX ADMIN — TAZOBACTAM SODIUM AND PIPERACILLIN SODIUM 3.38 G: 375; 3 INJECTION, SOLUTION INTRAVENOUS at 06:13

## 2023-01-25 RX ADMIN — GABAPENTIN 100 MG: 100 CAPSULE ORAL at 08:02

## 2023-01-25 RX ADMIN — LEVOTHYROXINE SODIUM 125 MCG: 125 TABLET ORAL at 06:13

## 2023-01-25 RX ADMIN — FLECAINIDE ACETATE 100 MG: 50 TABLET ORAL at 08:01

## 2023-01-25 RX ADMIN — ERYTHROMYCIN 1 APPLICATION: 5 OINTMENT OPHTHALMIC at 09:42

## 2023-01-25 RX ADMIN — FLUOXETINE 20 MG: 20 CAPSULE ORAL at 08:02

## 2023-01-25 RX ADMIN — COLESEVELAM HYDROCHLORIDE 1250 MG: 625 TABLET, COATED ORAL at 08:03

## 2023-01-25 RX ADMIN — Medication 12.5 MG: at 08:01

## 2023-01-25 RX ADMIN — APIXABAN 5 MG: 5 TABLET, FILM COATED ORAL at 08:02

## 2023-01-25 RX ADMIN — EMPAGLIFLOZIN 25 MG: 25 TABLET, FILM COATED ORAL at 08:01

## 2023-01-25 RX ADMIN — KETOTIFEN FUMARATE 1 DROP: 0.35 SOLUTION/ DROPS OPHTHALMIC at 08:08

## 2023-01-25 RX ADMIN — POLYMYXIN B SULFATE AND TRIMETHOPRIM 1 DROP: 10000; 1 SOLUTION OPHTHALMIC at 09:57

## 2023-01-25 RX ADMIN — DOXYCYCLINE 100 MG: 100 CAPSULE ORAL at 08:02

## 2023-01-25 RX ADMIN — PREDNISONE 5 MG: 5 TABLET ORAL at 08:02

## 2023-01-25 RX ADMIN — Medication 10 ML: at 08:08

## 2023-01-25 RX ADMIN — INSULIN LISPRO 2 UNITS: 100 INJECTION, SOLUTION INTRAVENOUS; SUBCUTANEOUS at 08:03

## 2023-01-25 RX ADMIN — FAMOTIDINE 20 MG: 20 TABLET, FILM COATED ORAL at 08:01

## 2023-01-25 RX ADMIN — HYDROCODONE BITARTRATE AND ACETAMINOPHEN 1 TABLET: 5; 325 TABLET ORAL at 08:07

## 2023-01-25 RX ADMIN — IPRATROPIUM BROMIDE AND ALBUTEROL SULFATE 3 ML: 2.5; .5 SOLUTION RESPIRATORY (INHALATION) at 07:25

## 2023-01-25 RX ADMIN — POLYMYXIN B SULFATE AND TRIMETHOPRIM 1 DROP: 10000; 1 SOLUTION OPHTHALMIC at 06:13

## 2023-01-25 RX ADMIN — SODIUM CHLORIDE 1000 ML: 9 INJECTION, SOLUTION INTRAVENOUS at 08:08

## 2023-01-25 RX ADMIN — ALLOPURINOL 300 MG: 300 TABLET ORAL at 08:02

## 2023-01-25 RX ADMIN — FOLIC ACID TAB 400 MCG 400 MCG: 400 TAB at 08:02

## 2023-02-08 ENCOUNTER — TRANSCRIBE ORDERS (OUTPATIENT)
Dept: HOME HEALTH SERVICES | Facility: HOME HEALTHCARE | Age: 68
End: 2023-02-08
Payer: MEDICARE

## 2023-02-08 DIAGNOSIS — R53.81 PHYSICAL DEBILITY: Primary | ICD-10-CM

## 2023-02-09 ENCOUNTER — HOME CARE VISIT (OUTPATIENT)
Dept: HOME HEALTH SERVICES | Facility: HOME HEALTHCARE | Age: 68
End: 2023-02-09
Payer: MEDICARE

## 2023-02-09 VITALS
DIASTOLIC BLOOD PRESSURE: 82 MMHG | BODY MASS INDEX: 28.93 KG/M2 | RESPIRATION RATE: 20 BRPM | HEIGHT: 66 IN | SYSTOLIC BLOOD PRESSURE: 112 MMHG | HEART RATE: 52 BPM | WEIGHT: 180 LBS | TEMPERATURE: 98.8 F

## 2023-02-09 PROCEDURE — G0299 HHS/HOSPICE OF RN EA 15 MIN: HCPCS

## 2023-02-09 NOTE — HOME HEALTH
Resumption of Care Note: 2/9/23    Reason for hosp/new problems: respiratory failure, septic shock, rheumatoid arthritis    New/changed medications: new medication ambien 10mg every evening prn sleeplessness, robaxin 500mg 2 tablets by mouth 2xday, insulin aspart sliding scale 3xday                                                                                                                                                  New/changed orders: SN,PT,OT,ST    Plan/Focus of Care: Altered respiratory, cardiac status, dm assessment and instruction, PT, OT for HEP, ST for swallowing functions    Plan for next visit: Diabetic instruction related to sliding scale

## 2023-02-13 ENCOUNTER — HOME CARE VISIT (OUTPATIENT)
Dept: HOME HEALTH SERVICES | Facility: HOME HEALTHCARE | Age: 68
End: 2023-02-13
Payer: MEDICARE

## 2023-02-13 VITALS
TEMPERATURE: 97.5 F | SYSTOLIC BLOOD PRESSURE: 140 MMHG | DIASTOLIC BLOOD PRESSURE: 86 MMHG | HEART RATE: 58 BPM | RESPIRATION RATE: 16 BRPM | OXYGEN SATURATION: 96 %

## 2023-02-13 PROCEDURE — G0151 HHCP-SERV OF PT,EA 15 MIN: HCPCS

## 2023-02-14 ENCOUNTER — HOME CARE VISIT (OUTPATIENT)
Dept: HOME HEALTH SERVICES | Facility: HOME HEALTHCARE | Age: 68
End: 2023-02-14
Payer: MEDICARE

## 2023-02-14 PROCEDURE — G0152 HHCP-SERV OF OT,EA 15 MIN: HCPCS

## 2023-02-14 NOTE — HOME HEALTH
PT Eval note:   Home Health ordered for: disciplines SN, PT, OT   Reason for Hosp/Primary Dx/Co-morbidities: pneumonia with respiratory failure/DM, HTN, A fib, Depression/anxiety, Gout and RA   Focus of Care: HHPT 2wk3  for gait training, balance training, endurance training, therapeutic exericse, pt education and HEP instruction related to PNA/respiratory failure   Current Functional status/mobility/DME: Dyspneic with exertion, uses 4WW for mobility, has 24/7 assist from family and paid caregivers.   HB status/Living Arrangements: Pt lives in her home and currently has 24/7 supervision from paid caregivers and or family She requires CGA/min assist of 1 and use of walker to leave home   Skin Integrity/wound status: no deficits noted  Code Status:Full Code   Fall Risk: High per [facility]

## 2023-02-15 VITALS
OXYGEN SATURATION: 94 % | DIASTOLIC BLOOD PRESSURE: 80 MMHG | RESPIRATION RATE: 16 BRPM | HEART RATE: 67 BPM | SYSTOLIC BLOOD PRESSURE: 110 MMHG

## 2023-02-15 NOTE — HOME HEALTH
OT Eval note: Pt d/c from [facility], home w/ cg'er during the day and family assist at night  Home Health ordered for: disciplines SN, PT, OT   Reason for Hosp/Primary Dx/Co-morbidities: Influenza A, pneumonia, Acute respiratory hypoxia, DM, HTN, A fib, Depression/anxiety, Gout and RA   Focus of Care: HHOT 1wk3 for EC/WS with ADL/lt IADL skills, balance training, endurance training, therapeutic exercise/HEP instruction, low vision adaptations, adaptions/AE w/ FM skills for ADL tasks, home safety/fall prevention related to Influenza/pneumonia   Current Functional status/mobility/DME: Min A w/ BADL skills, walking w/ 4 WW and CGA, fatigues easily, assist from family and paid caregivers.   HB status/Living Arrangements: Pt lives in her home w/ assist from family and hired cg'er. She requires min-moderate assist of 1 and use of walker to leave home   Skin Integrity/wound status: Skin intact with no open wounds   Code Status:Full   Fall Risk: High per MACH 10

## 2023-02-16 ENCOUNTER — HOME CARE VISIT (OUTPATIENT)
Dept: HOME HEALTH SERVICES | Facility: HOME HEALTHCARE | Age: 68
End: 2023-02-16
Payer: MEDICARE

## 2023-02-16 VITALS
TEMPERATURE: 97 F | SYSTOLIC BLOOD PRESSURE: 122 MMHG | DIASTOLIC BLOOD PRESSURE: 68 MMHG | HEART RATE: 78 BPM | OXYGEN SATURATION: 97 % | RESPIRATION RATE: 16 BRPM

## 2023-02-16 PROCEDURE — G0495 RN CARE TRAIN/EDU IN HH: HCPCS

## 2023-02-16 NOTE — HOME HEALTH
"Routine Visit Note:    Skill/education provided: Lungs clear ox sat 92% on room air at rest , after deep breathing exercises  ox sat up to 95%. Pt complains of L shoulder/neck pain today feeling stiff and achy.  Caregiver states appetite is not good and pt ate strawberries and coolwhip for breakfast and tomoato soup and crackers for lunch.  Drinking very little water.   Blood sugar at visit was 178 and am was around 100.  Daughter sets up meds and caregivers helping her to take them.  Encouraged deep breathing exercies or spirometer use if they can find it in the home.  Drinking at least 4 swallows of water every 2 to 3 hours while awake and finding more ways to put protein in her diet. Cg to discuss with daughter, perhaps protein powder.     Next SNV  followup on \" homework\" given deep breathing, drinking water, more protien in diet.   Diabetic foot education."

## 2023-02-17 ENCOUNTER — HOME CARE VISIT (OUTPATIENT)
Dept: HOME HEALTH SERVICES | Facility: HOME HEALTHCARE | Age: 68
End: 2023-02-17
Payer: MEDICARE

## 2023-02-17 PROCEDURE — G0157 HHC PT ASSISTANT EA 15: HCPCS

## 2023-02-19 VITALS
SYSTOLIC BLOOD PRESSURE: 132 MMHG | DIASTOLIC BLOOD PRESSURE: 82 MMHG | OXYGEN SATURATION: 92 % | HEART RATE: 64 BPM | RESPIRATION RATE: 16 BRPM

## 2023-02-21 ENCOUNTER — HOME CARE VISIT (OUTPATIENT)
Dept: HOME HEALTH SERVICES | Facility: HOME HEALTHCARE | Age: 68
End: 2023-02-21
Payer: MEDICARE

## 2023-02-21 VITALS
HEART RATE: 52 BPM | OXYGEN SATURATION: 92 % | SYSTOLIC BLOOD PRESSURE: 110 MMHG | RESPIRATION RATE: 15 BRPM | DIASTOLIC BLOOD PRESSURE: 58 MMHG

## 2023-02-21 PROCEDURE — G0157 HHC PT ASSISTANT EA 15: HCPCS

## 2023-02-21 NOTE — HOME HEALTH
PT Routine visit note    Skill/education interventions provided: cardiopulmonary tolerance to exercise, gait training and HEP instruction    Pt. response: pt. is able to return demo basic seated HEP today w/ cues, education re: importance of frequent short duration walks throughout the day w/ AD, she v/u.    Plan for next visit: review HEP and issue handouts

## 2023-02-24 ENCOUNTER — HOME CARE VISIT (OUTPATIENT)
Dept: HOME HEALTH SERVICES | Facility: HOME HEALTHCARE | Age: 68
End: 2023-02-24
Payer: MEDICARE

## 2023-02-24 VITALS
HEART RATE: 48 BPM | DIASTOLIC BLOOD PRESSURE: 62 MMHG | SYSTOLIC BLOOD PRESSURE: 114 MMHG | TEMPERATURE: 97.8 F | RESPIRATION RATE: 18 BRPM | OXYGEN SATURATION: 94 %

## 2023-02-24 VITALS
RESPIRATION RATE: 15 BRPM | DIASTOLIC BLOOD PRESSURE: 68 MMHG | SYSTOLIC BLOOD PRESSURE: 110 MMHG | HEART RATE: 54 BPM | OXYGEN SATURATION: 94 %

## 2023-02-24 PROCEDURE — G0157 HHC PT ASSISTANT EA 15: HCPCS

## 2023-02-24 PROCEDURE — G0299 HHS/HOSPICE OF RN EA 15 MIN: HCPCS

## 2023-02-24 PROCEDURE — G0152 HHCP-SERV OF OT,EA 15 MIN: HCPCS

## 2023-02-24 NOTE — HOME HEALTH
PT Routine visit note    Skill/education interventions provided: pt. and caregiver education on utilizing positioning for pain management, review HEP    Pt./caregiver response: pt. tolerates modifing resting position to improve spinal alignment to decrease pain level and improved ROM, pt. reports decreased pain level post tx    Plan for next visit: standing exercises if tolerated

## 2023-02-25 NOTE — HOME HEALTH
Routine Visit Note:Patient seen for cardiopulmonary assessmnet. Patient sitting in chair when RN arrived wearing O2 at 2L. Patient alert and oriented x4. Patient c/o generalized pain. Patient denies increased SOB. Patient and CG report slight increase in appetite over last few days. Denies changes to medications. All vitals wdl this visit.     Skill/education provided: Inst on continuing deep breathing exercises. Inst on safe use of O2. Inst on importance of adequate nutrition and fluid intake.     Patient/caregiver response: Patient and CG verbalized understanding and tolerated well    Plan for next visit: Cardiopulmonary assessmnet, nutrition assessmnet    Other pertinent info:

## 2023-02-27 ENCOUNTER — HOME CARE VISIT (OUTPATIENT)
Dept: HOME HEALTH SERVICES | Facility: HOME HEALTHCARE | Age: 68
End: 2023-02-27
Payer: MEDICARE

## 2023-02-27 VITALS
DIASTOLIC BLOOD PRESSURE: 62 MMHG | SYSTOLIC BLOOD PRESSURE: 114 MMHG | RESPIRATION RATE: 18 BRPM | OXYGEN SATURATION: 95 % | HEART RATE: 58 BPM

## 2023-02-27 VITALS
HEART RATE: 56 BPM | OXYGEN SATURATION: 91 % | SYSTOLIC BLOOD PRESSURE: 100 MMHG | DIASTOLIC BLOOD PRESSURE: 58 MMHG | RESPIRATION RATE: 16 BRPM

## 2023-02-27 PROCEDURE — G0180 MD CERTIFICATION HHA PATIENT: HCPCS | Performed by: HOSPITALIST

## 2023-02-27 PROCEDURE — G0157 HHC PT ASSISTANT EA 15: HCPCS

## 2023-02-27 NOTE — HOME HEALTH
PT Routine visit note    Skill/education interventions provided: standing therapuetic exercises, education re: energy conservation strategies and pain management by modifying posture/rest position    Pt. response: pt. reports decrease in pain level today, tolerates increasing standing exercise duration    Plan for next visit: upgrade seated HEP if appropriate

## 2023-02-27 NOTE — HOME HEALTH
Routine Visit Note:    Skill/education provided: Reinforced HEP, arthritis adaptations education/training for UB dressing, low vision adaptations for microwave usage/options for safe use, functional transfer training and 4WW safety.    Patient/caregiver response: Pt tolerated session well, no change in pain level    Plan for next visit: Upgrade HEP as indicated, UB dressing w/ adaptations as indicated, 4 WW safety w/ ADL/IADL skills    Other pertinent info:

## 2023-02-28 NOTE — HOME HEALTH
PT Routine visit note    Skill/education interventions provided: pt and caregiver education re: oxygen use when showering, monitoring SpO2, HEP compliance    Pt. response: pt. and caregiver v/u of importance of monitoring SpO2 with exertion including shower/dressing in bath; Pt. able to return demo basic seated strengthening exercises and reports compliance;  pt. unable to perform standing activity today due to c/o weakness and fatigue due to shower prior to PT session.    Plan for next visit: reassess by PT

## 2023-03-02 ENCOUNTER — HOME CARE VISIT (OUTPATIENT)
Dept: HOME HEALTH SERVICES | Facility: HOME HEALTHCARE | Age: 68
End: 2023-03-02
Payer: MEDICARE

## 2023-03-02 VITALS
DIASTOLIC BLOOD PRESSURE: 78 MMHG | HEART RATE: 50 BPM | RESPIRATION RATE: 16 BRPM | SYSTOLIC BLOOD PRESSURE: 122 MMHG | OXYGEN SATURATION: 92 % | TEMPERATURE: 97.3 F

## 2023-03-02 PROCEDURE — G0151 HHCP-SERV OF PT,EA 15 MIN: HCPCS

## 2023-03-03 ENCOUNTER — HOME CARE VISIT (OUTPATIENT)
Dept: HOME HEALTH SERVICES | Facility: HOME HEALTHCARE | Age: 68
End: 2023-03-03
Payer: MEDICARE

## 2023-03-03 VITALS
SYSTOLIC BLOOD PRESSURE: 108 MMHG | HEART RATE: 50 BPM | DIASTOLIC BLOOD PRESSURE: 60 MMHG | RESPIRATION RATE: 16 BRPM | OXYGEN SATURATION: 93 % | TEMPERATURE: 97 F

## 2023-03-03 PROCEDURE — G0152 HHCP-SERV OF OT,EA 15 MIN: HCPCS

## 2023-03-03 PROCEDURE — G0495 RN CARE TRAIN/EDU IN HH: HCPCS

## 2023-03-03 NOTE — HOME HEALTH
PT Reassessment Visit Note:    Skill/education provided: gait training with 4WW; LE ther ex/balance tasks; reassessment completed with updated POC    Patient/caregiver response: pt fatigues easily, improvement noted toward goals    Plan for next visit: continue HHPT 1wk4 for gait training, balance training, endurance training, therapeutic exercise and pt education related to A/CRF    Other pertinent info: pt on O2 @2L/min pnc continuous

## 2023-03-03 NOTE — HOME HEALTH
60 Day Summary    Home Health need continues for:  Monitoring of Blood pressure, heart rate, respiratory system.  Needs on going diet education regarding diabetes.  Medication educaiton.Assess for depression/anxiety.     Primary diagnoses/co-morbidities/recent procedures in past 60 days that impact current episode  Respiratory failure/pneumonia  RA, DM, HTN, A Fib, anxiety, GERD, OAS, Gout.  Current level of functional ability: Requires use of walker and assist of 1 person    Homebound status and living arrangements:  MAH of 6    Focus of care for next 60 days for each discipline ordered:  Monitoring for respiratory failure, cardioresp systems, diabetic education, assess depression, teach medications  Skin integrity/wound status:  Ian score of 19    Code status: full code    Most recent fall risk [facility] 6    Estimated date when home care services will end within 8 weeks    Plan of Care confirmed with Dr Evans via [employer] message on 3. 3.23.

## 2023-03-04 NOTE — HOME HEALTH
OT Reassessment Visit Note:   Skill/education provided:  Reinforce EC/WS w/ ADL skills, decrease bp from sitting to standing cgA needed when up, ther ex/balance tasks; reassessment completed with updated POC   Patient/caregiver response: pt c/o dizziness w/ standing tasks, fatigues easily, improvement noted toward goals   Plan for next visit: continue HHOT 1wk4 for ADL/IADL retraining, modification/adaptations for FM coordination/low vision, balance training, endurance training, therapeutic exercise and upgrade of HEP as indicated, pt/family/cg'er education for home safty/fall prevention.   Other pertinent info: pt on O2 @2L/min pnc continuous, contacted RN with bp changes

## 2023-03-06 VITALS
DIASTOLIC BLOOD PRESSURE: 70 MMHG | SYSTOLIC BLOOD PRESSURE: 102 MMHG | OXYGEN SATURATION: 90 % | TEMPERATURE: 95.6 F | RESPIRATION RATE: 16 BRPM | HEART RATE: 50 BPM

## 2023-03-07 ENCOUNTER — HOME CARE VISIT (OUTPATIENT)
Dept: HOME HEALTH SERVICES | Facility: HOME HEALTHCARE | Age: 68
End: 2023-03-07
Payer: MEDICARE

## 2023-03-07 VITALS
DIASTOLIC BLOOD PRESSURE: 52 MMHG | RESPIRATION RATE: 16 BRPM | HEART RATE: 54 BPM | SYSTOLIC BLOOD PRESSURE: 88 MMHG | OXYGEN SATURATION: 94 %

## 2023-03-07 PROCEDURE — G0157 HHC PT ASSISTANT EA 15: HCPCS

## 2023-03-08 ENCOUNTER — HOME CARE VISIT (OUTPATIENT)
Dept: HOME HEALTH SERVICES | Facility: HOME HEALTHCARE | Age: 68
End: 2023-03-08
Payer: MEDICARE

## 2023-03-08 PROCEDURE — G0152 HHCP-SERV OF OT,EA 15 MIN: HCPCS

## 2023-03-09 VITALS
SYSTOLIC BLOOD PRESSURE: 104 MMHG | RESPIRATION RATE: 16 BRPM | DIASTOLIC BLOOD PRESSURE: 60 MMHG | HEART RATE: 54 BPM | OXYGEN SATURATION: 95 %

## 2023-03-10 NOTE — HOME HEALTH
PT Routine visit note    Skill/education provided: cardiopulmonary exercise tolerance, therapuetic exercises; pt. education re: monitoring blood sugar regularly    Pt. response: pt. unable to improve on standing exercise tolerance today due to pt. being symptomtic of low blood sugar and requiring intervention/rest.  Pt. verbalized her family is obtaining new glucometer today in order to montior regularly    Plan for next visit: exercise tolerance/standing activities

## 2023-03-11 NOTE — HOME HEALTH
Routine Visit Note:    Skill/education provided: Pt c/o increase pain in shoulders and questioning neck pillow bruising collar bones. No bruises noted. Reinforced gentle AROM of UE and thera putty use. Low vision adaptations on microwave- lowest button and modified bra to extend hooks. Pt walking w/ SBA no c/o dizziness, but fatiques quickly and ready to sit.     Patient/caregiver response: Pt tolerated session fair, limited standing time at counter, able to use low vision bumps to set time and start microwave.     Plan for next visit: upgrade HEP as indicated, review adaptations, ADL retraining for difficult to reach areas    Other pertinent info: MD appt tomorrow, Will drop off adapted bra in the morning,

## 2023-03-13 ENCOUNTER — HOME CARE VISIT (OUTPATIENT)
Dept: HOME HEALTH SERVICES | Facility: HOME HEALTHCARE | Age: 68
End: 2023-03-13
Payer: MEDICARE

## 2023-03-13 VITALS
HEART RATE: 56 BPM | RESPIRATION RATE: 16 BRPM | OXYGEN SATURATION: 92 % | SYSTOLIC BLOOD PRESSURE: 92 MMHG | DIASTOLIC BLOOD PRESSURE: 60 MMHG

## 2023-03-13 PROCEDURE — G0157 HHC PT ASSISTANT EA 15: HCPCS

## 2023-03-15 ENCOUNTER — HOME CARE VISIT (OUTPATIENT)
Dept: HOME HEALTH SERVICES | Facility: HOME HEALTHCARE | Age: 68
End: 2023-03-15
Payer: MEDICARE

## 2023-03-15 VITALS
SYSTOLIC BLOOD PRESSURE: 104 MMHG | OXYGEN SATURATION: 96 % | DIASTOLIC BLOOD PRESSURE: 60 MMHG | HEART RATE: 46 BPM | RESPIRATION RATE: 16 BRPM

## 2023-03-15 PROCEDURE — G0152 HHCP-SERV OF OT,EA 15 MIN: HCPCS

## 2023-03-15 NOTE — HOME HEALTH
PT Routine visit note    Skill/education provided: gait, exercise tolerance, therapuetic exercises    Pt. response: pt. carrie ability to increase exercise tolerance in standing but she continues to desaturation with standing activity to mid 80's on ra, pt. carrie ability to return above 90% within 2 mins after sitting.  Recommended pt. use supplemental oxygen when up ambulating or performing standing activity longer durations, she v/u.    Plan for next visit: exercise tolerance in standing, pt. ed on monitoring vitals

## 2023-03-16 ENCOUNTER — HOME CARE VISIT (OUTPATIENT)
Dept: HOME HEALTH SERVICES | Facility: HOME HEALTHCARE | Age: 68
End: 2023-03-16
Payer: MEDICARE

## 2023-03-16 VITALS
TEMPERATURE: 96.6 F | SYSTOLIC BLOOD PRESSURE: 100 MMHG | RESPIRATION RATE: 14 BRPM | HEART RATE: 52 BPM | OXYGEN SATURATION: 96 % | DIASTOLIC BLOOD PRESSURE: 60 MMHG

## 2023-03-16 PROCEDURE — G0300 HHS/HOSPICE OF LPN EA 15 MIN: HCPCS

## 2023-03-16 NOTE — CASE COMMUNICATION
Routine Visit Note: LPN    Skill/education provided: Resp/cardiac assessment, Pt continues on o2 at 2lpm via n/c. o2 sats 96%. Pt c/o pain in neck and right shoulder. States pain is very uncomfortable. Instructed pt to discuss pain with pcp on her visit next week. She c/o nueropathy pain in the right foot and is noted with slight edema. Instructed pt to elevate legs when possible. Reviewed medications, purpose and adverse reactions and  when to report adverse reactions to pcp or to call 911 in life threatning situation. Pt ambulates with walker. She has a caregiver in the home. Pt is leaving on vacation tomorrow (Friday the 17th). Instructed pt on deep breathing exerceise and to continue to adequately hydrate.    Patient/caregiver response: pt will cont o2 at 2lpm via n/s and report any s/s of acute resp distress, Pt will cont taking medications as ordered. Pt will con tinue to monitor bg and be mindful of her nutrional diet. Pt will cont to keep daily log.     Plan for next visit: resp/ cardiac assessment.    Other pertinent info:

## 2023-03-16 NOTE — HOME HEALTH
Routine Visit Note: LPN    Skill/education provided: Resp/cardiac assessment, Pt continues on o2 at 2lpm via n/c. o2 sats 96%. Pt c/o pain in neck and right shoulder. States pain is very uncomfortable. Instructed pt to discuss pain with pcp on her visit next week. She c/o nueropathy pain in the right foot and is noted with slight edema. Instructed pt to elevate legs when possible. Reviewed medications, purpose and adverse reactions and when to report adverse reactions to pcp or to call 911 in life threatning situation. Pt ambulates with walker. She has a caregiver in the home. Pt is leaving on vacation tomorrow (Friday the 17th). Instructed pt on deep breathing exerceise and to continue to adequately hydrate.    Patient/caregiver response: pt will cont o2 at 2lpm via n/s and report any s/s of acute resp distress, Pt will cont taking medications as ordered. Pt will continue to monitor bg and be mindful of her nutrional diet. Pt will cont to keep daily log.     Plan for next visit: resp/ cardiac assessment.    Other pertinent info:

## 2023-03-20 ENCOUNTER — HOME CARE VISIT (OUTPATIENT)
Dept: HOME HEALTH SERVICES | Facility: HOME HEALTHCARE | Age: 68
End: 2023-03-20
Payer: MEDICARE

## 2023-03-20 VITALS
DIASTOLIC BLOOD PRESSURE: 78 MMHG | OXYGEN SATURATION: 92 % | SYSTOLIC BLOOD PRESSURE: 122 MMHG | HEART RATE: 57 BPM | RESPIRATION RATE: 16 BRPM

## 2023-03-20 PROCEDURE — G0157 HHC PT ASSISTANT EA 15: HCPCS

## 2023-03-21 NOTE — HOME HEALTH
PT Routine visit note    Skill/education provided: cardiopulmonary exercise tolerance, pain management education    Pt. response: pt. reports increase in L shoulder/scapula pain today, feels as if botox injection is wearing off; pt. able to able to increase ambulation duration today but continues to be limited by weakness and SpO2 levels.    Plan for next visit: Reassessment by PT

## 2023-03-22 ENCOUNTER — HOME CARE VISIT (OUTPATIENT)
Dept: HOME HEALTH SERVICES | Facility: HOME HEALTHCARE | Age: 68
End: 2023-03-22
Payer: MEDICARE

## 2023-03-22 VITALS
SYSTOLIC BLOOD PRESSURE: 112 MMHG | OXYGEN SATURATION: 94 % | RESPIRATION RATE: 16 BRPM | HEART RATE: 65 BPM | DIASTOLIC BLOOD PRESSURE: 78 MMHG

## 2023-03-22 PROCEDURE — G0152 HHCP-SERV OF OT,EA 15 MIN: HCPCS

## 2023-03-22 NOTE — HOME HEALTH
Routine Visit Note:    Skill/education provided: Reinforce HEP, upgraded thera putty, walked to closet/dresser to locate warmer clothes, toileting. rest, microwave use w/ buttons and reaching. Pt presents w/ increase tremor in carmen hands today.    Patient/caregiver response: Pt tolerated session well w/ no c/o dizziness and holding head more mid-line when up walking. 02 stat 86% after activity.    Plan for next visit: d/c    Other pertinent info: MD looking into portable 02 compresser,

## 2023-03-24 ENCOUNTER — HOME CARE VISIT (OUTPATIENT)
Dept: HOME HEALTH SERVICES | Facility: HOME HEALTHCARE | Age: 68
End: 2023-03-24
Payer: MEDICARE

## 2023-03-24 PROCEDURE — G0299 HHS/HOSPICE OF RN EA 15 MIN: HCPCS

## 2023-03-25 VITALS
HEART RATE: 68 BPM | OXYGEN SATURATION: 96 % | DIASTOLIC BLOOD PRESSURE: 70 MMHG | RESPIRATION RATE: 16 BRPM | SYSTOLIC BLOOD PRESSURE: 134 MMHG | TEMPERATURE: 97.9 F

## 2023-03-25 NOTE — HOME HEALTH
Routine Visit Note: Cardiopulmonary assessment wnl. Patient O2 96% on 2L. Patient ambulated to door on RA. O2 checked and found to be 93 on RN after ambulation. Pt, c/o continued left shoulder pain. No edema noted in bilateral LE.    Skill/education provided: Inst on O2 safety and use. Inst on safe ambulation. Inst on disease process and s/s of exacerbation. Inst on alternative pain relief related to shoulder pain. Inst on elevation of LE to continue to reduce edema.     Patient/caregiver response: Tolerated well and verbalzied understanding    Plan for next visit: Cardiopulmonary assessment, Assess edema and O2 use    Other pertinent info:

## 2023-03-30 ENCOUNTER — HOME CARE VISIT (OUTPATIENT)
Dept: HOME HEALTH SERVICES | Facility: HOME HEALTHCARE | Age: 68
End: 2023-03-30
Payer: MEDICARE

## 2023-03-30 VITALS
TEMPERATURE: 96.1 F | RESPIRATION RATE: 16 BRPM | SYSTOLIC BLOOD PRESSURE: 115 MMHG | OXYGEN SATURATION: 93 % | HEART RATE: 56 BPM | DIASTOLIC BLOOD PRESSURE: 66 MMHG

## 2023-03-30 PROCEDURE — G0152 HHCP-SERV OF OT,EA 15 MIN: HCPCS

## 2023-03-30 NOTE — HOME HEALTH
Pt d/c from OT w/ goals met.- set up/clean up assist w/ dressing, bathing, grooming tasks, SBA w/ shower transfers.

## 2023-03-31 ENCOUNTER — HOME CARE VISIT (OUTPATIENT)
Dept: HOME HEALTH SERVICES | Facility: HOME HEALTHCARE | Age: 68
End: 2023-03-31
Payer: MEDICARE

## 2023-03-31 VITALS
OXYGEN SATURATION: 93 % | SYSTOLIC BLOOD PRESSURE: 116 MMHG | TEMPERATURE: 96.6 F | HEART RATE: 60 BPM | DIASTOLIC BLOOD PRESSURE: 66 MMHG | RESPIRATION RATE: 16 BRPM

## 2023-03-31 PROCEDURE — G0151 HHCP-SERV OF PT,EA 15 MIN: HCPCS

## 2023-03-31 NOTE — HOME HEALTH
PT discharge  Visit Note:   Skill/education provided: review of HEP, reassessemnt of progress toward goals, completion of Discipline DC   Patient/caregiver response: pt has partially met goals, has plateaued at likely max functional potential  Plan for next visit: n/a  Other pertinent info: d/c HHPT 3/31/23

## 2023-03-31 NOTE — CASE COMMUNICATION
PT discharged from services 3/31/23 with goals partially met.  Pt has plateaued at what is likely her max functional potential.

## 2023-04-03 ENCOUNTER — HOME CARE VISIT (OUTPATIENT)
Dept: HOME HEALTH SERVICES | Facility: HOME HEALTHCARE | Age: 68
End: 2023-04-03
Payer: MEDICARE

## 2023-04-03 VITALS
HEART RATE: 54 BPM | RESPIRATION RATE: 16 BRPM | OXYGEN SATURATION: 93 % | TEMPERATURE: 97.1 F | SYSTOLIC BLOOD PRESSURE: 116 MMHG | DIASTOLIC BLOOD PRESSURE: 62 MMHG

## 2023-04-03 PROCEDURE — G0495 RN CARE TRAIN/EDU IN HH: HCPCS

## 2024-07-15 ENCOUNTER — APPOINTMENT (OUTPATIENT)
Dept: GENERAL RADIOLOGY | Facility: HOSPITAL | Age: 69
DRG: 377 | End: 2024-07-15
Payer: MEDICARE

## 2024-07-15 ENCOUNTER — APPOINTMENT (OUTPATIENT)
Dept: CT IMAGING | Facility: HOSPITAL | Age: 69
DRG: 377 | End: 2024-07-15
Payer: MEDICARE

## 2024-07-15 ENCOUNTER — HOSPITAL ENCOUNTER (INPATIENT)
Facility: HOSPITAL | Age: 69
LOS: 7 days | Discharge: NURSING, REHAB OR LONG-TERM CARE | DRG: 377 | End: 2024-07-22
Attending: EMERGENCY MEDICINE | Admitting: STUDENT IN AN ORGANIZED HEALTH CARE EDUCATION/TRAINING PROGRAM
Payer: MEDICARE

## 2024-07-15 ENCOUNTER — APPOINTMENT (OUTPATIENT)
Dept: CARDIOLOGY | Facility: HOSPITAL | Age: 69
DRG: 377 | End: 2024-07-15
Payer: MEDICARE

## 2024-07-15 DIAGNOSIS — D64.9 ANEMIA, UNSPECIFIED TYPE: ICD-10-CM

## 2024-07-15 DIAGNOSIS — J96.21 ACUTE ON CHRONIC RESPIRATORY FAILURE WITH HYPOXIA: Primary | ICD-10-CM

## 2024-07-15 DIAGNOSIS — K92.1 MELENA: ICD-10-CM

## 2024-07-15 DIAGNOSIS — K92.2 GASTROINTESTINAL HEMORRHAGE, UNSPECIFIED GASTROINTESTINAL HEMORRHAGE TYPE: ICD-10-CM

## 2024-07-15 DIAGNOSIS — I50.9 ACUTE ON CHRONIC CONGESTIVE HEART FAILURE, UNSPECIFIED HEART FAILURE TYPE: ICD-10-CM

## 2024-07-15 DIAGNOSIS — R29.6 MULTIPLE FALLS: ICD-10-CM

## 2024-07-15 PROBLEM — J96.01 ACUTE RESPIRATORY FAILURE WITH HYPOXIA: Status: ACTIVE | Noted: 2024-07-15

## 2024-07-15 PROBLEM — I50.33 ACUTE ON CHRONIC HEART FAILURE WITH PRESERVED EJECTION FRACTION: Status: ACTIVE | Noted: 2024-07-15

## 2024-07-15 PROBLEM — E78.2 MIXED HYPERLIPIDEMIA: Status: ACTIVE | Noted: 2024-07-15

## 2024-07-15 PROBLEM — M10.9 GOUT: Status: ACTIVE | Noted: 2024-07-15

## 2024-07-15 LAB
ABO GROUP BLD: NORMAL
ALBUMIN SERPL-MCNC: 2.7 G/DL (ref 3.5–5.2)
ALBUMIN/GLOB SERPL: 1.1 G/DL
ALP SERPL-CCNC: 71 U/L (ref 39–117)
ALT SERPL W P-5'-P-CCNC: 12 U/L (ref 1–33)
ANION GAP SERPL CALCULATED.3IONS-SCNC: 10 MMOL/L (ref 5–15)
ANISOCYTOSIS BLD QL: NORMAL
ARTERIAL PATENCY WRIST A: ABNORMAL
AST SERPL-CCNC: 18 U/L (ref 1–32)
ATMOSPHERIC PRESS: ABNORMAL MM[HG]
BASE EXCESS BLDA CALC-SCNC: 5.9 MMOL/L (ref 0–2)
BASOPHILS # BLD AUTO: 0.02 10*3/MM3 (ref 0–0.2)
BASOPHILS NFR BLD AUTO: 0.2 % (ref 0–1.5)
BDY SITE: ABNORMAL
BH CV LOWER VASCULAR RIGHT COMMON FEMORAL AUGMENT: NORMAL
BH CV LOWER VASCULAR RIGHT COMMON FEMORAL COMPRESS: NORMAL
BH CV LOWER VASCULAR RIGHT COMMON FEMORAL PHASIC: NORMAL
BH CV LOWER VASCULAR RIGHT COMMON FEMORAL SPONT: NORMAL
BH CV LOWER VASCULAR RIGHT DISTAL FEMORAL AUGMENT: NORMAL
BH CV LOWER VASCULAR RIGHT DISTAL FEMORAL COMPRESS: NORMAL
BH CV LOWER VASCULAR RIGHT DISTAL FEMORAL PHASIC: NORMAL
BH CV LOWER VASCULAR RIGHT DISTAL FEMORAL SPONT: NORMAL
BH CV LOWER VASCULAR RIGHT GASTRONEMIUS COMPRESS: NORMAL
BH CV LOWER VASCULAR RIGHT GREATER SAPH AK COMPRESS: NORMAL
BH CV LOWER VASCULAR RIGHT GREATER SAPH BK COMPRESS: NORMAL
BH CV LOWER VASCULAR RIGHT MID FEMORAL AUGMENT: NORMAL
BH CV LOWER VASCULAR RIGHT MID FEMORAL COMPRESS: NORMAL
BH CV LOWER VASCULAR RIGHT MID FEMORAL PHASIC: NORMAL
BH CV LOWER VASCULAR RIGHT MID FEMORAL SPONT: NORMAL
BH CV LOWER VASCULAR RIGHT PERONEAL COMPRESS: NORMAL
BH CV LOWER VASCULAR RIGHT POPLITEAL AUGMENT: NORMAL
BH CV LOWER VASCULAR RIGHT POPLITEAL COMPRESS: NORMAL
BH CV LOWER VASCULAR RIGHT POPLITEAL PHASIC: NORMAL
BH CV LOWER VASCULAR RIGHT POPLITEAL SPONT: NORMAL
BH CV LOWER VASCULAR RIGHT POSTERIOR TIBIAL COMPRESS: NORMAL
BH CV LOWER VASCULAR RIGHT PROFUNDA FEMORAL PHASIC: NORMAL
BH CV LOWER VASCULAR RIGHT PROFUNDA FEMORAL SPONT: NORMAL
BH CV LOWER VASCULAR RIGHT PROXIMAL FEMORAL AUGMENT: NORMAL
BH CV LOWER VASCULAR RIGHT PROXIMAL FEMORAL COMPRESS: NORMAL
BH CV LOWER VASCULAR RIGHT PROXIMAL FEMORAL PHASIC: NORMAL
BH CV LOWER VASCULAR RIGHT PROXIMAL FEMORAL SPONT: NORMAL
BH CV LOWER VASCULAR RIGHT SAPHENOFEMORAL JUNCTION AUGMENT: NORMAL
BH CV LOWER VASCULAR RIGHT SAPHENOFEMORAL JUNCTION COMPRESS: NORMAL
BH CV LOWER VASCULAR RIGHT SAPHENOFEMORAL JUNCTION PHASIC: NORMAL
BH CV LOWER VASCULAR RIGHT SAPHENOFEMORAL JUNCTION SPONT: NORMAL
BH CV VAS PRELIMINARY FINDINGS SCRIPTING: 1
BILIRUB SERPL-MCNC: 0.5 MG/DL (ref 0–1.2)
BILIRUB UR QL STRIP: NEGATIVE
BLD GP AB SCN SERPL QL: NEGATIVE
BODY TEMPERATURE: 37
BUN SERPL-MCNC: 16 MG/DL (ref 8–23)
BUN/CREAT SERPL: 23.5 (ref 7–25)
CALCIUM SPEC-SCNC: 7.7 MG/DL (ref 8.6–10.5)
CHLORIDE SERPL-SCNC: 100 MMOL/L (ref 98–107)
CLARITY UR: CLEAR
CLUMPED PLATELETS: PRESENT
CO2 BLDA-SCNC: 31 MMOL/L (ref 22–33)
CO2 SERPL-SCNC: 29 MMOL/L (ref 22–29)
COHGB MFR BLD: 1.7 % (ref 0–2)
COLOR UR: YELLOW
CREAT SERPL-MCNC: 0.68 MG/DL (ref 0.57–1)
CRP SERPL-MCNC: 14.75 MG/DL (ref 0–0.5)
D-LACTATE SERPL-SCNC: 1.7 MMOL/L (ref 0.5–2)
DEPRECATED RDW RBC AUTO: 64.7 FL (ref 37–54)
DEVELOPER EXPIRATION DATE: ABNORMAL
DEVELOPER LOT NUMBER: ABNORMAL
EGFRCR SERPLBLD CKD-EPI 2021: 94.4 ML/MIN/1.73
EOSINOPHIL # BLD AUTO: 0.04 10*3/MM3 (ref 0–0.4)
EOSINOPHIL NFR BLD AUTO: 0.4 % (ref 0.3–6.2)
EPAP: 0
ERYTHROCYTE [DISTWIDTH] IN BLOOD BY AUTOMATED COUNT: 21.1 % (ref 12.3–15.4)
ERYTHROCYTE [SEDIMENTATION RATE] IN BLOOD: 31 MM/HR (ref 0–30)
EXPIRATION DATE: ABNORMAL
FECAL OCCULT BLOOD SCREEN, POC: POSITIVE
GEN 5 2HR TROPONIN T REFLEX: 52 NG/L
GLOBULIN UR ELPH-MCNC: 2.4 GM/DL
GLUCOSE BLDC GLUCOMTR-MCNC: 114 MG/DL (ref 70–130)
GLUCOSE SERPL-MCNC: 174 MG/DL (ref 65–99)
GLUCOSE UR STRIP-MCNC: ABNORMAL MG/DL
HBA1C MFR BLD: 6.1 % (ref 4.8–5.6)
HCO3 BLDA-SCNC: 29.8 MMOL/L (ref 20–26)
HCT VFR BLD AUTO: 27 % (ref 34–46.6)
HCT VFR BLD CALC: 23.8 % (ref 38–51)
HGB BLD-MCNC: 7.6 G/DL (ref 12–15.9)
HGB BLDA-MCNC: 7.8 G/DL (ref 14–18)
HGB UR QL STRIP.AUTO: NEGATIVE
HOLD SPECIMEN: NORMAL
HYPOCHROMIA BLD QL: NORMAL
IMM GRANULOCYTES # BLD AUTO: 0.08 10*3/MM3 (ref 0–0.05)
IMM GRANULOCYTES NFR BLD AUTO: 0.7 % (ref 0–0.5)
INHALED O2 CONCENTRATION: 44 %
IPAP: 0
KETONES UR QL STRIP: ABNORMAL
LEUKOCYTE ESTERASE UR QL STRIP.AUTO: NEGATIVE
LYMPHOCYTES # BLD AUTO: 0.98 10*3/MM3 (ref 0.7–3.1)
LYMPHOCYTES NFR BLD AUTO: 8.7 % (ref 19.6–45.3)
Lab: ABNORMAL
MAGNESIUM SERPL-MCNC: 1.8 MG/DL (ref 1.6–2.4)
MCH RBC QN AUTO: 23.7 PG (ref 26.6–33)
MCHC RBC AUTO-ENTMCNC: 28.1 G/DL (ref 31.5–35.7)
MCV RBC AUTO: 84.1 FL (ref 79–97)
METHGB BLD QL: 0.6 % (ref 0–1.5)
MODALITY: ABNORMAL
MONOCYTES # BLD AUTO: 0.94 10*3/MM3 (ref 0.1–0.9)
MONOCYTES NFR BLD AUTO: 8.3 % (ref 5–12)
MRSA DNA SPEC QL NAA+PROBE: NEGATIVE
NEGATIVE CONTROL: NEGATIVE
NEUTROPHILS NFR BLD AUTO: 81.7 % (ref 42.7–76)
NEUTROPHILS NFR BLD AUTO: 9.25 10*3/MM3 (ref 1.7–7)
NITRITE UR QL STRIP: NEGATIVE
NRBC BLD AUTO-RTO: 0.8 /100 WBC (ref 0–0.2)
NT-PROBNP SERPL-MCNC: 8823 PG/ML (ref 0–900)
OVALOCYTES BLD QL SMEAR: NORMAL
OXYHGB MFR BLDV: 91.2 % (ref 94–99)
PAW @ PEAK INSP FLOW SETTING VENT: 0 CMH2O
PCO2 BLDA: 39.5 MM HG (ref 35–45)
PCO2 TEMP ADJ BLD: 39.5 MM HG (ref 35–45)
PH BLDA: 7.49 PH UNITS (ref 7.35–7.45)
PH UR STRIP.AUTO: 6.5 [PH] (ref 5–8)
PH, TEMP CORRECTED: 7.49 PH UNITS
PLATELET # BLD AUTO: 373 10*3/MM3 (ref 140–450)
PMV BLD AUTO: 9.4 FL (ref 6–12)
PO2 BLDA: 65.9 MM HG (ref 83–108)
PO2 TEMP ADJ BLD: 65.9 MM HG (ref 83–108)
POSITIVE CONTROL: POSITIVE
POTASSIUM SERPL-SCNC: 3.6 MMOL/L (ref 3.5–5.2)
PROCALCITONIN SERPL-MCNC: 12.49 NG/ML (ref 0–0.25)
PROT SERPL-MCNC: 5.1 G/DL (ref 6–8.5)
PROT UR QL STRIP: NEGATIVE
QT INTERVAL: 404 MS
QTC INTERVAL: 573 MS
RBC # BLD AUTO: 3.21 10*6/MM3 (ref 3.77–5.28)
RH BLD: POSITIVE
SMALL PLATELETS BLD QL SMEAR: ADEQUATE
SODIUM SERPL-SCNC: 139 MMOL/L (ref 136–145)
SP GR UR STRIP: 1.02 (ref 1–1.03)
T&S EXPIRATION DATE: NORMAL
TOTAL RATE: 0 BREATHS/MINUTE
TROPONIN T DELTA: -9 NG/L
TROPONIN T SERPL HS-MCNC: 61 NG/L
UROBILINOGEN UR QL STRIP: ABNORMAL
WBC MORPH BLD: NORMAL
WBC NRBC COR # BLD AUTO: 11.31 10*3/MM3 (ref 3.4–10.8)
WHOLE BLOOD HOLD COAG: NORMAL
WHOLE BLOOD HOLD SPECIMEN: NORMAL

## 2024-07-15 PROCEDURE — 71045 X-RAY EXAM CHEST 1 VIEW: CPT

## 2024-07-15 PROCEDURE — 82375 ASSAY CARBOXYHB QUANT: CPT

## 2024-07-15 PROCEDURE — 81003 URINALYSIS AUTO W/O SCOPE: CPT

## 2024-07-15 PROCEDURE — 86923 COMPATIBILITY TEST ELECTRIC: CPT

## 2024-07-15 PROCEDURE — 99291 CRITICAL CARE FIRST HOUR: CPT

## 2024-07-15 PROCEDURE — 83050 HGB METHEMOGLOBIN QUAN: CPT

## 2024-07-15 PROCEDURE — 83605 ASSAY OF LACTIC ACID: CPT | Performed by: EMERGENCY MEDICINE

## 2024-07-15 PROCEDURE — 85652 RBC SED RATE AUTOMATED: CPT | Performed by: NURSE PRACTITIONER

## 2024-07-15 PROCEDURE — 36430 TRANSFUSION BLD/BLD COMPNT: CPT

## 2024-07-15 PROCEDURE — 94799 UNLISTED PULMONARY SVC/PX: CPT

## 2024-07-15 PROCEDURE — 25010000002 PIPERACILLIN SOD-TAZOBACTAM PER 1 G: Performed by: NURSE PRACTITIONER

## 2024-07-15 PROCEDURE — 94640 AIRWAY INHALATION TREATMENT: CPT

## 2024-07-15 PROCEDURE — 71250 CT THORAX DX C-: CPT

## 2024-07-15 PROCEDURE — 25010000002 VANCOMYCIN HCL IN NACL 1.75-0.9 GM/500ML-% SOLUTION: Performed by: EMERGENCY MEDICINE

## 2024-07-15 PROCEDURE — 85007 BL SMEAR W/DIFF WBC COUNT: CPT

## 2024-07-15 PROCEDURE — 93971 EXTREMITY STUDY: CPT | Performed by: INTERNAL MEDICINE

## 2024-07-15 PROCEDURE — 82805 BLOOD GASES W/O2 SATURATION: CPT

## 2024-07-15 PROCEDURE — P9612 CATHETERIZE FOR URINE SPEC: HCPCS

## 2024-07-15 PROCEDURE — 82948 REAGENT STRIP/BLOOD GLUCOSE: CPT

## 2024-07-15 PROCEDURE — 74176 CT ABD & PELVIS W/O CONTRAST: CPT

## 2024-07-15 PROCEDURE — 94761 N-INVAS EAR/PLS OXIMETRY MLT: CPT

## 2024-07-15 PROCEDURE — 86900 BLOOD TYPING SEROLOGIC ABO: CPT | Performed by: INTERNAL MEDICINE

## 2024-07-15 PROCEDURE — 86900 BLOOD TYPING SEROLOGIC ABO: CPT

## 2024-07-15 PROCEDURE — 82270 OCCULT BLOOD FECES: CPT | Performed by: EMERGENCY MEDICINE

## 2024-07-15 PROCEDURE — 86140 C-REACTIVE PROTEIN: CPT | Performed by: NURSE PRACTITIONER

## 2024-07-15 PROCEDURE — 70450 CT HEAD/BRAIN W/O DYE: CPT

## 2024-07-15 PROCEDURE — 83735 ASSAY OF MAGNESIUM: CPT

## 2024-07-15 PROCEDURE — 36600 WITHDRAWAL OF ARTERIAL BLOOD: CPT

## 2024-07-15 PROCEDURE — 83880 ASSAY OF NATRIURETIC PEPTIDE: CPT | Performed by: EMERGENCY MEDICINE

## 2024-07-15 PROCEDURE — 84145 PROCALCITONIN (PCT): CPT | Performed by: EMERGENCY MEDICINE

## 2024-07-15 PROCEDURE — 87040 BLOOD CULTURE FOR BACTERIA: CPT | Performed by: EMERGENCY MEDICINE

## 2024-07-15 PROCEDURE — 25010000002 PIPERACILLIN SOD-TAZOBACTAM PER 1 G: Performed by: EMERGENCY MEDICINE

## 2024-07-15 PROCEDURE — 99223 1ST HOSP IP/OBS HIGH 75: CPT | Performed by: NURSE PRACTITIONER

## 2024-07-15 PROCEDURE — 85025 COMPLETE CBC W/AUTO DIFF WBC: CPT

## 2024-07-15 PROCEDURE — 84484 ASSAY OF TROPONIN QUANT: CPT

## 2024-07-15 PROCEDURE — P9016 RBC LEUKOCYTES REDUCED: HCPCS

## 2024-07-15 PROCEDURE — 86901 BLOOD TYPING SEROLOGIC RH(D): CPT | Performed by: INTERNAL MEDICINE

## 2024-07-15 PROCEDURE — 80053 COMPREHEN METABOLIC PANEL: CPT

## 2024-07-15 PROCEDURE — 83036 HEMOGLOBIN GLYCOSYLATED A1C: CPT | Performed by: NURSE PRACTITIONER

## 2024-07-15 PROCEDURE — 93971 EXTREMITY STUDY: CPT

## 2024-07-15 PROCEDURE — 36415 COLL VENOUS BLD VENIPUNCTURE: CPT

## 2024-07-15 PROCEDURE — 86850 RBC ANTIBODY SCREEN: CPT | Performed by: INTERNAL MEDICINE

## 2024-07-15 PROCEDURE — 93005 ELECTROCARDIOGRAM TRACING: CPT

## 2024-07-15 PROCEDURE — 25010000002 BUMETANIDE PER 0.5 MG: Performed by: EMERGENCY MEDICINE

## 2024-07-15 PROCEDURE — 87641 MR-STAPH DNA AMP PROBE: CPT | Performed by: NURSE PRACTITIONER

## 2024-07-15 RX ORDER — ACETAMINOPHEN 650 MG/1
650 SUPPOSITORY RECTAL EVERY 4 HOURS PRN
Status: DISCONTINUED | OUTPATIENT
Start: 2024-07-15 | End: 2024-07-22 | Stop reason: HOSPADM

## 2024-07-15 RX ORDER — ALLOPURINOL 300 MG/1
300 TABLET ORAL DAILY
Status: DISCONTINUED | OUTPATIENT
Start: 2024-07-16 | End: 2024-07-22 | Stop reason: HOSPADM

## 2024-07-15 RX ORDER — GABAPENTIN 100 MG/1
100 CAPSULE ORAL EVERY 12 HOURS SCHEDULED
Status: DISCONTINUED | OUTPATIENT
Start: 2024-07-15 | End: 2024-07-22 | Stop reason: HOSPADM

## 2024-07-15 RX ORDER — NICOTINE POLACRILEX 4 MG
15 LOZENGE BUCCAL
Status: DISCONTINUED | OUTPATIENT
Start: 2024-07-15 | End: 2024-07-16

## 2024-07-15 RX ORDER — ACETAMINOPHEN 325 MG/1
650 TABLET ORAL EVERY 4 HOURS PRN
Status: DISCONTINUED | OUTPATIENT
Start: 2024-07-15 | End: 2024-07-22 | Stop reason: HOSPADM

## 2024-07-15 RX ORDER — IPRATROPIUM BROMIDE AND ALBUTEROL SULFATE 2.5; .5 MG/3ML; MG/3ML
3 SOLUTION RESPIRATORY (INHALATION)
Status: DISCONTINUED | OUTPATIENT
Start: 2024-07-15 | End: 2024-07-22 | Stop reason: HOSPADM

## 2024-07-15 RX ORDER — AMOXICILLIN 250 MG
2 CAPSULE ORAL 2 TIMES DAILY PRN
Status: DISCONTINUED | OUTPATIENT
Start: 2024-07-15 | End: 2024-07-22 | Stop reason: HOSPADM

## 2024-07-15 RX ORDER — VANCOMYCIN 1.75 GRAM/500 ML IN 0.9 % SODIUM CHLORIDE INTRAVENOUS
20 ONCE
Qty: 500 ML | Refills: 0 | Status: COMPLETED | OUTPATIENT
Start: 2024-07-15 | End: 2024-07-15

## 2024-07-15 RX ORDER — ONDANSETRON 2 MG/ML
4 INJECTION INTRAMUSCULAR; INTRAVENOUS EVERY 6 HOURS PRN
Status: DISCONTINUED | OUTPATIENT
Start: 2024-07-15 | End: 2024-07-22 | Stop reason: HOSPADM

## 2024-07-15 RX ORDER — BISACODYL 10 MG
10 SUPPOSITORY, RECTAL RECTAL DAILY PRN
Status: DISCONTINUED | OUTPATIENT
Start: 2024-07-15 | End: 2024-07-22 | Stop reason: HOSPADM

## 2024-07-15 RX ORDER — NITROGLYCERIN 0.4 MG/1
0.4 TABLET SUBLINGUAL
Status: DISCONTINUED | OUTPATIENT
Start: 2024-07-15 | End: 2024-07-22 | Stop reason: HOSPADM

## 2024-07-15 RX ORDER — PANTOPRAZOLE SODIUM 40 MG/10ML
80 INJECTION, POWDER, LYOPHILIZED, FOR SOLUTION INTRAVENOUS ONCE
Status: COMPLETED | OUTPATIENT
Start: 2024-07-15 | End: 2024-07-15

## 2024-07-15 RX ORDER — IBUPROFEN 600 MG/1
1 TABLET ORAL
Status: DISCONTINUED | OUTPATIENT
Start: 2024-07-15 | End: 2024-07-16

## 2024-07-15 RX ORDER — SODIUM CHLORIDE 0.9 % (FLUSH) 0.9 %
10 SYRINGE (ML) INJECTION EVERY 12 HOURS SCHEDULED
Status: DISCONTINUED | OUTPATIENT
Start: 2024-07-15 | End: 2024-07-22 | Stop reason: HOSPADM

## 2024-07-15 RX ORDER — LEVOTHYROXINE SODIUM 0.12 MG/1
125 TABLET ORAL EVERY MORNING
Status: DISCONTINUED | OUTPATIENT
Start: 2024-07-16 | End: 2024-07-22 | Stop reason: HOSPADM

## 2024-07-15 RX ORDER — DEXTROSE MONOHYDRATE 25 G/50ML
25 INJECTION, SOLUTION INTRAVENOUS
Status: DISCONTINUED | OUTPATIENT
Start: 2024-07-15 | End: 2024-07-22 | Stop reason: HOSPADM

## 2024-07-15 RX ORDER — ACETAMINOPHEN 160 MG/5ML
650 SOLUTION ORAL EVERY 4 HOURS PRN
Status: DISCONTINUED | OUTPATIENT
Start: 2024-07-15 | End: 2024-07-22 | Stop reason: HOSPADM

## 2024-07-15 RX ORDER — SODIUM CHLORIDE 0.9 % (FLUSH) 0.9 %
10 SYRINGE (ML) INJECTION AS NEEDED
Status: DISCONTINUED | OUTPATIENT
Start: 2024-07-15 | End: 2024-07-22 | Stop reason: HOSPADM

## 2024-07-15 RX ORDER — LIDOCAINE 4 G/G
1 PATCH TOPICAL EVERY 24 HOURS
Status: DISCONTINUED | OUTPATIENT
Start: 2024-07-15 | End: 2024-07-22 | Stop reason: HOSPADM

## 2024-07-15 RX ORDER — HYDROCODONE BITARTRATE AND ACETAMINOPHEN 5; 325 MG/1; MG/1
1 TABLET ORAL EVERY 6 HOURS PRN
Status: DISCONTINUED | OUTPATIENT
Start: 2024-07-15 | End: 2024-07-17

## 2024-07-15 RX ORDER — SODIUM CHLORIDE 9 MG/ML
40 INJECTION, SOLUTION INTRAVENOUS AS NEEDED
Status: DISCONTINUED | OUTPATIENT
Start: 2024-07-15 | End: 2024-07-22 | Stop reason: HOSPADM

## 2024-07-15 RX ORDER — FUROSEMIDE 10 MG/ML
60 INJECTION INTRAMUSCULAR; INTRAVENOUS ONCE
Status: COMPLETED | OUTPATIENT
Start: 2024-07-15 | End: 2024-07-16

## 2024-07-15 RX ORDER — BUMETANIDE 0.25 MG/ML
2 INJECTION INTRAMUSCULAR; INTRAVENOUS ONCE
Status: COMPLETED | OUTPATIENT
Start: 2024-07-15 | End: 2024-07-15

## 2024-07-15 RX ORDER — POLYETHYLENE GLYCOL 3350 17 G/17G
17 POWDER, FOR SOLUTION ORAL DAILY PRN
Status: DISCONTINUED | OUTPATIENT
Start: 2024-07-15 | End: 2024-07-22 | Stop reason: HOSPADM

## 2024-07-15 RX ORDER — BISACODYL 5 MG/1
5 TABLET, DELAYED RELEASE ORAL DAILY PRN
Status: DISCONTINUED | OUTPATIENT
Start: 2024-07-15 | End: 2024-07-22 | Stop reason: HOSPADM

## 2024-07-15 RX ORDER — BUDESONIDE AND FORMOTEROL FUMARATE DIHYDRATE 160; 4.5 UG/1; UG/1
1 AEROSOL RESPIRATORY (INHALATION)
Status: DISCONTINUED | OUTPATIENT
Start: 2024-07-15 | End: 2024-07-22 | Stop reason: HOSPADM

## 2024-07-15 RX ADMIN — PANTOPRAZOLE SODIUM 80 MG: 40 INJECTION, POWDER, FOR SOLUTION INTRAVENOUS at 16:36

## 2024-07-15 RX ADMIN — Medication 1750 MG: at 19:01

## 2024-07-15 RX ADMIN — Medication 10 ML: at 20:21

## 2024-07-15 RX ADMIN — PIPERACILLIN AND TAZOBACTAM 3.38 G: 3; .375 INJECTION, POWDER, LYOPHILIZED, FOR SOLUTION INTRAVENOUS at 20:19

## 2024-07-15 RX ADMIN — BUMETANIDE 2 MG: 0.25 INJECTION, SOLUTION INTRAMUSCULAR; INTRAVENOUS at 16:36

## 2024-07-15 RX ADMIN — GABAPENTIN 100 MG: 100 CAPSULE ORAL at 20:19

## 2024-07-15 RX ADMIN — LIDOCAINE 1 PATCH: 4 PATCH TOPICAL at 20:18

## 2024-07-15 RX ADMIN — PIPERACILLIN AND TAZOBACTAM 3.38 G: 3; .375 INJECTION, POWDER, LYOPHILIZED, FOR SOLUTION INTRAVENOUS at 16:36

## 2024-07-15 RX ADMIN — BUDESONIDE AND FORMOTEROL FUMARATE DIHYDRATE 1 PUFF: 160; 4.5 AEROSOL RESPIRATORY (INHALATION) at 20:07

## 2024-07-15 RX ADMIN — Medication 12.5 MG: at 19:02

## 2024-07-15 RX ADMIN — IPRATROPIUM BROMIDE AND ALBUTEROL SULFATE 3 ML: 2.5; .5 SOLUTION RESPIRATORY (INHALATION) at 20:07

## 2024-07-15 RX ADMIN — Medication 10 ML: at 20:20

## 2024-07-15 NOTE — ED NOTES
Charly Blevins    Nursing Report ED to Floor:  Mental status: a&ox2, disoriented to time and location  Ambulatory status: nonambualtory in ed  Oxygen Therapy:  hifloNC  Cardiac Rhythm: afib  Admitted from: home  Safety Concerns:  none  Social Issues: none  ED Room #:  16    ED Nurse Phone Extension - [phone] or may call [phone].      HPI:   Chief Complaint   Patient presents with    Altered Mental Status       Past Medical History:  Past Medical History:   Diagnosis Date    Anxiety and depression 12/17/2022    Atrial fibrillation 2015    CARDIOVERSION - NO REOCCURANCE SINCE     Diabetes mellitus     Gout 12/17/2022    Hyperlipidemia     Hypertension     Hypothyroidism 12/17/2022    Obesity (BMI 30-39.9) 12/17/2022        Past Surgical History:  Past Surgical History:   Procedure Laterality Date    APPENDECTOMY      CHOLECYSTECTOMY      COLOSTOMY      RESULTED FROM HYSTERECTOMY     COLOSTOMY CLOSURE      WHILE DOING COLOSTOMY CLOSURE; ENDED UP HAVING A ILEOSTOMY    HEMORRHOIDECTOMY      HIP TROCHANTERIC NAILING WITH INTRAMEDULLARY HIP SCREW Right 3/27/2019    Procedure: HIP TROCANTERIC NAILING WITH INTRAMEDULLARY HIP SCREW RIGHT;  Surgeon: Jona Tom MD;  Location: [facility];  Service: Orthopedics    HYSTERECTOMY      LUMBAR DISCECTOMY      L4 - L5    OTHER SURGICAL HISTORY      TUBAL REVERSAL    THYROIDECTOMY      TUBAL ABDOMINAL LIGATION      X 2        Admitting Doctor:   Santa Kaba MD    Consulting Provider(s):  Consults       No orders found from 6/16/2024 to 7/16/2024.             Admitting Diagnosis:   The primary encounter diagnosis was Acute on chronic respiratory failure with hypoxia. Diagnoses of Acute on chronic congestive heart failure, unspecified heart failure type, Multiple falls, Anemia, unspecified type, and Gastrointestinal hemorrhage, unspecified gastrointestinal hemorrhage type were also pertinent to this visit.    Most Recent Vitals:   Vitals:    07/15/24 1515 07/15/24 1545  "07/15/24 1555 07/15/24 1600   BP: 119/74   120/81   BP Location:       Patient Position: Lying      Pulse: 111 115  109   Resp: 24   24   Temp: 99.1 °F (37.3 °C)      TempSrc: Oral      SpO2: 92% 100%  99%   Weight:   81.6 kg (179 lb 14.3 oz)    Height:   167 cm (65.75\")        Active LDAs/IV Access:   Lines, Drains & Airways       Active LDAs       Name Placement date Placement time Site Days    Peripheral IV 07/15/24 Right Antecubital 07/15/24  --  Antecubital  less than 1                    Labs (abnormal labs have a star):   Labs Reviewed   COMPREHENSIVE METABOLIC PANEL - Abnormal; Notable for the following components:       Result Value    Glucose 174 (*)     Calcium 7.7 (*)     Total Protein 5.1 (*)     Albumin 2.7 (*)     All other components within normal limits    Narrative:     GFR Normal >60  Chronic Kidney Disease <60  Kidney Failure <15     SINGLE HS TROPONIN T - Abnormal; Notable for the following components:    HS Troponin T 61 (*)     All other components within normal limits    Narrative:     High Sensitive Troponin T Reference Range:  <14.0 ng/L- Negative Female for AMI  <22.0 ng/L- Negative Male for AMI  >=14 - Abnormal Female indicating possible myocardial injury.  >=22 - Abnormal Male indicating possible myocardial injury.   Clinicians would have to utilize clinical acumen, EKG, Troponin, and serial changes to determine if it is an Acute Myocardial Infarction or myocardial injury due to an underlying chronic condition.        URINALYSIS W/ MICROSCOPIC IF INDICATED (NO CULTURE) - Abnormal; Notable for the following components:    Glucose,  mg/dL (1+) (*)     Ketones, UA 15 mg/dL (1+) (*)     All other components within normal limits    Narrative:     Urine microscopic not indicated.   CBC WITH AUTO DIFFERENTIAL - Abnormal; Notable for the following components:    WBC 11.31 (*)     RBC 3.21 (*)     Hemoglobin 7.6 (*)     Hematocrit 27.0 (*)     MCH 23.7 (*)     MCHC 28.1 (*)     RDW 21.1 (*) "     RDW-SD 64.7 (*)     Neutrophil % 81.7 (*)     Lymphocyte % 8.7 (*)     Immature Grans % 0.7 (*)     Neutrophils, Absolute 9.25 (*)     Monocytes, Absolute 0.94 (*)     Immature Grans, Absolute 0.08 (*)     nRBC 0.8 (*)     All other components within normal limits    Narrative:     Appended report. These results have been appended to a previously verified report.   BNP (IN-HOUSE) - Abnormal; Notable for the following components:    proBNP 8,823.0 (*)     All other components within normal limits    Narrative:     This assay is used as an aid in the diagnosis of individuals suspected of having heart failure. It can be used as an aid in the diagnosis of acute decompensated heart failure (ADHF) in patients presenting with signs and symptoms of ADHF to the emergency department (ED). In addition, NT-proBNP of <300 pg/mL indicates ADHF is not likely.    Age Range Result Interpretation  NT-proBNP Concentration (pg/mL:      <50             Positive            >450                   Gray                 300-450                    Negative             <300    50-75           Positive            >900                  Gray                300-900                  Negative            <300      >75             Positive            >1800                  Gray                300-1800                  Negative            <300   PROCALCITONIN - Abnormal; Notable for the following components:    Procalcitonin 12.49 (*)     All other components within normal limits    Narrative:     As a Marker for Sepsis (Non-Neonates):    1. <0.5 ng/mL represents a low risk of severe sepsis and/or septic shock.  2. >2 ng/mL represents a high risk of severe sepsis and/or septic shock.    As a Marker for Lower Respiratory Tract Infections that require antibiotic therapy:    PCT on Admission    Antibiotic Therapy       6-12 Hrs later    >0.5                Strongly Recommended  >0.25 - <0.5        Recommended   0.1 - 0.25          Discouraged           "    Remeasure/reassess PCT  <0.1                Strongly Discouraged     Remeasure/reassess PCT    As 28 day mortality risk marker: \"Change in Procalcitonin Result\" (>80% or <=80%) if Day 0 (or Day 1) and Day 4 values are available. Refer to [email or web address]-[email or web address]    Change in PCT <=80%  A decrease of PCT levels below or equal to 80% defines a positive change in PCT test result representing a higher risk for 28-day all-cause mortality of patients diagnosed with severe sepsis for septic shock.    Change in PCT >80%  A decrease of PCT levels of more than 80% defines a negative change in PCT result representing a lower risk for 28-day all-cause mortality of patients diagnosed with severe sepsis or septic shock.      BLOOD GAS, ARTERIAL W/CO-OXIMETRY - Abnormal; Notable for the following components:    pH, Arterial 7.486 (*)     pO2, Arterial 65.9 (*)     HCO3, Arterial 29.8 (*)     Base Excess, Arterial 5.9 (*)     Hemoglobin, Blood Gas 7.8 (*)     Hematocrit, Blood Gas 23.8 (*)     Oxyhemoglobin 91.2 (*)     pO2, Temperature Corrected 65.9 (*)     All other components within normal limits   POCT OCCULT BLOOD STOOL - Abnormal; Notable for the following components:    Fecal Occult Blood Positive (*)     All other components within normal limits   MAGNESIUM - Normal   LACTIC ACID, PLASMA - Normal   BLOOD CULTURE   BLOOD CULTURE   RAINBOW DRAW    Narrative:     The following orders were created for panel order [place] Draw.  Procedure                               Abnormality         Status                     ---------                               -----------         ------                     Green Top (Gel)[[id number]]                                  Final result               Lavender Top[[id number]]                                     Final result               Gold Top - SST[[id number]]                                   Final result               Gray Top[[id number]]                                       "   Final result               Light Blue Top[[id number]]                                   Final result                 Please view results for these tests on the individual orders.   BLOOD GAS, ARTERIAL   SCAN SLIDE   HIGH SENSITIVITIY TROPONIN T 2HR   PREPARE RBC   TYPE AND SCREEN   CBC AND DIFFERENTIAL    Narrative:     The following orders were created for panel order CBC & Differential.  Procedure                               Abnormality         Status                     ---------                               -----------         ------                     CBC Auto Differential[[id number]]        Abnormal            Final result               Scan Slide[[id number]]                                       Final result                 Please view results for these tests on the individual orders.   GREEN TOP   LAVENDER TOP   GOLD TOP - SST   GRAY TOP   LIGHT BLUE TOP       Meds Given in ED:   Medications   sodium chloride 0.9 % flush 10 mL (has no administration in time range)   vancomycin IVPB 1750 mg in 0.9% Sodium Chloride (premix) 500 mL (has no administration in time range)   piperacillin-tazobactam (ZOSYN) 3.375 g IVPB in 100 mL NS MBP (CD) (3.375 g Intravenous New Bag 7/15/24 1636)   bumetanide (BUMEX) injection 2 mg (2 mg Intravenous Given 7/15/24 1636)   pantoprazole (PROTONIX) injection 80 mg (80 mg Intravenous Given 7/15/24 1636)           Last NIH score:                                                          Dysphagia screening results:        [place] Coma Scale:  No data recorded     CIWA:        Restraint Type:            Isolation Status:  No active isolations

## 2024-07-15 NOTE — NURSING NOTE
Spoke with Rasheeda IGLESIAS concerning blood transfusion and patient heart rate. APRN confirms to withhold blood transfusion until HGH drops below & due to CHF exacerbation. RN also reports 's-140's. APRN acknowledges and requests RN to give 2100 oral dose of lopressor now instead of 2100 since patient's family member reports patient has not had any of her home medications today.

## 2024-07-15 NOTE — H&P
[facility] Medicine Services  HISTORY AND PHYSICAL    Patient Name: Charly Blevins  : 1955  MRN: [id number]  Primary Care Physician: Patel Evans MD  Date of admission: 7/15/2024    Subjective   Subjective     Chief Complaint:  Symptomatic anemia    HPI:  Charly Blevins is a 68 y.o. female PMH gout, DMII, HTN, HLD, RA, hypothyroidism presenting with symptomatic anemia to include dyspnea with exertion, fatigue. Pt fell on Wednesday without head trauma but fell again on Saturday and hit her head. Her daughter states she became confused on Saturday and had hallucinations all night last night.  Pt reports right sided breast pain with bruising from the fall. She also reports 2-3 episodes of diarrhea yesterday that were dark colored. She states she has chronic intermittent episodes of nausea in which she takes zofran. Stool occult (+) with hgb 7.6. In the ED pt found with hypoxia requiring high-flow NC with BNP 8000 and CXR suggesting CHF exacerbation. Last ECHO () LVEF 59.5% with diastolic function consistent with grade II w/high LAP. Pro ofelia elevated at 12.49, WBC 11.31, lactic acid normal.     Patient also reports 60+ pound weight loss since her   3 years ago    Review of Systems   Constitutional:  Positive for activity change, appetite change and fatigue. Negative for chills, diaphoresis and fever.   HENT: Negative.     Eyes: Negative.    Respiratory:  Positive for shortness of breath. Negative for cough.    Cardiovascular: Negative.    Gastrointestinal:  Positive for blood in stool, diarrhea and nausea. Negative for abdominal pain and vomiting.   Genitourinary: Negative.    Musculoskeletal:  Positive for arthralgias and myalgias.   Skin:  Positive for pallor.        Bruising to the right breast   Allergic/Immunologic: Negative.    Neurological: Negative.    Hematological: Negative.    Psychiatric/Behavioral:  Positive for confusion and hallucinations.        Personal History     Past Medical History:   Diagnosis Date    Anxiety and depression 12/17/2022    Atrial fibrillation 2015    CARDIOVERSION - NO REOCCURANCE SINCE     Diabetes mellitus     Gout 12/17/2022    Hyperlipidemia     Hypertension     Hypothyroidism 12/17/2022    Obesity (BMI 30-39.9) 12/17/2022       Past Surgical History:   Procedure Laterality Date    APPENDECTOMY      CHOLECYSTECTOMY      COLOSTOMY      RESULTED FROM HYSTERECTOMY     COLOSTOMY CLOSURE      WHILE DOING COLOSTOMY CLOSURE; ENDED UP HAVING A ILEOSTOMY    HEMORRHOIDECTOMY      HIP TROCHANTERIC NAILING WITH INTRAMEDULLARY HIP SCREW Right 3/27/2019    Procedure: HIP TROCANTERIC NAILING WITH INTRAMEDULLARY HIP SCREW RIGHT;  Surgeon: Jona Tom MD;  Location: [facility];  Service: Orthopedics    HYSTERECTOMY      LUMBAR DISCECTOMY      L4 - L5    OTHER SURGICAL HISTORY      TUBAL REVERSAL    THYROIDECTOMY      TUBAL ABDOMINAL LIGATION      X 2       Family History:  family history includes Cancer in her mother.     Social History:  reports that she has never smoked. She has never used smokeless tobacco. She reports that she does not drink alcohol and does not use drugs.  , lives alone, has 2 living children, is supposed to wear 4 LPM of oxygen at home.  Medications:  FLUoxetine, Fluticasone Furoate-Vilanterol, HYDROcodone-acetaminophen, Insulin Aspart, O2, acetaminophen, allopurinol, apixaban, colesevelam, dapagliflozin Propanediol, famotidine, folic acid, gabapentin, ipratropium-albuterol, levothyroxine, lidocaine, magnesium oxide, metFORMIN, methocarbamol, methotrexate PF, metoprolol tartrate, ondansetron, predniSONE, rosuvastatin, terbinafine, traZODone, and zolpidem    No Known Allergies    Objective   Objective     Vital Signs:   Temp:  [97.4 °F (36.3 °C)-99.1 °F (37.3 °C)] 98.4 °F (36.9 °C)  Heart Rate:  [] 107  Resp:  [18-25] 24  BP: (118-123)/(66-89) 118/66  Flow (L/min):  [6-55] 45    Physical  Exam  Constitutional:       General: She is not in acute distress.     Appearance: She is ill-appearing.   HENT:      Head: Normocephalic and atraumatic.      Mouth/Throat:      Mouth: Mucous membranes are dry.   Eyes:      General: No scleral icterus.     Conjunctiva/sclera: Conjunctivae normal.   Cardiovascular:      Rate and Rhythm: Tachycardia present. Rhythm irregular.      Heart sounds: No murmur heard.     No friction rub. No gallop.   Pulmonary:      Effort: Pulmonary effort is normal.      Breath sounds: Examination of the right-lower field reveals decreased breath sounds. Examination of the left-lower field reveals decreased breath sounds. Decreased breath sounds present.   Abdominal:      General: Bowel sounds are normal. There is no distension.      Tenderness: There is no abdominal tenderness.   Musculoskeletal:      Cervical back: Neck supple.      Right lower leg: Edema present.      Left lower leg: No edema.   Skin:     General: Skin is warm and dry.      Coloration: Skin is pale.      Findings: Bruising present.   Neurological:      Mental Status: She is alert.      Comments: A&O to place, month, reports it to be 2044. Pt is fidgety and restless.    Psychiatric:         Mood and Affect: Mood normal.         Behavior: Behavior normal.            Result Review:  I have personally reviewed the results from the time of this admission to 7/15/2024 22:15 EDT and agree with these findings:  [x]  Laboratory list / accordion  []  Microbiology  [x]  Radiology  []  EKG/Telemetry   [x]  Cardiology/Vascular   []  Pathology  []  Old records  []  Other:      LAB RESULTS:      Lab 07/15/24  1639 07/15/24  1447   WBC  --  11.31*   HEMOGLOBIN  --  7.6*   HEMATOCRIT  --  27.0*   PLATELETS  --  373   NEUTROS ABS  --  9.25*   IMMATURE GRANS (ABS)  --  0.08*   LYMPHS ABS  --  0.98   MONOS ABS  --  0.94*   EOS ABS  --  0.04   MCV  --  84.1   SED RATE  --  31*   CRP 14.75*  --    PROCALCITONIN  --  12.49*   LACTATE  --   1.7         Lab 07/15/24  1447   SODIUM 139   POTASSIUM 3.6   CHLORIDE 100   CO2 29.0   ANION GAP 10.0   BUN 16   CREATININE 0.68   EGFR 94.4   GLUCOSE 174*   CALCIUM 7.7*   MAGNESIUM 1.8   HEMOGLOBIN A1C 6.10*         Lab 07/15/24  1447   TOTAL PROTEIN 5.1*   ALBUMIN 2.7*   GLOBULIN 2.4   ALT (SGPT) 12   AST (SGOT) 18   BILIRUBIN 0.5   ALK PHOS 71         Lab 07/15/24  1639 07/15/24  1447   PROBNP  --  8,823.0*   HSTROP T 52* 61*             Lab 07/15/24  1639   ABO TYPING A   RH TYPING Positive   ANTIBODY SCREEN Negative         Lab 07/15/24  1511   PH, ARTERIAL 7.486*   PCO2, ARTERIAL 39.5   PO2 ART 65.9*   FIO2 44   HCO3 ART 29.8*   BASE EXCESS ART 5.9*   CARBOXYHEMOGLOBIN 1.7     Brief Urine Lab Results  (Last result in the past 365 days)        Color   Clarity   Blood   Leuk Est   Nitrite   Protein   CREAT   Urine HCG        07/15/24 1440 Yellow   Clear   Negative   Negative   Negative   Negative                 Microbiology Results (last 10 days)       Procedure Component Value - Date/Time    MRSA Screen, PCR (Inpatient) - Swab, Nares [[id number]]  (Normal) Collected: 07/15/24 1851    Lab Status: Final result Specimen: Swab from Nares Updated: 07/15/24 2010     MRSA PCR Negative    Narrative:      The negative predictive value of this diagnostic test is high and should only be used to consider de-escalating anti-MRSA therapy. A positive result may indicate colonization with MRSA and must be correlated clinically.  MRSA Negative            XR Chest 1 View    Result Date: 7/15/2024  XR CHEST 1 VW Date of Exam: 7/15/2024 3:53 PM EDT Indication: Weak/Dizzy/AMS triage protocol Comparison: Chest CT performed on the same day. Findings: Mild linear atelectasis in the right upper and middle lobes is visualized. There is mild elevation of the right hemidiaphragm. There is no evidence of pneumothorax. There is mild pulmonary vascular congestion. Cardiac silhouette is enlarged. Loop recorder overlies the lower thoracic  spine. No acute osseous abnormality identified.     Impression: Impression: Mild linear atelectasis in the right upper and middle lobes. Findings compatible with mild CHF. Electronically Signed: Jay Andrew MD  7/15/2024 4:21 PM EDT  Workstation ID: [id number]    Duplex Venous Lower Extremity - RIGHT    Result Date: 7/15/2024    Normal right lower extremity venous duplex scan.     CT Head Without Contrast    Result Date: 7/15/2024  CT HEAD WO CONTRAST, CT ABDOMEN PELVIS WO CONTRAST, CT CHEST WO CONTRAST DIAGNOSTIC Date of Exam: 7/15/2024 2:24 PM EDT Indication: AMS. Comparison: Previous head CT scan 1/19/2023. Angiographic chest CT scan 1/20/2023. No prior abdominal scan. Technique: Axial CT images were obtained of the head, chest abdomen pelvis without contrast administration.  Automated exposure control and iterative construction methods were used. Findings: No additional clinical history is currently available. CT SCAN OF THE HEAD WITHOUT CONTRAST: The calvarium appears intact. Included paranasal sinuses and mastoids appear clear. There is expected degree of generalized cerebral atrophy for the patient's age. No hemorrhage, contusion, edema, or infarct is seen.  There is no evidence of mass or mass effect, hydrocephalus, or abnormal extra-axial collection.     Impression: No evidence of acute intracranial disease. CT SCAN OF THE CHEST WITHOUT CONTRAST: Axial images 20 through 46 show multiple well-defined hyperdense rounded lesions within and dorsal to the right pectoralis minor, resembling soft tissue masses, but similar to density of acute blood, and with the lesion on image #27 appearing to show a potential hematocrit level, possibly all intramuscular hematoma. The largest of these measures approximately 3 cm in maximal diameter. No similar findings are seen elsewhere and no underlying rib fractures identified. The heart is enlarged. No pericardial effusion or mediastinal adenopathy is seen. There is trace  "bilateral pleural effusion. No coronary calcification is seen. Images of the lungs appear to show prominent pulmonary vasculature and mild diffuse interstitial disease suggesting early interstitial edema. No particular focal pulmonary disease is seen to favor a pneumonia although a small focus of pneumonia. Bony structures appear to be intact. IMPRESSION: 1. 4 or 5 adjacent rounded hyperdense \"masses\" within and along the right pectoralis minor muscle, each between 2 and 3 cm in diameter, and favored to represent hematoma/intramuscular hemorrhage, rather than neoplasm. Please correlate with any history of  trauma here. 2. Minimal bibasilar pleural effusion, and small area of right upper lobe discoid atelectasis. No particular features to suggest pneumonia. 3. Cardiomegaly and mild pulmonary vascular congestion, perhaps with early interstitial edema. ABDOMEN AND PELVIS CT SCAN WITHOUT CONTRAST: An area of slightly nodular left lower breast tissue is unchanged from 1/20/2023. No abdominal wall or retroperitoneal hematoma is seen. Clips are seen in the gallbladder fossa. No significant abnormalities are appreciated of the liver, pancreas, adrenal glands, or kidneys for non-IV contrast enhanced exam. Multiple splenic lesions, presumably cysts, are stable from the prior exam, the largest measuring water density, 3 cm in diameter image 33 series 2. No upper abdominal free air, ascites, adenopathy, or acute inflammatory change is seen. Bowel loops are nondistended. Regarding the lower abdomen and pelvis, there is evidence of previous extensive bowel surgery. Terminal ileum and cecum appear grossly normal. Appendix is not identified. There appears to be a distal ileal suture line, and suture line at the rectosigmoid  junction. These areas are clear of mass or inflammatory change. No intrapelvic free fluid mass or adenopathy is seen. Bladder is normally distended and normal in appearance. Uterus and ovaries are not " identified, whether atrophic or surgically absent. Streak artifact is seen from the patient's right hip fixation hardware. There is an old healed right inferior pubic ramus fracture but no acute bony abnormality is seen. IMPRESSION: 1. No evidence of acute inflammatory process or other clearly acute intra-abdominal or intrapelvic disease. 2. Evidence of previous bowel surgery. No evidence of significant ileus or obstruction. Electronically Signed: Heath Prado MD  7/15/2024 2:53 PM EDT  Workstation ID: [id number]    CT Chest Without Contrast Diagnostic    Result Date: 7/15/2024  CT HEAD WO CONTRAST, CT ABDOMEN PELVIS WO CONTRAST, CT CHEST WO CONTRAST DIAGNOSTIC Date of Exam: 7/15/2024 2:24 PM EDT Indication: AMS. Comparison: Previous head CT scan 1/19/2023. Angiographic chest CT scan 1/20/2023. No prior abdominal scan. Technique: Axial CT images were obtained of the head, chest abdomen pelvis without contrast administration.  Automated exposure control and iterative construction methods were used. Findings: No additional clinical history is currently available. CT SCAN OF THE HEAD WITHOUT CONTRAST: The calvarium appears intact. Included paranasal sinuses and mastoids appear clear. There is expected degree of generalized cerebral atrophy for the patient's age. No hemorrhage, contusion, edema, or infarct is seen.  There is no evidence of mass or mass effect, hydrocephalus, or abnormal extra-axial collection.     Impression: No evidence of acute intracranial disease. CT SCAN OF THE CHEST WITHOUT CONTRAST: Axial images 20 through 46 show multiple well-defined hyperdense rounded lesions within and dorsal to the right pectoralis minor, resembling soft tissue masses, but similar to density of acute blood, and with the lesion on image #27 appearing to show a potential hematocrit level, possibly all intramuscular hematoma. The largest of these measures approximately 3 cm in maximal diameter. No similar findings are seen  "elsewhere and no underlying rib fractures identified. The heart is enlarged. No pericardial effusion or mediastinal adenopathy is seen. There is trace bilateral pleural effusion. No coronary calcification is seen. Images of the lungs appear to show prominent pulmonary vasculature and mild diffuse interstitial disease suggesting early interstitial edema. No particular focal pulmonary disease is seen to favor a pneumonia although a small focus of pneumonia. Bony structures appear to be intact. IMPRESSION: 1. 4 or 5 adjacent rounded hyperdense \"masses\" within and along the right pectoralis minor muscle, each between 2 and 3 cm in diameter, and favored to represent hematoma/intramuscular hemorrhage, rather than neoplasm. Please correlate with any history of  trauma here. 2. Minimal bibasilar pleural effusion, and small area of right upper lobe discoid atelectasis. No particular features to suggest pneumonia. 3. Cardiomegaly and mild pulmonary vascular congestion, perhaps with early interstitial edema. ABDOMEN AND PELVIS CT SCAN WITHOUT CONTRAST: An area of slightly nodular left lower breast tissue is unchanged from 1/20/2023. No abdominal wall or retroperitoneal hematoma is seen. Clips are seen in the gallbladder fossa. No significant abnormalities are appreciated of the liver, pancreas, adrenal glands, or kidneys for non-IV contrast enhanced exam. Multiple splenic lesions, presumably cysts, are stable from the prior exam, the largest measuring water density, 3 cm in diameter image 33 series 2. No upper abdominal free air, ascites, adenopathy, or acute inflammatory change is seen. Bowel loops are nondistended. Regarding the lower abdomen and pelvis, there is evidence of previous extensive bowel surgery. Terminal ileum and cecum appear grossly normal. Appendix is not identified. There appears to be a distal ileal suture line, and suture line at the rectosigmoid  junction. These areas are clear of mass or inflammatory " change. No intrapelvic free fluid mass or adenopathy is seen. Bladder is normally distended and normal in appearance. Uterus and ovaries are not identified, whether atrophic or surgically absent. Streak artifact is seen from the patient's right hip fixation hardware. There is an old healed right inferior pubic ramus fracture but no acute bony abnormality is seen. IMPRESSION: 1. No evidence of acute inflammatory process or other clearly acute intra-abdominal or intrapelvic disease. 2. Evidence of previous bowel surgery. No evidence of significant ileus or obstruction. Electronically Signed: Heath Prado MD  7/15/2024 2:53 PM EDT  Workstation ID: [id number]    CT Abdomen Pelvis Without Contrast    Result Date: 7/15/2024  CT HEAD WO CONTRAST, CT ABDOMEN PELVIS WO CONTRAST, CT CHEST WO CONTRAST DIAGNOSTIC Date of Exam: 7/15/2024 2:24 PM EDT Indication: AMS. Comparison: Previous head CT scan 1/19/2023. Angiographic chest CT scan 1/20/2023. No prior abdominal scan. Technique: Axial CT images were obtained of the head, chest abdomen pelvis without contrast administration.  Automated exposure control and iterative construction methods were used. Findings: No additional clinical history is currently available. CT SCAN OF THE HEAD WITHOUT CONTRAST: The calvarium appears intact. Included paranasal sinuses and mastoids appear clear. There is expected degree of generalized cerebral atrophy for the patient's age. No hemorrhage, contusion, edema, or infarct is seen.  There is no evidence of mass or mass effect, hydrocephalus, or abnormal extra-axial collection.     Impression: No evidence of acute intracranial disease. CT SCAN OF THE CHEST WITHOUT CONTRAST: Axial images 20 through 46 show multiple well-defined hyperdense rounded lesions within and dorsal to the right pectoralis minor, resembling soft tissue masses, but similar to density of acute blood, and with the lesion on image #27 appearing to show a potential hematocrit level,  "possibly all intramuscular hematoma. The largest of these measures approximately 3 cm in maximal diameter. No similar findings are seen elsewhere and no underlying rib fractures identified. The heart is enlarged. No pericardial effusion or mediastinal adenopathy is seen. There is trace bilateral pleural effusion. No coronary calcification is seen. Images of the lungs appear to show prominent pulmonary vasculature and mild diffuse interstitial disease suggesting early interstitial edema. No particular focal pulmonary disease is seen to favor a pneumonia although a small focus of pneumonia. Bony structures appear to be intact. IMPRESSION: 1. 4 or 5 adjacent rounded hyperdense \"masses\" within and along the right pectoralis minor muscle, each between 2 and 3 cm in diameter, and favored to represent hematoma/intramuscular hemorrhage, rather than neoplasm. Please correlate with any history of  trauma here. 2. Minimal bibasilar pleural effusion, and small area of right upper lobe discoid atelectasis. No particular features to suggest pneumonia. 3. Cardiomegaly and mild pulmonary vascular congestion, perhaps with early interstitial edema. ABDOMEN AND PELVIS CT SCAN WITHOUT CONTRAST: An area of slightly nodular left lower breast tissue is unchanged from 1/20/2023. No abdominal wall or retroperitoneal hematoma is seen. Clips are seen in the gallbladder fossa. No significant abnormalities are appreciated of the liver, pancreas, adrenal glands, or kidneys for non-IV contrast enhanced exam. Multiple splenic lesions, presumably cysts, are stable from the prior exam, the largest measuring water density, 3 cm in diameter image 33 series 2. No upper abdominal free air, ascites, adenopathy, or acute inflammatory change is seen. Bowel loops are nondistended. Regarding the lower abdomen and pelvis, there is evidence of previous extensive bowel surgery. Terminal ileum and cecum appear grossly normal. Appendix is not identified. There " appears to be a distal ileal suture line, and suture line at the rectosigmoid  junction. These areas are clear of mass or inflammatory change. No intrapelvic free fluid mass or adenopathy is seen. Bladder is normally distended and normal in appearance. Uterus and ovaries are not identified, whether atrophic or surgically absent. Streak artifact is seen from the patient's right hip fixation hardware. There is an old healed right inferior pubic ramus fracture but no acute bony abnormality is seen. IMPRESSION: 1. No evidence of acute inflammatory process or other clearly acute intra-abdominal or intrapelvic disease. 2. Evidence of previous bowel surgery. No evidence of significant ileus or obstruction. Electronically Signed: Heath Prado MD  7/15/2024 2:53 PM EDT  Workstation ID: [id number]     Results for orders placed during the hospital encounter of 12/17/22    Adult Transthoracic Echo Complete w/ Color, Spectral and Contrast if Necessary Per Protocol    Interpretation Summary    Left ventricular systolic function is normal. Calculated left ventricular EF = 59.5%    Left ventricular wall thickness is consistent with mild concentric hypertrophy.    Left ventricular diastolic function is consistent with (grade II w/high LAP) pseudonormalization.    Left atrial volume is mildly increased.    There is calcification of the aortic valve.    Estimated right ventricular systolic pressure from tricuspid regurgitation is normal (<35 mmHg). Calculated right ventricular systolic pressure from tricuspid regurgitation is 27 mmHg.    Mild dilation of the ascending aorta is present.  4.1 cm      Assessment & Plan   Assessment & Plan       Symptomatic anemia    Primary hypertension    Type 2 diabetes mellitus, without long-term current use of insulin    Rheumatoid arthritis involving multiple sites    A-fib    Hypothyroidism (acquired)    Acute respiratory failure with hypoxia    Acute on chronic heart failure with preserved ejection  "fraction    Gout    Mixed hyperlipidemia      Hospital Course to date:  Charly Blevins is a 68 y.o. female PMH gout, DMII, HTN, HLD, RA, hypothyroidism presenting with symptomatic anemia to include dyspnea with exertion, fatigue.     Symptomatic anemia  Acute GI bleed and/or blood loss from hematomas  - Stool (+) occult blood  - Hgb 7.6  - type and screen and transfuse 1 unit of PRBCs  - consult GI  - Protonix 40 mg IV BID  - trend H&H  - NPO    Acute on chronic  respiratory failure  - currently on high flow oxygen  - CXR consistent with CHF exacerbation- diurese    Acute on chronic HFpEF  - ECHO (2022) LVEF 59.5% with diastolic function consistent with grade II w/high LAP.   - BNP 8000  - received Bumex in the ED  - strict I/O, daily weight    Falls  - fell on Wednesday and Saturday and struck her head  - CT head no acute intracranial process  - Bruising to the right breast (CT showing 4 or 5 adjacent rounded hyperdense \"masses\"within and along the right pectoralis minor muscle, each between 2-3 cm in diameter, thought to represent hematoma/intramuscular hemorrhage, rather than neoplasm.   - orthostatic BP    Elevated troponin  - 61 trending downward 52  - EKG Afib with NSST waves    Elevated pro ofelia  - Pro ofelia 12.49, WBC 11.31  - received zosyn/vanco in the ED- will continue for now  - UA neg for UTI, CXR/CT chest negative for PNA. Blood cultures pending.   - maybe elevated due to inflammation. Check CPR and sed rate.   - MRSA PCR  - Stool PCR      Afib  - holding eliquis  - continue metoprolol 12.5 mg bid    RA  Chronic right shoulder pain  - gabapentin 100 mg bid- home dose is 600 mg daily  - norco 5mg at reduced dose-home dose is 10 mg q 6 hours  - holding prednisone 5 mg daily due to GI bleed  - follows with pain management for chronic right shoulder pain     RLE edema (chronic)  - Duplex neg for DVT     DMII  - A1c pending  - SSI        DVT prophylaxis:  SCDs    CODE STATUS:    Level Of Support Discussed " With: Patient  Code Status (Patient has no pulse and is not breathing): CPR (Attempt to Resuscitate)  Medical Interventions (Patient has pulse or is breathing): Full Support      Expected Discharge 07/19/2024      This note has been completed as part of a split-shared workflow.     Signature: Electronically signed by KELSIE Fitzgerald, 07/15/24, 5:19 PM EDT.    Patient seen and examined.  She is alert, but pale, weak, wasted muscles and loose skin  Will transfuse and diurese  Agree with above.  Santa Kaba MD 07/15/24 22:27 EDT

## 2024-07-15 NOTE — PROGRESS NOTES
Pharmacy Consult-Vancomycin Dosing  Charly Blevins is a  68 y.o. female receiving vancomycin therapy.     Indication: Upper respiratory tract infection  Consulting Provider: Rasheeda Ontiveros APRN  ID Consult: No    Goal AUC: 400-600 mg/L*hr    Current Antimicrobial Therapy  Anti-Infectives (From admission, onward)      Ordered     Dose/Rate Route Frequency Start Stop    07/15/24 1852  vancomycin (VANCOCIN) 1,000 mg in sodium chloride 0.9 % 250 mL IVPB-VTB        Ordering Provider: Mi Carlisle, PharmD    1,000 mg  250 mL/hr over 60 Minutes Intravenous Every 12 Hours Scheduled 07/16/24 0900 07/20/24 0859    07/15/24 1835  piperacillin-tazobactam (ZOSYN) 3.375 g IVPB in 100 mL NS MBP (CD)        Ordering Provider: Rasheeda Ontiveros APRN    3.375 g  over 4 Hours Intravenous Every 8 Hours 07/16/24 0100 07/18/24 0059    07/15/24 1835  piperacillin-tazobactam (ZOSYN) 3.375 g IVPB in 100 mL NS MBP (CD)        Ordering Provider: Rasheeda Ontiveros APRN    3.375 g  over 30 Minutes Intravenous Once 07/15/24 1930      07/15/24 1835  Pharmacy to dose vancomycin        Ordering Provider: Rasheeda Ontiveros APRN     Does not apply Continuous PRN 07/15/24 1834 07/22/24 1833    07/15/24 1621  vancomycin IVPB 1750 mg in 0.9% Sodium Chloride (premix) 500 mL        Ordering Provider: Keyon Funez DO    20 mg/kg × 81.6 kg  285.7 mL/hr over 105 Minutes Intravenous Once 07/15/24 1715      07/15/24 1621  piperacillin-tazobactam (ZOSYN) 3.375 g IVPB in 100 mL NS MBP (CD)        Ordering Provider: Keyon Funez DO    3.375 g  over 30 Minutes Intravenous Once 07/15/24 1637 07/15/24 1706          Allergies  Allergies as of 07/15/2024    (No Known Allergies)     Labs  Results from last 7 days   Lab Units 07/15/24  1447   BUN mg/dL 16   CREATININE mg/dL 0.68     Results from last 7 days   Lab Units 07/15/24  1447   WBC 10*3/mm3 11.31*     Evaluation of Dosing  Last Dose Received in the ED/Outside Facility: No  Is  "Patient on Dialysis or Renal Replacement: No    Ht - 167 cm (65.75\")  Wt - 81.6 kg (179 lb 14.3 oz)    Estimated Creatinine Clearance: 83.7 mL/min (by C-G formula based on SCr of 0.68 mg/dL).    No intake/output data recorded.    Microbiology and Radiology  Microbiology Results (last 10 days)       ** No results found for the last 240 hours. **          Reported Vancomycin Levels                 [facility] AUC Calculation:  Current AUC: -- mg/L*hr    Predicted Steady State AUC on Current Dose: -- mg/L*hr  _________________________________    Predicted Steady State AUC on New Dose: 497 mg/L*hr    Assessment/Plan:  Pharmacy to dose vancomycin for upper respiratory tract infection.  Goal AUC: 400-600 mg/L*hr  Patient received loading dose of vancomycin 1750 mg (~21 mg/kg) IV on 7/15.   Due to stable renal function, initiate maintenance dose of vancomycin 1000 mg (~12 mg/kg) IV Q12H on 7/16 at 0900.  Assess clearance by obtaining vancomycin random level on 7/17 at 0600, prior to dose at 0900.  Monitor cultures and sensitivities, renal function and clinical status.  MRSA PCR nares ordered.  Pharmacy will continue to follow and adjust regimen as necessary.    Thank you,    Mi Carlisle, PharmD, BCPS  7/15/2024  18:54 EDT  "

## 2024-07-15 NOTE — ED PROVIDER NOTES
[facility]    EMERGENCY DEPARTMENT ENCOUNTER      Pt Name: Charly Blevins  MRN: [id number]  YOB: 1955  Date of evaluation: 7/15/2024  Provider: Keyon Funez DO    CHIEF COMPLAINT       Chief Complaint   Patient presents with    Altered Mental Status     HPI  Stated Reason for Visit: EMS was called by family of pt c/o AMS with hallucinations for past 2 days. Pt had recent fall without evaluation 4 days prior with brusining on chest. Pt is on Elequis. C/o back pain. On EMS arrival pt was 88% on RA - placed on NC at 5 l with no change, then placed on NRB at 15L bringing pt O2 to 97% EMS states GCS of 141 - family en route History Obtained From: EMS     HISTORY OF PRESENT ILLNESS  (Location/Symptom, Timing/Onset, Context/Setting, Quality, Duration, Modifying Factors, Severity.)   Charly Blevins is a 68 y.o. female who presents to the emergency department via EMS for evaluation altered state worsening over the last for 5 days.  The family notes she had a fall this part Wednesday about 5 days ago, they have been watching her over the weekend and she has been progressively worsening with hallucinations, altered state, having shortness of breath on room air, specialist wear oxygen intermittently at home and CPAP machine but she refuses.  Family notes no fever, she does have a history of prior pneumonia, urinary tract infection.  They note she is on Eliquis, has a history of atrial fibrillation.  Patient herself denies any acute pain, has had shortness of breath and cough, just overall not feeling well.  She denies any headache, vision changes, unilateral weakness, numbness or tingling.  No recent hospitalization.      Nursing notes were reviewed.      PAST MEDICAL HISTORY     Past Medical History:   Diagnosis Date    Anxiety and depression 12/17/2022    Atrial fibrillation 2015    CARDIOVERSION - NO REOCCURANCE SINCE     Diabetes mellitus     Gout 12/17/2022    Hyperlipidemia     Hypertension      Hypothyroidism 12/17/2022    Obesity (BMI 30-39.9) 12/17/2022         SURGICAL HISTORY       Past Surgical History:   Procedure Laterality Date    APPENDECTOMY      CHOLECYSTECTOMY      COLOSTOMY      RESULTED FROM HYSTERECTOMY     COLOSTOMY CLOSURE      WHILE DOING COLOSTOMY CLOSURE; ENDED UP HAVING A ILEOSTOMY    HEMORRHOIDECTOMY      HIP TROCHANTERIC NAILING WITH INTRAMEDULLARY HIP SCREW Right 3/27/2019    Procedure: HIP TROCANTERIC NAILING WITH INTRAMEDULLARY HIP SCREW RIGHT;  Surgeon: Jona Tom MD;  Location: [facility];  Service: Orthopedics    HYSTERECTOMY      LUMBAR DISCECTOMY      L4 - L5    OTHER SURGICAL HISTORY      TUBAL REVERSAL    THYROIDECTOMY      TUBAL ABDOMINAL LIGATION      X 2         CURRENT MEDICATIONS       Current Facility-Administered Medications:     bumetanide (BUMEX) injection 2 mg, 2 mg, Intravenous, Once, Keyon Funez, DO    pantoprazole (PROTONIX) injection 80 mg, 80 mg, Intravenous, Once, Keyon Funez, DO    sodium chloride 0.9 % flush 10 mL, 10 mL, Intravenous, PRN, Emergency, Triage Protocol, MD    Current Outpatient Medications:     acetaminophen (TYLENOL) 325 MG tablet, Take 2 tablets by mouth Every 4 (Four) Hours As Needed for Mild Pain ., Disp: , Rfl:     allopurinol (ZYLOPRIM) 300 MG tablet, Take 1 tablet by mouth Daily. Indications: Gout, Disp: , Rfl:     amoxicillin-clavulanate (Augmentin) 875-125 MG per tablet, Take 1 tablet by mouth 2 (Two) Times a Day. X 4 doses, Disp: , Rfl:     apixaban (ELIQUIS) 5 MG tablet tablet, Take 5 mg by mouth 2 (Two) Times a Day. Indications: Atrial Fibrillation, Disp: , Rfl:     colesevelam (WELCHOL) 625 MG tablet, Take 1,250 mg by mouth 2 (Two) Times a Day With Meals. Indications: High Amount of Fats in the Blood, Type 2 Diabetes, Disp: , Rfl:     dapagliflozin Propanediol (Farxiga) 10 MG tablet, Take 10 mg by mouth Daily. Indications: Type 2 Diabetes, Disp: , Rfl:     doxycycline (MONODOX) 100 MG capsule,  Take 1 capsule by mouth Every 12 (Twelve) Hours. X 4 doses  Indications: Pneumonia, Disp: , Rfl: 0    erythromycin (ROMYCIN) 5 MG/GM ophthalmic ointment, Administer  into the left eye Every 12 (Twelve) Hours. X 4 days, Disp: , Rfl:     famotidine (PEPCID) 20 MG tablet, Take 1 tablet by mouth 2 (Two) Times a Day., Disp: 60 tablet, Rfl: 1    flecainide (TAMBOCOR) 100 MG tablet, Take 50 mg by mouth 2 (Two) Times a Day. 2 capsules in am and 1 capsule pm  Indications: Atrial Fibrillation Electrically Shocked to Normal Rhythm, Disp: , Rfl:     FLUoxetine (PROzac) 20 MG capsule, Take 20 mg by mouth 2 (Two) Times a Day. Indications: Depression, Disp: , Rfl:     Fluticasone Furoate-Vilanterol 100-25 MCG/ACT aerosol powder , Inhale 1 puff Daily. Indications: Chronic Obstructive Lung Disease, Disp: , Rfl:     folic acid (FOLVITE) 400 MCG tablet, Take 1 tablet by mouth Daily. OTC, Disp: , Rfl:     gabapentin (NEURONTIN) 100 MG capsule, Take 1 capsule by mouth Every 12 (Twelve) Hours., Disp: , Rfl:     HYDROcodone-acetaminophen (NORCO)  MG per tablet, Take 1 tablet by mouth Every 6 (Six) Hours As Needed for Moderate Pain., Disp: , Rfl: 0    INSULIN ASPART FLEXPEN SC, Inject 1 Units under the skin into the appropriate area as directed 3 (Three) Times a Day., Disp: , Rfl:     ipratropium-albuterol (DUO-NEB) 0.5-2.5 mg/3 ml nebulizer, Take 3 mL by nebulization Every 6 (Six) Hours While Awake., Disp: 360 mL, Rfl:     levothyroxine (SYNTHROID, LEVOTHROID) 125 MCG tablet, Take 1 tablet by mouth Every Morning., Disp: , Rfl:     lidocaine (LIDODERM) 5 %, Place 2 patches on the skin as directed by provider Daily. Remove & Discard patch within 12 hours or as directed by MD. Place on R posterior shoulder, Disp: 30 each, Rfl: 0    magnesium oxide (MAG-OX) 400 tablet tablet, Take 2 tablets by mouth Daily., Disp: 60 each, Rfl: 0    metFORMIN (GLUCOPHAGE) 1000 MG tablet, Take 1,000 mg by mouth 2 (Two) Times a Day With Meals. Indications:  Type 2 Diabetes, Disp: , Rfl:     methocarbamol (Robaxin) 500 MG tablet, Take 500 mg by mouth 4 (Four) Times a Day After Meals & at Bedtime. Indications: Musculoskeletal Pain, Disp: , Rfl:     Methotrexate Sodium (methotrexate PF) 50 MG/2ML chemo syringe, Inject 2 mL into the appropriate muscle as directed by prescriber 1 (One) Time Per Week. Every Tuesday (HOLD WHILE AT [facility]), Disp: 1.12 mL, Rfl:     metoprolol tartrate (LOPRESSOR) 25 MG tablet, Take 12.5 mg by mouth 2 (Two) Times a Day. Indications: High Blood Pressure Disorder, Disp: , Rfl:     O2 (OXYGEN), Inhale 2 L/min Continuous., Disp: , Rfl:     ondansetron (ZOFRAN) 4 MG tablet, Take 1 tablet by mouth Every 8 (Eight) Hours As Needed for Nausea or Vomiting., Disp: 30 tablet, Rfl: 0    predniSONE (DELTASONE) 5 MG tablet, Take 5 mg by mouth Daily. For 90 days, started 12-12-22 ongoing therapy  Indications: Rheumatoid Arthritis, Disp: , Rfl:     rosuvastatin (CRESTOR) 20 MG tablet, Take 20 mg by mouth Every Night. Indications: High Amount of Triglycerides in the Blood, Disp: , Rfl:     terbinafine (lamiSIL) 250 MG tablet, Take 250 mg by mouth 2 (Two) Times a Day. Indications: Fungal Toenail Disease, Disp: , Rfl:     traZODone (DESYREL) 100 MG tablet, Take 100 mg by mouth Every Night. Indications: Trouble Sleeping, Disp: , Rfl:     zolpidem (AMBIEN) 10 MG tablet, Take 0.5 tablets by mouth Every Night., Disp: , Rfl:     ALLERGIES     Patient has no known allergies.    FAMILY HISTORY       Family History   Problem Relation Age of Onset    Cancer Mother           SOCIAL HISTORY       Social History     Socioeconomic History    Marital status:    Tobacco Use    Smoking status: Never    Smokeless tobacco: Never   Vaping Use    Vaping status: Never Used   Substance and Sexual Activity    Alcohol use: No    Drug use: No    Sexual activity: Defer         PHYSICAL EXAM    (up to 7 for level 4, 8 or more for level 5)     Vitals:    07/15/24 1413 07/15/24 1515  "07/15/24 1545 07/15/24 1555   BP: 123/85 119/74     BP Location: Right arm      Patient Position: Sitting Lying     Pulse: 109 111 115    Resp: 25 24     Temp:  99.1 °F (37.3 °C)     TempSrc:  Oral     SpO2: 100% 92% 100%    Weight: 81.6 kg (180 lb)   81.6 kg (179 lb 14.3 oz)   Height: 167 cm (65.75\")   167 cm (65.75\")       Physical Exam  General : Patient is awake, prescribed ill, is answering questions, slightly altered  HEENT: Pupils are equally round, EOMI, conjunctivae clear  Neck: Neck is supple, full range of motion, trachea midline  Cardiac: Heart  irregularly irregular  Lungs: Lungs decreased breath sound bilaterally, scattered rhonchi to bilateral bases, I saturations in the 80s on room air, 90% on 6 L nasal cannula  Abdomen: Abdomen is soft, nontender, nondistended. There are no firm or pulsatile masses, no rebound rigidity or guarding  Musculoskeletal: Generalized muscle weakness, no flaccid paralysis, there is swelling of the right lower extremity extremity which is chronic in nature  Neuro: Patient awake, she is following commands, answering questions although somewhat incorrect with her responses, no flaccid paralysis or obvious unilateral weakness  Dermatology: Skin is warm and dry        DIAGNOSTIC RESULTS     EKG:  All EKGs are interpreted by the Emergency Department Physician who either signs or Co-signs this chart in the absence of a cardiologist.    ECG 12 Lead ED Triage Standing Order; Weak / Dizzy / AMS   Final Result   Test Reason : ED Triage Standing Order~   Blood Pressure :   */*   mmHG   Vent. Rate : 121 BPM     Atrial Rate : 104 BPM      P-R Int : 136 ms          QRS Dur :  80 ms       QT Int : 404 ms       P-R-T Axes :  51  19 -44 degrees      QTc Int : 573 ms      atrial fibrillation   Low voltage QRS   Nonspecific ST and T wave abnormality   Abnormal ECG   When compared with ECG of   afib present   Confirmed by ARIELLA DIEHL MD ([id number]) on 7/15/2024 3:08:25 PM      Referred By: JAMES KILLIAN " "          Confirmed By: ARIELLA DIEHL MD          RADIOLOGY:     [x] Radiologist's Report Reviewed:  CT Head Without Contrast   Final Result   No evidence of acute intracranial disease.            CT SCAN OF THE CHEST WITHOUT CONTRAST: Axial images 20 through 46 show multiple well-defined hyperdense rounded lesions within and dorsal to the right pectoralis minor, resembling soft tissue masses, but similar to density of acute blood, and with the    lesion on image #27 appearing to show a potential hematocrit level, possibly all intramuscular hematoma. The largest of these measures approximately 3 cm in maximal diameter. No similar findings are seen elsewhere and no underlying rib fractures    identified.      The heart is enlarged. No pericardial effusion or mediastinal adenopathy is seen. There is trace bilateral pleural effusion. No coronary calcification is seen. Images of the lungs appear to show prominent pulmonary vasculature and mild diffuse    interstitial disease suggesting early interstitial edema. No particular focal pulmonary disease is seen to favor a pneumonia although a small focus of pneumonia.      Bony structures appear to be intact.      IMPRESSION:   1. 4 or 5 adjacent rounded hyperdense \"masses\" within and along the right pectoralis minor muscle, each between 2 and 3 cm in diameter, and favored to represent hematoma/intramuscular hemorrhage, rather than neoplasm. Please correlate with any history of    trauma here.      2. Minimal bibasilar pleural effusion, and small area of right upper lobe discoid atelectasis. No particular features to suggest pneumonia.      3. Cardiomegaly and mild pulmonary vascular congestion, perhaps with early interstitial edema.            ABDOMEN AND PELVIS CT SCAN WITHOUT CONTRAST: An area of slightly nodular left lower breast tissue is unchanged from 1/20/2023. No abdominal wall or retroperitoneal hematoma is seen. Clips are seen in the gallbladder fossa. No " significant abnormalities    are appreciated of the liver, pancreas, adrenal glands, or kidneys for non-IV contrast enhanced exam. Multiple splenic lesions, presumably cysts, are stable from the prior exam, the largest measuring water density, 3 cm in diameter image 33 series 2. No    upper abdominal free air, ascites, adenopathy, or acute inflammatory change is seen. Bowel loops are nondistended.      Regarding the lower abdomen and pelvis, there is evidence of previous extensive bowel surgery. Terminal ileum and cecum appear grossly normal. Appendix is not identified. There appears to be a distal ileal suture line, and suture line at the rectosigmoid    junction. These areas are clear of mass or inflammatory change. No intrapelvic free fluid mass or adenopathy is seen.       Bladder is normally distended and normal in appearance. Uterus and ovaries are not identified, whether atrophic or surgically absent. Streak artifact is seen from the patient's right hip fixation hardware. There is an old healed right inferior pubic    ramus fracture but no acute bony abnormality is seen.      IMPRESSION:   1. No evidence of acute inflammatory process or other clearly acute intra-abdominal or intrapelvic disease.      2. Evidence of previous bowel surgery. No evidence of significant ileus or obstruction.         Electronically Signed: Heath Prado MD     7/15/2024 2:53 PM EDT     Workstation ID: [id number]      CT Chest Without Contrast Diagnostic   Final Result   No evidence of acute intracranial disease.            CT SCAN OF THE CHEST WITHOUT CONTRAST: Axial images 20 through 46 show multiple well-defined hyperdense rounded lesions within and dorsal to the right pectoralis minor, resembling soft tissue masses, but similar to density of acute blood, and with the    lesion on image #27 appearing to show a potential hematocrit level, possibly all intramuscular hematoma. The largest of these measures approximately 3 cm in maximal  "diameter. No similar findings are seen elsewhere and no underlying rib fractures    identified.      The heart is enlarged. No pericardial effusion or mediastinal adenopathy is seen. There is trace bilateral pleural effusion. No coronary calcification is seen. Images of the lungs appear to show prominent pulmonary vasculature and mild diffuse    interstitial disease suggesting early interstitial edema. No particular focal pulmonary disease is seen to favor a pneumonia although a small focus of pneumonia.      Bony structures appear to be intact.      IMPRESSION:   1. 4 or 5 adjacent rounded hyperdense \"masses\" within and along the right pectoralis minor muscle, each between 2 and 3 cm in diameter, and favored to represent hematoma/intramuscular hemorrhage, rather than neoplasm. Please correlate with any history of    trauma here.      2. Minimal bibasilar pleural effusion, and small area of right upper lobe discoid atelectasis. No particular features to suggest pneumonia.      3. Cardiomegaly and mild pulmonary vascular congestion, perhaps with early interstitial edema.            ABDOMEN AND PELVIS CT SCAN WITHOUT CONTRAST: An area of slightly nodular left lower breast tissue is unchanged from 1/20/2023. No abdominal wall or retroperitoneal hematoma is seen. Clips are seen in the gallbladder fossa. No significant abnormalities    are appreciated of the liver, pancreas, adrenal glands, or kidneys for non-IV contrast enhanced exam. Multiple splenic lesions, presumably cysts, are stable from the prior exam, the largest measuring water density, 3 cm in diameter image 33 series 2. No    upper abdominal free air, ascites, adenopathy, or acute inflammatory change is seen. Bowel loops are nondistended.      Regarding the lower abdomen and pelvis, there is evidence of previous extensive bowel surgery. Terminal ileum and cecum appear grossly normal. Appendix is not identified. There appears to be a distal ileal suture line, " "and suture line at the rectosigmoid    junction. These areas are clear of mass or inflammatory change. No intrapelvic free fluid mass or adenopathy is seen.       Bladder is normally distended and normal in appearance. Uterus and ovaries are not identified, whether atrophic or surgically absent. Streak artifact is seen from the patient's right hip fixation hardware. There is an old healed right inferior pubic    ramus fracture but no acute bony abnormality is seen.      IMPRESSION:   1. No evidence of acute inflammatory process or other clearly acute intra-abdominal or intrapelvic disease.      2. Evidence of previous bowel surgery. No evidence of significant ileus or obstruction.         Electronically Signed: Heath Prado MD     7/15/2024 2:53 PM EDT     Workstation ID: [id number]      CT Abdomen Pelvis Without Contrast   Final Result   No evidence of acute intracranial disease.            CT SCAN OF THE CHEST WITHOUT CONTRAST: Axial images 20 through 46 show multiple well-defined hyperdense rounded lesions within and dorsal to the right pectoralis minor, resembling soft tissue masses, but similar to density of acute blood, and with the    lesion on image #27 appearing to show a potential hematocrit level, possibly all intramuscular hematoma. The largest of these measures approximately 3 cm in maximal diameter. No similar findings are seen elsewhere and no underlying rib fractures    identified.      The heart is enlarged. No pericardial effusion or mediastinal adenopathy is seen. There is trace bilateral pleural effusion. No coronary calcification is seen. Images of the lungs appear to show prominent pulmonary vasculature and mild diffuse    interstitial disease suggesting early interstitial edema. No particular focal pulmonary disease is seen to favor a pneumonia although a small focus of pneumonia.      Bony structures appear to be intact.      IMPRESSION:   1. 4 or 5 adjacent rounded hyperdense \"masses\" within and " along the right pectoralis minor muscle, each between 2 and 3 cm in diameter, and favored to represent hematoma/intramuscular hemorrhage, rather than neoplasm. Please correlate with any history of    trauma here.      2. Minimal bibasilar pleural effusion, and small area of right upper lobe discoid atelectasis. No particular features to suggest pneumonia.      3. Cardiomegaly and mild pulmonary vascular congestion, perhaps with early interstitial edema.            ABDOMEN AND PELVIS CT SCAN WITHOUT CONTRAST: An area of slightly nodular left lower breast tissue is unchanged from 1/20/2023. No abdominal wall or retroperitoneal hematoma is seen. Clips are seen in the gallbladder fossa. No significant abnormalities    are appreciated of the liver, pancreas, adrenal glands, or kidneys for non-IV contrast enhanced exam. Multiple splenic lesions, presumably cysts, are stable from the prior exam, the largest measuring water density, 3 cm in diameter image 33 series 2. No    upper abdominal free air, ascites, adenopathy, or acute inflammatory change is seen. Bowel loops are nondistended.      Regarding the lower abdomen and pelvis, there is evidence of previous extensive bowel surgery. Terminal ileum and cecum appear grossly normal. Appendix is not identified. There appears to be a distal ileal suture line, and suture line at the rectosigmoid    junction. These areas are clear of mass or inflammatory change. No intrapelvic free fluid mass or adenopathy is seen.       Bladder is normally distended and normal in appearance. Uterus and ovaries are not identified, whether atrophic or surgically absent. Streak artifact is seen from the patient's right hip fixation hardware. There is an old healed right inferior pubic    ramus fracture but no acute bony abnormality is seen.      IMPRESSION:   1. No evidence of acute inflammatory process or other clearly acute intra-abdominal or intrapelvic disease.      2. Evidence of previous  bowel surgery. No evidence of significant ileus or obstruction.         Electronically Signed: Heath Prado MD     7/15/2024 2:53 PM EDT     Workstation ID: [id number]      XR Chest 1 View    (Results Pending)   CT Angiogram Chest Pulmonary Embolism    (Results Pending)       I ordered and independently reviewed the above noted radiographic studies.      I viewed images of CT head which showed no acute intracranial normality, age-related changes.  Per my independent interpretation.    See radiologist's dictation for official interpretation.      ED BEDSIDE ULTRASOUND:   Performed by ED Physician - none    LABS:    I have reviewed and interpreted all of the currently available lab results from this visit (if applicable):  Results for orders placed or performed during the hospital encounter of 07/15/24   Comprehensive Metabolic Panel    Specimen: Blood   Result Value Ref Range    Glucose 174 (H) 65 - 99 mg/dL    BUN 16 8 - 23 mg/dL    Creatinine 0.68 0.57 - 1.00 mg/dL    Sodium 139 136 - 145 mmol/L    Potassium 3.6 3.5 - 5.2 mmol/L    Chloride 100 98 - 107 mmol/L    CO2 29.0 22.0 - 29.0 mmol/L    Calcium 7.7 (L) 8.6 - 10.5 mg/dL    Total Protein 5.1 (L) 6.0 - 8.5 g/dL    Albumin 2.7 (L) 3.5 - 5.2 g/dL    ALT (SGPT) 12 1 - 33 U/L    AST (SGOT) 18 1 - 32 U/L    Alkaline Phosphatase 71 39 - 117 U/L    Total Bilirubin 0.5 0.0 - 1.2 mg/dL    Globulin 2.4 gm/dL    A/G Ratio 1.1 g/dL    BUN/Creatinine Ratio 23.5 7.0 - 25.0    Anion Gap 10.0 5.0 - 15.0 mmol/L    eGFR 94.4 >60.0 mL/min/1.73   Single High Sensitivity Troponin T    Specimen: Blood   Result Value Ref Range    HS Troponin T 61 (C) <14 ng/L   Magnesium    Specimen: Blood   Result Value Ref Range    Magnesium 1.8 1.6 - 2.4 mg/dL   Urinalysis With Microscopic If Indicated (No Culture) - Straight Cath    Specimen: Straight Cath; Urine   Result Value Ref Range    Color, UA Yellow Yellow, Straw    Appearance, UA Clear Clear    pH, UA 6.5 5.0 - 8.0    Specific Gravity, UA  1.025 1.001 - 1.030    Glucose,  mg/dL (1+) (A) Negative    Ketones, UA 15 mg/dL (1+) (A) Negative    Bilirubin, UA Negative Negative    Blood, UA Negative Negative    Protein, UA Negative Negative    Leuk Esterase, UA Negative Negative    Nitrite, UA Negative Negative    Urobilinogen, UA 0.2 E.U./dL 0.2 - 1.0 E.U./dL   CBC Auto Differential    Specimen: Blood   Result Value Ref Range    WBC 11.31 (H) 3.40 - 10.80 10*3/mm3    RBC 3.21 (L) 3.77 - 5.28 10*6/mm3    Hemoglobin 7.6 (L) 12.0 - 15.9 g/dL    Hematocrit 27.0 (L) 34.0 - 46.6 %    MCV 84.1 79.0 - 97.0 fL    MCH 23.7 (L) 26.6 - 33.0 pg    MCHC 28.1 (L) 31.5 - 35.7 g/dL    RDW 21.1 (H) 12.3 - 15.4 %    RDW-SD 64.7 (H) 37.0 - 54.0 fl    MPV 9.4 6.0 - 12.0 fL    Platelets 373 140 - 450 10*3/mm3    nRBC 0.8 (H) 0.0 - 0.2 /100 WBC   BNP    Specimen: Blood   Result Value Ref Range    proBNP 8,823.0 (H) 0.0 - 900.0 pg/mL   Lactic Acid, Plasma    Specimen: Blood   Result Value Ref Range    Lactate 1.7 0.5 - 2.0 mmol/L   Procalcitonin    Specimen: Blood   Result Value Ref Range    Procalcitonin 12.49 (H) 0.00 - 0.25 ng/mL   Blood Gas, Arterial With Co-Ox    Specimen: Arterial Blood   Result Value Ref Range    Site Right Radial     Pato's Test N/A     pH, Arterial 7.486 (H) 7.350 - 7.450 pH units    pCO2, Arterial 39.5 35.0 - 45.0 mm Hg    pO2, Arterial 65.9 (L) 83.0 - 108.0 mm Hg    HCO3, Arterial 29.8 (H) 20.0 - 26.0 mmol/L    Base Excess, Arterial 5.9 (H) 0.0 - 2.0 mmol/L    Hemoglobin, Blood Gas 7.8 (L) 14 - 18 g/dL    Hematocrit, Blood Gas 23.8 (L) 38.0 - 51.0 %    Oxyhemoglobin 91.2 (L) 94 - 99 %    Methemoglobin 0.60 0.00 - 1.50 %    Carboxyhemoglobin 1.7 0 - 2 %    CO2 Content 31.0 22 - 33 mmol/L    Temperature 37.0     Barometric Pressure for Blood Gas      Modality Nasal Cannula     FIO2 44 %    Rate 0 Breaths/minute    PIP 0 cmH2O    IPAP 0     EPAP 0     pH, Temp Corrected 7.486 pH Units    pCO2, Temperature Corrected 39.5 35 - 45 mm Hg    pO2,  Temperature Corrected 65.9 (L) 83 - 108 mm Hg   POC Occult Blood Stool    Specimen: Per Rectum; Stool   Result Value Ref Range    Fecal Occult Blood Positive (A)     Lot Number [id number]     Expiration Date 12/26     DEVELOPER LOT NUMBER [id number]     DEVELOPER EXPIRATION DATE 8/2,027     Positive Control Positive     Negative Control Negative    ECG 12 Lead ED Triage Standing Order; Weak / Dizzy / AMS   Result Value Ref Range    QT Interval 404 ms    QTC Interval 573 ms   Green Top (Gel)   Result Value Ref Range    Extra Tube Hold for add-ons.    Lavender Top   Result Value Ref Range    Extra Tube hold for add-on    Gold Top - SST   Result Value Ref Range    Extra Tube Hold for add-ons.    Gray Top   Result Value Ref Range    Extra Tube Hold for add-ons.    Light Blue Top   Result Value Ref Range    Extra Tube Hold for add-ons.    Duplex Venous Lower Extremity - RIGHT   Result Value Ref Range    Right Common Femoral Spont Y     Right Common Femoral Phasic Y     Right Common Femoral Compress C     Right Common Femoral Augment Y     Right Saphenofemoral Junction Spont Y     Right Saphenofemoral Junction Phasic Y     Right Saphenofemoral Junction Compress C     Right Saphenofemoral Junction Augment Y     Right Profunda Femoral Spont Y     Right Profunda Femoral Phasic Y     Right Proximal Femoral Spont Y     Right Proximal Femoral Phasic Y     Right Proximal Femoral Compress C     Right Proximal Femoral Augment Y     Right Mid Femoral Spont Y     Right Mid Femoral Phasic Y     Right Mid Femoral Compress C     Right Mid Femoral Augment Y     Right Distal Femoral Spont Y     Right Distal Femoral Phasic Y     Right Distal Femoral Compress C     Right Distal Femoral Augment Y     Right Popliteal Spont Y     Right Popliteal Phasic Y     Right Popliteal Compress C     Right Popliteal Augment Y     Right Posterior Tibial Compress C     Right Peroneal Compress C     Right Gastronemius Compress C     Right Greater Saph AK Compress C      Right Greater Saph BK Compress C     BH CV VAS PRELIMINARY FINDINGS SCRIPTING 1.0         If labs were ordered, I independently reviewed the results and considered them in treating the patient.      EMERGENCY DEPARTMENT COURSE and DIFFERENTIAL DIAGNOSIS/MDM:   Vitals:  AS OF 16:13 EDT    BP - 119/74  HR - 115  TEMP - 99.1 °F (37.3 °C) (Oral)  O2 SATS - 100%      Orders placed during this visit:  Orders Placed This Encounter   Procedures    Blood Culture - Blood,    Blood Culture - Blood,    CT Head Without Contrast    XR Chest 1 View    CT Chest Without Contrast Diagnostic    CT Abdomen Pelvis Without Contrast    CT Angiogram Chest Pulmonary Embolism    [place] Draw    Comprehensive Metabolic Panel    Single High Sensitivity Troponin T    Magnesium    Urinalysis With Microscopic If Indicated (No Culture) - Urine, Clean Catch    CBC Auto Differential    BNP    Lactic Acid, Plasma    Procalcitonin    Blood Gas, Arterial -With Co-Ox Panel: Yes    Scan Slide    Blood Gas, Arterial With Co-Ox    High Sensitivity Troponin T 2Hr    NPO Diet NPO Type: Strict NPO    Undress & Gown    Continuous Pulse Oximetry    Vital Signs    Straight cath    Oxygen Therapy- Nasal Cannula; Titrate 1-6 LPM Per SpO2; 90 - 95%    Oxygen Therapy- High Flow Nasal Cannula; Titrate 6-12 LPM Per SpO2; 90 - 95%    POC Occult Blood Stool    ECG 12 Lead ED Triage Standing Order; Weak / Dizzy / AMS    Insert Peripheral IV    Fall Precautions    CBC & Differential    Green Top (Gel)    Lavender Top    Gold Top - SST    Gray Top    Light Blue Top       All labs have been independently reviewed by me.  All radiology studies have been reviewed by me and the radiologist dictating the report.  All EKG's have been independently viewed and interpreted by me.      Discussion below represents my analysis of pertinent findings related to patient's condition, differential diagnosis, treatment plan and final disposition.    Differential diagnosis:  The  differential diagnosis associated with the patient's presentation includes: Altered mental status, encephalopathy, dehydration, renal insufficiency, urinary tract infection, pneumonia, sepsis    Additional sources  Discussed/ obtained information from independent historians:   [] Spouse  [] Parent  [x] Family member, daughter at bedside  [] Friend  [x] EMS   [] Other:    External (non-ED) record review:   [] Inpatient record:   [] Office record:   [] Outpatient record:   [] Prior Outpatient labs:   [] Prior Outpatient radiology:   [] Primary Care record:   [] Outside ED record:   [] Other:     Patient's care impacted by:   [] Diabetes  [] Hypertension  [] CHF  [] Hyperlipidemia  [] Coronary Artery Disease   [x] COPD   [] Cancer   [] Tobacco Abuse   [] Substance Abuse    [] Other:     Care significantly affected by Social Determinants of Health (housing and economic circumstances, unemployment)    [] Yes     [x] No   If yes, Patient's care significantly limited by Social Determinants of Health including:   [] Inadequate housing   [] Low income   [] Alcoholism and drug addiction in family   [] Problems related to primary support group   [] Unemployment   [] Problems related to employment   [] Other Social Determinants of Health:       MEDICATIONS ADMINISTERED IN ED:  Medications   sodium chloride 0.9 % flush 10 mL (has no administration in time range)   bumetanide (BUMEX) injection 2 mg (has no administration in time range)   pantoprazole (PROTONIX) injection 80 mg (has no administration in time range)            Is a 68-year-old female who has had 4 to 5 days of progressive weakness, shortness of breath, multiple falls, just overall continuing to decompensate clinically.  Family noted that she was having increased work of breathing, hypoxia, does not normally wear oxygen at home, was having hallucinations and is continuing to worsen over the last few days.  She is brought in by EMS, seen by myself, stat CT head also  obtained as well as chest abdomen pelvis for further assessment.  Labs and septic markers initiated, ox saturations 80s to low 90s, will transition over to high flow nasal cannula as the patient does not feel she can tolerate BiPAP, she is also found to be in atrial fibrillation.  ABG with a pH of 7.48, pCO2 of 39 with a pO2 of 66.  Bicarb of 30.  Workup 11.3, hemoglobin 7.6 down from her baseline between 9-10.  Obtain a stool guaiac.  Discussed with respiratory therapy regarding placing the patient on high flow nasal cannula.  CT head is without acute intracranial normality, CT of the chest with mild cardiomegaly, layering pleural effusion, no obvious pneumonia.  CT abdomen/pelvis is unremarkable.  Patient still guaiac is positive, no prior history of GI bleeds.  Per the daughter she is refused colonoscopies in the past, had a phone number with rectal cancer.  Will treat for underlying GI bleed, or vital signs stable on high flow nasal cannula, A-fib heart rate in the low 100s, ox saturations 99%, blood pressure 120/81.  She is tolerating her interventions, we will plan on admission to the hospital for further workup treatment, trending of her labs and respiratory status.  Case discussed with hospitalist, Dr. Kaba, for admission.    PROCEDURES:  Procedures    CRITICAL CARE TIME    Total Critical Care time was 35 minutes, excluding separately reportable procedures.  Patient atrial fibrillation, GI bleed, acute respiratory failure with hypoxia saturations in the 80s requiring multiple interventions, oxygen administration, treatment for acute congestive heart failure, discussions with multiple consultants and family members. There was a high probability of clinically significant/life threatening deterioration in the patient's condition which required my urgent intervention.      FINAL IMPRESSION      1. Acute on chronic respiratory failure with hypoxia    2. Acute on chronic congestive heart failure, unspecified  heart failure type    3. Multiple falls    4. Anemia, unspecified type    5. Gastrointestinal hemorrhage, unspecified gastrointestinal hemorrhage type          DISPOSITION/PLAN     ED Disposition       ED Disposition   Decision to Admit    Condition   --    Comment   --                 Comment: Please note this report has been produced using speech recognition software.      Keyon Funez DO  Attending Emergency Physician         Keyon Funez,   07/15/24 0714

## 2024-07-16 ENCOUNTER — APPOINTMENT (OUTPATIENT)
Dept: GENERAL RADIOLOGY | Facility: HOSPITAL | Age: 69
DRG: 377 | End: 2024-07-16
Payer: MEDICARE

## 2024-07-16 ENCOUNTER — APPOINTMENT (OUTPATIENT)
Dept: CARDIOLOGY | Facility: HOSPITAL | Age: 69
DRG: 377 | End: 2024-07-16
Payer: MEDICARE

## 2024-07-16 LAB
ALBUMIN SERPL-MCNC: 2.6 G/DL (ref 3.5–5.2)
ALBUMIN/GLOB SERPL: 0.8 G/DL
ALP SERPL-CCNC: 64 U/L (ref 39–117)
ALT SERPL W P-5'-P-CCNC: 12 U/L (ref 1–33)
ANION GAP SERPL CALCULATED.3IONS-SCNC: 10 MMOL/L (ref 5–15)
ARTERIAL PATENCY WRIST A: ABNORMAL
AST SERPL-CCNC: 21 U/L (ref 1–32)
ATMOSPHERIC PRESS: ABNORMAL MM[HG]
B PARAPERT DNA SPEC QL NAA+PROBE: NOT DETECTED
B PERT DNA SPEC QL NAA+PROBE: NOT DETECTED
BASE EXCESS BLDA CALC-SCNC: 10.5 MMOL/L (ref 0–2)
BASOPHILS # BLD AUTO: 0.01 10*3/MM3 (ref 0–0.2)
BASOPHILS NFR BLD AUTO: 0.1 % (ref 0–1.5)
BDY SITE: ABNORMAL
BH BB BLOOD EXPIRATION DATE: NORMAL
BH BB BLOOD EXPIRATION DATE: NORMAL
BH BB BLOOD TYPE BARCODE: 6200
BH BB BLOOD TYPE BARCODE: 6200
BH BB DISPENSE STATUS: NORMAL
BH BB DISPENSE STATUS: NORMAL
BH BB PRODUCT CODE: NORMAL
BH BB PRODUCT CODE: NORMAL
BH BB UNIT NUMBER: NORMAL
BH BB UNIT NUMBER: NORMAL
BH CV ECHO MEAS - AO MAX PG: 6.3 MMHG
BH CV ECHO MEAS - AO MEAN PG: 3.5 MMHG
BH CV ECHO MEAS - AO ROOT DIAM: 3.9 CM
BH CV ECHO MEAS - AO V2 MAX: 125.5 CM/SEC
BH CV ECHO MEAS - AO V2 VTI: 23.4 CM
BH CV ECHO MEAS - AVA(I,D): 2.5 CM2
BH CV ECHO MEAS - EDV(CUBED): 79.5 ML
BH CV ECHO MEAS - EDV(MOD-SP2): 37.3 ML
BH CV ECHO MEAS - EDV(MOD-SP4): 66.3 ML
BH CV ECHO MEAS - EF(MOD-BP): 56.7 %
BH CV ECHO MEAS - EF(MOD-SP2): 54.4 %
BH CV ECHO MEAS - EF(MOD-SP4): 58.4 %
BH CV ECHO MEAS - ESV(CUBED): 22 ML
BH CV ECHO MEAS - ESV(MOD-SP2): 17 ML
BH CV ECHO MEAS - ESV(MOD-SP4): 27.6 ML
BH CV ECHO MEAS - FS: 34.9 %
BH CV ECHO MEAS - IVS/LVPW: 0.82 CM
BH CV ECHO MEAS - IVSD: 0.9 CM
BH CV ECHO MEAS - LA DIMENSION: 3.6 CM
BH CV ECHO MEAS - LAT PEAK E' VEL: 14.6 CM/SEC
BH CV ECHO MEAS - LV DIASTOLIC VOL/BSA (35-75): 34.9 CM2
BH CV ECHO MEAS - LV MASS(C)D: 142.5 GRAMS
BH CV ECHO MEAS - LV MAX PG: 4.7 MMHG
BH CV ECHO MEAS - LV MEAN PG: 2.5 MMHG
BH CV ECHO MEAS - LV SYSTOLIC VOL/BSA (12-30): 14.5 CM2
BH CV ECHO MEAS - LV V1 MAX: 108.5 CM/SEC
BH CV ECHO MEAS - LV V1 VTI: 18.6 CM
BH CV ECHO MEAS - LVIDD: 4.3 CM
BH CV ECHO MEAS - LVIDS: 2.8 CM
BH CV ECHO MEAS - LVOT AREA: 3.1 CM2
BH CV ECHO MEAS - LVOT DIAM: 2 CM
BH CV ECHO MEAS - LVPWD: 1.1 CM
BH CV ECHO MEAS - MED PEAK E' VEL: 12.2 CM/SEC
BH CV ECHO MEAS - MV A MAX VEL: 56.1 CM/SEC
BH CV ECHO MEAS - MV DEC SLOPE: 507 CM/SEC2
BH CV ECHO MEAS - MV DEC TIME: 0.23 SEC
BH CV ECHO MEAS - MV E MAX VEL: 88 CM/SEC
BH CV ECHO MEAS - MV E/A: 1.57
BH CV ECHO MEAS - MV MAX PG: 3.9 MMHG
BH CV ECHO MEAS - MV MEAN PG: 1 MMHG
BH CV ECHO MEAS - MV P1/2T: 59.5 MSEC
BH CV ECHO MEAS - MV V2 VTI: 26.4 CM
BH CV ECHO MEAS - MVA(P1/2T): 3.7 CM2
BH CV ECHO MEAS - MVA(VTI): 2.21 CM2
BH CV ECHO MEAS - PA ACC TIME: 0.1 SEC
BH CV ECHO MEAS - PA V2 MAX: 81.7 CM/SEC
BH CV ECHO MEAS - SV(LVOT): 58.3 ML
BH CV ECHO MEAS - SV(MOD-SP2): 20.3 ML
BH CV ECHO MEAS - SV(MOD-SP4): 38.7 ML
BH CV ECHO MEAS - SVI(LVOT): 30.7 ML/M2
BH CV ECHO MEAS - SVI(MOD-SP2): 10.7 ML/M2
BH CV ECHO MEAS - SVI(MOD-SP4): 20.4 ML/M2
BH CV ECHO MEAS - TAPSE (>1.6): 1.73 CM
BH CV ECHO MEASUREMENTS AVERAGE E/E' RATIO: 6.57
BH CV VAS BP RIGHT ARM: NORMAL MMHG
BH CV XLRA - RV BASE: 4.5 CM
BH CV XLRA - RV LENGTH: 7.1 CM
BH CV XLRA - RV MID: 3.6 CM
BH CV XLRA - TDI S': 12.4 CM/SEC
BILIRUB SERPL-MCNC: 1.2 MG/DL (ref 0–1.2)
BODY TEMPERATURE: 37
BUN SERPL-MCNC: 10 MG/DL (ref 8–23)
BUN/CREAT SERPL: 16.1 (ref 7–25)
C PNEUM DNA NPH QL NAA+NON-PROBE: NOT DETECTED
CALCIUM SPEC-SCNC: 7.1 MG/DL (ref 8.6–10.5)
CHLORIDE SERPL-SCNC: 100 MMOL/L (ref 98–107)
CO2 BLDA-SCNC: 33.9 MMOL/L (ref 22–33)
CO2 SERPL-SCNC: 33 MMOL/L (ref 22–29)
COHGB MFR BLD: 1.7 % (ref 0–2)
CREAT SERPL-MCNC: 0.62 MG/DL (ref 0.57–1)
CROSSMATCH INTERPRETATION: NORMAL
CROSSMATCH INTERPRETATION: NORMAL
DEPRECATED RDW RBC AUTO: 60.9 FL (ref 37–54)
EGFRCR SERPLBLD CKD-EPI 2021: 96.5 ML/MIN/1.73
EOSINOPHIL # BLD AUTO: 0.19 10*3/MM3 (ref 0–0.4)
EOSINOPHIL NFR BLD AUTO: 1.8 % (ref 0.3–6.2)
EPAP: 0
ERYTHROCYTE [DISTWIDTH] IN BLOOD BY AUTOMATED COUNT: 19.8 % (ref 12.3–15.4)
FLUAV SUBTYP SPEC NAA+PROBE: NOT DETECTED
FLUBV RNA ISLT QL NAA+PROBE: NOT DETECTED
GLOBULIN UR ELPH-MCNC: 3.2 GM/DL
GLUCOSE BLDC GLUCOMTR-MCNC: 100 MG/DL (ref 70–130)
GLUCOSE BLDC GLUCOMTR-MCNC: 115 MG/DL (ref 70–130)
GLUCOSE BLDC GLUCOMTR-MCNC: 177 MG/DL (ref 70–130)
GLUCOSE BLDC GLUCOMTR-MCNC: 93 MG/DL (ref 70–130)
GLUCOSE BLDC GLUCOMTR-MCNC: 94 MG/DL (ref 70–130)
GLUCOSE BLDC GLUCOMTR-MCNC: 99 MG/DL (ref 70–130)
GLUCOSE SERPL-MCNC: 96 MG/DL (ref 65–99)
HADV DNA SPEC NAA+PROBE: NOT DETECTED
HCO3 BLDA-SCNC: 32.8 MMOL/L (ref 20–26)
HCOV 229E RNA SPEC QL NAA+PROBE: NOT DETECTED
HCOV HKU1 RNA SPEC QL NAA+PROBE: NOT DETECTED
HCOV NL63 RNA SPEC QL NAA+PROBE: NOT DETECTED
HCOV OC43 RNA SPEC QL NAA+PROBE: NOT DETECTED
HCT VFR BLD AUTO: 29.6 % (ref 34–46.6)
HCT VFR BLD AUTO: 29.7 % (ref 34–46.6)
HCT VFR BLD AUTO: 33.2 % (ref 34–46.6)
HCT VFR BLD CALC: 28.4 % (ref 38–51)
HGB BLD-MCNC: 8.6 G/DL (ref 12–15.9)
HGB BLD-MCNC: 8.6 G/DL (ref 12–15.9)
HGB BLD-MCNC: 9.7 G/DL (ref 12–15.9)
HGB BLDA-MCNC: 9.3 G/DL (ref 14–18)
HMPV RNA NPH QL NAA+NON-PROBE: NOT DETECTED
HPIV1 RNA ISLT QL NAA+PROBE: NOT DETECTED
HPIV2 RNA SPEC QL NAA+PROBE: NOT DETECTED
HPIV3 RNA NPH QL NAA+PROBE: NOT DETECTED
HPIV4 P GENE NPH QL NAA+PROBE: NOT DETECTED
IMM GRANULOCYTES # BLD AUTO: 0.07 10*3/MM3 (ref 0–0.05)
IMM GRANULOCYTES NFR BLD AUTO: 0.7 % (ref 0–0.5)
INHALED O2 CONCENTRATION: 45 %
IPAP: 0
LEFT ATRIUM VOLUME INDEX: 41 ML/M2
LYMPHOCYTES # BLD AUTO: 1.04 10*3/MM3 (ref 0.7–3.1)
LYMPHOCYTES NFR BLD AUTO: 9.9 % (ref 19.6–45.3)
Lab: ABNORMAL
M PNEUMO IGG SER IA-ACNC: NOT DETECTED
MAGNESIUM SERPL-MCNC: 1.3 MG/DL (ref 1.6–2.4)
MCH RBC QN AUTO: 24.6 PG (ref 26.6–33)
MCHC RBC AUTO-ENTMCNC: 29.2 G/DL (ref 31.5–35.7)
MCV RBC AUTO: 84.1 FL (ref 79–97)
METHGB BLD QL: 0.1 % (ref 0–1.5)
MODALITY: ABNORMAL
MONOCYTES # BLD AUTO: 1.08 10*3/MM3 (ref 0.1–0.9)
MONOCYTES NFR BLD AUTO: 10.3 % (ref 5–12)
NEUTROPHILS NFR BLD AUTO: 77.2 % (ref 42.7–76)
NEUTROPHILS NFR BLD AUTO: 8.11 10*3/MM3 (ref 1.7–7)
NOTIFIED BY: ABNORMAL
NOTIFIED WHO: ABNORMAL
NRBC BLD AUTO-RTO: 0.7 /100 WBC (ref 0–0.2)
OXYHGB MFR BLDV: 98.6 % (ref 94–99)
PAW @ PEAK INSP FLOW SETTING VENT: 0 CMH2O
PCO2 BLDA: 34.2 MM HG (ref 35–45)
PCO2 TEMP ADJ BLD: 34.2 MM HG (ref 35–45)
PH BLDA: 7.59 PH UNITS (ref 7.35–7.45)
PH, TEMP CORRECTED: 7.59 PH UNITS
PLATELET # BLD AUTO: 367 10*3/MM3 (ref 140–450)
PMV BLD AUTO: 9.4 FL (ref 6–12)
PO2 BLDA: 163 MM HG (ref 83–108)
PO2 TEMP ADJ BLD: 163 MM HG (ref 83–108)
POTASSIUM SERPL-SCNC: 3 MMOL/L (ref 3.5–5.2)
POTASSIUM SERPL-SCNC: 3.3 MMOL/L (ref 3.5–5.2)
PROT SERPL-MCNC: 5.8 G/DL (ref 6–8.5)
RBC # BLD AUTO: 3.95 10*6/MM3 (ref 3.77–5.28)
RHINOVIRUS RNA SPEC NAA+PROBE: NOT DETECTED
RSV RNA NPH QL NAA+NON-PROBE: NOT DETECTED
SARS-COV-2 RNA NPH QL NAA+NON-PROBE: NOT DETECTED
SODIUM SERPL-SCNC: 143 MMOL/L (ref 136–145)
TOTAL RATE: 0 BREATHS/MINUTE
TSH SERPL DL<=0.05 MIU/L-ACNC: 1.89 UIU/ML (ref 0.27–4.2)
UNIT  ABO: NORMAL
UNIT  ABO: NORMAL
UNIT  RH: NORMAL
UNIT  RH: NORMAL
WBC NRBC COR # BLD AUTO: 10.5 10*3/MM3 (ref 3.4–10.8)

## 2024-07-16 PROCEDURE — 82375 ASSAY CARBOXYHB QUANT: CPT

## 2024-07-16 PROCEDURE — 83050 HGB METHEMOGLOBIN QUAN: CPT

## 2024-07-16 PROCEDURE — 94799 UNLISTED PULMONARY SVC/PX: CPT

## 2024-07-16 PROCEDURE — 25010000002 DIGOXIN PER 500 MCG: Performed by: INTERNAL MEDICINE

## 2024-07-16 PROCEDURE — 25810000003 SODIUM CHLORIDE 0.9 % SOLUTION 250 ML FLEX CONT

## 2024-07-16 PROCEDURE — 93306 TTE W/DOPPLER COMPLETE: CPT

## 2024-07-16 PROCEDURE — 25010000002 FUROSEMIDE PER 20 MG: Performed by: INTERNAL MEDICINE

## 2024-07-16 PROCEDURE — 85025 COMPLETE CBC W/AUTO DIFF WBC: CPT | Performed by: INTERNAL MEDICINE

## 2024-07-16 PROCEDURE — 84443 ASSAY THYROID STIM HORMONE: CPT | Performed by: INTERNAL MEDICINE

## 2024-07-16 PROCEDURE — P9047 ALBUMIN (HUMAN), 25%, 50ML: HCPCS | Performed by: INTERNAL MEDICINE

## 2024-07-16 PROCEDURE — 25010000002 POTASSIUM CHLORIDE 10 MEQ/100ML SOLUTION

## 2024-07-16 PROCEDURE — 94664 DEMO&/EVAL PT USE INHALER: CPT

## 2024-07-16 PROCEDURE — 25010000002 MAGNESIUM SULFATE 2 GM/50ML SOLUTION: Performed by: INTERNAL MEDICINE

## 2024-07-16 PROCEDURE — 82948 REAGENT STRIP/BLOOD GLUCOSE: CPT

## 2024-07-16 PROCEDURE — 63710000001 INSULIN LISPRO (HUMAN) PER 5 UNITS: Performed by: INTERNAL MEDICINE

## 2024-07-16 PROCEDURE — 25010000002 ALBUMIN HUMAN 25% PER 50 ML: Performed by: INTERNAL MEDICINE

## 2024-07-16 PROCEDURE — 83735 ASSAY OF MAGNESIUM: CPT | Performed by: INTERNAL MEDICINE

## 2024-07-16 PROCEDURE — 25010000002 VANCOMYCIN 1 G RECONSTITUTED SOLUTION 1 EACH VIAL

## 2024-07-16 PROCEDURE — 99233 SBSQ HOSP IP/OBS HIGH 50: CPT | Performed by: INTERNAL MEDICINE

## 2024-07-16 PROCEDURE — 36600 WITHDRAWAL OF ARTERIAL BLOOD: CPT

## 2024-07-16 PROCEDURE — 85014 HEMATOCRIT: CPT | Performed by: INTERNAL MEDICINE

## 2024-07-16 PROCEDURE — 93306 TTE W/DOPPLER COMPLETE: CPT | Performed by: INTERNAL MEDICINE

## 2024-07-16 PROCEDURE — 85018 HEMOGLOBIN: CPT | Performed by: INTERNAL MEDICINE

## 2024-07-16 PROCEDURE — 84132 ASSAY OF SERUM POTASSIUM: CPT | Performed by: INTERNAL MEDICINE

## 2024-07-16 PROCEDURE — 82805 BLOOD GASES W/O2 SATURATION: CPT

## 2024-07-16 PROCEDURE — 80053 COMPREHEN METABOLIC PANEL: CPT | Performed by: INTERNAL MEDICINE

## 2024-07-16 PROCEDURE — 99222 1ST HOSP IP/OBS MODERATE 55: CPT | Performed by: PHYSICIAN ASSISTANT

## 2024-07-16 PROCEDURE — 0202U NFCT DS 22 TRGT SARS-COV-2: CPT | Performed by: INTERNAL MEDICINE

## 2024-07-16 PROCEDURE — 93005 ELECTROCARDIOGRAM TRACING: CPT | Performed by: INTERNAL MEDICINE

## 2024-07-16 PROCEDURE — 71045 X-RAY EXAM CHEST 1 VIEW: CPT

## 2024-07-16 PROCEDURE — 25010000002 PIPERACILLIN SOD-TAZOBACTAM PER 1 G: Performed by: NURSE PRACTITIONER

## 2024-07-16 RX ORDER — NICOTINE POLACRILEX 4 MG
15 LOZENGE BUCCAL
Status: DISCONTINUED | OUTPATIENT
Start: 2024-07-16 | End: 2024-07-22 | Stop reason: HOSPADM

## 2024-07-16 RX ORDER — POTASSIUM CHLORIDE 29.8 MG/ML
20 INJECTION INTRAVENOUS
Status: DISCONTINUED | OUTPATIENT
Start: 2024-07-16 | End: 2024-07-16

## 2024-07-16 RX ORDER — MAGNESIUM SULFATE HEPTAHYDRATE 40 MG/ML
2 INJECTION, SOLUTION INTRAVENOUS
Status: COMPLETED | OUTPATIENT
Start: 2024-07-16 | End: 2024-07-16

## 2024-07-16 RX ORDER — MIDAZOLAM HYDROCHLORIDE 1 MG/ML
1 INJECTION INTRAMUSCULAR; INTRAVENOUS EVERY 6 HOURS PRN
Status: DISCONTINUED | OUTPATIENT
Start: 2024-07-16 | End: 2024-07-22 | Stop reason: HOSPADM

## 2024-07-16 RX ORDER — METOPROLOL TARTRATE 1 MG/ML
2.5 INJECTION, SOLUTION INTRAVENOUS ONCE
Status: COMPLETED | OUTPATIENT
Start: 2024-07-16 | End: 2024-07-16

## 2024-07-16 RX ORDER — DIGOXIN 0.25 MG/ML
250 INJECTION INTRAMUSCULAR; INTRAVENOUS ONCE
Status: COMPLETED | OUTPATIENT
Start: 2024-07-16 | End: 2024-07-16

## 2024-07-16 RX ORDER — HALOPERIDOL 5 MG/ML
0.5 INJECTION INTRAMUSCULAR EVERY 8 HOURS PRN
Status: DISCONTINUED | OUTPATIENT
Start: 2024-07-16 | End: 2024-07-22 | Stop reason: HOSPADM

## 2024-07-16 RX ORDER — POTASSIUM CHLORIDE 20 MEQ/1
40 TABLET, EXTENDED RELEASE ORAL EVERY 4 HOURS
Qty: 4 TABLET | Refills: 0 | Status: COMPLETED | OUTPATIENT
Start: 2024-07-16 | End: 2024-07-17

## 2024-07-16 RX ORDER — POTASSIUM CHLORIDE 7.45 MG/ML
10 INJECTION INTRAVENOUS
Qty: 600 ML | Refills: 0 | Status: COMPLETED | OUTPATIENT
Start: 2024-07-16 | End: 2024-07-16

## 2024-07-16 RX ORDER — ALBUMIN (HUMAN) 12.5 G/50ML
25 SOLUTION INTRAVENOUS ONCE
Status: COMPLETED | OUTPATIENT
Start: 2024-07-16 | End: 2024-07-16

## 2024-07-16 RX ORDER — DEXTROSE MONOHYDRATE 25 G/50ML
25 INJECTION, SOLUTION INTRAVENOUS
Status: DISCONTINUED | OUTPATIENT
Start: 2024-07-16 | End: 2024-07-17 | Stop reason: SDUPTHER

## 2024-07-16 RX ORDER — PANTOPRAZOLE SODIUM 40 MG/10ML
40 INJECTION, POWDER, LYOPHILIZED, FOR SOLUTION INTRAVENOUS EVERY 12 HOURS SCHEDULED
Status: DISCONTINUED | OUTPATIENT
Start: 2024-07-16 | End: 2024-07-20

## 2024-07-16 RX ORDER — IBUPROFEN 600 MG/1
1 TABLET ORAL
Status: DISCONTINUED | OUTPATIENT
Start: 2024-07-16 | End: 2024-07-22 | Stop reason: HOSPADM

## 2024-07-16 RX ORDER — MAGNESIUM SULFATE HEPTAHYDRATE 40 MG/ML
2 INJECTION, SOLUTION INTRAVENOUS
Status: CANCELLED | OUTPATIENT
Start: 2024-07-16 | End: 2024-07-16

## 2024-07-16 RX ORDER — TEMAZEPAM 15 MG/1
15 CAPSULE ORAL NIGHTLY PRN
Status: ACTIVE | OUTPATIENT
Start: 2024-07-16 | End: 2024-07-21

## 2024-07-16 RX ORDER — INSULIN LISPRO 100 [IU]/ML
2-7 INJECTION, SOLUTION INTRAVENOUS; SUBCUTANEOUS
Status: DISCONTINUED | OUTPATIENT
Start: 2024-07-16 | End: 2024-07-22 | Stop reason: HOSPADM

## 2024-07-16 RX ADMIN — VANCOMYCIN HYDROCHLORIDE 1000 MG: 1 INJECTION, POWDER, LYOPHILIZED, FOR SOLUTION INTRAVENOUS at 08:26

## 2024-07-16 RX ADMIN — POTASSIUM CHLORIDE 10 MEQ: 7.46 INJECTION, SOLUTION INTRAVENOUS at 07:46

## 2024-07-16 RX ADMIN — DOXYCYCLINE 100 MG: 100 INJECTION, POWDER, LYOPHILIZED, FOR SOLUTION INTRAVENOUS at 21:13

## 2024-07-16 RX ADMIN — PIPERACILLIN AND TAZOBACTAM 3.38 G: 3; .375 INJECTION, POWDER, LYOPHILIZED, FOR SOLUTION INTRAVENOUS at 19:03

## 2024-07-16 RX ADMIN — MAGNESIUM SULFATE HEPTAHYDRATE 2 G: 2 INJECTION, SOLUTION INTRAVENOUS at 10:31

## 2024-07-16 RX ADMIN — LIDOCAINE 1 PATCH: 4 PATCH TOPICAL at 19:12

## 2024-07-16 RX ADMIN — POTASSIUM CHLORIDE 10 MEQ: 7.46 INJECTION, SOLUTION INTRAVENOUS at 10:35

## 2024-07-16 RX ADMIN — POTASSIUM CHLORIDE 40 MEQ: 1500 TABLET, EXTENDED RELEASE ORAL at 21:08

## 2024-07-16 RX ADMIN — PANTOPRAZOLE SODIUM 40 MG: 40 INJECTION, POWDER, LYOPHILIZED, FOR SOLUTION INTRAVENOUS at 21:10

## 2024-07-16 RX ADMIN — HYDROCODONE BITARTRATE AND ACETAMINOPHEN 1 TABLET: 5; 325 TABLET ORAL at 22:17

## 2024-07-16 RX ADMIN — FUROSEMIDE 60 MG: 10 INJECTION, SOLUTION INTRAMUSCULAR; INTRAVENOUS at 00:23

## 2024-07-16 RX ADMIN — Medication 12.5 MG: at 10:10

## 2024-07-16 RX ADMIN — MAGNESIUM SULFATE HEPTAHYDRATE 2 G: 2 INJECTION, SOLUTION INTRAVENOUS at 13:17

## 2024-07-16 RX ADMIN — LEVOTHYROXINE SODIUM 125 MCG: 125 TABLET ORAL at 06:26

## 2024-07-16 RX ADMIN — GABAPENTIN 100 MG: 100 CAPSULE ORAL at 08:26

## 2024-07-16 RX ADMIN — DIGOXIN 250 MCG: 0.25 INJECTION INTRAMUSCULAR; INTRAVENOUS at 07:37

## 2024-07-16 RX ADMIN — Medication 10 ML: at 21:05

## 2024-07-16 RX ADMIN — BUDESONIDE AND FORMOTEROL FUMARATE DIHYDRATE 1 PUFF: 160; 4.5 AEROSOL RESPIRATORY (INHALATION) at 19:36

## 2024-07-16 RX ADMIN — PIPERACILLIN AND TAZOBACTAM 3.38 G: 3; .375 INJECTION, POWDER, LYOPHILIZED, FOR SOLUTION INTRAVENOUS at 01:30

## 2024-07-16 RX ADMIN — Medication 10 ML: at 08:27

## 2024-07-16 RX ADMIN — DIGOXIN 250 MCG: 0.25 INJECTION INTRAMUSCULAR; INTRAVENOUS at 03:51

## 2024-07-16 RX ADMIN — MAGNESIUM SULFATE HEPTAHYDRATE 2 G: 2 INJECTION, SOLUTION INTRAVENOUS at 08:10

## 2024-07-16 RX ADMIN — POTASSIUM CHLORIDE 10 MEQ: 7.46 INJECTION, SOLUTION INTRAVENOUS at 11:47

## 2024-07-16 RX ADMIN — HYDROCODONE BITARTRATE AND ACETAMINOPHEN 1 TABLET: 5; 325 TABLET ORAL at 16:08

## 2024-07-16 RX ADMIN — Medication 5 MG: at 22:17

## 2024-07-16 RX ADMIN — METOPROLOL TARTRATE 2.5 MG: 5 INJECTION INTRAVENOUS at 04:41

## 2024-07-16 RX ADMIN — GABAPENTIN 100 MG: 100 CAPSULE ORAL at 21:07

## 2024-07-16 RX ADMIN — DOXYCYCLINE 100 MG: 100 INJECTION, POWDER, LYOPHILIZED, FOR SOLUTION INTRAVENOUS at 16:09

## 2024-07-16 RX ADMIN — INSULIN LISPRO 2 UNITS: 100 INJECTION, SOLUTION INTRAVENOUS; SUBCUTANEOUS at 21:03

## 2024-07-16 RX ADMIN — HYDROCODONE BITARTRATE AND ACETAMINOPHEN 1 TABLET: 5; 325 TABLET ORAL at 08:26

## 2024-07-16 RX ADMIN — PANTOPRAZOLE SODIUM 40 MG: 40 INJECTION, POWDER, LYOPHILIZED, FOR SOLUTION INTRAVENOUS at 10:41

## 2024-07-16 RX ADMIN — POTASSIUM CHLORIDE 10 MEQ: 7.46 INJECTION, SOLUTION INTRAVENOUS at 09:20

## 2024-07-16 RX ADMIN — IPRATROPIUM BROMIDE AND ALBUTEROL SULFATE 3 ML: 2.5; .5 SOLUTION RESPIRATORY (INHALATION) at 08:12

## 2024-07-16 RX ADMIN — BUDESONIDE AND FORMOTEROL FUMARATE DIHYDRATE 1 PUFF: 160; 4.5 AEROSOL RESPIRATORY (INHALATION) at 08:13

## 2024-07-16 RX ADMIN — ALBUMIN (HUMAN) 25 G: 0.25 INJECTION, SOLUTION INTRAVENOUS at 07:37

## 2024-07-16 RX ADMIN — IPRATROPIUM BROMIDE AND ALBUTEROL SULFATE 3 ML: 2.5; .5 SOLUTION RESPIRATORY (INHALATION) at 13:02

## 2024-07-16 RX ADMIN — Medication 12.5 MG: at 06:26

## 2024-07-16 RX ADMIN — PIPERACILLIN AND TAZOBACTAM 3.38 G: 3; .375 INJECTION, POWDER, LYOPHILIZED, FOR SOLUTION INTRAVENOUS at 10:26

## 2024-07-16 RX ADMIN — METOPROLOL TARTRATE 25 MG: 25 TABLET, FILM COATED ORAL at 21:06

## 2024-07-16 RX ADMIN — POTASSIUM CHLORIDE 10 MEQ: 7.46 INJECTION, SOLUTION INTRAVENOUS at 13:18

## 2024-07-16 RX ADMIN — ALLOPURINOL 300 MG: 300 TABLET ORAL at 08:28

## 2024-07-16 RX ADMIN — POTASSIUM CHLORIDE 10 MEQ: 7.46 INJECTION, SOLUTION INTRAVENOUS at 15:05

## 2024-07-16 RX ADMIN — IPRATROPIUM BROMIDE AND ALBUTEROL SULFATE 3 ML: 2.5; .5 SOLUTION RESPIRATORY (INHALATION) at 19:36

## 2024-07-16 NOTE — CASE MANAGEMENT/SOCIAL WORK
Discharge Planning Assessment  [facility]     Patient Name: Charly Blevins  MRN: [id number]  Today's Date: 7/16/2024    Admit Date: 7/15/2024    Plan: Home   Discharge Needs Assessment       Row Name 07/16/24 1545       Living Environment    People in Home alone;other (see comments)  Caregivers during the day    Primary Care Provided by self    Provides Primary Care For no one, unable/limited ability to care for self    Family Caregiver if Needed child(buddy), adult    Family Caregiver Names Alexandra Rodriguez-daughter [phone]    Quality of Family Relationships helpful;involved;supportive    Able to Return to Prior Arrangements yes       Transition Planning    Patient/Family Anticipates Transition to home    Patient/Family Anticipated Services at Transition     Transportation Anticipated family or friend will provide       Discharge Needs Assessment    Equipment Currently Used at Home bp cuff;cane, straight;pulse ox;oxygen;respiratory supplies;wheelchair;rollator                   Discharge Plan       Row Name 07/16/24 1541       Plan    Plan Home    Patient/Family in Agreement with Plan yes    Plan Comments Ms. Blevins is confused. Called and spoke with her daughter Alexandra Rodriguez, to initiate discharge planning. She reported her mother lives alone in [facility] and her son lives with her part-time. Ms. Blevins has Medicare and [employer] insurance, with prescription coverage. She uses [facility] to get her prescriptions filled. Her PCP is Patel Adan DO. Prior to admission, she required assistance with ADL's. Reported she has caregivers during the day. She has a rollator, straight cane, wheelchair, pulse ox, and a blood pressure cuff. She has oxygen at home ([employer]) in case she needs it. Ms. Blevins is not current with home health. The goal is for Ms. Blevins is to go back home at discharge. Family will transport her. Await therapy recommendations to determine  proper discharge placement or plan. CM will continue to follow.    Final Discharge Disposition Code 01 - home or self-care                  Continued Care and Services - Admitted Since 7/15/2024    No active coordination exists for this encounter.       Expected Discharge Date and Time       Expected Discharge Date Expected Discharge Time    Jul 19, 2024            Demographic Summary       Row Name 07/16/24 1544       General Information    Admission Type inpatient    Arrived From emergency department    Referral Source admission list    Reason for Consult discharge planning    Preferred Language English       Contact Information    Permission Granted to Share Info With     Contact Information Obtained for                    Functional Status    No documentation.                  Psychosocial    No documentation.                  Abuse/Neglect    No documentation.                  Legal    No documentation.                  Substance Abuse    No documentation.                  Patient Forms    No documentation.                     Mey Triana RN

## 2024-07-16 NOTE — PROGRESS NOTES
[facility] Medicine Services  PROGRESS NOTE    Patient Name: Charly Blevins  : 1955  MRN: [id number]    Date of Admission: 7/15/2024  Primary Care Physician: Patel Evans MD    Subjective   Subjective     CC:  Confusion, hypoxia, n/v/d, anemia    HPI:  Feeling better, dyspnea improved. No abd pain now. No emesis. Last bm dark and was yesterday      Objective   Objective     Vital Signs:   Temp:  [97.4 °F (36.3 °C)-99.1 °F (37.3 °C)] 98 °F (36.7 °C)  Heart Rate:  [] 114  Resp:  [16-25] 19  BP: ()/(58-97) 105/78  Flow (L/min):  [3-55] 3     Physical Exam:  Constitutional:Alert, oriented to year, month, place  Psych:Normal/appropriate affect, currently calm  HEENT:NCAT, oropharynx clear  Neck: neck supple, full range of motion  Neuro: Face symmetric, speech clear, equal , moves all extremities  Cardiac irr irr, tachycardic; No pretibial pitting edema  Resp: CTAB, normal effort  GI: abd soft, nontender to palpation  Skin: No extremity rash  Musculoskeletal/extremities: no cyanosis of extremities; no significant ankle edema  In soft restraints          Results Reviewed:  LAB RESULTS:      Lab 24  0516 07/15/24  1639 07/15/24  1447   WBC 10.50  --  11.31*   HEMOGLOBIN 9.7*  --  7.6*   HEMATOCRIT 33.2*  --  27.0*   PLATELETS 367  --  373   NEUTROS ABS 8.11*  --  9.25*   IMMATURE GRANS (ABS) 0.07*  --  0.08*   LYMPHS ABS 1.04  --  0.98   MONOS ABS 1.08*  --  0.94*   EOS ABS 0.19  --  0.04   MCV 84.1  --  84.1   SED RATE  --   --  31*   CRP  --  14.75*  --    PROCALCITONIN  --   --  12.49*   LACTATE  --   --  1.7         Lab 24  0516 07/15/24  1447   SODIUM 143 139   POTASSIUM 3.0* 3.6   CHLORIDE 100 100   CO2 33.0* 29.0   ANION GAP 10.0 10.0   BUN 10 16   CREATININE 0.62 0.68   EGFR 96.5 94.4   GLUCOSE 96 174*   CALCIUM 7.1* 7.7*   MAGNESIUM 1.3* 1.8   HEMOGLOBIN A1C  --  6.10*   TSH 1.890  --          Lab 24  0516 07/15/24  1447   TOTAL  PROTEIN 5.8* 5.1*   ALBUMIN 2.6* 2.7*   GLOBULIN 3.2 2.4   ALT (SGPT) 12 12   AST (SGOT) 21 18   BILIRUBIN 1.2 0.5   ALK PHOS 64 71         Lab 07/15/24  1639 07/15/24  1447   PROBNP  --  8,823.0*   HSTROP T 52* 61*             Lab 07/15/24  1639   ABO TYPING A   RH TYPING Positive   ANTIBODY SCREEN Negative         Lab 07/16/24  0513 07/15/24  1511   PH, ARTERIAL 7.590* 7.486*   PCO2, ARTERIAL 34.2* 39.5   PO2 .0* 65.9*   FIO2 45 44   HCO3 ART 32.8* 29.8*   BASE EXCESS ART 10.5* 5.9*   CARBOXYHEMOGLOBIN 1.7 1.7     Brief Urine Lab Results  (Last result in the past 365 days)        Color   Clarity   Blood   Leuk Est   Nitrite   Protein   CREAT   Urine HCG        07/15/24 1440 Yellow   Clear   Negative   Negative   Negative   Negative                   Microbiology Results Abnormal       Procedure Component Value - Date/Time    MRSA Screen, PCR (Inpatient) - Swab, Nares [[id number]]  (Normal) Collected: 07/15/24 1851    Lab Status: Final result Specimen: Swab from Nares Updated: 07/15/24 2010     MRSA PCR Negative    Narrative:      The negative predictive value of this diagnostic test is high and should only be used to consider de-escalating anti-MRSA therapy. A positive result may indicate colonization with MRSA and must be correlated clinically.  MRSA Negative            XR Chest 1 View    Result Date: 7/16/2024  XR CHEST 1 VW Date of Exam: 7/16/2024 7:31 AM EDT Indication: hypoxia, chf (after diuresis) Comparison: 7/15/2024 Findings: Implanted loop recorder is again noted. The heart is enlarged. Prominent mediastinal shadow is in part due to rotation of the patient to the right on today's exam, as well as significant superior mediastinal fat as seen on yesterday's chest CT scan. Mild  patchy disease in the right midlung is improving, now appearing as linear atelectasis. There is mild pulmonary venous hypertension without evidence of congestive failure, and mild nonspecific interstitial lung changes appear  stable. No effusion or pneumothorax is seen.     Impression: Impression: 1. Cardiomegaly and mild pulmonary venous hypertension. 2. Mild remaining right midlung discoid atelectasis. No new chest disease is seen. Electronically Signed: Heath Prado MD  7/16/2024 8:02 AM EDT  Workstation ID: [id number]    XR Chest 1 View    Result Date: 7/15/2024  XR CHEST 1 VW Date of Exam: 7/15/2024 3:53 PM EDT Indication: Weak/Dizzy/AMS triage protocol Comparison: Chest CT performed on the same day. Findings: Mild linear atelectasis in the right upper and middle lobes is visualized. There is mild elevation of the right hemidiaphragm. There is no evidence of pneumothorax. There is mild pulmonary vascular congestion. Cardiac silhouette is enlarged. Loop recorder overlies the lower thoracic spine. No acute osseous abnormality identified.     Impression: Impression: Mild linear atelectasis in the right upper and middle lobes. Findings compatible with mild CHF. Electronically Signed: Jay Andrew MD  7/15/2024 4:21 PM EDT  Workstation ID: [id number]    Duplex Venous Lower Extremity - RIGHT    Result Date: 7/15/2024    Normal right lower extremity venous duplex scan.     CT Head Without Contrast    Result Date: 7/15/2024  CT HEAD WO CONTRAST, CT ABDOMEN PELVIS WO CONTRAST, CT CHEST WO CONTRAST DIAGNOSTIC Date of Exam: 7/15/2024 2:24 PM EDT Indication: AMS. Comparison: Previous head CT scan 1/19/2023. Angiographic chest CT scan 1/20/2023. No prior abdominal scan. Technique: Axial CT images were obtained of the head, chest abdomen pelvis without contrast administration.  Automated exposure control and iterative construction methods were used. Findings: No additional clinical history is currently available. CT SCAN OF THE HEAD WITHOUT CONTRAST: The calvarium appears intact. Included paranasal sinuses and mastoids appear clear. There is expected degree of generalized cerebral atrophy for the patient's age. No hemorrhage, contusion, edema, or  "infarct is seen.  There is no evidence of mass or mass effect, hydrocephalus, or abnormal extra-axial collection.     Impression: No evidence of acute intracranial disease. CT SCAN OF THE CHEST WITHOUT CONTRAST: Axial images 20 through 46 show multiple well-defined hyperdense rounded lesions within and dorsal to the right pectoralis minor, resembling soft tissue masses, but similar to density of acute blood, and with the lesion on image #27 appearing to show a potential hematocrit level, possibly all intramuscular hematoma. The largest of these measures approximately 3 cm in maximal diameter. No similar findings are seen elsewhere and no underlying rib fractures identified. The heart is enlarged. No pericardial effusion or mediastinal adenopathy is seen. There is trace bilateral pleural effusion. No coronary calcification is seen. Images of the lungs appear to show prominent pulmonary vasculature and mild diffuse interstitial disease suggesting early interstitial edema. No particular focal pulmonary disease is seen to favor a pneumonia although a small focus of pneumonia. Bony structures appear to be intact. IMPRESSION: 1. 4 or 5 adjacent rounded hyperdense \"masses\" within and along the right pectoralis minor muscle, each between 2 and 3 cm in diameter, and favored to represent hematoma/intramuscular hemorrhage, rather than neoplasm. Please correlate with any history of  trauma here. 2. Minimal bibasilar pleural effusion, and small area of right upper lobe discoid atelectasis. No particular features to suggest pneumonia. 3. Cardiomegaly and mild pulmonary vascular congestion, perhaps with early interstitial edema. ABDOMEN AND PELVIS CT SCAN WITHOUT CONTRAST: An area of slightly nodular left lower breast tissue is unchanged from 1/20/2023. No abdominal wall or retroperitoneal hematoma is seen. Clips are seen in the gallbladder fossa. No significant abnormalities are appreciated of the liver, pancreas, adrenal glands, " or kidneys for non-IV contrast enhanced exam. Multiple splenic lesions, presumably cysts, are stable from the prior exam, the largest measuring water density, 3 cm in diameter image 33 series 2. No upper abdominal free air, ascites, adenopathy, or acute inflammatory change is seen. Bowel loops are nondistended. Regarding the lower abdomen and pelvis, there is evidence of previous extensive bowel surgery. Terminal ileum and cecum appear grossly normal. Appendix is not identified. There appears to be a distal ileal suture line, and suture line at the rectosigmoid  junction. These areas are clear of mass or inflammatory change. No intrapelvic free fluid mass or adenopathy is seen. Bladder is normally distended and normal in appearance. Uterus and ovaries are not identified, whether atrophic or surgically absent. Streak artifact is seen from the patient's right hip fixation hardware. There is an old healed right inferior pubic ramus fracture but no acute bony abnormality is seen. IMPRESSION: 1. No evidence of acute inflammatory process or other clearly acute intra-abdominal or intrapelvic disease. 2. Evidence of previous bowel surgery. No evidence of significant ileus or obstruction. Electronically Signed: eHath Prado MD  7/15/2024 2:53 PM EDT  Workstation ID: [id number]    CT Chest Without Contrast Diagnostic    Result Date: 7/15/2024  CT HEAD WO CONTRAST, CT ABDOMEN PELVIS WO CONTRAST, CT CHEST WO CONTRAST DIAGNOSTIC Date of Exam: 7/15/2024 2:24 PM EDT Indication: AMS. Comparison: Previous head CT scan 1/19/2023. Angiographic chest CT scan 1/20/2023. No prior abdominal scan. Technique: Axial CT images were obtained of the head, chest abdomen pelvis without contrast administration.  Automated exposure control and iterative construction methods were used. Findings: No additional clinical history is currently available. CT SCAN OF THE HEAD WITHOUT CONTRAST: The calvarium appears intact. Included paranasal sinuses and  "mastoids appear clear. There is expected degree of generalized cerebral atrophy for the patient's age. No hemorrhage, contusion, edema, or infarct is seen.  There is no evidence of mass or mass effect, hydrocephalus, or abnormal extra-axial collection.     Impression: No evidence of acute intracranial disease. CT SCAN OF THE CHEST WITHOUT CONTRAST: Axial images 20 through 46 show multiple well-defined hyperdense rounded lesions within and dorsal to the right pectoralis minor, resembling soft tissue masses, but similar to density of acute blood, and with the lesion on image #27 appearing to show a potential hematocrit level, possibly all intramuscular hematoma. The largest of these measures approximately 3 cm in maximal diameter. No similar findings are seen elsewhere and no underlying rib fractures identified. The heart is enlarged. No pericardial effusion or mediastinal adenopathy is seen. There is trace bilateral pleural effusion. No coronary calcification is seen. Images of the lungs appear to show prominent pulmonary vasculature and mild diffuse interstitial disease suggesting early interstitial edema. No particular focal pulmonary disease is seen to favor a pneumonia although a small focus of pneumonia. Bony structures appear to be intact. IMPRESSION: 1. 4 or 5 adjacent rounded hyperdense \"masses\" within and along the right pectoralis minor muscle, each between 2 and 3 cm in diameter, and favored to represent hematoma/intramuscular hemorrhage, rather than neoplasm. Please correlate with any history of  trauma here. 2. Minimal bibasilar pleural effusion, and small area of right upper lobe discoid atelectasis. No particular features to suggest pneumonia. 3. Cardiomegaly and mild pulmonary vascular congestion, perhaps with early interstitial edema. ABDOMEN AND PELVIS CT SCAN WITHOUT CONTRAST: An area of slightly nodular left lower breast tissue is unchanged from 1/20/2023. No abdominal wall or retroperitoneal " hematoma is seen. Clips are seen in the gallbladder fossa. No significant abnormalities are appreciated of the liver, pancreas, adrenal glands, or kidneys for non-IV contrast enhanced exam. Multiple splenic lesions, presumably cysts, are stable from the prior exam, the largest measuring water density, 3 cm in diameter image 33 series 2. No upper abdominal free air, ascites, adenopathy, or acute inflammatory change is seen. Bowel loops are nondistended. Regarding the lower abdomen and pelvis, there is evidence of previous extensive bowel surgery. Terminal ileum and cecum appear grossly normal. Appendix is not identified. There appears to be a distal ileal suture line, and suture line at the rectosigmoid  junction. These areas are clear of mass or inflammatory change. No intrapelvic free fluid mass or adenopathy is seen. Bladder is normally distended and normal in appearance. Uterus and ovaries are not identified, whether atrophic or surgically absent. Streak artifact is seen from the patient's right hip fixation hardware. There is an old healed right inferior pubic ramus fracture but no acute bony abnormality is seen. IMPRESSION: 1. No evidence of acute inflammatory process or other clearly acute intra-abdominal or intrapelvic disease. 2. Evidence of previous bowel surgery. No evidence of significant ileus or obstruction. Electronically Signed: Heath Prado MD  7/15/2024 2:53 PM EDT  Workstation ID: [id number]    CT Abdomen Pelvis Without Contrast    Result Date: 7/15/2024  CT HEAD WO CONTRAST, CT ABDOMEN PELVIS WO CONTRAST, CT CHEST WO CONTRAST DIAGNOSTIC Date of Exam: 7/15/2024 2:24 PM EDT Indication: AMS. Comparison: Previous head CT scan 1/19/2023. Angiographic chest CT scan 1/20/2023. No prior abdominal scan. Technique: Axial CT images were obtained of the head, chest abdomen pelvis without contrast administration.  Automated exposure control and iterative construction methods were used. Findings: No additional  "clinical history is currently available. CT SCAN OF THE HEAD WITHOUT CONTRAST: The calvarium appears intact. Included paranasal sinuses and mastoids appear clear. There is expected degree of generalized cerebral atrophy for the patient's age. No hemorrhage, contusion, edema, or infarct is seen.  There is no evidence of mass or mass effect, hydrocephalus, or abnormal extra-axial collection.     Impression: No evidence of acute intracranial disease. CT SCAN OF THE CHEST WITHOUT CONTRAST: Axial images 20 through 46 show multiple well-defined hyperdense rounded lesions within and dorsal to the right pectoralis minor, resembling soft tissue masses, but similar to density of acute blood, and with the lesion on image #27 appearing to show a potential hematocrit level, possibly all intramuscular hematoma. The largest of these measures approximately 3 cm in maximal diameter. No similar findings are seen elsewhere and no underlying rib fractures identified. The heart is enlarged. No pericardial effusion or mediastinal adenopathy is seen. There is trace bilateral pleural effusion. No coronary calcification is seen. Images of the lungs appear to show prominent pulmonary vasculature and mild diffuse interstitial disease suggesting early interstitial edema. No particular focal pulmonary disease is seen to favor a pneumonia although a small focus of pneumonia. Bony structures appear to be intact. IMPRESSION: 1. 4 or 5 adjacent rounded hyperdense \"masses\" within and along the right pectoralis minor muscle, each between 2 and 3 cm in diameter, and favored to represent hematoma/intramuscular hemorrhage, rather than neoplasm. Please correlate with any history of  trauma here. 2. Minimal bibasilar pleural effusion, and small area of right upper lobe discoid atelectasis. No particular features to suggest pneumonia. 3. Cardiomegaly and mild pulmonary vascular congestion, perhaps with early interstitial edema. ABDOMEN AND PELVIS CT SCAN " WITHOUT CONTRAST: An area of slightly nodular left lower breast tissue is unchanged from 1/20/2023. No abdominal wall or retroperitoneal hematoma is seen. Clips are seen in the gallbladder fossa. No significant abnormalities are appreciated of the liver, pancreas, adrenal glands, or kidneys for non-IV contrast enhanced exam. Multiple splenic lesions, presumably cysts, are stable from the prior exam, the largest measuring water density, 3 cm in diameter image 33 series 2. No upper abdominal free air, ascites, adenopathy, or acute inflammatory change is seen. Bowel loops are nondistended. Regarding the lower abdomen and pelvis, there is evidence of previous extensive bowel surgery. Terminal ileum and cecum appear grossly normal. Appendix is not identified. There appears to be a distal ileal suture line, and suture line at the rectosigmoid  junction. These areas are clear of mass or inflammatory change. No intrapelvic free fluid mass or adenopathy is seen. Bladder is normally distended and normal in appearance. Uterus and ovaries are not identified, whether atrophic or surgically absent. Streak artifact is seen from the patient's right hip fixation hardware. There is an old healed right inferior pubic ramus fracture but no acute bony abnormality is seen. IMPRESSION: 1. No evidence of acute inflammatory process or other clearly acute intra-abdominal or intrapelvic disease. 2. Evidence of previous bowel surgery. No evidence of significant ileus or obstruction. Electronically Signed: Heath Prado MD  7/15/2024 2:53 PM EDT  Workstation ID: [id number]     Results for orders placed during the hospital encounter of 12/17/22    Adult Transthoracic Echo Complete w/ Color, Spectral and Contrast if Necessary Per Protocol    Interpretation Summary    Left ventricular systolic function is normal. Calculated left ventricular EF = 59.5%    Left ventricular wall thickness is consistent with mild concentric hypertrophy.    Left ventricular  diastolic function is consistent with (grade II w/high LAP) pseudonormalization.    Left atrial volume is mildly increased.    There is calcification of the aortic valve.    Estimated right ventricular systolic pressure from tricuspid regurgitation is normal (<35 mmHg). Calculated right ventricular systolic pressure from tricuspid regurgitation is 27 mmHg.    Mild dilation of the ascending aorta is present.  4.1 cm      Current medications:  Scheduled Meds:allopurinol, 300 mg, Oral, Daily  budesonide-formoterol, 1 puff, Inhalation, BID - RT  doxycycline, 100 mg, Intravenous, BID  gabapentin, 100 mg, Oral, Q12H  insulin regular, 2-7 Units, Subcutaneous, Q6H  ipratropium-albuterol, 3 mL, Nebulization, Q6H While Awake - RT  levothyroxine, 125 mcg, Oral, QAM  Lidocaine, 1 patch, Transdermal, Q24H  magnesium sulfate, 2 g, Intravenous, Q2H  melatonin, 5 mg, Oral, Nightly  metoprolol tartrate, 12.5 mg, Oral, Once  metoprolol tartrate, 25 mg, Oral, BID  pantoprazole, 40 mg, Intravenous, Q12H  piperacillin-tazobactam, 3.375 g, Intravenous, Q8H  potassium chloride, 10 mEq, Intravenous, Q1H  sodium chloride, 10 mL, Intravenous, Q12H      Continuous Infusions:Pharmacy to dose vancomycin,       PRN Meds:.  acetaminophen **OR** acetaminophen **OR** acetaminophen    senna-docusate sodium **AND** polyethylene glycol **AND** bisacodyl **AND** bisacodyl    dextrose    dextrose    glucagon (human recombinant)    haloperidol lactate    HYDROcodone-acetaminophen    Magnesium Cardiology Dose Replacement - Follow Nurse / BPA Driven Protocol    midazolam    nitroglycerin    ondansetron    Pharmacy to dose vancomycin    Potassium Replacement - Follow Nurse / BPA Driven Protocol    sodium chloride    sodium chloride    sodium chloride    temazepam    Assessment & Plan   Assessment & Plan     Active Hospital Problems    Diagnosis  POA    **Symptomatic anemia [D64.9]  Yes    Acute respiratory failure with hypoxia [J96.01]  Unknown    Acute on  chronic heart failure with preserved ejection fraction [I50.33]  Unknown    Gout [M10.9]  Unknown    Mixed hyperlipidemia [E78.2]  Unknown    Hypothyroidism (acquired) [E03.9]  Yes    A-fib [I48.91]  Yes    Type 2 diabetes mellitus, without long-term current use of insulin [E11.9]  Yes    Primary hypertension [I10]  Yes    Rheumatoid arthritis involving multiple sites [M06.9]  Yes      Resolved Hospital Problems   No resolved problems to display.        Brief Hospital Course to date:  Charly Blevins is a 68 y.o. female w/ hx afib (on eliquis), htn, dm2, hl, RA, hypothyroidism (on levothyroxine) who presented with fatigue, progressive dyspnea & fatigue. Reports had recently fallen and hit head on 7/13/24, became confused later that evening w/ some hallucinations as well as some right sided chest wall pain since the fall w/ some n/v/d (dark colored). In ED was hypoxic requiring hi flow canula to maintain sats >88%, afib w/ rvr. hgb 7.6, guaic +, elevated procal >12. Wbc 13,310. Probnp 8,823. Hs trop 61 (repeat 52). CXR suggested changes o pulm vascular congestion. CT head negative . CT chest suggested right pectoralis hematomas and chf changes. Ct a/p was negative for acute process. Received bumex 2mg iv in ed. Developed worsening RVR, developed marginal bp (borderline hypotension) and received 1 unit prbc. Received another lasix 60mg iv later the evening of admission. Also received digoxin after admission due to RVR      Acute on chronic hypoxic resp failure  Chronic 3L o2 dependence (due to previous influenza resp illness)  Bilateral interstitial infiltrates vs edema (on imaging)  -favor chf as etiology  -initially required hi flow canula  -covering for possible pneumonia as well  -ordering resp pcr panel  -after diuresis, improving (now down to 4Lnc, continue wean as tolerates)    Acute on chronic HF (likely HFpEF, current EF being investigated)  Afib w/ rvr  Elevated HS troponins  -previous echo 12/17/22: LV EF  59%, diastolic dysfunction  -CT chest w/o contrast: ~4 o 5 hyperdensities in right chest wall along right pectoralis minor muscle, favoring hematoma (rather than neoplasm); mild bibasilar pleural effusions, cardiomegaly and mild pulm vascular contgestion  -initial abg 7.28/39/65 (on 44% fio2)  -repeat abg 7.59/34/163 (on 45% tio2)  -tsh wnl  -repeat echo  -s/p digoxin 250mcg x 1 after admission; giving another digoxin 250mcg iv now  -s/p bumex 2mg iv in ed, another lasix 60mg iv after admission (diuresis   >5,700 cc thus far), hold further diuresis for now due to borderline hypotension (will also give 25g albumin x 1)  -increase metoprolol to 25mg po bid  -cards consulted (for chf, afib rvr), further afib  rate control and volume management per cards   -holding eliquis (gi bleed)    Acute symptomatic anemia  Gi bleed (dark stool, guaic +)  N/V/D  ct c/a/p without overt source infection  -send GI pcr panel  -u/a benign  **s/p 1 unit prbc (hgb 7.6 with symptoms of hypotension, dyspnea in setting of hypotension and afib rvr)  -continue q8h hgb (transfuse prn hgb <7 or symptoms)  -bid ppi  -GI consult  -holding eliquis    Elevated procal & leukocytosis  -unclear etiology  -ct c/a/p without overt source infection  -send GI pcr panel  -u/a benign  -resp mrsa pcr negative  -continue zosyn for now; stop vanc; add doxy  -follow cultures, resp pcr panel, blood cultures.     RLE edema  -venous duplex negative RLE for dvt    Encephalopathy  -ct head negative  -u/a benign  -? Due to acute illness, ? Sleep deprivation  -prn midazolama dn haldol prn; scheduled melatonin; prn nightly restoril      Hypokalemia and hypomagnesemia  -replace per protocol    Dm2   -A1c 6.1  -ssi    Hypothyroidism  -tsh wnl1.8; continue levothyroxine    Hx RA        Labs: q8h hgb  Am labs: cbc,bmp,mag (f/u echo)      -updated daughter Alexandra via phone      Expected Discharge Location and Transportation: TBD (pt/ot evals pending)  Expected Discharge    Expected Discharge Date: 7/19/2024; Expected Discharge Time:      VTE Prophylaxis:  Mechanical VTE prophylaxis orders are present.         AM-PAC 6 Clicks Score (PT): 14 (07/15/24 6163)    CODE STATUS:   Code Status and Medical Interventions:   Ordered at: 07/15/24 8431     Level Of Support Discussed With:    Patient     Code Status (Patient has no pulse and is not breathing):    CPR (Attempt to Resuscitate)     Medical Interventions (Patient has pulse or is breathing):    Full Support       Ino Grissom MD  07/16/24

## 2024-07-16 NOTE — NURSING NOTE
Notified MD about sustained increased , /77, temp 97.8. Orders to replace K and Mg per protocal. Orders to give a 1x dose of 250mcg digoxin, cardiologgy to be consulted this morning, Albumin x1.

## 2024-07-16 NOTE — CONSULTS
[facility] [place]    Referring Provider:  Ino Grissom MD     PCP: Patel Evans MD    Reason for Consultation: Anemia     Chief complaint: Shortness of breath     History of present illness:    Charly Blevins is a 68 y.o. female who is admitted with acute on chronic respiratory failure, CHF exacerbation and symptomatic anemia.   She has had several falls recently at home.   She has been confused, currently stating she is at home.   Labs showed normocytic anemia with H&H of 7.6 and 27.   She states her stools have been dark for several days.   She has intermittent nausea for which she takes Zofran as needed with relief.   She describes remote bowel resection related to complication of hysterectomy requiring temporary ileostomy.       She has history of Rheumatoid arthritis and is on chronic low dose Prednisone.       She is unsure of her last colonoscopy.       Allergies:  Patient has no known allergies.    Scheduled Meds:  allopurinol, 300 mg, Oral, Daily  budesonide-formoterol, 1 puff, Inhalation, BID - RT  doxycycline, 100 mg, Intravenous, BID  gabapentin, 100 mg, Oral, Q12H  insulin regular, 2-7 Units, Subcutaneous, Q6H  ipratropium-albuterol, 3 mL, Nebulization, Q6H While Awake - RT  levothyroxine, 125 mcg, Oral, QAM  Lidocaine, 1 patch, Transdermal, Q24H  magnesium sulfate, 2 g, Intravenous, Q2H  melatonin, 5 mg, Oral, Nightly  metoprolol tartrate, 25 mg, Oral, BID  pantoprazole, 40 mg, Intravenous, Q12H  piperacillin-tazobactam, 3.375 g, Intravenous, Q8H  potassium chloride, 10 mEq, Intravenous, Q1H  sodium chloride, 10 mL, Intravenous, Q12H         Infusions:       PRN Meds:    acetaminophen **OR** acetaminophen **OR** acetaminophen    senna-docusate sodium **AND** polyethylene glycol **AND** bisacodyl **AND** bisacodyl    dextrose    dextrose    glucagon (human recombinant)    haloperidol lactate    HYDROcodone-acetaminophen    Magnesium Cardiology Dose Replacement - Follow Nurse  / BPA Driven Protocol    midazolam    nitroglycerin    ondansetron    Potassium Replacement - Follow Nurse / BPA Driven Protocol    sodium chloride    sodium chloride    sodium chloride    temazepam    Home Meds:  Medications Prior to Admission   Medication Sig Dispense Refill Last Dose    acetaminophen (TYLENOL) 325 MG tablet Take 2 tablets by mouth Every 4 (Four) Hours As Needed for Mild Pain .   7/14/2024    allopurinol (ZYLOPRIM) 300 MG tablet Take 1 tablet by mouth Daily. Indications: Gout   7/14/2024    apixaban (ELIQUIS) 5 MG tablet tablet Take 1 tablet by mouth 2 (Two) Times a Day. Indications: Atrial Fibrillation   7/14/2024    colesevelam (WELCHOL) 625 MG tablet Take 2 tablets by mouth 2 (Two) Times a Day With Meals. Indications: High Amount of Fats in the Blood, Type 2 Diabetes   Past Month    dapagliflozin Propanediol (Farxiga) 10 MG tablet Take 10 mg by mouth Daily. Indications: Type 2 Diabetes   7/14/2024    famotidine (PEPCID) 20 MG tablet Take 1 tablet by mouth 2 (Two) Times a Day. 60 tablet 1 7/14/2024    FLUoxetine (PROzac) 20 MG capsule Take 1 capsule by mouth 2 (Two) Times a Day. Indications: Depression   7/14/2024    folic acid (FOLVITE) 400 MCG tablet Take 1 tablet by mouth Daily. OTC   7/14/2024    gabapentin (NEURONTIN) 100 MG capsule Take 1 capsule by mouth Every 12 (Twelve) Hours.   7/14/2024    HYDROcodone-acetaminophen (NORCO)  MG per tablet Take 1 tablet by mouth Every 6 (Six) Hours As Needed for Moderate Pain.  0 7/14/2024    levothyroxine (SYNTHROID, LEVOTHROID) 125 MCG tablet Take 1 tablet by mouth Every Morning.   7/14/2024    lidocaine (LIDODERM) 5 % Place 2 patches on the skin as directed by provider Daily. Remove & Discard patch within 12 hours or as directed by MD. Place on R posterior shoulder 30 each 0 Past Month    magnesium oxide (MAG-OX) 400 tablet tablet Take 2 tablets by mouth Daily. 60 each 0 7/14/2024    metFORMIN (GLUCOPHAGE) 1000 MG tablet Take 1 tablet by mouth  2 (Two) Times a Day With Meals. Indications: Type 2 Diabetes   7/14/2024    Methotrexate Sodium (methotrexate PF) 50 MG/2ML chemo syringe Inject 2 mL into the appropriate muscle as directed by prescriber 1 (One) Time Per Week. Every Tuesday (HOLD WHILE AT [facility]) 1.12 mL  Past Month    metoprolol tartrate (LOPRESSOR) 25 MG tablet Take 0.5 tablets by mouth 2 (Two) Times a Day. Indications: High Blood Pressure Disorder   7/14/2024    O2 (OXYGEN) Inhale 2 L/min Continuous.   7/15/2024    ondansetron (ZOFRAN) 4 MG tablet Take 1 tablet by mouth Every 8 (Eight) Hours As Needed for Nausea or Vomiting. 30 tablet 0 Past Week    predniSONE (DELTASONE) 5 MG tablet Take 1 tablet by mouth Daily. For 90 days, started 12-12-22 ongoing therapy  Indications: Rheumatoid Arthritis   7/14/2024    rosuvastatin (CRESTOR) 20 MG tablet Take 1 tablet by mouth Every Night. Indications: High Amount of Triglycerides in the Blood   7/14/2024    traZODone (DESYREL) 100 MG tablet Take 1 tablet by mouth Every Night. Indications: Trouble Sleeping   7/14/2024    zolpidem (AMBIEN) 10 MG tablet Take 0.5 tablets by mouth Every Night.   7/14/2024    Fluticasone Furoate-Vilanterol 100-25 MCG/ACT aerosol powder  Inhale 1 puff Daily. Indications: Chronic Obstructive Lung Disease   More than a month    INSULIN ASPART FLEXPEN SC Inject 1 Units under the skin into the appropriate area as directed 3 (Three) Times a Day.   More than a month    ipratropium-albuterol (DUO-NEB) 0.5-2.5 mg/3 ml nebulizer Take 3 mL by nebulization Every 6 (Six) Hours While Awake. 360 mL  More than a month    methocarbamol (Robaxin) 500 MG tablet Take 1 tablet by mouth 4 (Four) Times a Day After Meals & at Bedtime. Indications: Musculoskeletal Pain   More than a month    terbinafine (lamiSIL) 250 MG tablet Take 1 tablet by mouth 2 (Two) Times a Day. Indications: Fungal Toenail Disease   More than a month       ROS: Review of Systems   Constitutional:  Positive for fatigue.  "  Respiratory: Negative.     Cardiovascular: Negative.    Gastrointestinal:  Positive for blood in stool and nausea. Negative for abdominal pain.   Endocrine: Negative.    Genitourinary: Negative.    Musculoskeletal:  Positive for arthralgias.   Skin:  Positive for pallor.   Psychiatric/Behavioral:  Positive for confusion and hallucinations.        PAST MED HX:  Past Medical History:   Diagnosis Date    Anxiety and depression 12/17/2022    Atrial fibrillation 2015    CARDIOVERSION - NO REOCCURANCE SINCE     Diabetes mellitus     Gout 12/17/2022    Hyperlipidemia     Hypertension     Hypothyroidism 12/17/2022    Obesity (BMI 30-39.9) 12/17/2022       PAST SURG HX:  Past Surgical History:   Procedure Laterality Date    APPENDECTOMY      CHOLECYSTECTOMY      COLOSTOMY      RESULTED FROM HYSTERECTOMY     COLOSTOMY CLOSURE      WHILE DOING COLOSTOMY CLOSURE; ENDED UP HAVING A ILEOSTOMY    HEMORRHOIDECTOMY      HIP TROCHANTERIC NAILING WITH INTRAMEDULLARY HIP SCREW Right 3/27/2019    Procedure: HIP TROCANTERIC NAILING WITH INTRAMEDULLARY HIP SCREW RIGHT;  Surgeon: Jona Tom MD;  Location: [facility];  Service: Orthopedics    HYSTERECTOMY      LUMBAR DISCECTOMY      L4 - L5    OTHER SURGICAL HISTORY      TUBAL REVERSAL    THYROIDECTOMY      TUBAL ABDOMINAL LIGATION      X 2       FAM HX:  Family History   Problem Relation Age of Onset    Cancer Mother        SOC HX:  Social History     Socioeconomic History    Marital status:    Tobacco Use    Smoking status: Never    Smokeless tobacco: Never   Vaping Use    Vaping status: Never Used   Substance and Sexual Activity    Alcohol use: No    Drug use: No    Sexual activity: Defer       PHYSICAL EXAM  /86   Pulse (!) 131   Temp 98 °F (36.7 °C) (Oral)   Resp 19   Ht 167 cm (65.75\")   Wt 81.6 kg (179 lb 14.3 oz)   SpO2 96%   BMI 29.26 kg/m²   Wt Readings from Last 3 Encounters:   07/15/24 81.6 kg (179 lb 14.3 oz)   02/09/23 81.6 kg (180 lb) "   01/19/23 88.5 kg (195 lb)   ,body mass index is 29.26 kg/m².  Physical Exam  Constitutional:       Appearance: She is ill-appearing.   Cardiovascular:      Rate and Rhythm: Tachycardia present. Rhythm irregular.   Pulmonary:      Effort: Pulmonary effort is normal. No respiratory distress.      Comments: On 4 L O2   Abdominal:      General: Bowel sounds are normal. There is no distension.      Palpations: Abdomen is soft.      Tenderness: There is no abdominal tenderness. There is no guarding.   Skin:     Coloration: Skin is pale.      Findings: Bruising present.   Neurological:      Mental Status: She is alert.      Comments: Oriented to self and time.  When asked about location she states she is currently at home.         Results Review:   I reviewed the patient's new clinical results.    Lab Results   Component Value Date    WBC 10.50 07/16/2024    HGB 8.6 (L) 07/16/2024    HGB 9.7 (L) 07/16/2024    HGB 7.6 (L) 07/15/2024    HCT 29.7 (L) 07/16/2024    MCV 84.1 07/16/2024     07/16/2024       Lab Results   Component Value Date    INR 1.27 (H) 12/22/2022    INR 1.41 (H) 12/20/2022    INR 2.6 (H) 12/17/2022       Lab Results   Component Value Date    GLUCOSE 96 07/16/2024    BUN 10 07/16/2024    CREATININE 0.62 07/16/2024    EGFRIFNONA 82 04/01/2019    BCR 16.1 07/16/2024     07/16/2024    K 3.0 (L) 07/16/2024    CO2 33.0 (H) 07/16/2024    CALCIUM 7.1 (L) 07/16/2024    ALBUMIN 2.6 (L) 07/16/2024    ALKPHOS 64 07/16/2024    BILITOT 1.2 07/16/2024    ALT 12 07/16/2024    AST 21 07/16/2024       ASSESSMENTS/PLANS    Gastrointestinal bleeding with melena  Normocytic anemia due to blood loss   Metabolic encephalopathy   CHF exacerbation  Acute hypoxic respiratory failure   Hematomas after fall, right pectoralis minor muscle     >> Recommend EGD and Colonoscopy when medically optimized.   Patient currently in atrial fibrillation with RVR.  Cardiology evaluation pending.   She is being diuresed with IV  Lasix.     >> Empiric BID PPI   >> Serial H&H, transfuse for Hgb < 7     I discussed the patient's findings and my recommendations with patient    CLIFF Kumar  07/16/24  11:24 EDT

## 2024-07-16 NOTE — CONSULTS
[facility] Cardiology  Consultation H&P    Patient: Charly Blevins  1955    [address]  [place] [address]    PCP:  Patel Evans MD   Treatment Team:   Attending Provider: Ino Grissom MD  Consulting Physician: Santa Lovelace PA  Consulting Physician: Christopher Preston MD  Admitting Provider: Ino Grissom MD   7/15/2024    Primary cardiologist:    DATE OF CONSULTATION: 7/16/2024 12:14 EDT     IDENTIFICATION: A 68 y.o. female, , originally from [place]    REASON FOR CONSULTATION: Afib with RVR    PROBLEM LIST:  Afib with RVR, CV = 5  2015 ECV - no recurrence until 7/2024  Hx of ablation, dd  Loop recorder implant - battery depleted  Acute on chronic heart failure with preserved ejection fraction  12/22 Echo: EF 60%, mild LVH, grade II dd w/ high LAP, RVSP 27 mmHg, mild dilation of the ascending aorta 4.1 cm  7/24 CT chest wo: No CAC, trace bilateral pleural effusion  Symptomatic anemia  Primary hypertension  Type 2 diabetes mellitus, without long-term current use of insulin  7/24 A1c 6.1  Rheumatoid arthritis involving multiple sites  Hypothyroidism (acquired)  Acute respiratory failure with hypoxia  Gout  Mixed hyperlipidemia  Surgical history:  Appendectomy  Cholecystectomy  Hysterectomy (complication from colon surgery)  Colon resection  Hemorrhoidectomy  Right hip repair  Lumbar discectomy  Thyroidectomy       Allergies  No Known Allergies    Home Medications:  Current Outpatient Medications   Medication Instructions    acetaminophen (TYLENOL) 650 mg, Oral, Every 4 Hours PRN    allopurinol (ZYLOPRIM) 300 MG tablet 1 tablet, Oral, Daily    apixaban (ELIQUIS) 5 mg, Oral, 2 Times Daily    colesevelam (WELCHOL) 1,250 mg, Oral, 2 Times Daily With Meals    famotidine (PEPCID) 20 mg, Oral, 2 Times Daily    Farxiga 10 mg, Oral, Daily    FLUoxetine (PROZAC) 20 mg, Oral, 2 Times Daily    Fluticasone Furoate-Vilanterol 100-25 MCG/ACT aerosol powder  1 puff,  Inhalation, Daily    folic acid (FOLVITE) 400 mcg, Oral, Daily, OTC    gabapentin (NEURONTIN) 100 mg, Oral, Every 12 Hours Scheduled    HYDROcodone-acetaminophen (NORCO)  MG per tablet 1 tablet, Oral, Every 6 Hours PRN    INSULIN ASPART FLEXPEN SC 1 Units, Subcutaneous, 3 Times Daily    ipratropium-albuterol (DUO-NEB) 0.5-2.5 mg/3 ml nebulizer 3 mL, Nebulization, Every 6 Hours While Awake - RT    levothyroxine (SYNTHROID, LEVOTHROID) 125 mcg, Oral, Every Morning    lidocaine (LIDODERM) 5 % 2 patches, Transdermal, Every 24 Hours Scheduled, Remove & Discard patch within 12 hours or as directed by MD. Place on R posterior shoulder    magnesium oxide (MAG-OX) 800 mg, Oral, Daily    metFORMIN (GLUCOPHAGE) 1,000 mg, Oral, 2 Times Daily With Meals    methocarbamol (ROBAXIN) 500 mg, Oral, 4 Times Daily After Meals & at Bedtime    methotrexate PF 50 mg, Intramuscular, Weekly, Every Tuesday (HOLD WHILE AT [facility])    metoprolol tartrate (LOPRESSOR) 12.5 mg, Oral, 2 Times Daily    O2 (OXYGEN) 2 L/min, Inhalation, Continuous    ondansetron (ZOFRAN) 4 mg, Oral, Every 8 Hours PRN    predniSONE (DELTASONE) 5 mg, Oral, Daily, For 90 days, started 12-12-22 ongoing therapy    rosuvastatin (CRESTOR) 20 mg, Oral, Nightly    terbinafine (LAMISIL) 250 mg, Oral, 2 Times Daily    traZODone (DESYREL) 100 mg, Oral, Nightly    zolpidem (AMBIEN) 5 mg, Oral, Nightly        Current Medications    Current Facility-Administered Medications:     acetaminophen (TYLENOL) tablet 650 mg, 650 mg, Oral, Q4H PRN **OR** acetaminophen (TYLENOL) 160 MG/5ML oral solution 650 mg, 650 mg, Oral, Q4H PRN **OR** acetaminophen (TYLENOL) suppository 650 mg, 650 mg, Rectal, Q4H PRN, Rasheeda Ontiveros APRN    allopurinol (ZYLOPRIM) tablet 300 mg, 300 mg, Oral, Daily, Rasheeda Ontiveros APRN, 300 mg at 07/16/24 0828    sennosides-docusate (PERICOLACE) 8.6-50 MG per tablet 2 tablet, 2 tablet, Oral, BID PRN **AND** polyethylene glycol (MIRALAX) packet 17 g, 17 g, Oral,  Daily PRN **AND** bisacodyl (DULCOLAX) EC tablet 5 mg, 5 mg, Oral, Daily PRN **AND** bisacodyl (DULCOLAX) suppository 10 mg, 10 mg, Rectal, Daily PRN, Rasheeda Ontiveros APRN    budesonide-formoterol (SYMBICORT) 160-4.5 MCG/ACT inhaler 1 puff, 1 puff, Inhalation, BID - RT, Rasheeda Ontiveros APRN, 1 puff at 07/16/24 0813    dextrose (D50W) (25 g/50 mL) IV injection 25 g, 25 g, Intravenous, Q15 Min PRN, Rasheeda Ontiveros APRN    dextrose (GLUTOSE) oral gel 15 g, 15 g, Oral, Q15 Min PRN, Rasheeda Ontiveros APRN    doxycycline (VIBRAMYCIN) 100 mg in sodium chloride 0.9 % 100 mL MBP, 100 mg, Intravenous, BID, Ino Grissom MD    gabapentin (NEURONTIN) capsule 100 mg, 100 mg, Oral, Q12H, Rasheeda Ontiveros APRN, 100 mg at 07/16/24 0826    glucagon (GLUCAGEN) injection 1 mg, 1 mg, Intramuscular, Q15 Min PRN, Rasheeda Ontiveros APRN    haloperidol lactate (HALDOL) injection 0.5 mg, 0.5 mg, Intravenous, Q8H PRN, Ino Grissom MD    HYDROcodone-acetaminophen (NORCO) 5-325 MG per tablet 1 tablet, 1 tablet, Oral, Q6H PRN, Rasheeda Ontiveros APRN, 1 tablet at 07/16/24 0826    insulin regular (humuLIN R,novoLIN R) injection 2-7 Units, 2-7 Units, Subcutaneous, Q6H, Rasheeda Ontiveros APRN    ipratropium-albuterol (DUO-NEB) nebulizer solution 3 mL, 3 mL, Nebulization, Q6H While Awake - RT, Rasheeda Ontiveros APRN, 3 mL at 07/16/24 0812    levothyroxine (SYNTHROID, LEVOTHROID) tablet 125 mcg, 125 mcg, Oral, QAM, Rasheeda Ontiveros APRN, 125 mcg at 07/16/24 0626    Lidocaine 4 % 1 patch, 1 patch, Transdermal, Q24H, Rasheeda Ontiveros APRN, 1 patch at 07/15/24 2018    Magnesium Cardiology Dose Replacement - Follow Nurse / BPA Driven Protocol, , Does not apply, PRN, Ino Grissom MD    magnesium sulfate 2g/50 mL (PREMIX) infusion, 2 g, Intravenous, Q2H, Ino Grissom MD, 2 g at 07/16/24 1031    melatonin tablet 5 mg, 5 mg, Oral, Nightly, Ino Grissom MD    metoprolol tartrate (LOPRESSOR) tablet 25 mg, 25 mg,  Oral, BID, Ino Grissom MD    midazolam (VERSED) injection 1 mg, 1 mg, Intravenous, Q6H PRN, Ino Grissom MD    nitroglycerin (NITROSTAT) SL tablet 0.4 mg, 0.4 mg, Sublingual, Q5 Min PRN, Rasheeda Ontiveros APRN    ondansetron (ZOFRAN) injection 4 mg, 4 mg, Intravenous, Q6H PRN, Rasheeda Ontiveros APRN    pantoprazole (PROTONIX) injection 40 mg, 40 mg, Intravenous, Q12H, Ino Grissom MD, 40 mg at 07/16/24 1041    piperacillin-tazobactam (ZOSYN) 3.375 g IVPB in 100 mL NS MBP (CD), 3.375 g, Intravenous, Q8H, Rasheeda Ontiveros APRN, 3.375 g at 07/16/24 1026    potassium chloride 10 mEq in 100 mL IVPB, 10 mEq, Intravenous, Q1H, Rehana Birch, PharmD, Last Rate: 100 mL/hr at 07/16/24 1147, 10 mEq at 07/16/24 1147    Potassium Replacement - Follow Nurse / BPA Driven Protocol, , Does not apply, PRN, Ino Grissom MD    sodium chloride 0.9 % flush 10 mL, 10 mL, Intravenous, PRN, Emergency, Triage Protocol, MD    sodium chloride 0.9 % flush 10 mL, 10 mL, Intravenous, Q12H, Rasheeda Ontiveros APRN, 10 mL at 07/16/24 0827    sodium chloride 0.9 % flush 10 mL, 10 mL, Intravenous, PRN, Rsaheeda Ontiveros APRN, 10 mL at 07/15/24 2020    sodium chloride 0.9 % infusion 40 mL, 40 mL, Intravenous, PRN, Rasheeda Ontiveros APRN    temazepam (RESTORIL) capsule 15 mg, 15 mg, Oral, Nightly PRN, Ino Grissom MD       History of Present Illness   Charly Blevins is a 68 y.o. year old female with a past medical history significant for PAF, T2DM, HTN, HLD, RA, hypothyroidism, and gout presented to [facility] ER on 7/15/2024 with symptomatic anemia to include dyspnea with exertion, fatigue. She states she has experienced progressive dyspnea over the last 2 weeks. Reportedly fell at home last Wednesday without head trauma but fell again on Saturday and hit her head. She developed altered mental status and hallucinations prompting ER evaluation. Reportedly 2-3 episodes of diarrhea 7/14 that were dark  colored. Stool occult (+) with hgb 7.6. In the ED pt found with hypoxia requiring high-flow NC with BNP 8000 and CXR suggesting CHF exacerbation. Last ECHO (2022) LVEF 59.5% with diastolic function consistent with grade II w/high LAP. Pro ofelia elevated at 12.49, WBC 11.31, lactic acid normal. Cardiology has been consulted to address atrial fibrillation with RVR. She has received 1 unit of PRBCs. Afib treated with IV digoxin x 2, IV metoprolol and po metoprolol. BP has been low normal.     At time of assessment she is in AF with -120s, on 3 L nc (home dose), no acute distress.  She states she sees [facility] cardiologist and she had ablation in [place] with Dr. Julian. She has always been aware of atrial fibrillation and prior to hospitalization had not missed doses of Eliquis.     ROS  Review of Systems   Constitutional: Positive for malaise/fatigue.   Cardiovascular:  Positive for dyspnea on exertion and palpitations.   Respiratory:  Positive for shortness of breath.    Gastrointestinal:  Positive for diarrhea and melena.   Psychiatric/Behavioral:  Positive for altered mental status.    All other systems reviewed and are negative.      SOCIAL HX  Social History     Socioeconomic History    Marital status:    Tobacco Use    Smoking status: Never    Smokeless tobacco: Never   Vaping Use    Vaping status: Never Used   Substance and Sexual Activity    Alcohol use: No    Drug use: No    Sexual activity: Defer       FAMILY HX  Family History   Problem Relation Age of Onset    Cancer Mother        OBJECTIVE:  Vitals:    07/16/24 0900 07/16/24 0930 07/16/24 1009 07/16/24 1123   BP: 117/93 99/84 109/86 108/83   BP Location:    Right arm   Patient Position:    Lying   Pulse: (!) 131 101 (!) 131 114   Resp:    16   Temp:    98.5 °F (36.9 °C)   TempSrc:    Oral   SpO2:    99%   Weight:       Height:         I/O last 3 completed shifts:  In: 212.5 [Blood:212.5]  Out: 6000 [Urine:6000]  No intake/output  data recorded.  Intake & Output (last 3 days)         07/13 0701  07/14 0700 07/14 0701  07/15 0700 07/15 0701 07/16 0700 07/16 0701 07/17 0700    Blood   212.5     Total Intake(mL/kg)   212.5 (2.6)     Urine (mL/kg/hr)   6000     Total Output   6000     Net   -5787.5                      PHYSICAL EXAMINATION:  Vitals reviewed.   Constitutional:       Appearance: Chronically ill-appearing.      Interventions: Nasal cannula in place.   Neck:      Vascular: JVD elevated.   Pulmonary:      Effort: Pulmonary effort is normal.   Cardiovascular:      Tachycardia present. Irregularly irregular rhythm. Normal S1. Normal S2.       Murmurs: There is no murmur.   Pulses:     Intact distal pulses.   Edema:     Peripheral edema absent.   Skin:     General: Skin is warm and dry.      Coloration: Skin is pale.   Neurological:      General: No focal deficit present.      Mental Status: Alert and oriented to person, place and time.   Psychiatric:         Behavior: Behavior is cooperative.         Diagnostic Data:  Lab Results (last 24 hours)       Procedure Component Value Units Date/Time    POC Glucose Once [[id number]]  (Normal) Collected: 07/16/24 1153    Specimen: Blood Updated: 07/16/24 1154     Glucose 94 mg/dL     Respiratory Panel PCR w/COVID-19(SARS-CoV-2) GEOVANI/ROSALIE/HÉCTOR/PAD/COR/EDISON In-House, NP Swab in UTM/VTM, 2 HR TAT - Swab, Nasopharynx [[id number]]  (Normal) Collected: 07/16/24 1038    Specimen: Swab from Nasopharynx Updated: 07/16/24 1148     ADENOVIRUS, PCR Not Detected     Coronavirus 229E Not Detected     Coronavirus HKU1 Not Detected     Coronavirus NL63 Not Detected     Coronavirus OC43 Not Detected     COVID19 Not Detected     Human Metapneumovirus Not Detected     Human Rhinovirus/Enterovirus Not Detected     Influenza A PCR Not Detected     Influenza B PCR Not Detected     Parainfluenza Virus 1 Not Detected     Parainfluenza Virus 2 Not Detected     Parainfluenza Virus 3 Not Detected     Parainfluenza Virus 4 Not  Detected     RSV, PCR Not Detected     Bordetella pertussis pcr Not Detected     Bordetella parapertussis PCR Not Detected     Chlamydophila pneumoniae PCR Not Detected     Mycoplasma pneumo by PCR Not Detected    Narrative:      In the setting of a positive respiratory panel with a viral infection PLUS a negative procalcitonin without other underlying concern for bacterial infection, consider observing off antibiotics or discontinuation of antibiotics and continue supportive care. If the respiratory panel is positive for atypical bacterial infection (Bordetella pertussis, Chlamydophila pneumoniae, or Mycoplasma pneumoniae), consider antibiotic de-escalation to target atypical bacterial infection.    Hemoglobin & Hematocrit, Blood [[id number]]  (Abnormal) Collected: 07/16/24 1038    Specimen: Blood Updated: 07/16/24 1113     Hemoglobin 8.6 g/dL      Hematocrit 29.7 %     TSH [[id number]]  (Normal) Collected: 07/16/24 0516    Specimen: Blood Updated: 07/16/24 0800     TSH 1.890 uIU/mL     POC Glucose Once [[id number]]  (Normal) Collected: 07/16/24 0736    Specimen: Blood Updated: 07/16/24 0737     Glucose 100 mg/dL     Magnesium [[id number]]  (Abnormal) Collected: 07/16/24 0516    Specimen: Blood Updated: 07/16/24 0633     Magnesium 1.3 mg/dL     POC Glucose Once [[id number]]  (Normal) Collected: 07/16/24 0557    Specimen: Blood Updated: 07/16/24 0557     Glucose 99 mg/dL     Comprehensive Metabolic Panel [[id number]]  (Abnormal) Collected: 07/16/24 0516    Specimen: Blood Updated: 07/16/24 0552     Glucose 96 mg/dL      BUN 10 mg/dL      Creatinine 0.62 mg/dL      Sodium 143 mmol/L      Potassium 3.0 mmol/L      Chloride 100 mmol/L      CO2 33.0 mmol/L      Calcium 7.1 mg/dL      Total Protein 5.8 g/dL      Albumin 2.6 g/dL      ALT (SGPT) 12 U/L      AST (SGOT) 21 U/L      Alkaline Phosphatase 64 U/L      Total Bilirubin 1.2 mg/dL      Globulin 3.2 gm/dL      Comment: Calculated Result        A/G Ratio 0.8 g/dL       BUN/Creatinine Ratio 16.1     Anion Gap 10.0 mmol/L      eGFR 96.5 mL/min/1.73     Narrative:      GFR Normal >60  Chronic Kidney Disease <60  Kidney Failure <15      CBC & Differential [[id number]]  (Abnormal) Collected: 07/16/24 0516    Specimen: Blood Updated: 07/16/24 0540    Narrative:      The following orders were created for panel order CBC & Differential.  Procedure                               Abnormality         Status                     ---------                               -----------         ------                     CBC Auto Differential[[id number]]        Abnormal            Final result                 Please view results for these tests on the individual orders.    CBC Auto Differential [[id number]]  (Abnormal) Collected: 07/16/24 0516    Specimen: Blood Updated: 07/16/24 0540     WBC 10.50 10*3/mm3      RBC 3.95 10*6/mm3      Hemoglobin 9.7 g/dL      Hematocrit 33.2 %      MCV 84.1 fL      MCH 24.6 pg      MCHC 29.2 g/dL      RDW 19.8 %      RDW-SD 60.9 fl      MPV 9.4 fL      Platelets 367 10*3/mm3      Neutrophil % 77.2 %      Lymphocyte % 9.9 %      Monocyte % 10.3 %      Eosinophil % 1.8 %      Basophil % 0.1 %      Immature Grans % 0.7 %      Neutrophils, Absolute 8.11 10*3/mm3      Lymphocytes, Absolute 1.04 10*3/mm3      Monocytes, Absolute 1.08 10*3/mm3      Eosinophils, Absolute 0.19 10*3/mm3      Basophils, Absolute 0.01 10*3/mm3      Immature Grans, Absolute 0.07 10*3/mm3      nRBC 0.7 /100 WBC     Blood Gas, Arterial With Co-Ox [[id number]]  (Abnormal) Collected: 07/16/24 0513    Specimen: Arterial Blood Updated: 07/16/24 0513     Site Right Brachial     Pato's Test N/A     pH, Arterial 7.590 pH units      Comment: 86 Value above critical limit        pCO2, Arterial 34.2 mm Hg      Comment: 84 Value below reference range        pO2, Arterial 163.0 mm Hg      HCO3, Arterial 32.8 mmol/L      Base Excess, Arterial 10.5 mmol/L      Hemoglobin, Blood Gas 9.3 g/dL      Comment: 84 Value  below reference range        Hematocrit, Blood Gas 28.4 %      Oxyhemoglobin 98.6 %      Methemoglobin 0.10 %      Carboxyhemoglobin 1.7 %      CO2 Content 33.9 mmol/L      Temperature 37.0     Barometric Pressure for Blood Gas --     Comment: N/A        Modality Heated HFNC     FIO2 45 %      Rate 0 Breaths/minute      PIP 0 cmH2O      Comment: Meter: [id number]     :  [id number]        IPAP 0     EPAP 0     Notified [facility] LUIS ANTONIO ESPINOSA RN     Notified By [id number]     Notified Time 07/16/2024 05:13     pH, Temp Corrected 7.590 pH Units      pCO2, Temperature Corrected 34.2 mm Hg      pO2, Temperature Corrected 163 mm Hg     POC Glucose Once [[id number]]  (Normal) Collected: 07/15/24 2359    Specimen: Blood Updated: 07/16/24 0001     Glucose 93 mg/dL     MRSA Screen, PCR (Inpatient) - Swab, Nares [[id number]]  (Normal) Collected: 07/15/24 1851    Specimen: Swab from Nares Updated: 07/15/24 2010     MRSA PCR Negative    Narrative:      The negative predictive value of this diagnostic test is high and should only be used to consider de-escalating anti-MRSA therapy. A positive result may indicate colonization with MRSA and must be correlated clinically.  MRSA Negative    POC Glucose Once [[id number]]  (Normal) Collected: 07/15/24 1916    Specimen: Blood Updated: 07/15/24 1918     Glucose 114 mg/dL     C-reactive Protein [[id number]]  (Abnormal) Collected: 07/15/24 1639    Specimen: Blood Updated: 07/15/24 1914     C-Reactive Protein 14.75 mg/dL     Hemoglobin A1c [[id number]]  (Abnormal) Collected: 07/15/24 1447    Specimen: Blood Updated: 07/15/24 1846     Hemoglobin A1C 6.10 %     Narrative:      Hemoglobin A1C Ranges:    Increased Risk for Diabetes  5.7% to 6.4%  Diabetes                     >= 6.5%  Diabetic Goal                < 7.0%    Sedimentation Rate [[id number]]  (Abnormal) Collected: 07/15/24 1447    Specimen: Blood Updated: 07/15/24 1819     Sed Rate 31 mm/hr     High Sensitivity Troponin T 2Hr [[id number]]   (Abnormal) Collected: 07/15/24 1639    Specimen: Blood Updated: 07/15/24 1724     HS Troponin T 52 ng/L      Troponin T Delta -9 ng/L     Narrative:      High Sensitive Troponin T Reference Range:  <14.0 ng/L- Negative Female for AMI  <22.0 ng/L- Negative Male for AMI  >=14 - Abnormal Female indicating possible myocardial injury.  >=22 - Abnormal Male indicating possible myocardial injury.   Clinicians would have to utilize clinical acumen, EKG, Troponin, and serial changes to determine if it is an Acute Myocardial Infarction or myocardial injury due to an underlying chronic condition.         CBC & Differential [[id number]]  (Abnormal) Collected: 07/15/24 1447    Specimen: Blood Updated: 07/15/24 1629    Narrative:      The following orders were created for panel order CBC & Differential.  Procedure                               Abnormality         Status                     ---------                               -----------         ------                     CBC Auto Differential[[id number]]        Abnormal            Final result               Scan Slide[[id number]]                                       Final result                 Please view results for these tests on the individual orders.    CBC Auto Differential [[id number]]  (Abnormal) Collected: 07/15/24 1447    Specimen: Blood Updated: 07/15/24 1629     WBC 11.31 10*3/mm3      RBC 3.21 10*6/mm3      Hemoglobin 7.6 g/dL      Hematocrit 27.0 %      MCV 84.1 fL      MCH 23.7 pg      MCHC 28.1 g/dL      RDW 21.1 %      RDW-SD 64.7 fl      MPV 9.4 fL      Platelets 373 10*3/mm3      Neutrophil % 81.7 %      Lymphocyte % 8.7 %      Monocyte % 8.3 %      Eosinophil % 0.4 %      Basophil % 0.2 %      Immature Grans % 0.7 %      Neutrophils, Absolute 9.25 10*3/mm3      Lymphocytes, Absolute 0.98 10*3/mm3      Monocytes, Absolute 0.94 10*3/mm3      Eosinophils, Absolute 0.04 10*3/mm3      Basophils, Absolute 0.02 10*3/mm3      Immature Grans, Absolute 0.08 10*3/mm3   "    nRBC 0.8 /100 WBC     Narrative:      Appended report. These results have been appended to a previously verified report.    Scan Slide [[id number]] Collected: 07/15/24 1447    Specimen: Blood Updated: 07/15/24 1629     Anisocytosis Mod/2+     Hypochromia Slight/1+     Ovalocytes Slight/1+     WBC Morphology Normal     Platelet Estimate Adequate     Clumped Platelets Present    POC Occult Blood Stool [[id number]]  (Abnormal) Resulted: 07/15/24 1604    Specimen: Stool from Per Rectum Updated: 07/15/24 1605     Fecal Occult Blood Positive     Lot Number [id number]     Expiration Date 12/26     DEVELOPER LOT NUMBER [id number]     DEVELOPER EXPIRATION DATE 8/2,027     Positive Control Positive     Negative Control Negative    Procalcitonin [[id number]]  (Abnormal) Collected: 07/15/24 1447    Specimen: Blood Updated: 07/15/24 1540     Procalcitonin 12.49 ng/mL     Narrative:      As a Marker for Sepsis (Non-Neonates):    1. <0.5 ng/mL represents a low risk of severe sepsis and/or septic shock.  2. >2 ng/mL represents a high risk of severe sepsis and/or septic shock.    As a Marker for Lower Respiratory Tract Infections that require antibiotic therapy:    PCT on Admission    Antibiotic Therapy       6-12 Hrs later    >0.5                Strongly Recommended  >0.25 - <0.5        Recommended   0.1 - 0.25          Discouraged              Remeasure/reassess PCT  <0.1                Strongly Discouraged     Remeasure/reassess PCT    As 28 day mortality risk marker: \"Change in Procalcitonin Result\" (>80% or <=80%) if Day 0 (or Day 1) and Day 4 values are available. Refer to http://www.[email or web address]-pct-calculator.com    Change in PCT <=80%  A decrease of PCT levels below or equal to 80% defines a positive change in PCT test result representing a higher risk for 28-day all-cause mortality of patients diagnosed with severe sepsis for septic shock.    Change in PCT >80%  A decrease of PCT levels of more than 80% defines a negative change in PCT " result representing a lower risk for 28-day all-cause mortality of patients diagnosed with severe sepsis or septic shock.       Comprehensive Metabolic Panel [[id number]]  (Abnormal) Collected: 07/15/24 1447    Specimen: Blood Updated: 07/15/24 1539     Glucose 174 mg/dL      BUN 16 mg/dL      Creatinine 0.68 mg/dL      Sodium 139 mmol/L      Potassium 3.6 mmol/L      Chloride 100 mmol/L      CO2 29.0 mmol/L      Calcium 7.7 mg/dL      Total Protein 5.1 g/dL      Albumin 2.7 g/dL      ALT (SGPT) 12 U/L      AST (SGOT) 18 U/L      Alkaline Phosphatase 71 U/L      Total Bilirubin 0.5 mg/dL      Globulin 2.4 gm/dL      Comment: Calculated Result        A/G Ratio 1.1 g/dL      BUN/Creatinine Ratio 23.5     Anion Gap 10.0 mmol/L      eGFR 94.4 mL/min/1.73     Narrative:      GFR Normal >60  Chronic Kidney Disease <60  Kidney Failure <15      Magnesium [[id number]]  (Normal) Collected: 07/15/24 1447    Specimen: Blood Updated: 07/15/24 1539     Magnesium 1.8 mg/dL     Single High Sensitivity Troponin T [[id number]]  (Abnormal) Collected: 07/15/24 1447    Specimen: Blood Updated: 07/15/24 1539     HS Troponin T 61 ng/L     Narrative:      High Sensitive Troponin T Reference Range:  <14.0 ng/L- Negative Female for AMI  <22.0 ng/L- Negative Male for AMI  >=14 - Abnormal Female indicating possible myocardial injury.  >=22 - Abnormal Male indicating possible myocardial injury.   Clinicians would have to utilize clinical acumen, EKG, Troponin, and serial changes to determine if it is an Acute Myocardial Infarction or myocardial injury due to an underlying chronic condition.         BNP [[id number]]  (Abnormal) Collected: 07/15/24 1447    Specimen: Blood Updated: 07/15/24 1534     proBNP 8,823.0 pg/mL     Narrative:      This assay is used as an aid in the diagnosis of individuals suspected of having heart failure. It can be used as an aid in the diagnosis of acute decompensated heart failure (ADHF) in patients presenting with signs  and symptoms of ADHF to the emergency department (ED). In addition, NT-proBNP of <300 pg/mL indicates ADHF is not likely.    Age Range Result Interpretation  NT-proBNP Concentration (pg/mL:      <50             Positive            >450                   Gray                 300-450                    Negative             <300    50-75           Positive            >900                  Gray                300-900                  Negative            <300      >75             Positive            >1800                  Gray                300-1800                  Negative            <300    Blood Culture - Blood, Wrist, Left [[id number]] Collected: 07/15/24 1526    Specimen: Blood from Wrist, Left Updated: 07/15/24 1533    Lactic Acid, Plasma [[id number]]  (Normal) Collected: 07/15/24 1447    Specimen: Blood Updated: 07/15/24 1532     Lactate 1.7 mmol/L      Comment: Falsely depressed results may occur on samples drawn from patients receiving N-Acetylcysteine (NAC) or Metamizole.       Urinalysis With Microscopic If Indicated (No Culture) - Straight Cath [[id number]]  (Abnormal) Collected: 07/15/24 1440    Specimen: Urine from Straight Cath Updated: 07/15/24 1515     Color, UA Yellow     Appearance, UA Clear     pH, UA 6.5     Specific Gravity, UA 1.025     Glucose,  mg/dL (1+)     Ketones, UA 15 mg/dL (1+)     Bilirubin, UA Negative     Blood, UA Negative     Protein, UA Negative     Leuk Esterase, UA Negative     Nitrite, UA Negative     Urobilinogen, UA 0.2 E.U./dL    Narrative:      Urine microscopic not indicated.    Blood Gas, Arterial With Co-Ox [[id number]]  (Abnormal) Collected: 07/15/24 1511    Specimen: Arterial Blood Updated: 07/15/24 1511     Site Right Radial     [place] Test N/A     pH, Arterial 7.486 pH units      Comment: 83 Value above reference range        pCO2, Arterial 39.5 mm Hg      pO2, Arterial 65.9 mm Hg      Comment: 84 Value below reference range        HCO3, Arterial 29.8 mmol/L       Base Excess, Arterial 5.9 mmol/L      Hemoglobin, Blood Gas 7.8 g/dL      Comment: 84 Value below reference range        Hematocrit, Blood Gas 23.8 %      Oxyhemoglobin 91.2 %      Comment: 84 Value below reference range        Methemoglobin 0.60 %      Carboxyhemoglobin 1.7 %      CO2 Content 31.0 mmol/L      Temperature 37.0     Barometric Pressure for Blood Gas --     Comment: N/A        Modality Nasal Cannula     FIO2 44 %      Rate 0 Breaths/minute      PIP 0 cmH2O      Comment: Meter: [id number]     :  [id number]        IPAP 0     EPAP 0     pH, Temp Corrected 7.486 pH Units      pCO2, Temperature Corrected 39.5 mm Hg      pO2, Temperature Corrected 65.9 mm Hg     Blood Culture - Blood, Arm, Right [[id number]] Collected: 07/15/24 1447    Specimen: Blood from Arm, Right Updated: 07/15/24 1503    [place] [facility] [[id number]] Collected: 07/15/24 1447    Specimen: Blood Updated: 07/15/24 1500    Narrative:      The following orders were created for panel order [place] [facility].  Procedure                               Abnormality         Status                     ---------                               -----------         ------                     Green Top (Gel)[[id number]]                                  Final result               Lavender Top[[id number]]                                     Final result               Gold Top - SST[[id number]]                                   Final result               Gray Top[[id number]]                                         Final result               Light Blue Top[[id number]]                                   Final result                 Please view results for these tests on the individual orders.    Green Top (Gel) [[id number]] Collected: 07/15/24 1447    Specimen: Blood Updated: 07/15/24 1500     Extra Tube Hold for add-ons.     Comment: Auto resulted.       Lavender Top [[id number]] Collected: 07/15/24 1447    Specimen: Blood Updated: 07/15/24 1500     Extra Tube hold  for add-on     Comment: Auto resulted       Gold Top - SST [[id number]] Collected: 07/15/24 1447    Specimen: Blood Updated: 07/15/24 1500     Extra Tube Hold for add-ons.     Comment: Auto resulted.       Gray Top [[id number]] Collected: 07/15/24 1447    Specimen: Blood Updated: 07/15/24 1500     Extra Tube Hold for add-ons.     Comment: Auto resulted.       Light Blue Top [[id number]] Collected: 07/15/24 1447    Specimen: Blood Updated: 07/15/24 1500     Extra Tube Hold for add-ons.     Comment: Auto resulted             ECG/EMG Results (last 24 hours)       Procedure Component Value Units Date/Time    ECG 12 Lead ED Triage Standing Order; Weak / Dizzy / AMS [[id number]] Collected: 07/15/24 1414     Updated: 07/15/24 1508     QT Interval 404 ms      QTC Interval 573 ms     Narrative:      Test Reason : ED Triage Standing Order~  Blood Pressure :   */*   mmHG  Vent. Rate : 121 BPM     Atrial Rate : 104 BPM     P-R Int : 136 ms          QRS Dur :  80 ms      QT Int : 404 ms       P-R-T Axes :  51  19 -44 degrees     QTc Int : 573 ms    atrial fibrillation  Low voltage QRS  Nonspecific ST and T wave abnormality  Abnormal ECG  When compared with ECG of  afib present  Confirmed by ARIELLA DIEHL MD ([id number]) on 7/15/2024 3:08:25 PM    Referred By: ED MD           Confirmed By: ARIELLA DIEHL MD    ECG 12 Lead Tachycardia [[id number]] Collected: 07/16/24 0616     Updated: 07/16/24 0706     QT Interval 320 ms      QTC Interval 495 ms     Narrative:      Test Reason : Tachycardia  Blood Pressure :   */*   mmHG  Vent. Rate : 144 BPM     Atrial Rate : 288 BPM     P-R Int :   * ms          QRS Dur :  82 ms      QT Int : 320 ms       P-R-T Axes : 254  37 257 degrees     QTc Int : 495 ms    Atrial flutter with 2:1 AV conduction  ST & T wave abnormality, consider inferior ischemia  ST & T wave abnormality, consider anterolateral ischemia  Abnormal ECG  When compared with ECG of 16-JUL-2024 03:51, (Unconfirmed)  Previous ECG has  undetermined rhythm, needs review  T wave inversion more evident in Lateral leads    Referred By:            Confirmed By:     ECG 12 Lead Tachycardia [[id number]] Collected: 07/16/24 0348     Updated: 07/16/24 0717     QT Interval 318 ms      QTC Interval 467 ms     Narrative:      Test Reason : Tachycardia  Blood Pressure :   */*   mmHG  Vent. Rate : 130 BPM     Atrial Rate : 138 BPM     P-R Int : 160 ms          QRS Dur :  92 ms      QT Int : 318 ms       P-R-T Axes :  64  49 -35 degrees     QTc Int : 467 ms    Sinus tachycardia  Low voltage QRS  Nonspecific T wave abnormality  Abnormal ECG  When compared with ECG of 15-JUL-2024 14:14,  Previous ECG has undetermined rhythm, needs review  Nonspecific T wave abnormality has replaced inverted T waves in Lateral leads    Referred By:            Confirmed By:              ASSESSMENT/PLAN:    Symptomatic anemia    Primary hypertension    Type 2 diabetes mellitus, without long-term current use of insulin    Rheumatoid arthritis involving multiple sites    A-fib    Hypothyroidism (acquired)    Acute respiratory failure with hypoxia    Acute on chronic heart failure with preserved ejection fraction    Gout    Mixed hyperlipidemia    A-fib with RVR in the setting of symptomatic anemia.  Historically normal LVEF (2022), history of ablation, data deficit.  [id number] =5  -Ventricular rates 106-120s, should improve with resolution of anemia  -TSH normal, Mag 1.3 today, K 3.0  -Replace electrolytes per cardiology protocol  -Increase metoprolol tartrate to 25 mg twice daily  -Continue to hold Eliquis/anticoagulation for GI eval  -Echo pending  -Consider Watchman at outpatient follow-up    Anemia, suspected GI bleed  -No prohibitive cardiac risk to proceed with EGD/colonoscopy    Cardiology will continue to follow  Scribed for Christopher Preston MD by Sara Pedro, APRN. 7/16/2024  12:14 EDT

## 2024-07-16 NOTE — PLAN OF CARE
Problem: Fall Injury Risk  Goal: Absence of Fall and Fall-Related Injury  Intervention: Promote Injury-Free Environment  Recent Flowsheet Documentation  Taken 7/16/2024 1800 by Marah Jameson RN  Safety Promotion/Fall Prevention:   activity supervised   assistive device/personal items within reach   clutter free environment maintained   lighting adjusted   room organization consistent   safety round/check completed  Taken 7/16/2024 1600 by Marah Jameson RN  Safety Promotion/Fall Prevention:   activity supervised   assistive device/personal items within reach   lighting adjusted   room organization consistent   safety round/check completed  Taken 7/16/2024 1400 by Marah Jameson RN  Safety Promotion/Fall Prevention:   activity supervised   assistive device/personal items within reach   clutter free environment maintained   lighting adjusted   room organization consistent   safety round/check completed     Problem: Adult Inpatient Plan of Care  Goal: Absence of Hospital-Acquired Illness or Injury  Intervention: Identify and Manage Fall Risk  Recent Flowsheet Documentation  Taken 7/16/2024 1800 by Marah Jameson RN  Safety Promotion/Fall Prevention:   activity supervised   assistive device/personal items within reach   clutter free environment maintained   lighting adjusted   room organization consistent   safety round/check completed  Taken 7/16/2024 1600 by Marah Jameson RN  Safety Promotion/Fall Prevention:   activity supervised   assistive device/personal items within reach   lighting adjusted   room organization consistent   safety round/check completed  Taken 7/16/2024 1400 by Marah Jameson RN  Safety Promotion/Fall Prevention:   activity supervised   assistive device/personal items within reach   clutter free environment maintained   lighting adjusted   room organization consistent   safety round/check completed  Intervention: Prevent Skin Injury  Recent Flowsheet Documentation  Taken  7/16/2024 1800 by Marah Jameson RN  Body Position: supine  Skin Protection:   adhesive use limited   incontinence pads utilized   tubing/devices free from skin contact  Taken 7/16/2024 1600 by Marah Jameson RN  Body Position: supine  Skin Protection:   adhesive use limited   incontinence pads utilized   tubing/devices free from skin contact  Taken 7/16/2024 1400 by Marah Jameson RN  Body Position: left  Skin Protection:   adhesive use limited   incontinence pads utilized   tubing/devices free from skin contact  Intervention: Prevent and Manage VTE (Venous Thromboembolism) Risk  Recent Flowsheet Documentation  Taken 7/16/2024 1800 by Marah Jameson RN  Activity Management: activity encouraged  Taken 7/16/2024 1600 by Marah Jameson RN  Activity Management: back to bed  Taken 7/16/2024 1400 by Marah Jameson RN  Activity Management: bedrest  Intervention: Prevent Infection  Recent Flowsheet Documentation  Taken 7/16/2024 1800 by Marah Jameson RN  Infection Prevention: environmental surveillance performed  Taken 7/16/2024 1600 by Marah Jameson RN  Infection Prevention: environmental surveillance performed  Taken 7/16/2024 1400 by Marah Jameson RN  Infection Prevention: environmental surveillance performed     Problem: Skin Injury Risk Increased  Goal: Skin Health and Integrity  Intervention: Optimize Skin Protection  Recent Flowsheet Documentation  Taken 7/16/2024 1800 by Marah Jameson RN  Pressure Reduction Techniques:   frequent weight shift encouraged   pressure points protected   weight shift assistance provided  Head of Bed (HOB) Positioning: HOB elevated  Pressure Reduction Devices: pressure-redistributing mattress utilized  Skin Protection:   adhesive use limited   incontinence pads utilized   tubing/devices free from skin contact  Taken 7/16/2024 1600 by Marah Jameson RN  Pressure Reduction Techniques:   frequent weight shift encouraged   pressure points protected    weight shift assistance provided  Head of Bed (hospitals) Positioning: hospitals elevated  Pressure Reduction Devices: pressure-redistributing mattress utilized  Skin Protection:   adhesive use limited   incontinence pads utilized   tubing/devices free from skin contact  Taken 7/16/2024 1400 by Marah Jameson RN  Pressure Reduction Techniques:   frequent weight shift encouraged   pressure points protected   weight shift assistance provided  Head of Bed (hospitals) Positioning: hospitals elevated  Pressure Reduction Devices: pressure-redistributing mattress utilized  Skin Protection:   adhesive use limited   incontinence pads utilized   tubing/devices free from skin contact     Problem: Infection Progression (Sepsis/Septic Shock)  Goal: Absence of Infection Signs and Symptoms  Intervention: Initiate Sepsis Management  Recent Flowsheet Documentation  Taken 7/16/2024 1800 by Marah Jameson RN  Infection Prevention: environmental surveillance performed  Taken 7/16/2024 1600 by Marah Jameson RN  Infection Prevention: environmental surveillance performed  Taken 7/16/2024 1400 by Marah Jameson RN  Infection Prevention: environmental surveillance performed  Intervention: Promote Recovery  Recent Flowsheet Documentation  Taken 7/16/2024 1800 by Marah Jameson RN  Activity Management: activity encouraged  Taken 7/16/2024 1600 by Marah Jameson RN  Activity Management: back to bed  Taken 7/16/2024 1400 by Marah Jameson RN  Activity Management: bedrest     Problem: Restraint, Nonviolent  Goal: Absence of Harm or Injury  Intervention: Protect Skin and Joint Integrity  Recent Flowsheet Documentation  Taken 7/16/2024 1800 by Marah Jameson RN  Body Position: supine  Taken 7/16/2024 1600 by Marah Jameson RN  Body Position: supine  Taken 7/16/2024 1400 by Marah Jameson RN  Body Position: left   Goal Outcome Evaluation:

## 2024-07-17 LAB
ANION GAP SERPL CALCULATED.3IONS-SCNC: 8 MMOL/L (ref 5–15)
BUN SERPL-MCNC: 9 MG/DL (ref 8–23)
BUN/CREAT SERPL: 13.6 (ref 7–25)
CALCIUM SPEC-SCNC: 7.5 MG/DL (ref 8.6–10.5)
CHLORIDE SERPL-SCNC: 100 MMOL/L (ref 98–107)
CO2 SERPL-SCNC: 30 MMOL/L (ref 22–29)
CREAT SERPL-MCNC: 0.66 MG/DL (ref 0.57–1)
DEPRECATED RDW RBC AUTO: 61.4 FL (ref 37–54)
EGFRCR SERPLBLD CKD-EPI 2021: 95.1 ML/MIN/1.73
ERYTHROCYTE [DISTWIDTH] IN BLOOD BY AUTOMATED COUNT: 19.7 % (ref 12.3–15.4)
GLUCOSE BLDC GLUCOMTR-MCNC: 113 MG/DL (ref 70–130)
GLUCOSE BLDC GLUCOMTR-MCNC: 146 MG/DL (ref 70–130)
GLUCOSE BLDC GLUCOMTR-MCNC: 154 MG/DL (ref 70–130)
GLUCOSE BLDC GLUCOMTR-MCNC: 210 MG/DL (ref 70–130)
GLUCOSE SERPL-MCNC: 107 MG/DL (ref 65–99)
HCT VFR BLD AUTO: 28.9 % (ref 34–46.6)
HCT VFR BLD AUTO: 29.3 % (ref 34–46.6)
HCT VFR BLD AUTO: 29.9 % (ref 34–46.6)
HGB BLD-MCNC: 8.1 G/DL (ref 12–15.9)
HGB BLD-MCNC: 8.4 G/DL (ref 12–15.9)
HGB BLD-MCNC: 8.7 G/DL (ref 12–15.9)
IRON 24H UR-MRATE: 23 MCG/DL (ref 37–145)
IRON SATN MFR SERPL: 10 % (ref 20–50)
MAGNESIUM SERPL-MCNC: 2 MG/DL (ref 1.6–2.4)
MCH RBC QN AUTO: 25.1 PG (ref 26.6–33)
MCHC RBC AUTO-ENTMCNC: 29.1 G/DL (ref 31.5–35.7)
MCV RBC AUTO: 86.2 FL (ref 79–97)
PLATELET # BLD AUTO: 292 10*3/MM3 (ref 140–450)
PMV BLD AUTO: 9.8 FL (ref 6–12)
POTASSIUM SERPL-SCNC: 4.9 MMOL/L (ref 3.5–5.2)
QT INTERVAL: 318 MS
QT INTERVAL: 320 MS
QTC INTERVAL: 467 MS
QTC INTERVAL: 495 MS
RBC # BLD AUTO: 3.47 10*6/MM3 (ref 3.77–5.28)
SODIUM SERPL-SCNC: 138 MMOL/L (ref 136–145)
TIBC SERPL-MCNC: 228 MCG/DL (ref 298–536)
TRANSFERRIN SERPL-MCNC: 153 MG/DL (ref 200–360)
VANCOMYCIN SERPL-MCNC: 7.8 MCG/ML (ref 5–40)
WBC NRBC COR # BLD AUTO: 9.64 10*3/MM3 (ref 3.4–10.8)

## 2024-07-17 PROCEDURE — 99233 SBSQ HOSP IP/OBS HIGH 50: CPT | Performed by: INTERNAL MEDICINE

## 2024-07-17 PROCEDURE — 85014 HEMATOCRIT: CPT | Performed by: INTERNAL MEDICINE

## 2024-07-17 PROCEDURE — 25010000002 ONDANSETRON PER 1 MG: Performed by: NURSE PRACTITIONER

## 2024-07-17 PROCEDURE — 80048 BASIC METABOLIC PNL TOTAL CA: CPT | Performed by: INTERNAL MEDICINE

## 2024-07-17 PROCEDURE — 63710000001 INSULIN LISPRO (HUMAN) PER 5 UNITS: Performed by: INTERNAL MEDICINE

## 2024-07-17 PROCEDURE — 83735 ASSAY OF MAGNESIUM: CPT | Performed by: INTERNAL MEDICINE

## 2024-07-17 PROCEDURE — 80202 ASSAY OF VANCOMYCIN: CPT

## 2024-07-17 PROCEDURE — 82948 REAGENT STRIP/BLOOD GLUCOSE: CPT

## 2024-07-17 PROCEDURE — 25010000002 PIPERACILLIN SOD-TAZOBACTAM PER 1 G: Performed by: NURSE PRACTITIONER

## 2024-07-17 PROCEDURE — 94799 UNLISTED PULMONARY SVC/PX: CPT

## 2024-07-17 PROCEDURE — 25010000002 NA FERRIC GLUC CPLX PER 12.5 MG: Performed by: INTERNAL MEDICINE

## 2024-07-17 PROCEDURE — 99232 SBSQ HOSP IP/OBS MODERATE 35: CPT | Performed by: PHYSICIAN ASSISTANT

## 2024-07-17 PROCEDURE — 97162 PT EVAL MOD COMPLEX 30 MIN: CPT

## 2024-07-17 PROCEDURE — 85027 COMPLETE CBC AUTOMATED: CPT | Performed by: INTERNAL MEDICINE

## 2024-07-17 PROCEDURE — 97165 OT EVAL LOW COMPLEX 30 MIN: CPT

## 2024-07-17 PROCEDURE — 84466 ASSAY OF TRANSFERRIN: CPT | Performed by: INTERNAL MEDICINE

## 2024-07-17 PROCEDURE — 85018 HEMOGLOBIN: CPT | Performed by: INTERNAL MEDICINE

## 2024-07-17 PROCEDURE — 94664 DEMO&/EVAL PT USE INHALER: CPT

## 2024-07-17 PROCEDURE — 83540 ASSAY OF IRON: CPT | Performed by: INTERNAL MEDICINE

## 2024-07-17 RX ORDER — HYDROCODONE BITARTRATE AND ACETAMINOPHEN 7.5; 325 MG/1; MG/1
1 TABLET ORAL EVERY 6 HOURS PRN
Status: DISPENSED | OUTPATIENT
Start: 2024-07-17 | End: 2024-07-22

## 2024-07-17 RX ADMIN — INSULIN LISPRO 3 UNITS: 100 INJECTION, SOLUTION INTRAVENOUS; SUBCUTANEOUS at 21:39

## 2024-07-17 RX ADMIN — HYDROCODONE BITARTRATE AND ACETAMINOPHEN 1 TABLET: 7.5; 325 TABLET ORAL at 17:41

## 2024-07-17 RX ADMIN — DOXYCYCLINE 100 MG: 100 INJECTION, POWDER, LYOPHILIZED, FOR SOLUTION INTRAVENOUS at 21:39

## 2024-07-17 RX ADMIN — ACETAMINOPHEN 650 MG: 325 TABLET, FILM COATED ORAL at 10:12

## 2024-07-17 RX ADMIN — HYDROCODONE BITARTRATE AND ACETAMINOPHEN 1 TABLET: 5; 325 TABLET ORAL at 12:20

## 2024-07-17 RX ADMIN — Medication 10 MG: at 21:40

## 2024-07-17 RX ADMIN — Medication 10 ML: at 23:20

## 2024-07-17 RX ADMIN — INSULIN LISPRO 3 UNITS: 100 INJECTION, SOLUTION INTRAVENOUS; SUBCUTANEOUS at 17:41

## 2024-07-17 RX ADMIN — INSULIN LISPRO 2 UNITS: 100 INJECTION, SOLUTION INTRAVENOUS; SUBCUTANEOUS at 12:20

## 2024-07-17 RX ADMIN — ONDANSETRON 4 MG: 2 INJECTION INTRAMUSCULAR; INTRAVENOUS at 02:49

## 2024-07-17 RX ADMIN — IPRATROPIUM BROMIDE AND ALBUTEROL SULFATE 3 ML: 2.5; .5 SOLUTION RESPIRATORY (INHALATION) at 06:59

## 2024-07-17 RX ADMIN — LEVOTHYROXINE SODIUM 125 MCG: 125 TABLET ORAL at 06:15

## 2024-07-17 RX ADMIN — BUDESONIDE AND FORMOTEROL FUMARATE DIHYDRATE 1 PUFF: 160; 4.5 AEROSOL RESPIRATORY (INHALATION) at 19:28

## 2024-07-17 RX ADMIN — METOPROLOL TARTRATE 25 MG: 25 TABLET, FILM COATED ORAL at 09:04

## 2024-07-17 RX ADMIN — HYDROCODONE BITARTRATE AND ACETAMINOPHEN 1 TABLET: 5; 325 TABLET ORAL at 06:14

## 2024-07-17 RX ADMIN — PANTOPRAZOLE SODIUM 40 MG: 40 INJECTION, POWDER, LYOPHILIZED, FOR SOLUTION INTRAVENOUS at 21:39

## 2024-07-17 RX ADMIN — PIPERACILLIN AND TAZOBACTAM 3.38 G: 3; .375 INJECTION, POWDER, LYOPHILIZED, FOR SOLUTION INTRAVENOUS at 01:21

## 2024-07-17 RX ADMIN — PANTOPRAZOLE SODIUM 40 MG: 40 INJECTION, POWDER, LYOPHILIZED, FOR SOLUTION INTRAVENOUS at 09:05

## 2024-07-17 RX ADMIN — SODIUM CHLORIDE 125 MG: 9 INJECTION, SOLUTION INTRAVENOUS at 14:36

## 2024-07-17 RX ADMIN — ALLOPURINOL 300 MG: 300 TABLET ORAL at 09:04

## 2024-07-17 RX ADMIN — BUDESONIDE AND FORMOTEROL FUMARATE DIHYDRATE 1 PUFF: 160; 4.5 AEROSOL RESPIRATORY (INHALATION) at 06:59

## 2024-07-17 RX ADMIN — IPRATROPIUM BROMIDE AND ALBUTEROL SULFATE 3 ML: 2.5; .5 SOLUTION RESPIRATORY (INHALATION) at 11:39

## 2024-07-17 RX ADMIN — GABAPENTIN 100 MG: 100 CAPSULE ORAL at 09:04

## 2024-07-17 RX ADMIN — Medication 10 ML: at 09:04

## 2024-07-17 RX ADMIN — POTASSIUM CHLORIDE 40 MEQ: 1500 TABLET, EXTENDED RELEASE ORAL at 01:21

## 2024-07-17 RX ADMIN — LIDOCAINE 1 PATCH: 4 PATCH TOPICAL at 17:41

## 2024-07-17 RX ADMIN — DOXYCYCLINE 100 MG: 100 INJECTION, POWDER, LYOPHILIZED, FOR SOLUTION INTRAVENOUS at 09:04

## 2024-07-17 RX ADMIN — IPRATROPIUM BROMIDE AND ALBUTEROL SULFATE 3 ML: 2.5; .5 SOLUTION RESPIRATORY (INHALATION) at 19:28

## 2024-07-17 RX ADMIN — GABAPENTIN 100 MG: 100 CAPSULE ORAL at 21:41

## 2024-07-17 RX ADMIN — ACETAMINOPHEN 650 MG: 325 TABLET, FILM COATED ORAL at 21:52

## 2024-07-17 RX ADMIN — PIPERACILLIN AND TAZOBACTAM 3.38 G: 3; .375 INJECTION, POWDER, LYOPHILIZED, FOR SOLUTION INTRAVENOUS at 09:04

## 2024-07-17 NOTE — THERAPY EVALUATION
Patient Name: Charly Blevins  : 1955    MRN: [id number]                              Today's Date: 2024       Admit Date: 7/15/2024    Visit Dx:     ICD-10-CM ICD-9-CM   1. Acute on chronic respiratory failure with hypoxia  J96.21 518.84     799.02   2. Acute on chronic congestive heart failure, unspecified heart failure type  I50.9 428.0   3. Multiple falls  R29.6 V15.88   4. Anemia, unspecified type  D64.9 285.9   5. Gastrointestinal hemorrhage, unspecified gastrointestinal hemorrhage type  K92.2 578.9     Patient Active Problem List   Diagnosis    Closed intertrochanteric fracture of hip, right, initial encounter    Pubic ramus fracture    Primary hypertension    Dyslipidemia    Type 2 diabetes mellitus, without long-term current use of insulin    Rheumatoid arthritis involving multiple sites    A-fib    Acute hypoxic respiratory failure    On chronic immunosuppressive therapy    GERD    Obesity (BMI 30-39.9)    Hypothyroidism (acquired)    Anxiety and depression    Gout    Steroid-induced hyperglycemia    Pneumonia of right lower lobe due to infectious organism    Acute conjunctivitis of left eye    Acute respiratory failure with hypoxia    Nocturnal hypoxia (baseline 2-3L NC QHS)    Immunosuppressed status    Insomnia    Symptomatic anemia    Acute respiratory failure with hypoxia    Acute on chronic heart failure with preserved ejection fraction    Gout    Mixed hyperlipidemia     Past Medical History:   Diagnosis Date    Anxiety and depression 2022    Atrial fibrillation     CARDIOVERSION - NO REOCCURANCE SINCE     Diabetes mellitus     Gout 2022    Hyperlipidemia     Hypertension     Hypothyroidism 2022    Obesity (BMI 30-39.9) 2022     Past Surgical History:   Procedure Laterality Date    APPENDECTOMY      CHOLECYSTECTOMY      COLOSTOMY      RESULTED FROM HYSTERECTOMY     COLOSTOMY CLOSURE      WHILE DOING COLOSTOMY CLOSURE; ENDED UP HAVING A ILEOSTOMY     HEMORRHOIDECTOMY      HIP TROCHANTERIC NAILING WITH INTRAMEDULLARY HIP SCREW Right 3/27/2019    Procedure: HIP TROCANTERIC NAILING WITH INTRAMEDULLARY HIP SCREW RIGHT;  Surgeon: Jona Tom MD;  Location: [facility];  Service: Orthopedics    HYSTERECTOMY      LUMBAR DISCECTOMY      L4 - L5    OTHER SURGICAL HISTORY      TUBAL REVERSAL    THYROIDECTOMY      TUBAL ABDOMINAL LIGATION      X 2      General Information       Row Name 07/17/24 1155          OT Time and Intention    Document Type evaluation  -MR     Mode of Treatment occupational therapy  -MR       Row Name 07/17/24 1155          General Information    Patient Profile Reviewed yes  -MR     Prior Level of Function independent:;all household mobility;gait;transfer;bed mobility;min assist:;dressing;bathing;dependent:;cooking;cleaning;shopping  Uses rollator for in home mobility and w/c for community mobility. Pt has a caregiver from 8 to 5. Son there part time and daughter lives close by. On supplemental oxygen at baseline.  -MR     Existing Precautions/Restrictions fall;oxygen therapy device and L/min  Chronic LBP and L shoulder pain. Multiple recent falls.  -MR     Barriers to Rehab medically complex;previous functional deficit;cognitive status  -MR       Row Name 07/17/24 1155          Living Environment    People in Home alone;other (see comments)  Caregiver from 8:00 AM to 5:00PM  -MR       Row Name 07/17/24 1155          Home Main Entrance    Number of Stairs, Main Entrance three  -MR     Stair Railings, Main Entrance none;other (see comments)  Family assists pt when ascending/descending stairs.  -MR       Row Name 07/17/24 1155          Stairs Within Home, Primary    Number of Stairs, Within Home, Primary none  -MR       Row Name 07/17/24 1154          Cognition    Orientation Status (Cognition) oriented to;person;place;disoriented to;time  -MR       Row Name 07/17/24 1153          Safety Issues, Functional Mobility    Safety Issues  Affecting Function (Mobility) at risk behavior observed;awareness of need for assistance  -MR     Impairments Affecting Function (Mobility) balance;coordination;endurance/activity tolerance;strength;shortness of breath  -MR               User Key  (r) = Recorded By, (t) = Taken By, (c) = Cosigned By      Initials Name Provider Type    Marichuy Payne, OT Occupational Therapist                     Mobility/ADL's       Row Name 07/17/24 1158          Bed Mobility    Bed Mobility supine-sit  -MR     Supine-Sit [place] (Bed Mobility) minimum assist (75% patient effort);verbal cues  -MR     Assistive Device (Bed Mobility) bed rails;draw sheet;head of bed elevated  -MR       Row Name 07/17/24 1158          Transfers    Transfers sit-stand transfer;bed-chair transfer  -MR     Comment, (Transfers) Pt completed STS from EOB to upright at RW, pt reporting dizziness. BP stable.  -MR       Row Name 07/17/24 1158          Bed-Chair Transfer    Bed-Chair [place] (Transfers) minimum assist (75% patient effort);2 person assist;verbal cues  -MR     Assistive Device (Bed-Chair Transfers) walker, front-wheeled  -MR       Row Name 07/17/24 1158          Sit-Stand Transfer    Sit-Stand [place] (Transfers) verbal cues;contact guard  -MR     Assistive Device (Sit-Stand Transfers) walker, front-wheeled  -MR       Row Name 07/17/24 1158          Activities of Daily Living    BADL Assessment/Intervention lower body dressing;grooming;upper body dressing  -MR       Row Name 07/17/24 1158          Lower Body Dressing Assessment/Training    [place] Level (Lower Body Dressing) don;socks;maximum assist (25% patient effort)  -MR     Position (Lower Body Dressing) supine  -MR       Row Name 07/17/24 1158          Grooming Assessment/Training    [place] Level (Grooming) hair care, combing/brushing;maximum assist (25% patient effort)  -MR     Position (Grooming) edge of bed sitting  -MR       Row Name 07/17/24 1158           Upper Body Dressing Assessment/Training    [place] Level (Upper Body Dressing) don;moderate assist (50% patient effort)  -MR     Position (Upper Body Dressing) edge of bed sitting  -MR               User Key  (r) = Recorded By, (t) = Taken By, (c) = Cosigned By      Initials Name Provider Type     Marichuy Holt OT Occupational Therapist                   Obj/Interventions       Row Name 07/17/24 1344          Sensory Assessment (Somatosensory)    Sensory Assessment (Somatosensory) UE sensation intact  -MR       Row Name 07/17/24 1344          Vision Assessment/Intervention    Visual Impairment/Limitations WFL  -MR       Row Name 07/17/24 1344          Range of Motion Comprehensive    Comment, General Range of Motion L shoulder ROM limited to approx 45 degrees; RUE WFL  -MR       Row Name 07/17/24 1344          Strength Comprehensive (MMT)    Comment, General Manual Muscle Testing (MMT) Assessment LUE 3+/5 elbow flex/ext, shoulder flex/ext 3/5; RUE grossly 4-/5 in all functional planes  -MR       Row Name 07/17/24 1344          Balance    Balance Assessment sitting static balance;sitting dynamic balance;standing static balance;standing dynamic balance  -MR     Static Sitting Balance standby assist  -MR     Dynamic Sitting Balance contact guard  -MR     Position, Sitting Balance unsupported;sitting edge of bed  -MR     Static Standing Balance minimal assist;verbal cues  -MR     Dynamic Standing Balance minimal assist;2-person assist  -MR     Position/Device Used, Standing Balance supported;walker, rolling  -MR     Balance Interventions sitting;standing;sit to stand;supported;static;dynamic;occupation based/functional task  -MR               User Key  (r) = Recorded By, (t) = Taken By, (c) = Cosigned By      Initials Name Provider Type     Marichuy Holt OT Occupational Therapist                   Goals/Plan       Row Name 07/17/24 1401          Bed Mobility Goal 1 (OT)    Activity/Assistive Device (Bed  Mobility Goal 1, OT) sit to supine;supine to sit  -MR     [place] Level/Cues Needed (Bed Mobility Goal 1, OT) contact guard required  -MR     Time Frame (Bed Mobility Goal 1, OT) short term goal (STG);3 days  -MR     Progress/Outcomes (Bed Mobility Goal 1, OT) new goal  -MR       Row Name 07/17/24 1401          Transfer Goal 1 (OT)    Activity/Assistive Device (Transfer Goal 1, OT) bed-to-chair/chair-to-bed  -MR     [place] Level/Cues Needed (Transfer Goal 1, OT) contact guard required;tactile cues required;verbal cues required  -MR     Time Frame (Transfer Goal 1, OT) long term goal (LTG);10 days  -MR     Progress/Outcome (Transfer Goal 1, OT) new goal  -MR       Row Name 07/17/24 1401          Dressing Goal 1 (OT)    Activity/Device (Dressing Goal 1, OT) lower body dressing  -MR     [place]/Cues Needed (Dressing Goal 1, OT) moderate assist (50-74% patient effort);tactile cues required;verbal cues required  -MR     Time Frame (Dressing Goal 1, OT) long term goal (LTG);10 days  -MR       Row Name 07/17/24 1401          Therapy Assessment/Plan (OT)    Planned Therapy Interventions (OT) activity tolerance training;adaptive equipment training;BADL retraining;edema control/reduction;functional balance retraining;strengthening exercise;transfer/mobility retraining;ROM/therapeutic exercise;patient/caregiver education/training;passive ROM/stretching;IADL retraining  -MR               User Key  (r) = Recorded By, (t) = Taken By, (c) = Cosigned By      Initials Name Provider Type    MR Marichuy Holt, OT Occupational Therapist                   Clinical Impression       Row Name 07/17/24 7818          Pain Assessment    Pretreatment Pain Rating 9/10  -MR     Posttreatment Pain Rating 9/10  -MR     Pain Location - Side/Orientation Right;Left  -MR     Pain Location generalized  -MR     Pain Location - other (see comments);shoulder  RLE  -MR     Pain Intervention(s) Repositioned;Ambulation/increased activity  -MR        Row Name 07/17/24 1355          Plan of Care Review    Plan of Care Reviewed With patient  -MR     Progress no change  Initial Eval  -MR     Outcome Evaluation Pt presenting below her functional baseline w/ balance, mobility and transfers requiring increased need for assist w/ ADLs. Pt requiring assist w/ LB dressing, bed mobility and SPT from bed to chair. Pt's deficits warrant skilled IP OT services. Recommend inpatient rehab at d/c for best functional outcome.  -MR [place]ha Name 07/17/24 6792          Therapy Assessment/Plan (OT)    Patient/Family Therapy Goal Statement (OT) Return to PLOF  -MR     Rehab Potential (OT) good, to achieve stated therapy goals  -MR     Criteria for Skilled Therapeutic Interventions Met (OT) yes;meets criteria;skilled treatment is necessary  -MR     Therapy Frequency (OT) daily  -MR     Predicted Duration of Therapy Intervention (OT) 5 days  -MR       Alphonse Name 07/17/24 1692          Therapy Plan Review/Discharge Plan (OT)    Anticipated Discharge Disposition (OT) inpatient rehabilitation facility  -MR       Row Name 07/17/24 0859          Vital Signs    Pre Systolic BP Rehab 114  -MR     Pre Treatment Diastolic BP 67  -MR     Post Systolic BP Rehab 105  -MR     Post Treatment Diastolic BP 76  -MR     Pretreatment Heart Rate (beats/min) 76  -MR     Posttreatment Heart Rate (beats/min) 103  -MR     Pre SpO2 (%) 91  -MR     O2 Delivery Pre Treatment nasal cannula  -MR     O2 Delivery Intra Treatment nasal cannula  -MR     O2 Delivery Post Treatment nasal cannula  -MR     Pre Patient Position Supine  -MR     Intra Patient Position Standing  -MR     Post Patient Position Sitting  -MR       Row Name 07/17/24 4303          Positioning and Restraints    Pre-Treatment Position in bed  -MR     Post Treatment Position chair  -MR     In Chair notified nsg;reclined;sitting;call light within reach;encouraged to call for assist;exit alarm on;legs elevated;waffle cushion  -MR                User Key  (r) = Recorded By, (t) = Taken By, (c) = Cosigned By      Initials Name Provider Type    Marichuy Payne, OT Occupational Therapist                   Outcome Measures       Row Name 07/17/24 1402          How much help from another is currently needed...    Putting on and taking off regular lower body clothing? 1  -MR     Bathing (including washing, rinsing, and drying) 2  -MR     Toileting (which includes using toilet bed pan or urinal) 2  -MR     Putting on and taking off regular upper body clothing 2  -MR     Taking care of personal grooming (such as brushing teeth) 2  -MR     Eating meals 4  -MR     AM-PAC 6 Clicks Score (OT) 13  -MR       Row Name 07/17/24 1017          How much help from another person do you currently need...    Turning from your back to your side while in flat bed without using bedrails? 3  -CD     Moving from lying on back to sitting on the side of a flat bed without bedrails? 3  -CD     Moving to and from a bed to a chair (including a wheelchair)? 2  -CD     Standing up from a chair using your arms (e.g., wheelchair, bedside chair)? 3  -CD     Climbing 3-5 steps with a railing? 2  -CD     To walk in hospital room? 2  -CD     AM-PAC 6 Clicks Score (PT) 15  -CD     Highest Level of Mobility Goal 4 --> Transfer to chair/commode  -CD       Row Name 07/17/24 1017          Modified [place] Scale    Modified [place] Scale 4 - Moderately severe disability.  Unable to walk without assistance, and unable to attend to own bodily needs without assistance.  -CD       Row Name 07/17/24 1402 07/17/24 1017       Functional Assessment    Outcome Measure Options AM-PAC 6 Clicks Daily Activity (OT)  -MR AM-PAC 6 Clicks Basic Mobility (PT);Modified [place]  -CD              User Key  (r) = Recorded By, (t) = Taken By, (c) = Cosigned By      Initials Name Provider Type    CD Jeanette Back, PT Physical Therapist    Marichuy Payne, OT Occupational Therapist                    Occupational Therapy  Education       Title: PT OT SLP Therapies (In Progress)       Topic: Occupational Therapy (In Progress)       Point: ADL training (Done)       Description:   Instruct learner(s) on proper safety adaptation and remediation techniques during self care or transfers.   Instruct in proper use of assistive devices.                  Learning Progress Summary             Patient Acceptance, E, VU by MR at 7/17/2024 1403                         Point: Home exercise program (Not Started)       Description:   Instruct learner(s) on appropriate technique for monitoring, assisting and/or progressing therapeutic exercises/activities.                  Learner Progress:  Not documented in this visit.              Point: Precautions (Done)       Description:   Instruct learner(s) on prescribed precautions during self-care and functional transfers.                  Learning Progress Summary             Patient Acceptance, E, VU by MR at 7/17/2024 1403                         Point: Body mechanics (Done)       Description:   Instruct learner(s) on proper positioning and spine alignment during self-care, functional mobility activities and/or exercises.                  Learning Progress Summary             Patient Acceptance, E, VU by MR at 7/17/2024 1403                                         User Key       Initials Effective Dates Name Provider Type Discipline    MR 09/22/22 -  Marichuy Holt OT Occupational Therapist OT                  OT Recommendation and Plan  Planned Therapy Interventions (OT): activity tolerance training, adaptive equipment training, BADL retraining, edema control/reduction, functional balance retraining, strengthening exercise, transfer/mobility retraining, ROM/therapeutic exercise, patient/caregiver education/training, passive ROM/stretching, IADL retraining  Therapy Frequency (OT): daily  Plan of Care Review  Plan of Care Reviewed With: patient  Progress: no change (Initial Eval)  Outcome Evaluation: Pt  presenting below her functional baseline w/ balance, mobility and transfers requiring increased need for assist w/ ADLs. Pt requiring assist w/ LB dressing, bed mobility and SPT from bed to chair. Pt's deficits warrant skilled IP OT services. Recommend inpatient rehab at d/c for best functional outcome.     Time Calculation:   Evaluation Complexity (OT)  Review Occupational Profile/Medical/Therapy History Complexity: brief/low complexity  Assessment, Occupational Performance/Identification of Deficit Complexity: 1-3 performance deficits  Clinical Decision Making Complexity (OT): problem focused assessment/low complexity  Overall Complexity of Evaluation (OT): low complexity     Time Calculation- OT       Row Name 07/17/24 1403             Time Calculation- OT    OT Start Time 0911  -MR      OT Received On 07/17/24  -MR      OT Goal Re-Cert Due Date 07/27/24  -MR         Untimed Charges    OT Eval/Re-eval Minutes 46  -MR         Total Minutes    Untimed Charges Total Minutes 46  -MR       Total Minutes 46  -MR                User Key  (r) = Recorded By, (t) = Taken By, (c) = Cosigned By      Initials Name Provider Type    MR Marichuy Holt OT Occupational Therapist                  Therapy Charges for Today       Code Description Service Date Service Provider Modifiers Qty    [id number]  OT EVAL LOW COMPLEXITY 4 7/17/2024 Marichuy Holt OT GO 1                 Marichuy Holt OT  7/17/2024

## 2024-07-17 NOTE — PROGRESS NOTES
"  [facility]  PROGRESS NOTE    Date of Admission: 7/15/2024  Date of Service: 07/17/24    Primary Care Physician: Patel Evans MD    Chief Complaint: follow up atrial fibrillation  Problem List:   Symptomatic anemia    Primary hypertension    Type 2 diabetes mellitus, without long-term current use of insulin    Rheumatoid arthritis involving multiple sites    A-fib    Hypothyroidism (acquired)    Acute respiratory failure with hypoxia    Acute on chronic heart failure with preserved ejection fraction    Gout    Mixed hyperlipidemia      Subjective      Patient placed in restraints overnight as she was pulling out her IV. Currently with left restraint only. Sleeping at the time of my visit. In sinus rhythm with PAC on telemetry.    Objective   Vitals: /67 (BP Location: Right arm, Patient Position: Lying)   Pulse 64   Temp 98.4 °F (36.9 °C) (Oral)   Resp 18   Ht 167 cm (65.75\")   Wt 81 kg (178 lb 9.2 oz)   SpO2 96%   BMI 29.04 kg/m²     Physical Exam:  GENERAL: in no acute distress.   HEENT: Normocephalic, no jugular venous distention  HEART: Regular rhythm, normal rate, and no murmurs, gallops, or rubs.   LUNGS: Clear to auscultation bilaterally. No wheezing, rales or rhonchi.      Results:  Results from last 7 days   Lab Units 07/17/24  0724 07/17/24  0027 07/16/24  1544 07/16/24  1038 07/16/24  0516 07/15/24  1447   WBC 10*3/mm3 9.64  --   --   --  10.50 11.31*   HEMOGLOBIN g/dL 8.7* 8.4* 8.6*   < > 9.7* 7.6*   HEMATOCRIT % 29.9* 29.3* 29.6*   < > 33.2* 27.0*   PLATELETS 10*3/mm3 292  --   --   --  367 373    < > = values in this interval not displayed.     Results from last 7 days   Lab Units 07/17/24  0724 07/16/24  1544 07/16/24  0516 07/15/24  1447   SODIUM mmol/L 138  --  143 139   POTASSIUM mmol/L 4.9 3.3* 3.0* 3.6   CHLORIDE mmol/L 100  --  100 100   CO2 mmol/L 30.0*  --  33.0* 29.0   BUN mg/dL 9  --  10 16   CREATININE mg/dL 0.66  --  0.62 0.68 "   GLUCOSE mg/dL 107*  --  96 174*      Lab Results   Component Value Date    AST 21 07/16/2024    ALT 12 07/16/2024     Results from last 7 days   Lab Units 07/15/24  1447   HEMOGLOBIN A1C % 6.10*         Results from last 7 days   Lab Units 07/16/24  0516   TSH uIU/mL 1.890             Results from last 7 days   Lab Units 07/15/24  1639 07/15/24  1447   HSTROP T ng/L 52* 61*     Results from last 7 days   Lab Units 07/15/24  1447   PROBNP pg/mL 8,823.0*         Intake/Output Summary (Last 24 hours) at 7/17/2024 0910  Last data filed at 7/16/2024 2335  Gross per 24 hour   Intake --   Output 1400 ml   Net -1400 ml       I personally reviewed the patient's EKG/Telemetry data    Radiology Data:   Results for orders placed during the hospital encounter of 07/15/24    Adult Transthoracic Echo Complete W/ Cont if Necessary Per Protocol    Interpretation Summary    Left ventricular ejection fraction appears to be 56 - 60%.    Left ventricular diastolic function was normal.    The right ventricular cavity is borderline dilated.    The left atrial cavity is dilated.      Current Medications:  allopurinol, 300 mg, Oral, Daily  budesonide-formoterol, 1 puff, Inhalation, BID - RT  doxycycline, 100 mg, Intravenous, BID  gabapentin, 100 mg, Oral, Q12H  insulin lispro, 2-7 Units, Subcutaneous, 4x Daily AC & at Bedtime  ipratropium-albuterol, 3 mL, Nebulization, Q6H While Awake - RT  levothyroxine, 125 mcg, Oral, QAM  Lidocaine, 1 patch, Transdermal, Q24H  melatonin, 5 mg, Oral, Nightly  metoprolol tartrate, 25 mg, Oral, BID  pantoprazole, 40 mg, Intravenous, Q12H  piperacillin-tazobactam, 3.375 g, Intravenous, Q8H  sodium chloride, 10 mL, Intravenous, Q12H           Assessment and plan:   Paroxysmal atrial fibrillation, in RVR on arrival  Initial episode in 2015, no known recurrence until this admission  In the setting of symptomatic anemia  Echo this admission with normal systolic and diastolic function  Anticoagulation on hold  as she is anemic and undergoing GI eval. Resume when ok with GI.   Can consider watchman as an outpatient  Continue metoprolol 25mg BID for HR control. Can uptitrate as needed.   Hypomagnesemia, hypokalemia  Replace per protocol  improved  Acute anemia  7.6 on arrival, suspected GI bleed  S/p 1 unit PRBCs  GI following  Acute on chronic respiratory failure  Chronically on 3L via NC  Hypertension, controlled  Continue metoprolol   No ACEI/ARB due to low/normal BP  Dyslipidemia  Continue on crestor  Diabetes, controlled  Per primary team    Not much more to add from a cardiac standpoint. We will sign off and be available to see on an as needed basis.     Berna Larkin PA-C

## 2024-07-17 NOTE — CASE MANAGEMENT/SOCIAL WORK
Continued Stay Note  [facility]     Patient Name: Charly Blevins  MRN: [id number]  Today's Date: 7/17/2024    Admit Date: 7/15/2024    Plan: IRF   Discharge Plan       Row Name 07/17/24 1114       Plan    Plan IRF    Plan Comments Discussed in MDR. Ms. Blevins is not being discharged today. She continues on IV ABX and has been in restraints. She will need inpatient rehab per recommendations of PT. A Patient Choice List was given to her daughter, Alexandra. CM will continue to follow.    IMM delivered 7/17/24 1523 TS     Final Discharge Disposition Code 30 - still a patient                   Discharge Codes    No documentation.                 Expected Discharge Date and Time       Expected Discharge Date Expected Discharge Time    Jul 19, 2024               Mey Triana RN

## 2024-07-17 NOTE — PROGRESS NOTES
"          Clinical Nutrition Assessment     Patient Name: Charly Blevins  YOB: 1955  MRN: [id number]  Date of Encounter: 07/17/24 09:04 EDT  Admission date: 7/15/2024  Reason for Visit: MST score 2+, Reduced oral intake, \"Unsure\" unintentional weight loss    Assessment   Nutrition Assessment   Admission Diagnosis:  Symptomatic anemia [D64.9]    Problem List:    Symptomatic anemia    Primary hypertension    Type 2 diabetes mellitus, without long-term current use of insulin    Rheumatoid arthritis involving multiple sites    A-fib    Hypothyroidism (acquired)    Acute respiratory failure with hypoxia    Acute on chronic heart failure with preserved ejection fraction    Gout    Mixed hyperlipidemia      PMH:   She  has a past medical history of Anxiety and depression (12/17/2022), Atrial fibrillation (2015), Diabetes mellitus, Gout (12/17/2022), Hyperlipidemia, Hypertension, Hypothyroidism (12/17/2022), and Obesity (BMI 30-39.9) (12/17/2022).    PSH:  She  has a past surgical history that includes Hemorrhoid surgery; Cholecystectomy; Colostomy; Colostomy closure; Thyroidectomy; Hysterectomy; Tubal ligation; Other surgical history; Appendectomy; Lumbar discectomy; and Hip Trochanteric Nailing (Right, 3/27/2019).    Applicable Nutrition History:   + dentures    Anthropometrics     Height: Height: 167 cm (65.75\")  Last Filed Weight: Weight: 81 kg (178 lb 9.2 oz) (07/16/24 1534)  Method: Weight Method: Estimated  BMI: BMI (Calculated): 29    UBW:  Patient and family is unsure of UBW; per EMR no notable weight loss past year; 2/9/23 180#     Weight change: unchanged    Nutrition Focused Physical Exam    Date:  7/16/24       Patient meets criteria for malnutrition diagnosis, see MSA note.     Subjective   Reported/Observed/Food/Nutrition Related History:     7/16/24  Patient seemed slightly confused during visit. Family at bedside reported patient does not eat very well and hasn't for years. They have to " "\"make\" her eat. She is able to consume 1 good meal per day, then occasional snacks. Denies chewing or swallowing difficulties at this time. Does have persistent nausea that she takes zofran for. Denied any symptoms at time of visit. Declined all boost products. Willing to trial magic cup once per day. Discussed importance of adequate nutrition and PO intake. Patient verbalized understanding.    Current Nutrition Prescription   PO: Diet: Gastrointestinal; Fiber-Restricted; Texture: Soft to Chew (NDD 3); Soft to Chew: Whole Meat; Fluid Consistency: Thin (IDDSI 0)  Oral Nutrition Supplement: n/a  Intake: Insufficient data    Assessment & Plan   Nutrition Diagnosis   Date:  7/16/24            Updated:    Problem Malnutrition  moderate   Etiology Unclear etiology, chronic illness   Signs/Symptoms moderate decrease in energy intake, moderate muscle and fat loss   Status: New    Goal:   Nutrition to support treatment and Establish PO      Nutrition Intervention      Follow treatment progress, Care plan reviewed, Advise alternate selection, Advised available snacks, Interview for preferences, Encourage intake, Supplement provided, Education provided    Will order magic cup van once per day  Encourage adequate nutrition    Monitoring/Evaluation:   Per protocol, PO intake, Supplement intake, Weight, GI status, Symptoms, POC/GOC    Caty Balderrama, MS,RD,LD  Time Spent: 30min  "

## 2024-07-17 NOTE — PLAN OF CARE
Goal Outcome Evaluation:  Plan of Care Reviewed With: patient           Outcome Evaluation: PT PRESENTS WITH EVOLVING SYMPTOMS TO INCLUDE GENERALIZED WEAKNESS, IMPAIRED BALANCE, IMPAIRED COORDINATION, ACUTE ON CHRONIC PAIN AND DECLINE IN FUNCTIONAL MOBILITY. PT REQUIRES 3L O2 AND FATIGUES QUICKLY. NEEDS CUES FOR SAFETY AND SEQUENCING MOBILITY. REQUIRED MIN ASSIST OF 2 FOR STAND PIVOT BED TO RECLINER. BP STABLE SUPINE TO SIT. RECOMMEND IRF AT D/C FOR BEST OUTCOME.       Anticipated Discharge Disposition (PT): inpatient rehabilitation facility

## 2024-07-17 NOTE — PROGRESS NOTES
[facility] Medicine Services  PROGRESS NOTE    Patient Name: Charly Blevins  : 1955  MRN: [id number]    Date of Admission: 7/15/2024  Primary Care Physician: Patel Evans MD    Subjective   Subjective     CC:  Confusion, hypoxia, n/v/d, anemia    HPI:  Tired of hospital  No diarrhea, no n/v  No chest pain  No dyspnea        Objective   Objective     Vital Signs:   Temp:  [96.8 °F (36 °C)-98.6 °F (37 °C)] 97.9 °F (36.6 °C)  Heart Rate:  [] 67  Resp:  [16-20] 18  BP: (100-127)/(64-95) 104/64  Flow (L/min):  [3] 3     Physical Exam:  Constitutional:Alert, oriented to year, month, place  Psych:Normal/appropriate affect, currently calm  HEENT:NCAT, oropharynx clear  Neck: neck supple, full range of motion  Neuro: Face symmetric, speech clear, equal , moves all extremities  Cardiac iirr irr, regular rate; No pretibial pitting edema  Resp: ctab  GI: abd soft, nontender to palpation  Skin: No extremity rash  Musculoskeletal/extremities: no cyanosis of extremities; no significant ankle edema  In soft restraints          Results Reviewed:  LAB RESULTS:      Lab 24  0724 24  0027 24  1544 24  1038 24  0516 07/15/24  1639 07/15/24  1447   WBC 9.64  --   --   --  10.50  --  11.31*   HEMOGLOBIN 8.7* 8.4* 8.6* 8.6* 9.7*  --  7.6*   HEMATOCRIT 29.9* 29.3* 29.6* 29.7* 33.2*  --  27.0*   PLATELETS 292  --   --   --  367  --  373   NEUTROS ABS  --   --   --   --  8.11*  --  9.25*   IMMATURE GRANS (ABS)  --   --   --   --  0.07*  --  0.08*   LYMPHS ABS  --   --   --   --  1.04  --  0.98   MONOS ABS  --   --   --   --  1.08*  --  0.94*   EOS ABS  --   --   --   --  0.19  --  0.04   MCV 86.2  --   --   --  84.1  --  84.1   SED RATE  --   --   --   --   --   --  31*   CRP  --   --   --   --   --  14.75*  --    PROCALCITONIN  --   --   --   --   --   --  12.49*   LACTATE  --   --   --   --   --   --  1.7         [facility] 24  0724 24  1544  07/16/24  0516 07/15/24  1447   SODIUM 138  --  143 139   POTASSIUM 4.9 3.3* 3.0* 3.6   CHLORIDE 100  --  100 100   CO2 30.0*  --  33.0* 29.0   ANION GAP 8.0  --  10.0 10.0   BUN 9  --  10 16   CREATININE 0.66  --  0.62 0.68   EGFR 95.1  --  96.5 94.4   GLUCOSE 107*  --  96 174*   CALCIUM 7.5*  --  7.1* 7.7*   MAGNESIUM 2.0  --  1.3* 1.8   HEMOGLOBIN A1C  --   --   --  6.10*   TSH  --   --  1.890  --          Lab 07/16/24  0516 07/15/24  1447   TOTAL PROTEIN 5.8* 5.1*   ALBUMIN 2.6* 2.7*   GLOBULIN 3.2 2.4   ALT (SGPT) 12 12   AST (SGOT) 21 18   BILIRUBIN 1.2 0.5   ALK PHOS 64 71         Lab 07/15/24  1639 07/15/24  1447   PROBNP  --  8,823.0*   HSTROP T 52* 61*             Lab 07/17/24  0724 07/15/24  1639   IRON 23*  --    IRON SATURATION (TSAT) 10*  --    TIBC 228*  --    TRANSFERRIN 153*  --    ABO TYPING  --  A   RH TYPING  --  Positive   ANTIBODY SCREEN  --  Negative         Lab 07/16/24  0513 07/15/24  1511   PH, ARTERIAL 7.590* 7.486*   PCO2, ARTERIAL 34.2* 39.5   PO2 .0* 65.9*   FIO2 45 44   HCO3 ART 32.8* 29.8*   BASE EXCESS ART 10.5* 5.9*   CARBOXYHEMOGLOBIN 1.7 1.7     Brief Urine Lab Results  (Last result in the past 365 days)        Color   Clarity   Blood   Leuk Est   Nitrite   Protein   CREAT   Urine HCG        07/15/24 1440 Yellow   Clear   Negative   Negative   Negative   Negative                   Microbiology Results Abnormal       Procedure Component Value - Date/Time    Blood Culture - Blood, Wrist, Left [[id number]]  (Normal) Collected: 07/15/24 1526    Lab Status: Preliminary result Specimen: Blood from Wrist, Left Updated: 07/16/24 1545     Blood Culture No growth at 24 hours    Narrative:      Less than seven (7) mL's of blood was collected.  Insufficient quantity may yield false negative results.    Blood Culture - Blood, Arm, Right [[id number]]  (Normal) Collected: 07/15/24 1447    Lab Status: Preliminary result Specimen: Blood from Arm, Right Updated: 07/16/24 1515     Blood Culture  No growth at 24 hours    Narrative:      Less than seven (7) mL's of blood was collected.  Insufficient quantity may yield false negative results.    Respiratory Panel PCR w/COVID-19(SARS-CoV-2) GEOVANI/ROSALIE/HÉCTOR/PAD/COR/EDISON In-House, NP Swab in UTM/VTM, 2 HR TAT - Swab, Nasopharynx [[id number]]  (Normal) Collected: 07/16/24 1038    Lab Status: Final result Specimen: Swab from Nasopharynx Updated: 07/16/24 1148     ADENOVIRUS, PCR Not Detected     Coronavirus 229E Not Detected     Coronavirus HKU1 Not Detected     Coronavirus NL63 Not Detected     Coronavirus OC43 Not Detected     COVID19 Not Detected     Human Metapneumovirus Not Detected     Human Rhinovirus/Enterovirus Not Detected     Influenza A PCR Not Detected     Influenza B PCR Not Detected     Parainfluenza Virus 1 Not Detected     Parainfluenza Virus 2 Not Detected     Parainfluenza Virus 3 Not Detected     Parainfluenza Virus 4 Not Detected     RSV, PCR Not Detected     Bordetella pertussis pcr Not Detected     Bordetella parapertussis PCR Not Detected     Chlamydophila pneumoniae PCR Not Detected     Mycoplasma pneumo by PCR Not Detected    Narrative:      In the setting of a positive respiratory panel with a viral infection PLUS a negative procalcitonin without other underlying concern for bacterial infection, consider observing off antibiotics or discontinuation of antibiotics and continue supportive care. If the respiratory panel is positive for atypical bacterial infection (Bordetella pertussis, Chlamydophila pneumoniae, or Mycoplasma pneumoniae), consider antibiotic de-escalation to target atypical bacterial infection.    MRSA Screen, PCR (Inpatient) - Swab, Nares [[id number]]  (Normal) Collected: 07/15/24 1851    Lab Status: Final result Specimen: Swab from Nares Updated: 07/15/24 2010     MRSA PCR Negative    Narrative:      The negative predictive value of this diagnostic test is high and should only be used to consider de-escalating anti-MRSA therapy.  A positive result may indicate colonization with MRSA and must be correlated clinically.  MRSA Negative            Adult Transthoracic Echo Complete W/ Cont if Necessary Per Protocol    Result Date: 7/16/2024    Left ventricular ejection fraction appears to be 56 - 60%.   Left ventricular diastolic function was normal.   The right ventricular cavity is borderline dilated.   The left atrial cavity is dilated.     XR Chest 1 View    Result Date: 7/16/2024  XR CHEST 1 VW Date of Exam: 7/16/2024 7:31 AM EDT Indication: hypoxia, chf (after diuresis) Comparison: 7/15/2024 Findings: Implanted loop recorder is again noted. The heart is enlarged. Prominent mediastinal shadow is in part due to rotation of the patient to the right on today's exam, as well as significant superior mediastinal fat as seen on yesterday's chest CT scan. Mild  patchy disease in the right midlung is improving, now appearing as linear atelectasis. There is mild pulmonary venous hypertension without evidence of congestive failure, and mild nonspecific interstitial lung changes appear stable. No effusion or pneumothorax is seen.     Impression: Impression: 1. Cardiomegaly and mild pulmonary venous hypertension. 2. Mild remaining right midlung discoid atelectasis. No new chest disease is seen. Electronically Signed: Heath Prado MD  7/16/2024 8:02 AM EDT  Workstation ID: [id number]    XR Chest 1 View    Result Date: 7/15/2024  XR CHEST 1 VW Date of Exam: 7/15/2024 3:53 PM EDT Indication: Weak/Dizzy/AMS triage protocol Comparison: Chest CT performed on the same day. Findings: Mild linear atelectasis in the right upper and middle lobes is visualized. There is mild elevation of the right hemidiaphragm. There is no evidence of pneumothorax. There is mild pulmonary vascular congestion. Cardiac silhouette is enlarged. Loop recorder overlies the lower thoracic spine. No acute osseous abnormality identified.     Impression: Impression: Mild linear atelectasis in the  right upper and middle lobes. Findings compatible with mild CHF. Electronically Signed: Jay Andrew MD  7/15/2024 4:21 PM EDT  Workstation ID: [id number]    Duplex Venous Lower Extremity - RIGHT    Result Date: 7/15/2024    Normal right lower extremity venous duplex scan.     CT Head Without Contrast    Result Date: 7/15/2024  CT HEAD WO CONTRAST, CT ABDOMEN PELVIS WO CONTRAST, CT CHEST WO CONTRAST DIAGNOSTIC Date of Exam: 7/15/2024 2:24 PM EDT Indication: AMS. Comparison: Previous head CT scan 1/19/2023. Angiographic chest CT scan 1/20/2023. No prior abdominal scan. Technique: Axial CT images were obtained of the head, chest abdomen pelvis without contrast administration.  Automated exposure control and iterative construction methods were used. Findings: No additional clinical history is currently available. CT SCAN OF THE HEAD WITHOUT CONTRAST: The calvarium appears intact. Included paranasal sinuses and mastoids appear clear. There is expected degree of generalized cerebral atrophy for the patient's age. No hemorrhage, contusion, edema, or infarct is seen.  There is no evidence of mass or mass effect, hydrocephalus, or abnormal extra-axial collection.     Impression: No evidence of acute intracranial disease. CT SCAN OF THE CHEST WITHOUT CONTRAST: Axial images 20 through 46 show multiple well-defined hyperdense rounded lesions within and dorsal to the right pectoralis minor, resembling soft tissue masses, but similar to density of acute blood, and with the lesion on image #27 appearing to show a potential hematocrit level, possibly all intramuscular hematoma. The largest of these measures approximately 3 cm in maximal diameter. No similar findings are seen elsewhere and no underlying rib fractures identified. The heart is enlarged. No pericardial effusion or mediastinal adenopathy is seen. There is trace bilateral pleural effusion. No coronary calcification is seen. Images of the lungs appear to show prominent  "pulmonary vasculature and mild diffuse interstitial disease suggesting early interstitial edema. No particular focal pulmonary disease is seen to favor a pneumonia although a small focus of pneumonia. Bony structures appear to be intact. IMPRESSION: 1. 4 or 5 adjacent rounded hyperdense \"masses\" within and along the right pectoralis minor muscle, each between 2 and 3 cm in diameter, and favored to represent hematoma/intramuscular hemorrhage, rather than neoplasm. Please correlate with any history of  trauma here. 2. Minimal bibasilar pleural effusion, and small area of right upper lobe discoid atelectasis. No particular features to suggest pneumonia. 3. Cardiomegaly and mild pulmonary vascular congestion, perhaps with early interstitial edema. ABDOMEN AND PELVIS CT SCAN WITHOUT CONTRAST: An area of slightly nodular left lower breast tissue is unchanged from 1/20/2023. No abdominal wall or retroperitoneal hematoma is seen. Clips are seen in the gallbladder fossa. No significant abnormalities are appreciated of the liver, pancreas, adrenal glands, or kidneys for non-IV contrast enhanced exam. Multiple splenic lesions, presumably cysts, are stable from the prior exam, the largest measuring water density, 3 cm in diameter image 33 series 2. No upper abdominal free air, ascites, adenopathy, or acute inflammatory change is seen. Bowel loops are nondistended. Regarding the lower abdomen and pelvis, there is evidence of previous extensive bowel surgery. Terminal ileum and cecum appear grossly normal. Appendix is not identified. There appears to be a distal ileal suture line, and suture line at the rectosigmoid  junction. These areas are clear of mass or inflammatory change. No intrapelvic free fluid mass or adenopathy is seen. Bladder is normally distended and normal in appearance. Uterus and ovaries are not identified, whether atrophic or surgically absent. Streak artifact is seen from the patient's right hip fixation " hardware. There is an old healed right inferior pubic ramus fracture but no acute bony abnormality is seen. IMPRESSION: 1. No evidence of acute inflammatory process or other clearly acute intra-abdominal or intrapelvic disease. 2. Evidence of previous bowel surgery. No evidence of significant ileus or obstruction. Electronically Signed: Heath Prado MD  7/15/2024 2:53 PM EDT  Workstation ID: [id number]    CT Chest Without Contrast Diagnostic    Result Date: 7/15/2024  CT HEAD WO CONTRAST, CT ABDOMEN PELVIS WO CONTRAST, CT CHEST WO CONTRAST DIAGNOSTIC Date of Exam: 7/15/2024 2:24 PM EDT Indication: AMS. Comparison: Previous head CT scan 1/19/2023. Angiographic chest CT scan 1/20/2023. No prior abdominal scan. Technique: Axial CT images were obtained of the head, chest abdomen pelvis without contrast administration.  Automated exposure control and iterative construction methods were used. Findings: No additional clinical history is currently available. CT SCAN OF THE HEAD WITHOUT CONTRAST: The calvarium appears intact. Included paranasal sinuses and mastoids appear clear. There is expected degree of generalized cerebral atrophy for the patient's age. No hemorrhage, contusion, edema, or infarct is seen.  There is no evidence of mass or mass effect, hydrocephalus, or abnormal extra-axial collection.     Impression: No evidence of acute intracranial disease. CT SCAN OF THE CHEST WITHOUT CONTRAST: Axial images 20 through 46 show multiple well-defined hyperdense rounded lesions within and dorsal to the right pectoralis minor, resembling soft tissue masses, but similar to density of acute blood, and with the lesion on image #27 appearing to show a potential hematocrit level, possibly all intramuscular hematoma. The largest of these measures approximately 3 cm in maximal diameter. No similar findings are seen elsewhere and no underlying rib fractures identified. The heart is enlarged. No pericardial effusion or mediastinal  "adenopathy is seen. There is trace bilateral pleural effusion. No coronary calcification is seen. Images of the lungs appear to show prominent pulmonary vasculature and mild diffuse interstitial disease suggesting early interstitial edema. No particular focal pulmonary disease is seen to favor a pneumonia although a small focus of pneumonia. Bony structures appear to be intact. IMPRESSION: 1. 4 or 5 adjacent rounded hyperdense \"masses\" within and along the right pectoralis minor muscle, each between 2 and 3 cm in diameter, and favored to represent hematoma/intramuscular hemorrhage, rather than neoplasm. Please correlate with any history of  trauma here. 2. Minimal bibasilar pleural effusion, and small area of right upper lobe discoid atelectasis. No particular features to suggest pneumonia. 3. Cardiomegaly and mild pulmonary vascular congestion, perhaps with early interstitial edema. ABDOMEN AND PELVIS CT SCAN WITHOUT CONTRAST: An area of slightly nodular left lower breast tissue is unchanged from 1/20/2023. No abdominal wall or retroperitoneal hematoma is seen. Clips are seen in the gallbladder fossa. No significant abnormalities are appreciated of the liver, pancreas, adrenal glands, or kidneys for non-IV contrast enhanced exam. Multiple splenic lesions, presumably cysts, are stable from the prior exam, the largest measuring water density, 3 cm in diameter image 33 series 2. No upper abdominal free air, ascites, adenopathy, or acute inflammatory change is seen. Bowel loops are nondistended. Regarding the lower abdomen and pelvis, there is evidence of previous extensive bowel surgery. Terminal ileum and cecum appear grossly normal. Appendix is not identified. There appears to be a distal ileal suture line, and suture line at the rectosigmoid  junction. These areas are clear of mass or inflammatory change. No intrapelvic free fluid mass or adenopathy is seen. Bladder is normally distended and normal in appearance. " Uterus and ovaries are not identified, whether atrophic or surgically absent. Streak artifact is seen from the patient's right hip fixation hardware. There is an old healed right inferior pubic ramus fracture but no acute bony abnormality is seen. IMPRESSION: 1. No evidence of acute inflammatory process or other clearly acute intra-abdominal or intrapelvic disease. 2. Evidence of previous bowel surgery. No evidence of significant ileus or obstruction. Electronically Signed: Heath Prado MD  7/15/2024 2:53 PM EDT  Workstation ID: [id number]    CT Abdomen Pelvis Without Contrast    Result Date: 7/15/2024  CT HEAD WO CONTRAST, CT ABDOMEN PELVIS WO CONTRAST, CT CHEST WO CONTRAST DIAGNOSTIC Date of Exam: 7/15/2024 2:24 PM EDT Indication: AMS. Comparison: Previous head CT scan 1/19/2023. Angiographic chest CT scan 1/20/2023. No prior abdominal scan. Technique: Axial CT images were obtained of the head, chest abdomen pelvis without contrast administration.  Automated exposure control and iterative construction methods were used. Findings: No additional clinical history is currently available. CT SCAN OF THE HEAD WITHOUT CONTRAST: The calvarium appears intact. Included paranasal sinuses and mastoids appear clear. There is expected degree of generalized cerebral atrophy for the patient's age. No hemorrhage, contusion, edema, or infarct is seen.  There is no evidence of mass or mass effect, hydrocephalus, or abnormal extra-axial collection.     Impression: No evidence of acute intracranial disease. CT SCAN OF THE CHEST WITHOUT CONTRAST: Axial images 20 through 46 show multiple well-defined hyperdense rounded lesions within and dorsal to the right pectoralis minor, resembling soft tissue masses, but similar to density of acute blood, and with the lesion on image #27 appearing to show a potential hematocrit level, possibly all intramuscular hematoma. The largest of these measures approximately 3 cm in maximal diameter. No similar  "findings are seen elsewhere and no underlying rib fractures identified. The heart is enlarged. No pericardial effusion or mediastinal adenopathy is seen. There is trace bilateral pleural effusion. No coronary calcification is seen. Images of the lungs appear to show prominent pulmonary vasculature and mild diffuse interstitial disease suggesting early interstitial edema. No particular focal pulmonary disease is seen to favor a pneumonia although a small focus of pneumonia. Bony structures appear to be intact. IMPRESSION: 1. 4 or 5 adjacent rounded hyperdense \"masses\" within and along the right pectoralis minor muscle, each between 2 and 3 cm in diameter, and favored to represent hematoma/intramuscular hemorrhage, rather than neoplasm. Please correlate with any history of  trauma here. 2. Minimal bibasilar pleural effusion, and small area of right upper lobe discoid atelectasis. No particular features to suggest pneumonia. 3. Cardiomegaly and mild pulmonary vascular congestion, perhaps with early interstitial edema. ABDOMEN AND PELVIS CT SCAN WITHOUT CONTRAST: An area of slightly nodular left lower breast tissue is unchanged from 1/20/2023. No abdominal wall or retroperitoneal hematoma is seen. Clips are seen in the gallbladder fossa. No significant abnormalities are appreciated of the liver, pancreas, adrenal glands, or kidneys for non-IV contrast enhanced exam. Multiple splenic lesions, presumably cysts, are stable from the prior exam, the largest measuring water density, 3 cm in diameter image 33 series 2. No upper abdominal free air, ascites, adenopathy, or acute inflammatory change is seen. Bowel loops are nondistended. Regarding the lower abdomen and pelvis, there is evidence of previous extensive bowel surgery. Terminal ileum and cecum appear grossly normal. Appendix is not identified. There appears to be a distal ileal suture line, and suture line at the rectosigmoid  junction. These areas are clear of mass " or inflammatory change. No intrapelvic free fluid mass or adenopathy is seen. Bladder is normally distended and normal in appearance. Uterus and ovaries are not identified, whether atrophic or surgically absent. Streak artifact is seen from the patient's right hip fixation hardware. There is an old healed right inferior pubic ramus fracture but no acute bony abnormality is seen. IMPRESSION: 1. No evidence of acute inflammatory process or other clearly acute intra-abdominal or intrapelvic disease. 2. Evidence of previous bowel surgery. No evidence of significant ileus or obstruction. Electronically Signed: Heath Prado MD  7/15/2024 2:53 PM EDT  Workstation ID: [id number]     Results for orders placed during the hospital encounter of 07/15/24    Adult Transthoracic Echo Complete W/ Cont if Necessary Per Protocol    Interpretation Summary    Left ventricular ejection fraction appears to be 56 - 60%.    Left ventricular diastolic function was normal.    The right ventricular cavity is borderline dilated.    The left atrial cavity is dilated.      Current medications:  Scheduled Meds:allopurinol, 300 mg, Oral, Daily  budesonide-formoterol, 1 puff, Inhalation, BID - RT  doxycycline, 100 mg, Intravenous, BID  ferric gluconate, 125 mg, Intravenous, Daily  gabapentin, 100 mg, Oral, Q12H  insulin lispro, 2-7 Units, Subcutaneous, 4x Daily AC & at Bedtime  ipratropium-albuterol, 3 mL, Nebulization, Q6H While Awake - RT  levothyroxine, 125 mcg, Oral, QAM  Lidocaine, 1 patch, Transdermal, Q24H  melatonin, 10 mg, Oral, Nightly  metoprolol tartrate, 25 mg, Oral, BID  pantoprazole, 40 mg, Intravenous, Q12H  sodium chloride, 10 mL, Intravenous, Q12H      Continuous Infusions:     PRN Meds:.  acetaminophen **OR** acetaminophen **OR** acetaminophen    senna-docusate sodium **AND** polyethylene glycol **AND** bisacodyl **AND** bisacodyl    dextrose    dextrose    glucagon (human recombinant)    haloperidol lactate     HYDROcodone-acetaminophen    Magnesium Cardiology Dose Replacement - Follow Nurse / BPA Driven Protocol    midazolam    nitroglycerin    ondansetron    Potassium Replacement - Follow Nurse / BPA Driven Protocol    sodium chloride    sodium chloride    sodium chloride    temazepam    Assessment & Plan   Assessment & Plan     Active Hospital Problems    Diagnosis  POA    **Symptomatic anemia [D64.9]  Yes    Acute respiratory failure with hypoxia [J96.01]  Unknown    Acute on chronic heart failure with preserved ejection fraction [I50.33]  Unknown    Gout [M10.9]  Unknown    Mixed hyperlipidemia [E78.2]  Unknown    Hypothyroidism (acquired) [E03.9]  Yes    A-fib [I48.91]  Yes    Type 2 diabetes mellitus, without long-term current use of insulin [E11.9]  Yes    Primary hypertension [I10]  Yes    Rheumatoid arthritis involving multiple sites [M06.9]  Yes      Resolved Hospital Problems   No resolved problems to display.        Brief Hospital Course to date:  Charly Blevins is a 68 y.o. female w/ hx afib (on eliquis), htn, dm2, hl, RA, hypothyroidism (on levothyroxine) who presented with fatigue, progressive dyspnea & fatigue. Reports had recently fallen and hit head on 7/13/24, became confused later that evening w/ some hallucinations as well as some right sided chest wall pain since the fall w/ some n/v/d (dark colored). In ED was hypoxic requiring hi flow canula to maintain sats >88%, afib w/ rvr. hgb 7.6, guaic +, elevated procal >12. Wbc 13,310. Probnp 8,823. Hs trop 61 (repeat 52). CXR suggested changes o pulm vascular congestion. CT head negative . CT chest suggested right pectoralis hematomas and chf changes. Ct a/p was negative for acute process. Received bumex 2mg iv in ed. Developed worsening RVR, developed marginal bp (borderline hypotension) and received 1 unit prbc. Received another lasix 60mg iv later the evening of admission. Also received digoxin after admission due to RVR      Acute on chronic hypoxic  resp failure  Chronic 3L o2 dependence (due to previous influenza resp illness)  Bilateral interstitial infiltrates vs edema (on imaging)  -favor chf as etiology  -initially required hi flow canula  -covering for possible pneumonia as well  -resp pcr panel negative  -have weaned down to 3Lnc today    Acute HFpEF  Afib w/ rvr  Elevated HS troponins  -previous echo 12/17/22: LV EF 59%, diastolic dysfunction  -CT chest w/o contrast: ~4 o 5 hyperdensities in right chest wall along right pectoralis minor muscle, favoring hematoma (rather than neoplasm); mild bibasilar pleural effusions, cardiomegaly and mild pulm vascular contgestion  -initial abg 7.28/39/65 (on 44% fio2)  -repeat abg 7.59/34/163 (on 45% tio2)  -tsh wnl  -s/p digoxin 250mcg x 2 (evening 7/15, morning of 7/16)  -increased metoprolol to 25mg po bid on 7/16/24  -s/p bumex 2mg iv in ED, another lasix 60mg iv after admission (had >5,700 mL output within 1st day)  -echo 7/16/24: LV ef 56-60%, normal diastolic function  -Cards followed: continue metoprolol 25mg po bid, holding eliquis (restart when ko w/ gi), follow up w/ cards as outpatient for consideration of watchman if unable to anticoagulate  -holding further diuresis at this point    Acute symptomatic anemia  Iron def  Gi bleed (dark stool, guaic +)  N/V/D  ct c/a/p without overt source infection  -diarrhea resolved, cancel gi pcr panel  -u/a benign  **s/p 1 unit prbc (hgb 7.6 with symptoms of hypotension, dyspnea in setting of hypotension and afib rvr)  -serial hgb stable  -iv iron day #1/3  -holding eliquis  -GI following: continue bid ppi; patient doesn't feel can tolerate endoscopy today, considering initiating bowel prep tomorrow 7/18/24 for upper and lower endoscopy Friday 7/19/24    Leukocytosis  Elevated procal  -unclear etiology, perhaps possible atypical pneumonia?  -ct c/a/p without overt source infection  -u/a benign  -resp mrsa pcr negative  -stopped vanc  -stop zosyn  -doxycycline day #2/5  empiric rx    RLE edema  -venous duplex negative RLE for dvt    Encephalopathy, improving  -ct head negative  -u/a benign  -? Due to acute illness, ? Sleep deprivation; improving  -prn midazolama dn haldol prn; scheduled melatonin; prn nightly restoril    Chronic back pain  -on lortab  -heating pad    Hypokalemia and hypomagnesemia  -replace per protocol    Dm2   -A1c 6.1  -ssi    Hypothyroidism  -tsh wnl1.8; continue levothyroxine    Hx RA        Am labs: cbc,bmp,mag       -updated daughter Alexandra via phone on 7/16/24      Expected Discharge Location and Transportation: TBD (pt recommends inpatient rehab, case management to discuss further w/ patient)  Expected Discharge after endoscopies  Expected Discharge Date: 7/19/2024; Expected Discharge Time:      VTE Prophylaxis:  Mechanical VTE prophylaxis orders are present.         AM-PAC 6 Clicks Score (PT): 15 (07/17/24 1017)    CODE STATUS:   Code Status and Medical Interventions:   Ordered at: 07/15/24 2848     Level Of Support Discussed With:    Patient     Code Status (Patient has no pulse and is not breathing):    CPR (Attempt to Resuscitate)     Medical Interventions (Patient has pulse or is breathing):    Full Support       Ino Grissom MD  07/17/24

## 2024-07-17 NOTE — PROGRESS NOTES
"GI Daily Progress Note  Subjective:    Chief Complaint:  Follow up melena     Charly is feeling some better today.   She is more oriented.   She denies any melena today.   She complains of chronic left shoulder pain that is really bothering her today.       Had mild nausea last night.      Objective:    /64 (BP Location: Right arm, Patient Position: Lying)   Pulse 67   Temp 97.9 °F (36.6 °C)   Resp 18   Ht 167 cm (65.75\")   Wt 81 kg (178 lb 9.2 oz)   SpO2 98%   BMI 29.04 kg/m²     Physical Exam  Constitutional:       General: She is not in acute distress.  Cardiovascular:      Rate and Rhythm: Normal rate and regular rhythm.   Pulmonary:      Effort: Pulmonary effort is normal. No respiratory distress.   Abdominal:      General: Bowel sounds are normal. There is no distension.      Palpations: Abdomen is soft.      Tenderness: There is no abdominal tenderness.   Skin:     Coloration: Skin is pale.   Neurological:      Mental Status: She is alert and oriented to person, place, and time.         Lab  Lab Results   Component Value Date    WBC 9.64 07/17/2024    HGB 8.7 (L) 07/17/2024    HGB 8.4 (L) 07/17/2024    HGB 8.6 (L) 07/16/2024    MCV 86.2 07/17/2024     07/17/2024    INR 1.27 (H) 12/22/2022    INR 1.41 (H) 12/20/2022    INR 2.6 (H) 12/17/2022    INR 1.30 (H) 07/26/2022     Lab Results   Component Value Date    GLUCOSE 107 (H) 07/17/2024    BUN 9 07/17/2024    CREATININE 0.66 07/17/2024    EGFRIFNONA 82 04/01/2019    BCR 13.6 07/17/2024     07/17/2024    K 4.9 07/17/2024    CO2 30.0 (H) 07/17/2024    CALCIUM 7.5 (L) 07/17/2024    ALBUMIN 2.6 (L) 07/16/2024    ALKPHOS 64 07/16/2024    BILITOT 1.2 07/16/2024    ALT 12 07/16/2024    AST 21 07/16/2024       Assessment:    Gastrointestinal bleeding with melena  Normocytic anemia due to blood loss   Metabolic encephalopathy   CHF exacerbation  Acute hypoxic respiratory failure   Hematomas after fall, right pectoralis minor muscle   FH of colon " cancer, her mother     Plan:    H&H remains stable today post transfusion.  No melena observed today.       Discussed bidirectional endoscopies.   She voices concerning of consuming bowel prep as well as her left shoulder pain.   She would like to think about this further.   We discussed possible clear liquid diet tomorrow for procedures on Friday, 7/19.      Continue empiric BID PPI      Will change diet to clear liquid for tomorrow .      CLIFF Kumar  07/17/24  12:45 EDT

## 2024-07-17 NOTE — PROGRESS NOTES
Malnutrition Severity Assessment    Patient Name:  Charly Blevins  YOB: 1955  MRN: [id number]  Admit Date:  7/15/2024    Patient meets criteria for : Moderate (non-severe) Malnutrition (Assessed on 7/16)    Comments:  Patient meets chronic illness related moderate malnutrition criteria with indicators for moderate decrease in energy intake, moderate muscle and fat loss.    Malnutrition Severity Assessment  Malnutrition Type: Chronic Disease - Related Malnutrition  Malnutrition Type (Last 8 Hours)       Malnutrition Severity Assessment       Row Name 07/17/24 0902       Malnutrition Severity Assessment    Malnutrition Type Chronic Disease - Related Malnutrition      Row Name 07/17/24 0902       Insufficient Energy Intake     Insufficient Energy Intake Findings Moderate    Insufficient Energy Intake  <75% of est. energy requirement for > or equal to 1 month      Row Name 07/17/24 0902       Muscle Loss    Loss of Muscle Mass Findings Moderate    [place] Region Moderate - slight depression    Clavicle Bone Region Moderate - some protrusion in females, visible in males    Acromion Bone Region Moderate - acromion may slightly protrude    Dorsal Hand Region Moderate - slight depression    Patellar Region Moderate - patella more prominent, less muscle definition around patella    Anterior Thigh Region Moderate - mild depression on inner thigh    Posterior Calf Region --  GENARO      Row Name 07/17/24 0902       Fat Loss    Subcutaneous Fat Loss Findings Moderate    Orbital Region  Moderate -  somewhat hollowness, slightly dark circles    Upper Arm Region Moderate - some fat tissue, not ample      Row Name 07/17/24 0902       Declining Functional Status    Declining Functional Status Findings N/A      Row Name 07/17/24 0902       Criteria Met (Must meet criteria for severity in at least 2 of these categories: M Wasting, Fat Loss, Fluid, Secondary Signs, Wt. Status, Intake)    Patient meets criteria for   Moderate (non-severe) Malnutrition  Assessed on 7/16                    Electronically signed by:  Caty Balderrama MS,RD,LD  07/17/24 09:02 EDT

## 2024-07-17 NOTE — THERAPY EVALUATION
Patient Name: Charly Blevins  : 1955    MRN: [id number]                              Today's Date: 2024       Admit Date: 7/15/2024    Visit Dx:     ICD-10-CM ICD-9-CM   1. Acute on chronic respiratory failure with hypoxia  J96.21 518.84     799.02   2. Acute on chronic congestive heart failure, unspecified heart failure type  I50.9 428.0   3. Multiple falls  R29.6 V15.88   4. Anemia, unspecified type  D64.9 285.9   5. Gastrointestinal hemorrhage, unspecified gastrointestinal hemorrhage type  K92.2 578.9     Patient Active Problem List   Diagnosis    Closed intertrochanteric fracture of hip, right, initial encounter    Pubic ramus fracture    Primary hypertension    Dyslipidemia    Type 2 diabetes mellitus, without long-term current use of insulin    Rheumatoid arthritis involving multiple sites    A-fib    Acute hypoxic respiratory failure    On chronic immunosuppressive therapy    GERD    Obesity (BMI 30-39.9)    Hypothyroidism (acquired)    Anxiety and depression    Gout    Steroid-induced hyperglycemia    Pneumonia of right lower lobe due to infectious organism    Acute conjunctivitis of left eye    Acute respiratory failure with hypoxia    Nocturnal hypoxia (baseline 2-3L NC QHS)    Immunosuppressed status    Insomnia    Symptomatic anemia    Acute respiratory failure with hypoxia    Acute on chronic heart failure with preserved ejection fraction    Gout    Mixed hyperlipidemia     Past Medical History:   Diagnosis Date    Anxiety and depression 2022    Atrial fibrillation     CARDIOVERSION - NO REOCCURANCE SINCE     Diabetes mellitus     Gout 2022    Hyperlipidemia     Hypertension     Hypothyroidism 2022    Obesity (BMI 30-39.9) 2022     Past Surgical History:   Procedure Laterality Date    APPENDECTOMY      CHOLECYSTECTOMY      COLOSTOMY      RESULTED FROM HYSTERECTOMY     COLOSTOMY CLOSURE      WHILE DOING COLOSTOMY CLOSURE; ENDED UP HAVING A ILEOSTOMY     HEMORRHOIDECTOMY      HIP TROCHANTERIC NAILING WITH INTRAMEDULLARY HIP SCREW Right 3/27/2019    Procedure: HIP TROCANTERIC NAILING WITH INTRAMEDULLARY HIP SCREW RIGHT;  Surgeon: Jona Tom MD;  Location: [facility];  Service: Orthopedics    HYSTERECTOMY      LUMBAR DISCECTOMY      L4 - L5    OTHER SURGICAL HISTORY      TUBAL REVERSAL    THYROIDECTOMY      TUBAL ABDOMINAL LIGATION      X 2      General Information       Row Name 07/17/24 0955          Physical Therapy Time and Intention    Document Type evaluation  -CD     Mode of Treatment physical therapy  -CD       Row Name 07/17/24 0955          General Information    Patient Profile Reviewed yes  -CD     Prior Level of Function independent:;all household mobility;gait;transfer;bed mobility;min assist:;dressing;bathing;dependent:;cooking;cleaning;driving;shopping  USES ROLLATOR IN THE HOME AND W/C IN COMMUNITY. HAS A CAREGIVER 8AM-5PM. SON IS THERE PART TIME. HAS A DTR CLOSE BY. USES O2 24/7.  -CD     Existing Precautions/Restrictions fall;oxygen therapy device and L/min  CHRONIC BACK, L SHOULDER AND R LE PAIN. MULTIPLE RECENT FALLS.  -CD     Barriers to Rehab medically complex;previous functional deficit;cognitive status  -CD       Row Name 07/17/24 0955          Living Environment    People in Home alone  CG DURING THE DAY.  -CD       Row Name 07/17/24 0955          Home Main Entrance    Number of Stairs, Main Entrance three  -CD     Stair Railings, Main Entrance none  FAMILY ASSISTS ALWAYS WITH ENTERING/LEAVING HOME.  -CD       Row Name 07/17/24 0955          Stairs Within Home, Primary    Number of Stairs, Within Home, Primary none  -CD       Row Name 07/17/24 0955          Cognition    Orientation Status (Cognition) oriented to;person;place;disoriented to;time  -CD       Row Name 07/17/24 0955          Safety Issues, Functional Mobility    Safety Issues Affecting Function (Mobility) at risk behavior observed;awareness of need for assistance  -CD      Impairments Affecting Function (Mobility) balance;coordination;endurance/activity tolerance;strength;shortness of breath  -CD               User Key  (r) = Recorded By, (t) = Taken By, (c) = Cosigned By      Initials Name Provider Type    CD Jeanette Back, PT Physical Therapist                   Mobility       Row Name 07/17/24 1002          Bed Mobility    Bed Mobility supine-sit  -CD     Supine-Sit Angelina (Bed Mobility) minimum assist (75% patient effort);verbal cues  -CD     Assistive Device (Bed Mobility) bed rails;draw sheet;head of bed elevated  -CD     Comment, (Bed Mobility) INCREASED TIME AND EFFORT.  -CD       Row Name 07/17/24 1002          Transfers    Comment, (Transfers) CUES FOR HAND PLACEMENT. STS FROM EOB BUT C/O DIZZINESS AND RETURNED TO SITTING. BP STABLE. COMPLETED STAND STEP PIVOT BED TO RECLINER.  -CD       Row Name 07/17/24 1002          Bed-Chair Transfer    Bed-Chair Angelina (Transfers) minimum assist (75% patient effort);2 person assist;verbal cues  -CD     Assistive Device (Bed-Chair Transfers) walker, front-wheeled  -CD       Row Name 07/17/24 1002          Sit-Stand Transfer    Sit-Stand Angelina (Transfers) verbal cues;contact guard  -CD     Assistive Device (Sit-Stand Transfers) walker, front-wheeled  -CD       Row Name 07/17/24 1002          Gait/Stairs (Locomotion)    Angelina Level (Gait) not tested  -CD     Comment, (Gait/Stairs) PT DECLINED DUE TO FATIGUE, L LE PAIN.  -CD               User Key  (r) = Recorded By, (t) = Taken By, (c) = Cosigned By      Initials Name Provider Type    Jeanette Olmos PT Physical Therapist                   Obj/Interventions       Row Name 07/17/24 1003          Range of Motion Comprehensive    General Range of Motion bilateral lower extremity ROM WFL  -CD       Row Name 07/17/24 1003          Strength Comprehensive (MMT)    General Manual Muscle Testing (MMT) Assessment lower extremity strength deficits identified  -CD      Comment, General Manual Muscle Testing (MMT) Assessment HIP FLEX R 3/5, L 4/5; KNEE EXT L 3+/5, R 4/5, B DF 4/5.  -CD       Row Name 07/17/24 1003          Motor Skills    Motor Skills coordination;functional endurance  -CD     Coordination gross motor deficit;bilateral;lower extremity;minimal impairment  PT TREMULOUS WITH ROM ASSESSEMENT.  -CD     Functional Endurance PT FATIGUES QUICKLY. ON 3L O2.  -CD       Row Name 07/17/24 1003          Balance    Balance Assessment sitting static balance;sit to stand dynamic balance;standing static balance;sitting dynamic balance;standing dynamic balance  -CD     Static Sitting Balance standby assist  -CD     Dynamic Sitting Balance contact guard  -CD     Position, Sitting Balance unsupported;sitting edge of bed  -CD     Sit to Stand Dynamic Balance minimal assist;2-person assist  -CD     Static Standing Balance minimal assist;verbal cues  -CD     Dynamic Standing Balance minimal assist;2-person assist  -CD     Position/Device Used, Standing Balance walker, rolling  -CD     Balance Interventions sitting;standing;sit to stand;supported;static;dynamic  -CD     Comment, Balance PT UNSTEADY. RELIES HEAVILY ON R WALKER FOR SUPPORT.  -CD               User Key  (r) = Recorded By, (t) = Taken By, (c) = Cosigned By      Initials Name Provider Type    CD Jeanette Back, PT Physical Therapist                   Goals/Plan       Row Name 07/17/24 1016          Bed Mobility Goal 1 (PT)    Activity/Assistive Device (Bed Mobility Goal 1, PT) sit to supine/supine to sit  -CD     [place] Level/Cues Needed (Bed Mobility Goal 1, PT) supervision required  -CD     Time Frame (Bed Mobility Goal 1, PT) short term goal (STG);1 week  -CD       Row Name 07/17/24 1016          Transfer Goal 1 (PT)    Activity/Assistive Device (Transfer Goal 1, PT) sit-to-stand/stand-to-sit;bed-to-chair/chair-to-bed  -CD     [place] Level/Cues Needed (Transfer Goal 1, PT) contact guard required  -CD     Time  Frame (Transfer Goal 1, PT) long term goal (LTG);2 weeks  -CD       Row Name 07/17/24 1016          Gait Training Goal 1 (PT)    Activity/Assistive Device (Gait Training Goal 1, PT) gait (walking locomotion);walker, rolling  -CD     [place] Level (Gait Training Goal 1, PT) contact guard required  -CD     Distance (Gait Training Goal 1, PT) 200 FEET  -CD     Time Frame (Gait Training Goal 1, PT) long term goal (LTG);2 weeks  -CD       Row Name 07/17/24 1016          Therapy Assessment/Plan (PT)    Planned Therapy Interventions (PT) balance training;bed mobility training;gait training;home exercise program;transfer training;strengthening;postural re-education;patient/family education;motor coordination training  -CD               User Key  (r) = Recorded By, (t) = Taken By, (c) = Cosigned By      Initials Name Provider Type    CD Jeanette Back, PT Physical Therapist                   Clinical Impression       Row Name 07/17/24 1010          Pain    Pretreatment Pain Rating 9/10  -CD     Posttreatment Pain Rating 9/10  -CD     Pain Location - Side/Orientation Right;Left  -CD     Pain Location lower  -CD     Pain Location - extremity;shoulder  -CD     Pre/Posttreatment Pain Comment PT REPORTS CHRONIC L SHOULDER, BACK  AND R LE PAIN. PT IS S/P  PRIOR R HIP NAILING AND BACK SURGERIES.  -CD     Pain Intervention(s) Repositioned;Rest  NOTIFIED NSG PT WOULD LIKE PAIN MEDICATION.  -CD       Row Name 07/17/24 1010          Plan of Care Review    Plan of Care Reviewed With patient  -CD     Outcome Evaluation PT PRESENTS WITH EVOLVING SYMPTOMS TO INCLUDE GENERALIZED WEAKNESS, IMPAIRED BALANCE, IMPAIRED COORDINATION, ACUTE ON CHRONIC PAIN AND DECLINE IN FUNCTIONAL MOBILITY. PT REQUIRES 3L O2 AND FATIGUES QUICKLY. NEEDS CUES FOR SAFETY AND SEQUENCING MOBILITY. REQUIRED MIN ASSIST OF 2 FOR STAND PIVOT BED TO RECLINER. BP STABLE SUPINE TO SIT. RECOMMEND IRF AT D/C FOR BEST OUTCOME.  -CD       Row Name 07/17/24 1010           Therapy Assessment/Plan (PT)    Patient/Family Therapy Goals Statement (PT) TO GO HOME.  -CD     Rehab Potential (PT) good, to achieve stated therapy goals  -CD     Criteria for Skilled Interventions Met (PT) yes;meets criteria;skilled treatment is necessary  -CD     Therapy Frequency (PT) daily  -CD     Predicted Duration of Therapy Intervention (PT) 2 WEEKS  -CD       Row Name 07/17/24 1010          Vital Signs    Pre Systolic BP Rehab 114  -CD     Pre Treatment Diastolic BP 67  -CD     Post Systolic BP Rehab 105  -CD     Post Treatment Diastolic BP 76  -CD     Intratreatment Heart Rate (beats/min) 103  -CD     Posttreatment Heart Rate (beats/min) 83  -CD     Pre SpO2 (%) 91  -CD     O2 Delivery Pre Treatment supplemental O2  -CD     O2 Delivery Intra Treatment supplemental O2  -CD     Post SpO2 (%) --  NSG NOTIFIED SENSOR NEEDS REPLACED.  -CD     O2 Delivery Post Treatment supplemental O2  -CD     Pre Patient Position Supine  -CD     Intra Patient Position Standing  -CD     Post Patient Position Sitting  -CD       Row Name 07/17/24 1010          Positioning and Restraints    Pre-Treatment Position in bed  -CD     Post Treatment Position chair  -CD     In Chair reclined;call light within reach;encouraged to call for assist;legs elevated;notified nsg;exit alarm on  -CD               User Key  (r) = Recorded By, (t) = Taken By, (c) = Cosigned By      Initials Name Provider Type    CD Jeanette Back, PT Physical Therapist                   Outcome Measures       Row Name 07/17/24 1017          How much help from another person do you currently need...    Turning from your back to your side while in flat bed without using bedrails? 3  -CD     Moving from lying on back to sitting on the side of a flat bed without bedrails? 3  -CD     Moving to and from a bed to a chair (including a wheelchair)? 2  -CD     Standing up from a chair using your arms (e.g., wheelchair, bedside chair)? 3  -CD     Climbing 3-5 steps with a  railing? 2  -CD     To walk in hospital room? 2  -CD     AM-PAC 6 Clicks Score (PT) 15  -CD     Highest Level of Mobility Goal 4 --> Transfer to chair/commode  -CD       Row Name 07/17/24 1017          Modified [place] Scale    Modified [place] Scale 4 - Moderately severe disability.  Unable to walk without assistance, and unable to attend to own bodily needs without assistance.  -CD       Row Name 07/17/24 1017          Functional Assessment    Outcome Measure Options AM-PAC 6 Clicks Basic Mobility (PT);Modified Scarlet  -CD               User Key  (r) = Recorded By, (t) = Taken By, (c) = Cosigned By      Initials Name Provider Type    CD Jeanette Back, PT Physical Therapist                                 Physical Therapy Education       Title: PT OT SLP Therapies (Done)       Topic: Physical Therapy (Done)       Point: Mobility training (Done)       Learning Progress Summary             Patient Acceptance, E,D, VU,NR by CD at 7/17/2024 1018    Comment: BENEFITS OF OOB ACTIVITY, SAFETY WITH MOBILITY, PROGRESSION OF POC , D/C PLANNING,                         Point: Home exercise program (Done)       Learning Progress Summary             Patient Acceptance, E,D, VU,NR by CD at 7/17/2024 1018    Comment: BENEFITS OF OOB ACTIVITY, SAFETY WITH MOBILITY, PROGRESSION OF POC , D/C PLANNING,                         Point: Body mechanics (Done)       Learning Progress Summary             Patient Acceptance, E,D, VU,NR by CD at 7/17/2024 1018    Comment: BENEFITS OF OOB ACTIVITY, SAFETY WITH MOBILITY, PROGRESSION OF POC , D/C PLANNING,                         Point: Precautions (Done)       Learning Progress Summary             Patient Acceptance, E,D, VU,NR by CD at 7/17/2024 1018    Comment: BENEFITS OF OOB ACTIVITY, SAFETY WITH MOBILITY, PROGRESSION OF POC , D/C PLANNING,                                         User Key       Initials Effective Dates Name Provider Type Discipline    CD 02/03/23 -  Jeanette Back PT  Physical Therapist PT                  PT Recommendation and Plan  Planned Therapy Interventions (PT): balance training, bed mobility training, gait training, home exercise program, transfer training, strengthening, postural re-education, patient/family education, motor coordination training  Plan of Care Reviewed With: patient  Outcome Evaluation: PT PRESENTS WITH EVOLVING SYMPTOMS TO INCLUDE GENERALIZED WEAKNESS, IMPAIRED BALANCE, IMPAIRED COORDINATION, ACUTE ON CHRONIC PAIN AND DECLINE IN FUNCTIONAL MOBILITY. PT REQUIRES 3L O2 AND FATIGUES QUICKLY. NEEDS CUES FOR SAFETY AND SEQUENCING MOBILITY. REQUIRED MIN ASSIST OF 2 FOR STAND PIVOT BED TO RECLINER. BP STABLE SUPINE TO SIT. RECOMMEND IRF AT D/C FOR BEST OUTCOME.     Time Calculation:   PT Evaluation Complexity  History, PT Evaluation Complexity: 3 or more personal factors and/or comorbidities  Examination of Body Systems (PT Eval Complexity): total of 4 or more elements  Clinical Presentation (PT Evaluation Complexity): evolving  Clinical Decision Making (PT Evaluation Complexity): moderate complexity  Overall Complexity (PT Evaluation Complexity): moderate complexity     PT Charges       Row Name 07/17/24 1020             Time Calculation    Start Time 0911  -CD      PT Received On 07/17/24  -CD      PT Goal Re-Cert Due Date 07/27/24  -CD         Untimed Charges    PT Eval/Re-eval Minutes 66  -CD         Total Minutes    Untimed Charges Total Minutes 66  -CD       Total Minutes 66  -CD                User Key  (r) = Recorded By, (t) = Taken By, (c) = Cosigned By      Initials Name Provider Type    CD Jeanette Back, PT Physical Therapist                  Therapy Charges for Today       Code Description Service Date Service Provider Modifiers Qty    [id number] HC PT EVAL MOD COMPLEXITY 4 7/17/2024 Jeanette Back, PT GP 1            PT G-Codes  Outcome Measure Options: AM-PAC 6 Clicks Basic Mobility (PT), Modified [place]  AM-PAC 6 Clicks Score (PT):  15  Modified [place] Scale: 4 - Moderately severe disability.  Unable to walk without assistance, and unable to attend to own bodily needs without assistance.  PT Discharge Summary  Anticipated Discharge Disposition (PT): inpatient rehabilitation facility    Jeanette Back, PT  7/17/2024

## 2024-07-17 NOTE — PLAN OF CARE
Goal Outcome Evaluation:  Plan of Care Reviewed With: patient        Progress: no change (Initial Eval)  Outcome Evaluation: Pt presenting below her functional baseline w/ balance, mobility and transfers requiring increased need for assist w/ ADLs. Pt requiring assist w/ LB dressing, bed mobility and SPT from bed to chair. Pt's deficits warrant skilled IP OT services. Recommend inpatient rehab at d/c for best functional outcome.      Anticipated Discharge Disposition (OT): inpatient rehabilitation facility                         Over Night Events:  Feels the same.  no CP.  NO SOB at rest.  LAND.  On 40% high flow O2        ROS:  See HPI    PHYSICAL EXAM    ICU Vital Signs Last 24 Hrs  T(C): 36 (08 Mar 2018 04:00), Max: 36.2 (07 Mar 2018 20:00)  T(F): 96.8 (08 Mar 2018 04:00), Max: 97.2 (07 Mar 2018 20:00)  HR: 80 (08 Mar 2018 07:00) (78 - 120)  BP: 113/64 (08 Mar 2018 07:00) (101/59 - 145/74)  BP(mean): 84 (08 Mar 2018 07:00) (75 - 101)  ABP: --  ABP(mean): --  RR: 32 (08 Mar 2018 07:00) (21 - 54)  SpO2: 93% (08 Mar 2018 07:00) (77% - 100%)      General: In NAD   HEENT: LUIS A             Lymph Nodes: No cervical LN   Lungs: Bilateral BS  Cardiovascular: Regular   Abdomen: Soft, Positive BS  Extremities: No clubbing   Skin: Warm  Neurological: Non focal       03-07-18 @ 07:01  -  03-08-18 @ 07:00  --------------------------------------------------------  IN:    Oral Fluid: 850 mL  Total IN: 850 mL    OUT:    Indwelling Catheter - Urethral: 200 mL    Voided: 450 mL  Total OUT: 650 mL    Total NET: 200 mL          LABS:                            8.7    16.09 )-----------( 408      ( 08 Mar 2018 04:51 )             28.0                                               03-08    136  |  101  |  31<H>  ----------------------------<  226<H>  3.5   |  24  |  1.4    Ca    7.9<L>      08 Mar 2018 04:51  Mg     2.2     03-08    TPro  5.5<L>  /  Alb  2.4<L>  /  TBili  1.0  /  DBili  0.2  /  AST  16  /  ALT  22  /  AlkPhos  87  03-08      PT/INR - ( 08 Mar 2018 04:51 )   PT: >40.00 sec;   INR: 4.00 ratio                                                                                              LIVER FUNCTIONS - ( 08 Mar 2018 04:51 )  Alb: 2.4 g/dL / Pro: 5.5 g/dL / ALK PHOS: 87 U/L / ALT: 22 U/L / AST: 16 U/L / GGT: x                                                                                                                                   ABG - ( 07 Mar 2018 04:37 )  pH: 7.55  /  pCO2: 32    /  pO2: 55    / HCO3: 28    / Base Excess: 5.5   /  SaO2: 91                  MEDICATIONS  (STANDING):  ALBUTerol    90 MICROgram(s) HFA Inhaler 1 Puff(s) Inhalation every 4 hours  ALBUTerol/ipratropium for Nebulization 3 milliLiter(s) Nebulizer every 6 hours  amLODIPine   Tablet 10 milliGRAM(s) Oral every 24 hours  docusate sodium 100 milliGRAM(s) Oral three times a day  methylPREDNISolone sodium succinate Injectable 80 milliGRAM(s) IV Push every 6 hours  metoprolol     tartrate 25 milliGRAM(s) Oral two times a day  montelukast 10 milliGRAM(s) Oral daily  pantoprazole   Suspension 40 milliGRAM(s) Oral daily  senna 2 Tablet(s) Oral at bedtime  tiotropium 18 MICROgram(s) Capsule 1 Capsule(s) Inhalation daily    MEDICATIONS  (PRN):  acetaminophen   Tablet. 650 milliGRAM(s) Oral every 6 hours PRN Moderate Pain (4 - 6)  HYDROmorphone  Injectable 2 milliGRAM(s) IV Push four times a day PRN Severe Pain (7 - 10)  ondansetron Injectable 4 milliGRAM(s) IV Push every 8 hours PRN Nausea and/or Vomiting  oxyCODONE    5 mG/acetaminophen 325 mG 2 Tablet(s) Oral every 4 hours PRN Moderate Pain (4 - 6)  polyethylene glycol 3350 17 Gram(s) Oral daily PRN Constipation      Xrays:    Bilateral infiltrate                                                                                 ECHO No psychiatric contraindications to discharge

## 2024-07-18 ENCOUNTER — ANESTHESIA EVENT (OUTPATIENT)
Dept: TELEMETRY | Facility: HOSPITAL | Age: 69
End: 2024-07-18

## 2024-07-18 PROBLEM — K92.2 GASTROINTESTINAL HEMORRHAGE: Status: ACTIVE | Noted: 2024-07-15

## 2024-07-18 LAB
ANION GAP SERPL CALCULATED.3IONS-SCNC: 6 MMOL/L (ref 5–15)
BUN SERPL-MCNC: 10 MG/DL (ref 8–23)
BUN/CREAT SERPL: 25 (ref 7–25)
CALCIUM SPEC-SCNC: 7.7 MG/DL (ref 8.6–10.5)
CHLORIDE SERPL-SCNC: 103 MMOL/L (ref 98–107)
CO2 SERPL-SCNC: 32 MMOL/L (ref 22–29)
CREAT SERPL-MCNC: 0.4 MG/DL (ref 0.57–1)
DEPRECATED RDW RBC AUTO: 64.7 FL (ref 37–54)
EGFRCR SERPLBLD CKD-EPI 2021: 107.3 ML/MIN/1.73
ERYTHROCYTE [DISTWIDTH] IN BLOOD BY AUTOMATED COUNT: 20 % (ref 12.3–15.4)
GLUCOSE BLDC GLUCOMTR-MCNC: 111 MG/DL (ref 70–130)
GLUCOSE BLDC GLUCOMTR-MCNC: 123 MG/DL (ref 70–130)
GLUCOSE BLDC GLUCOMTR-MCNC: 163 MG/DL (ref 70–130)
GLUCOSE BLDC GLUCOMTR-MCNC: 210 MG/DL (ref 70–130)
GLUCOSE SERPL-MCNC: 120 MG/DL (ref 65–99)
HCT VFR BLD AUTO: 27.8 % (ref 34–46.6)
HCT VFR BLD AUTO: 31 % (ref 34–46.6)
HGB BLD-MCNC: 7.7 G/DL (ref 12–15.9)
HGB BLD-MCNC: 8.7 G/DL (ref 12–15.9)
MAGNESIUM SERPL-MCNC: 1.7 MG/DL (ref 1.6–2.4)
MCH RBC QN AUTO: 24.8 PG (ref 26.6–33)
MCHC RBC AUTO-ENTMCNC: 27.7 G/DL (ref 31.5–35.7)
MCV RBC AUTO: 89.4 FL (ref 79–97)
PLATELET # BLD AUTO: 297 10*3/MM3 (ref 140–450)
PMV BLD AUTO: 9.9 FL (ref 6–12)
POTASSIUM SERPL-SCNC: 4.3 MMOL/L (ref 3.5–5.2)
RBC # BLD AUTO: 3.11 10*6/MM3 (ref 3.77–5.28)
SODIUM SERPL-SCNC: 141 MMOL/L (ref 136–145)
WBC NRBC COR # BLD AUTO: 8.24 10*3/MM3 (ref 3.4–10.8)

## 2024-07-18 PROCEDURE — 63710000001 INSULIN LISPRO (HUMAN) PER 5 UNITS: Performed by: INTERNAL MEDICINE

## 2024-07-18 PROCEDURE — 94799 UNLISTED PULMONARY SVC/PX: CPT

## 2024-07-18 PROCEDURE — 99232 SBSQ HOSP IP/OBS MODERATE 35: CPT | Performed by: INTERNAL MEDICINE

## 2024-07-18 PROCEDURE — 83735 ASSAY OF MAGNESIUM: CPT | Performed by: INTERNAL MEDICINE

## 2024-07-18 PROCEDURE — 85018 HEMOGLOBIN: CPT | Performed by: INTERNAL MEDICINE

## 2024-07-18 PROCEDURE — 99232 SBSQ HOSP IP/OBS MODERATE 35: CPT | Performed by: PHYSICIAN ASSISTANT

## 2024-07-18 PROCEDURE — 82948 REAGENT STRIP/BLOOD GLUCOSE: CPT

## 2024-07-18 PROCEDURE — 25010000002 ONDANSETRON PER 1 MG: Performed by: NURSE PRACTITIONER

## 2024-07-18 PROCEDURE — 94664 DEMO&/EVAL PT USE INHALER: CPT

## 2024-07-18 PROCEDURE — 25010000002 NA FERRIC GLUC CPLX PER 12.5 MG: Performed by: INTERNAL MEDICINE

## 2024-07-18 PROCEDURE — 80048 BASIC METABOLIC PNL TOTAL CA: CPT | Performed by: INTERNAL MEDICINE

## 2024-07-18 PROCEDURE — 85027 COMPLETE CBC AUTOMATED: CPT | Performed by: INTERNAL MEDICINE

## 2024-07-18 PROCEDURE — 85014 HEMATOCRIT: CPT | Performed by: INTERNAL MEDICINE

## 2024-07-18 PROCEDURE — 25010000002 MAGNESIUM SULFATE 4 GM/100ML SOLUTION: Performed by: INTERNAL MEDICINE

## 2024-07-18 RX ORDER — BISACODYL 5 MG/1
10 TABLET, DELAYED RELEASE ORAL ONCE
Status: COMPLETED | OUTPATIENT
Start: 2024-07-18 | End: 2024-07-18

## 2024-07-18 RX ORDER — MAGNESIUM SULFATE HEPTAHYDRATE 40 MG/ML
4 INJECTION, SOLUTION INTRAVENOUS ONCE
Status: COMPLETED | OUTPATIENT
Start: 2024-07-18 | End: 2024-07-18

## 2024-07-18 RX ORDER — SODIUM, POTASSIUM,MAG SULFATES 17.5-3.13G
1 SOLUTION, RECONSTITUTED, ORAL ORAL EVERY 12 HOURS
Status: COMPLETED | OUTPATIENT
Start: 2024-07-18 | End: 2024-07-19

## 2024-07-18 RX ADMIN — ACETAMINOPHEN 650 MG: 325 TABLET, FILM COATED ORAL at 21:21

## 2024-07-18 RX ADMIN — ONDANSETRON 4 MG: 2 INJECTION INTRAMUSCULAR; INTRAVENOUS at 17:08

## 2024-07-18 RX ADMIN — MAGNESIUM SULFATE IN WATER FOR 4 G: 40 INJECTION INTRAVENOUS at 08:03

## 2024-07-18 RX ADMIN — Medication 10 ML: at 21:31

## 2024-07-18 RX ADMIN — DOXYCYCLINE 100 MG: 100 INJECTION, POWDER, LYOPHILIZED, FOR SOLUTION INTRAVENOUS at 08:02

## 2024-07-18 RX ADMIN — INSULIN LISPRO 2 UNITS: 100 INJECTION, SOLUTION INTRAVENOUS; SUBCUTANEOUS at 12:08

## 2024-07-18 RX ADMIN — ACETAMINOPHEN 650 MG: 325 TABLET, FILM COATED ORAL at 08:21

## 2024-07-18 RX ADMIN — IPRATROPIUM BROMIDE AND ALBUTEROL SULFATE 3 ML: 2.5; .5 SOLUTION RESPIRATORY (INHALATION) at 19:18

## 2024-07-18 RX ADMIN — HYDROCODONE BITARTRATE AND ACETAMINOPHEN 1 TABLET: 7.5; 325 TABLET ORAL at 06:10

## 2024-07-18 RX ADMIN — PANTOPRAZOLE SODIUM 40 MG: 40 INJECTION, POWDER, LYOPHILIZED, FOR SOLUTION INTRAVENOUS at 21:22

## 2024-07-18 RX ADMIN — ALLOPURINOL 300 MG: 300 TABLET ORAL at 08:03

## 2024-07-18 RX ADMIN — Medication 10 MG: at 21:23

## 2024-07-18 RX ADMIN — LEVOTHYROXINE SODIUM 125 MCG: 125 TABLET ORAL at 06:10

## 2024-07-18 RX ADMIN — BUDESONIDE AND FORMOTEROL FUMARATE DIHYDRATE 1 PUFF: 160; 4.5 AEROSOL RESPIRATORY (INHALATION) at 19:18

## 2024-07-18 RX ADMIN — IPRATROPIUM BROMIDE AND ALBUTEROL SULFATE 3 ML: 2.5; .5 SOLUTION RESPIRATORY (INHALATION) at 13:06

## 2024-07-18 RX ADMIN — BISACODYL 10 MG: 5 TABLET, COATED ORAL at 12:07

## 2024-07-18 RX ADMIN — SODIUM CHLORIDE 125 MG: 9 INJECTION, SOLUTION INTRAVENOUS at 08:03

## 2024-07-18 RX ADMIN — Medication 10 ML: at 08:03

## 2024-07-18 RX ADMIN — DOXYCYCLINE 100 MG: 100 INJECTION, POWDER, LYOPHILIZED, FOR SOLUTION INTRAVENOUS at 21:23

## 2024-07-18 RX ADMIN — GABAPENTIN 100 MG: 100 CAPSULE ORAL at 21:23

## 2024-07-18 RX ADMIN — IPRATROPIUM BROMIDE AND ALBUTEROL SULFATE 3 ML: 2.5; .5 SOLUTION RESPIRATORY (INHALATION) at 07:15

## 2024-07-18 RX ADMIN — SODIUM SULFATE, POTASSIUM SULFATE, MAGNESIUM SULFATE 1 BOTTLE: 1.6; 3.13; 17.5 SOLUTION ORAL at 17:08

## 2024-07-18 RX ADMIN — HYDROCODONE BITARTRATE AND ACETAMINOPHEN 1 TABLET: 7.5; 325 TABLET ORAL at 17:40

## 2024-07-18 RX ADMIN — BUDESONIDE AND FORMOTEROL FUMARATE DIHYDRATE 1 PUFF: 160; 4.5 AEROSOL RESPIRATORY (INHALATION) at 07:16

## 2024-07-18 RX ADMIN — INSULIN LISPRO 3 UNITS: 100 INJECTION, SOLUTION INTRAVENOUS; SUBCUTANEOUS at 21:22

## 2024-07-18 RX ADMIN — HYDROCODONE BITARTRATE AND ACETAMINOPHEN 1 TABLET: 7.5; 325 TABLET ORAL at 23:45

## 2024-07-18 RX ADMIN — METOPROLOL TARTRATE 25 MG: 25 TABLET, FILM COATED ORAL at 21:23

## 2024-07-18 RX ADMIN — PANTOPRAZOLE SODIUM 40 MG: 40 INJECTION, POWDER, LYOPHILIZED, FOR SOLUTION INTRAVENOUS at 08:03

## 2024-07-18 RX ADMIN — LIDOCAINE 1 PATCH: 4 PATCH TOPICAL at 17:08

## 2024-07-18 RX ADMIN — METOPROLOL TARTRATE 25 MG: 25 TABLET, FILM COATED ORAL at 08:03

## 2024-07-18 RX ADMIN — GABAPENTIN 100 MG: 100 CAPSULE ORAL at 08:03

## 2024-07-18 RX ADMIN — HYDROCODONE BITARTRATE AND ACETAMINOPHEN 1 TABLET: 7.5; 325 TABLET ORAL at 12:07

## 2024-07-18 NOTE — PROGRESS NOTES
[facility] Medicine Services  PROGRESS NOTE    Patient Name: Charly Blevins  : 1955  MRN: [id number]    Date of Admission: 7/15/2024  Primary Care Physician: Patel Evans MD    Subjective   Subjective     CC:  Confusion, hypoxia, n/v/d, anemia    HPI:  Better sleep last night, no melena or diarrhea, no abd pain, breathing back to baseline        Objective   Objective     Vital Signs:   Temp:  [97.7 °F (36.5 °C)-98.4 °F (36.9 °C)] 98.4 °F (36.9 °C)  Heart Rate:  [60-78] 65  Resp:  [16-18] 18  BP: ()/(66-85) 121/80  Flow (L/min):  [2-3] 2     Physical Exam:  Constitutional:Alert, oriented x3 today, nontoxic appearing, normal work of breathing w/ nasal canula in place  Psych:Normal/appropriate affect, currently calm  HEENT:NCAT, oropharynx clear  Neck: neck supple, full range of motion  Neuro: Face symmetric, speech clear, equal , moves all extremities  Cardiac irr irr, regular rate, No pretibial pitting edema  Resp: ctab  GI: abd soft, nontender to palpation  Skin: No extremity rash  Musculoskeletal/extremities: no cyanosis of extremities; no significant           Results Reviewed:  LAB RESULTS:      Lab 24  0531 24  2106 24  0724 24  0027 24  1544 24  1038 24  0516 07/15/24  1639 07/15/24  1447   WBC 8.24  --  9.64  --   --   --  10.50  --  11.31*   HEMOGLOBIN 7.7* 8.1* 8.7* 8.4* 8.6*   < > 9.7*  --  7.6*   HEMATOCRIT 27.8* 28.9* 29.9* 29.3* 29.6*   < > 33.2*  --  27.0*   PLATELETS 297  --  292  --   --   --  367  --  373   NEUTROS ABS  --   --   --   --   --   --  8.11*  --  9.25*   IMMATURE GRANS (ABS)  --   --   --   --   --   --  0.07*  --  0.08*   LYMPHS ABS  --   --   --   --   --   --  1.04  --  0.98   MONOS ABS  --   --   --   --   --   --  1.08*  --  0.94*   EOS ABS  --   --   --   --   --   --  0.19  --  0.04   MCV 89.4  --  86.2  --   --   --  84.1  --  84.1   SED RATE  --   --   --   --   --   --   --   --   31*   CRP  --   --   --   --   --   --   --  14.75*  --    PROCALCITONIN  --   --   --   --   --   --   --   --  12.49*   LACTATE  --   --   --   --   --   --   --   --  1.7    < > = values in this interval not displayed.         Lab 07/18/24  0531 07/17/24  0724 07/16/24  1544 07/16/24  0516 07/15/24  1447   SODIUM 141 138  --  143 139   POTASSIUM 4.3 4.9 3.3* 3.0* 3.6   CHLORIDE 103 100  --  100 100   CO2 32.0* 30.0*  --  33.0* 29.0   ANION GAP 6.0 8.0  --  10.0 10.0   BUN 10 9  --  10 16   CREATININE 0.40* 0.66  --  0.62 0.68   EGFR 107.3 95.1  --  96.5 94.4   GLUCOSE 120* 107*  --  96 174*   CALCIUM 7.7* 7.5*  --  7.1* 7.7*   MAGNESIUM 1.7 2.0  --  1.3* 1.8   HEMOGLOBIN A1C  --   --   --   --  6.10*   TSH  --   --   --  1.890  --          Lab 07/16/24  0516 07/15/24  1447   TOTAL PROTEIN 5.8* 5.1*   ALBUMIN 2.6* 2.7*   GLOBULIN 3.2 2.4   ALT (SGPT) 12 12   AST (SGOT) 21 18   BILIRUBIN 1.2 0.5   ALK PHOS 64 71         Lab 07/15/24  1639 07/15/24  1447   PROBNP  --  8,823.0*   HSTROP T 52* 61*             Lab 07/17/24  0724 07/15/24  1639   IRON 23*  --    IRON SATURATION (TSAT) 10*  --    TIBC 228*  --    TRANSFERRIN 153*  --    ABO TYPING  --  A   RH TYPING  --  Positive   ANTIBODY SCREEN  --  Negative         Lab 07/16/24  0513 07/15/24  1511   PH, ARTERIAL 7.590* 7.486*   PCO2, ARTERIAL 34.2* 39.5   PO2 .0* 65.9*   FIO2 45 44   HCO3 ART 32.8* 29.8*   BASE EXCESS ART 10.5* 5.9*   CARBOXYHEMOGLOBIN 1.7 1.7     Brief Urine Lab Results  (Last result in the past 365 days)        Color   Clarity   Blood   Leuk Est   Nitrite   Protein   CREAT   Urine HCG        07/15/24 1440 Yellow   Clear   Negative   Negative   Negative   Negative                   Microbiology Results Abnormal       Procedure Component Value - Date/Time    Blood Culture - Blood, Wrist, Left [[id number]]  (Normal) Collected: 07/15/24 1526    Lab Status: Preliminary result Specimen: Blood from Wrist, Left Updated: 07/18/24 1545     Blood  Culture No growth at 3 days    Narrative:      Less than seven (7) mL's of blood was collected.  Insufficient quantity may yield false negative results.    Blood Culture - Blood, Arm, Right [[id number]]  (Normal) Collected: 07/15/24 1447    Lab Status: Preliminary result Specimen: Blood from Arm, Right Updated: 07/18/24 1515     Blood Culture No growth at 3 days    Narrative:      Less than seven (7) mL's of blood was collected.  Insufficient quantity may yield false negative results.    Respiratory Panel PCR w/COVID-19(SARS-CoV-2) GEOVANI/ROSALIE/HÉCTOR/PAD/COR/EDISON In-House, NP Swab in UTM/VTM, 2 HR TAT - Swab, Nasopharynx [[id number]]  (Normal) Collected: 07/16/24 1038    Lab Status: Final result Specimen: Swab from Nasopharynx Updated: 07/16/24 1148     ADENOVIRUS, PCR Not Detected     Coronavirus 229E Not Detected     Coronavirus HKU1 Not Detected     Coronavirus NL63 Not Detected     Coronavirus OC43 Not Detected     COVID19 Not Detected     Human Metapneumovirus Not Detected     Human Rhinovirus/Enterovirus Not Detected     Influenza A PCR Not Detected     Influenza B PCR Not Detected     Parainfluenza Virus 1 Not Detected     Parainfluenza Virus 2 Not Detected     Parainfluenza Virus 3 Not Detected     Parainfluenza Virus 4 Not Detected     RSV, PCR Not Detected     Bordetella pertussis pcr Not Detected     Bordetella parapertussis PCR Not Detected     Chlamydophila pneumoniae PCR Not Detected     Mycoplasma pneumo by PCR Not Detected    Narrative:      In the setting of a positive respiratory panel with a viral infection PLUS a negative procalcitonin without other underlying concern for bacterial infection, consider observing off antibiotics or discontinuation of antibiotics and continue supportive care. If the respiratory panel is positive for atypical bacterial infection (Bordetella pertussis, Chlamydophila pneumoniae, or Mycoplasma pneumoniae), consider antibiotic de-escalation to target atypical bacterial infection.     MRSA Screen, PCR (Inpatient) - Swab, Nares [[id number]]  (Normal) Collected: 07/15/24 1851    Lab Status: Final result Specimen: Swab from Nares Updated: 07/15/24 2010     MRSA PCR Negative    Narrative:      The negative predictive value of this diagnostic test is high and should only be used to consider de-escalating anti-MRSA therapy. A positive result may indicate colonization with MRSA and must be correlated clinically.  MRSA Negative            No radiology results from the last 24 hrs    Results for orders placed during the hospital encounter of 07/15/24    Adult Transthoracic Echo Complete W/ Cont if Necessary Per Protocol    Interpretation Summary    Left ventricular ejection fraction appears to be 56 - 60%.    Left ventricular diastolic function was normal.    The right ventricular cavity is borderline dilated.    The left atrial cavity is dilated.      Current medications:  Scheduled Meds:allopurinol, 300 mg, Oral, Daily  budesonide-formoterol, 1 puff, Inhalation, BID - RT  doxycycline, 100 mg, Intravenous, BID  ferric gluconate, 125 mg, Intravenous, Daily  gabapentin, 100 mg, Oral, Q12H  insulin lispro, 2-7 Units, Subcutaneous, 4x Daily AC & at Bedtime  ipratropium-albuterol, 3 mL, Nebulization, Q6H While Awake - RT  levothyroxine, 125 mcg, Oral, QAM  Lidocaine, 1 patch, Transdermal, Q24H  melatonin, 10 mg, Oral, Nightly  metoprolol tartrate, 25 mg, Oral, BID  pantoprazole, 40 mg, Intravenous, Q12H  sodium chloride, 10 mL, Intravenous, Q12H  sodium-potassium-magnesium sulfates, 1 bottle, Oral, Q12H      Continuous Infusions:     PRN Meds:.  acetaminophen **OR** acetaminophen **OR** acetaminophen    senna-docusate sodium **AND** polyethylene glycol **AND** bisacodyl **AND** bisacodyl    dextrose    dextrose    glucagon (human recombinant)    haloperidol lactate    HYDROcodone-acetaminophen    Magnesium Cardiology Dose Replacement - Follow Nurse / BPA Driven Protocol    midazolam    nitroglycerin     ondansetron    Potassium Replacement - Follow Nurse / BPA Driven Protocol    sodium chloride    sodium chloride    sodium chloride    temazepam    Assessment & Plan   Assessment & Plan     Active Hospital Problems    Diagnosis  POA    **Symptomatic anemia [D64.9]  Yes    Acute respiratory failure with hypoxia [J96.01]  Unknown    Acute on chronic heart failure with preserved ejection fraction [I50.33]  Unknown    Gout [M10.9]  Unknown    Mixed hyperlipidemia [E78.2]  Unknown    Gastrointestinal hemorrhage [K92.2]  Unknown    Hypothyroidism (acquired) [E03.9]  Yes    A-fib [I48.91]  Yes    Type 2 diabetes mellitus, without long-term current use of insulin [E11.9]  Yes    Primary hypertension [I10]  Yes    Rheumatoid arthritis involving multiple sites [M06.9]  Yes      Resolved Hospital Problems   No resolved problems to display.        Brief Hospital Course to date:  Charly Blevins is a 68 y.o. female w/ hx afib (on eliquis), htn, dm2, hl, RA, hypothyroidism (on levothyroxine) who presented with fatigue, progressive dyspnea & fatigue. Reports had recently fallen and hit head on 7/13/24, became confused later that evening w/ some hallucinations as well as some right sided chest wall pain since the fall w/ some n/v/d (dark colored). In ED was hypoxic requiring hi flow canula to maintain sats >88%, afib w/ rvr. hgb 7.6, guaic +, elevated procal >12. Wbc 13,310. Probnp 8,823. Hs trop 61 (repeat 52). CXR suggested changes o pulm vascular congestion. CT head negative . CT chest suggested right pectoralis hematomas and chf changes. Ct a/p was negative for acute process. Received bumex 2mg iv in ed. Developed worsening RVR, developed marginal bp (borderline hypotension) and received 1 unit prbc. Received another lasix 60mg iv later the evening of admission. Also received digoxin after admission due to RVR      Acute on chronic hypoxic resp failure, resolved  Bilateral interstitial infiltrates vs edema (on  imaging)  Chronic 3L o2 dependence (unclear etiology)  -favor chf as etiology  -initially required hi flow canula  -s/p diuresis, treating for possible respiratory infection as well  -resp pcr panel negative  -have weaned down to 2-3Lnc today (her baseline)    Acute HFpEF  Afib w/ rvr  Elevated HS troponins  -previous echo 12/17/22: LV EF 59%, diastolic dysfunction  -CT chest w/o contrast: ~4 o 5 hyperdensities in right chest wall along right pectoralis minor muscle, favoring hematoma (rather than neoplasm); mild bibasilar pleural effusions, cardiomegaly and mild pulm vascular contgestion  -initial abg 7.28/39/65 (on 44% fio2)  -repeat abg 7.59/34/163 (on 45% tio2)  -tsh wnl  -s/p digoxin 250mcg x 2 (evening 7/15, morning of 7/16)  -increased metoprolol to 25mg po bid on 7/16/24  -s/p bumex 2mg iv in ED, another lasix 60mg iv after admission (had >5,700 mL output within 1st day)  -echo 7/16/24: LV ef 56-60%, normal diastolic function  -Cards followed: continue metoprolol 25mg po bid, holding eliquis (restart when ko w/ gi), follow up w/ cards as outpatient for consideration of watchman if unable to anticoagulate  -holding further diuresis at this point    Acute symptomatic anemia  Iron def  Gi bleed (dark stool, guaic +)  N/V/D resolved  ct c/a/p without overt source infection  -diarrhea resolved, cancel gi pcr panel  -u/a benign  **s/p 1 unit prbc (hgb 7.6 with symptoms of hypotension, dyspnea in setting of hypotension and afib rvr)  -serial hgb stable  -iv iron day #2/3  -GI following: continue bid ppi; planning Colonoscopy & EGD tomorrow  -trend hgb, transfuse prn hgb <7 or symptoms  -restart eliquis when ok w/ GI    Leukocytosis  Elevated procal  -unclear etiology, perhaps possible atypical pneumonia?  -ct c/a/p without overt source infection  -u/a benign  -resp mrsa pcr negative  -stopped vanc & zosyn  -doxycycline day #3/5 empiric rx    RLE edema  -venous duplex negative RLE for dvt    Encephalopathy,  resolved  -ct head negative  -u/a benign  -likely was due to sleep deprivation, acute illness  -continue melatonin, nightly restoril    Chronic back pain  -on lortab  -heating pad    Hypokalemia and hypomagnesemia  -replace per protocol    Dm2   -A1c 6.1  -ssi    Hypothyroidism  -tsh wnl1.8; continue levothyroxine    Hx RA        Am labs: cbc,bmp,mag      -updated family bedside on 7/18/24      Expected Discharge Location and Transportation: rehab facility (referrals sent)  Expected Discharge after endoscopies   Expected Discharge Date: 7/20/2024; Expected Discharge Time:      VTE Prophylaxis:  Mechanical VTE prophylaxis orders are present.         AM-PAC 6 Clicks Score (PT): 15 (07/17/24 1017)    CODE STATUS:   Code Status and Medical Interventions:   Ordered at: 07/15/24 8568     Level Of Support Discussed With:    Patient     Code Status (Patient has no pulse and is not breathing):    CPR (Attempt to Resuscitate)     Medical Interventions (Patient has pulse or is breathing):    Full Support       Ino Grissom MD  07/18/24

## 2024-07-18 NOTE — CASE MANAGEMENT/SOCIAL WORK
Continued Stay Note  [facility]     Patient Name: Charly Blevins  MRN: [id number]  Today's Date: 7/18/2024    Admit Date: 7/15/2024    Plan: IRF   Discharge Plan       Row Name 07/18/24 1541       Plan    Plan IRF    Plan Comments Met with Ms. Blevins and her daughter in her room, regarding the Patient Choice list. In addition to [facility], they wanted referrals sent to [facility] and [facility]. These referrals were called or faxed. CM will continue to follow.    Final Discharge Disposition Code 30 - still a patient                   Discharge Codes    No documentation.                 Expected Discharge Date and Time       Expected Discharge Date Expected Discharge Time    Jul 23, 2024               Mey Triana RN

## 2024-07-18 NOTE — PROGRESS NOTES
"GI Daily Progress Note  Subjective:    Chief Complaint:  Follow up melena     No bowel movement yesterday.   Complains of generalized fatigue.   Nausea is improved but voices concern of nausea with bowel cleanse.     Objective:    /83 (BP Location: Right arm, Patient Position: Lying)   Pulse 67   Temp 98.3 °F (36.8 °C) (Tympanic)   Resp 18   Ht 167 cm (65.75\")   Wt 81 kg (178 lb 9.2 oz)   SpO2 97%   BMI 29.04 kg/m²     Physical Exam  Constitutional:       Comments: Chronically ill appearing.  Awake and oriented    Cardiovascular:      Rate and Rhythm: Normal rate and regular rhythm.   Pulmonary:      Effort: Pulmonary effort is normal. No respiratory distress.   Abdominal:      General: Bowel sounds are normal. There is no distension.      Palpations: Abdomen is soft.      Tenderness: There is no abdominal tenderness. There is no guarding.   Skin:     Coloration: Skin is pale.       Lab  Lab Results   Component Value Date    WBC 8.24 07/18/2024    HGB 7.7 (L) 07/18/2024    HGB 8.1 (L) 07/17/2024    HGB 8.7 (L) 07/17/2024    MCV 89.4 07/18/2024     07/18/2024    INR 1.27 (H) 12/22/2022    INR 1.41 (H) 12/20/2022    INR 2.6 (H) 12/17/2022    INR 1.30 (H) 07/26/2022       Lab Results   Component Value Date    GLUCOSE 120 (H) 07/18/2024    BUN 10 07/18/2024    CREATININE 0.40 (L) 07/18/2024    EGFRIFNONA 82 04/01/2019    BCR 25.0 07/18/2024     07/18/2024    K 4.3 07/18/2024    CO2 32.0 (H) 07/18/2024    CALCIUM 7.7 (L) 07/18/2024    ALBUMIN 2.6 (L) 07/16/2024    ALKPHOS 64 07/16/2024    BILITOT 1.2 07/16/2024    ALT 12 07/16/2024    AST 21 07/16/2024       Assessment:    Gastrointestinal bleeding with melena  Normocytic anemia due to blood loss   Metabolic encephalopathy   CHF exacerbation  Acute hypoxic respiratory failure   Hematomas after fall, right pectoralis minor muscle   FH of colon cancer, her mother   Nausea    Plan:    >> Dulcolax 10 mg PO now  >> Split dose Suprep to begin this " afternoon.  Will give IV Zofran 4 mg thirty minutes prior to initiation of prep.  Discussed with RN.    >> Colonoscopy and EGD tomorrow  >> NPO at midnight.    >> Repeat H&H this evening.  If Hgb < 7 recommend transfusion     CLIFF Kumar  07/18/24  10:28 EDT

## 2024-07-18 NOTE — DISCHARGE PLACEMENT REQUEST
"Cally Fuller July (68 y.o. Female)       Date of Birth   1955    Social Security Number       Address   [place] [address]    Home Phone   [phone]    MRN   [id number]       Jew   Rastafari    Marital Status                               Admission Date   7/15/24    Admission Type   Emergency    Admitting Provider   Ino Grissom MD    Attending Provider   Ino Grissom MD    Department, Room/Bed   [facility] 3E, S347/1       Discharge Date       Discharge Disposition       Discharge Destination                                 Attending Provider: Ino Grissom MD    Allergies: No Known Allergies    Isolation: None   Infection: None   Code Status: CPR    Ht: 167 cm (65.75\")   Wt: 81 kg (178 lb 9.2 oz)    Admission Cmt: None   Principal Problem: Symptomatic anemia [D64.9]                   Active Insurance as of 7/15/2024       Primary Coverage       Payor Plan Insurance Group Employer/Plan Group    MEDICARE MEDICARE A & B        Payor Plan Address Payor Plan Phone Number Payor Plan Fax Number Effective Dates    [address] [phone]  7/1/2015 - None Entered    [facility] [id number]         Subscriber Name Subscriber Birth Date Member ID       CALLY FULLER 1955 [id number]               Secondary Coverage       Payor Plan Insurance Group Employer/Plan Group    [id number]       Payor Plan Address Payor Plan Phone Number Payor Plan Fax Number Effective Dates    [address]   12/1/2016 - None Entered    [facility] [id number]         Subscriber Name Subscriber Birth Date Member ID       CALLY FULLER JULY 1955 [id number]                     Emergency Contacts        (Rel.) Home Phone Work Phone Mobile Phone    ROSEANN NOBLE (Daughter) -- -- [phone]              Insurance Information                  MEDICARE/MEDICARE A & B Phone: [phone]    Subscriber: Cally Fuller " Dot Subscriber#: [id number]    Group#: -- Precert#: --        ANTHEM BLUE CROSS/ANTHEM [facility] SUPP Phone: --    Subscriber: Charly Blevins Subscriber#: [id number]    Group#: [id number] Precert#: --             History & Physical        Santa Kaba MD at 07/15/24 1715              [facility] Medicine Services  HISTORY AND PHYSICAL    Patient Name: Charly Blevins  : 1955  MRN: [id number]  Primary Care Physician: Patel Evans MD  Date of admission: 7/15/2024    Subjective  Subjective     Chief Complaint:  Symptomatic anemia    HPI:  Charly Blevins is a 68 y.o. female PMH gout, DMII, HTN, HLD, RA, hypothyroidism presenting with symptomatic anemia to include dyspnea with exertion, fatigue. Pt fell on Wednesday without head trauma but fell again on Saturday and hit her head. Her daughter states she became confused on Saturday and had hallucinations all night last night.  Pt reports right sided breast pain with bruising from the fall. She also reports 2-3 episodes of diarrhea yesterday that were dark colored. She states she has chronic intermittent episodes of nausea in which she takes zofran. Stool occult (+) with hgb 7.6. In the ED pt found with hypoxia requiring high-flow NC with BNP 8000 and CXR suggesting CHF exacerbation. Last ECHO () LVEF 59.5% with diastolic function consistent with grade II w/high LAP. Pro ofelia elevated at 12.49, WBC 11.31, lactic acid normal.     Patient also reports 60+ pound weight loss since her   3 years ago    Review of Systems   Constitutional:  Positive for activity change, appetite change and fatigue. Negative for chills, diaphoresis and fever.   HENT: Negative.     Eyes: Negative.    Respiratory:  Positive for shortness of breath. Negative for cough.    Cardiovascular: Negative.    Gastrointestinal:  Positive for blood in stool, diarrhea and nausea. Negative for abdominal pain and vomiting.   Genitourinary: Negative.     Musculoskeletal:  Positive for arthralgias and myalgias.   Skin:  Positive for pallor.        Bruising to the right breast   Allergic/Immunologic: Negative.    Neurological: Negative.    Hematological: Negative.    Psychiatric/Behavioral:  Positive for confusion and hallucinations.       Personal History     Past Medical History:   Diagnosis Date    Anxiety and depression 12/17/2022    Atrial fibrillation 2015    CARDIOVERSION - NO REOCCURANCE SINCE     Diabetes mellitus     Gout 12/17/2022    Hyperlipidemia     Hypertension     Hypothyroidism 12/17/2022    Obesity (BMI 30-39.9) 12/17/2022       Past Surgical History:   Procedure Laterality Date    APPENDECTOMY      CHOLECYSTECTOMY      COLOSTOMY      RESULTED FROM HYSTERECTOMY     COLOSTOMY CLOSURE      WHILE DOING COLOSTOMY CLOSURE; ENDED UP HAVING A ILEOSTOMY    HEMORRHOIDECTOMY      HIP TROCHANTERIC NAILING WITH INTRAMEDULLARY HIP SCREW Right 3/27/2019    Procedure: HIP TROCANTERIC NAILING WITH INTRAMEDULLARY HIP SCREW RIGHT;  Surgeon: Jona Tom MD;  Location: [facility];  Service: Orthopedics    HYSTERECTOMY      LUMBAR DISCECTOMY      L4 - L5    OTHER SURGICAL HISTORY      TUBAL REVERSAL    THYROIDECTOMY      TUBAL ABDOMINAL LIGATION      X 2       Family History:  family history includes Cancer in her mother.     Social History:  reports that she has never smoked. She has never used smokeless tobacco. She reports that she does not drink alcohol and does not use drugs.  , lives alone, has 2 living children, is supposed to wear 4 LPM of oxygen at home.  Medications:  FLUoxetine, Fluticasone Furoate-Vilanterol, HYDROcodone-acetaminophen, Insulin Aspart, O2, acetaminophen, allopurinol, apixaban, colesevelam, dapagliflozin Propanediol, famotidine, folic acid, gabapentin, ipratropium-albuterol, levothyroxine, lidocaine, magnesium oxide, metFORMIN, methocarbamol, methotrexate PF, metoprolol tartrate, ondansetron, predniSONE, rosuvastatin,  terbinafine, traZODone, and zolpidem    No Known Allergies    Objective  Objective     Vital Signs:   Temp:  [97.4 °F (36.3 °C)-99.1 °F (37.3 °C)] 98.4 °F (36.9 °C)  Heart Rate:  [] 107  Resp:  [18-25] 24  BP: (118-123)/(66-89) 118/66  Flow (L/min):  [6-55] 45    Physical Exam  Constitutional:       General: She is not in acute distress.     Appearance: She is ill-appearing.   HENT:      Head: Normocephalic and atraumatic.      Mouth/Throat:      Mouth: Mucous membranes are dry.   Eyes:      General: No scleral icterus.     Conjunctiva/sclera: Conjunctivae normal.   Cardiovascular:      Rate and Rhythm: Tachycardia present. Rhythm irregular.      Heart sounds: No murmur heard.     No friction rub. No gallop.   Pulmonary:      Effort: Pulmonary effort is normal.      Breath sounds: Examination of the right-lower field reveals decreased breath sounds. Examination of the left-lower field reveals decreased breath sounds. Decreased breath sounds present.   Abdominal:      General: Bowel sounds are normal. There is no distension.      Tenderness: There is no abdominal tenderness.   Musculoskeletal:      Cervical back: Neck supple.      Right lower leg: Edema present.      Left lower leg: No edema.   Skin:     General: Skin is warm and dry.      Coloration: Skin is pale.      Findings: Bruising present.   Neurological:      Mental Status: She is alert.      Comments: A&O to place, month, reports it to be 2044. Pt is fidgety and restless.    Psychiatric:         Mood and Affect: Mood normal.         Behavior: Behavior normal.            Result Review:  I have personally reviewed the results from the time of this admission to 7/15/2024 22:15 EDT and agree with these findings:  [x]  Laboratory list / accordion  []  Microbiology  [x]  Radiology  []  EKG/Telemetry   [x]  Cardiology/Vascular   []  Pathology  []  Old records  []  Other:      LAB RESULTS:      Lab 07/15/24  1639 07/15/24  1447   WBC  --  11.31*   HEMOGLOBIN   --  7.6*   HEMATOCRIT  --  27.0*   PLATELETS  --  373   NEUTROS ABS  --  9.25*   IMMATURE GRANS (ABS)  --  0.08*   LYMPHS ABS  --  0.98   MONOS ABS  --  0.94*   EOS ABS  --  0.04   MCV  --  84.1   SED RATE  --  31*   CRP 14.75*  --    PROCALCITONIN  --  12.49*   LACTATE  --  1.7         Lab 07/15/24  1447   SODIUM 139   POTASSIUM 3.6   CHLORIDE 100   CO2 29.0   ANION GAP 10.0   BUN 16   CREATININE 0.68   EGFR 94.4   GLUCOSE 174*   CALCIUM 7.7*   MAGNESIUM 1.8   HEMOGLOBIN A1C 6.10*         Lab 07/15/24  1447   TOTAL PROTEIN 5.1*   ALBUMIN 2.7*   GLOBULIN 2.4   ALT (SGPT) 12   AST (SGOT) 18   BILIRUBIN 0.5   ALK PHOS 71         Lab 07/15/24  1639 07/15/24  1447   PROBNP  --  8,823.0*   HSTROP T 52* 61*             Lab 07/15/24  1639   ABO TYPING A   RH TYPING Positive   ANTIBODY SCREEN Negative         Lab 07/15/24  1511   PH, ARTERIAL 7.486*   PCO2, ARTERIAL 39.5   PO2 ART 65.9*   FIO2 44   HCO3 ART 29.8*   BASE EXCESS ART 5.9*   CARBOXYHEMOGLOBIN 1.7     Brief Urine Lab Results  (Last result in the past 365 days)        Color   Clarity   Blood   Leuk Est   Nitrite   Protein   CREAT   Urine HCG        07/15/24 1440 Yellow   Clear   Negative   Negative   Negative   Negative                 Microbiology Results (last 10 days)       Procedure Component Value - Date/Time    MRSA Screen, PCR (Inpatient) - Swab, Nares [[id number]]  (Normal) Collected: 07/15/24 1851    Lab Status: Final result Specimen: Swab from Nares Updated: 07/15/24 2010     MRSA PCR Negative    Narrative:      The negative predictive value of this diagnostic test is high and should only be used to consider de-escalating anti-MRSA therapy. A positive result may indicate colonization with MRSA and must be correlated clinically.  MRSA Negative            XR Chest 1 View    Result Date: 7/15/2024  XR CHEST 1 VW Date of Exam: 7/15/2024 3:53 PM EDT Indication: Weak/Dizzy/AMS triage protocol Comparison: Chest CT performed on the same day. Findings: Mild  linear atelectasis in the right upper and middle lobes is visualized. There is mild elevation of the right hemidiaphragm. There is no evidence of pneumothorax. There is mild pulmonary vascular congestion. Cardiac silhouette is enlarged. Loop recorder overlies the lower thoracic spine. No acute osseous abnormality identified.     Impression: Impression: Mild linear atelectasis in the right upper and middle lobes. Findings compatible with mild CHF. Electronically Signed: Jay Andrew MD  7/15/2024 4:21 PM EDT  Workstation ID: [id number]    Duplex Venous Lower Extremity - RIGHT    Result Date: 7/15/2024    Normal right lower extremity venous duplex scan.     CT Head Without Contrast    Result Date: 7/15/2024  CT HEAD WO CONTRAST, CT ABDOMEN PELVIS WO CONTRAST, CT CHEST WO CONTRAST DIAGNOSTIC Date of Exam: 7/15/2024 2:24 PM EDT Indication: AMS. Comparison: Previous head CT scan 1/19/2023. Angiographic chest CT scan 1/20/2023. No prior abdominal scan. Technique: Axial CT images were obtained of the head, chest abdomen pelvis without contrast administration.  Automated exposure control and iterative construction methods were used. Findings: No additional clinical history is currently available. CT SCAN OF THE HEAD WITHOUT CONTRAST: The calvarium appears intact. Included paranasal sinuses and mastoids appear clear. There is expected degree of generalized cerebral atrophy for the patient's age. No hemorrhage, contusion, edema, or infarct is seen.  There is no evidence of mass or mass effect, hydrocephalus, or abnormal extra-axial collection.     Impression: No evidence of acute intracranial disease. CT SCAN OF THE CHEST WITHOUT CONTRAST: Axial images 20 through 46 show multiple well-defined hyperdense rounded lesions within and dorsal to the right pectoralis minor, resembling soft tissue masses, but similar to density of acute blood, and with the lesion on image #27 appearing to show a potential hematocrit level, possibly  "all intramuscular hematoma. The largest of these measures approximately 3 cm in maximal diameter. No similar findings are seen elsewhere and no underlying rib fractures identified. The heart is enlarged. No pericardial effusion or mediastinal adenopathy is seen. There is trace bilateral pleural effusion. No coronary calcification is seen. Images of the lungs appear to show prominent pulmonary vasculature and mild diffuse interstitial disease suggesting early interstitial edema. No particular focal pulmonary disease is seen to favor a pneumonia although a small focus of pneumonia. Bony structures appear to be intact. IMPRESSION: 1. 4 or 5 adjacent rounded hyperdense \"masses\" within and along the right pectoralis minor muscle, each between 2 and 3 cm in diameter, and favored to represent hematoma/intramuscular hemorrhage, rather than neoplasm. Please correlate with any history of  trauma here. 2. Minimal bibasilar pleural effusion, and small area of right upper lobe discoid atelectasis. No particular features to suggest pneumonia. 3. Cardiomegaly and mild pulmonary vascular congestion, perhaps with early interstitial edema. ABDOMEN AND PELVIS CT SCAN WITHOUT CONTRAST: An area of slightly nodular left lower breast tissue is unchanged from 1/20/2023. No abdominal wall or retroperitoneal hematoma is seen. Clips are seen in the gallbladder fossa. No significant abnormalities are appreciated of the liver, pancreas, adrenal glands, or kidneys for non-IV contrast enhanced exam. Multiple splenic lesions, presumably cysts, are stable from the prior exam, the largest measuring water density, 3 cm in diameter image 33 series 2. No upper abdominal free air, ascites, adenopathy, or acute inflammatory change is seen. Bowel loops are nondistended. Regarding the lower abdomen and pelvis, there is evidence of previous extensive bowel surgery. Terminal ileum and cecum appear grossly normal. Appendix is not identified. There appears to " be a distal ileal suture line, and suture line at the rectosigmoid  junction. These areas are clear of mass or inflammatory change. No intrapelvic free fluid mass or adenopathy is seen. Bladder is normally distended and normal in appearance. Uterus and ovaries are not identified, whether atrophic or surgically absent. Streak artifact is seen from the patient's right hip fixation hardware. There is an old healed right inferior pubic ramus fracture but no acute bony abnormality is seen. IMPRESSION: 1. No evidence of acute inflammatory process or other clearly acute intra-abdominal or intrapelvic disease. 2. Evidence of previous bowel surgery. No evidence of significant ileus or obstruction. Electronically Signed: Heath Prado MD  7/15/2024 2:53 PM EDT  Workstation ID: [id number]    CT Chest Without Contrast Diagnostic    Result Date: 7/15/2024  CT HEAD WO CONTRAST, CT ABDOMEN PELVIS WO CONTRAST, CT CHEST WO CONTRAST DIAGNOSTIC Date of Exam: 7/15/2024 2:24 PM EDT Indication: AMS. Comparison: Previous head CT scan 1/19/2023. Angiographic chest CT scan 1/20/2023. No prior abdominal scan. Technique: Axial CT images were obtained of the head, chest abdomen pelvis without contrast administration.  Automated exposure control and iterative construction methods were used. Findings: No additional clinical history is currently available. CT SCAN OF THE HEAD WITHOUT CONTRAST: The calvarium appears intact. Included paranasal sinuses and mastoids appear clear. There is expected degree of generalized cerebral atrophy for the patient's age. No hemorrhage, contusion, edema, or infarct is seen.  There is no evidence of mass or mass effect, hydrocephalus, or abnormal extra-axial collection.     Impression: No evidence of acute intracranial disease. CT SCAN OF THE CHEST WITHOUT CONTRAST: Axial images 20 through 46 show multiple well-defined hyperdense rounded lesions within and dorsal to the right pectoralis minor, resembling soft tissue  "masses, but similar to density of acute blood, and with the lesion on image #27 appearing to show a potential hematocrit level, possibly all intramuscular hematoma. The largest of these measures approximately 3 cm in maximal diameter. No similar findings are seen elsewhere and no underlying rib fractures identified. The heart is enlarged. No pericardial effusion or mediastinal adenopathy is seen. There is trace bilateral pleural effusion. No coronary calcification is seen. Images of the lungs appear to show prominent pulmonary vasculature and mild diffuse interstitial disease suggesting early interstitial edema. No particular focal pulmonary disease is seen to favor a pneumonia although a small focus of pneumonia. Bony structures appear to be intact. IMPRESSION: 1. 4 or 5 adjacent rounded hyperdense \"masses\" within and along the right pectoralis minor muscle, each between 2 and 3 cm in diameter, and favored to represent hematoma/intramuscular hemorrhage, rather than neoplasm. Please correlate with any history of  trauma here. 2. Minimal bibasilar pleural effusion, and small area of right upper lobe discoid atelectasis. No particular features to suggest pneumonia. 3. Cardiomegaly and mild pulmonary vascular congestion, perhaps with early interstitial edema. ABDOMEN AND PELVIS CT SCAN WITHOUT CONTRAST: An area of slightly nodular left lower breast tissue is unchanged from 1/20/2023. No abdominal wall or retroperitoneal hematoma is seen. Clips are seen in the gallbladder fossa. No significant abnormalities are appreciated of the liver, pancreas, adrenal glands, or kidneys for non-IV contrast enhanced exam. Multiple splenic lesions, presumably cysts, are stable from the prior exam, the largest measuring water density, 3 cm in diameter image 33 series 2. No upper abdominal free air, ascites, adenopathy, or acute inflammatory change is seen. Bowel loops are nondistended. Regarding the lower abdomen and pelvis, there is " evidence of previous extensive bowel surgery. Terminal ileum and cecum appear grossly normal. Appendix is not identified. There appears to be a distal ileal suture line, and suture line at the rectosigmoid  junction. These areas are clear of mass or inflammatory change. No intrapelvic free fluid mass or adenopathy is seen. Bladder is normally distended and normal in appearance. Uterus and ovaries are not identified, whether atrophic or surgically absent. Streak artifact is seen from the patient's right hip fixation hardware. There is an old healed right inferior pubic ramus fracture but no acute bony abnormality is seen. IMPRESSION: 1. No evidence of acute inflammatory process or other clearly acute intra-abdominal or intrapelvic disease. 2. Evidence of previous bowel surgery. No evidence of significant ileus or obstruction. Electronically Signed: Heath Prado MD  7/15/2024 2:53 PM EDT  Workstation ID: [id number]    CT Abdomen Pelvis Without Contrast    Result Date: 7/15/2024  CT HEAD WO CONTRAST, CT ABDOMEN PELVIS WO CONTRAST, CT CHEST WO CONTRAST DIAGNOSTIC Date of Exam: 7/15/2024 2:24 PM EDT Indication: AMS. Comparison: Previous head CT scan 1/19/2023. Angiographic chest CT scan 1/20/2023. No prior abdominal scan. Technique: Axial CT images were obtained of the head, chest abdomen pelvis without contrast administration.  Automated exposure control and iterative construction methods were used. Findings: No additional clinical history is currently available. CT SCAN OF THE HEAD WITHOUT CONTRAST: The calvarium appears intact. Included paranasal sinuses and mastoids appear clear. There is expected degree of generalized cerebral atrophy for the patient's age. No hemorrhage, contusion, edema, or infarct is seen.  There is no evidence of mass or mass effect, hydrocephalus, or abnormal extra-axial collection.     Impression: No evidence of acute intracranial disease. CT SCAN OF THE CHEST WITHOUT CONTRAST: Axial images 20  "through 46 show multiple well-defined hyperdense rounded lesions within and dorsal to the right pectoralis minor, resembling soft tissue masses, but similar to density of acute blood, and with the lesion on image #27 appearing to show a potential hematocrit level, possibly all intramuscular hematoma. The largest of these measures approximately 3 cm in maximal diameter. No similar findings are seen elsewhere and no underlying rib fractures identified. The heart is enlarged. No pericardial effusion or mediastinal adenopathy is seen. There is trace bilateral pleural effusion. No coronary calcification is seen. Images of the lungs appear to show prominent pulmonary vasculature and mild diffuse interstitial disease suggesting early interstitial edema. No particular focal pulmonary disease is seen to favor a pneumonia although a small focus of pneumonia. Bony structures appear to be intact. IMPRESSION: 1. 4 or 5 adjacent rounded hyperdense \"masses\" within and along the right pectoralis minor muscle, each between 2 and 3 cm in diameter, and favored to represent hematoma/intramuscular hemorrhage, rather than neoplasm. Please correlate with any history of  trauma here. 2. Minimal bibasilar pleural effusion, and small area of right upper lobe discoid atelectasis. No particular features to suggest pneumonia. 3. Cardiomegaly and mild pulmonary vascular congestion, perhaps with early interstitial edema. ABDOMEN AND PELVIS CT SCAN WITHOUT CONTRAST: An area of slightly nodular left lower breast tissue is unchanged from 1/20/2023. No abdominal wall or retroperitoneal hematoma is seen. Clips are seen in the gallbladder fossa. No significant abnormalities are appreciated of the liver, pancreas, adrenal glands, or kidneys for non-IV contrast enhanced exam. Multiple splenic lesions, presumably cysts, are stable from the prior exam, the largest measuring water density, 3 cm in diameter image 33 series 2. No upper abdominal free air, " ascites, adenopathy, or acute inflammatory change is seen. Bowel loops are nondistended. Regarding the lower abdomen and pelvis, there is evidence of previous extensive bowel surgery. Terminal ileum and cecum appear grossly normal. Appendix is not identified. There appears to be a distal ileal suture line, and suture line at the rectosigmoid  junction. These areas are clear of mass or inflammatory change. No intrapelvic free fluid mass or adenopathy is seen. Bladder is normally distended and normal in appearance. Uterus and ovaries are not identified, whether atrophic or surgically absent. Streak artifact is seen from the patient's right hip fixation hardware. There is an old healed right inferior pubic ramus fracture but no acute bony abnormality is seen. IMPRESSION: 1. No evidence of acute inflammatory process or other clearly acute intra-abdominal or intrapelvic disease. 2. Evidence of previous bowel surgery. No evidence of significant ileus or obstruction. Electronically Signed: Heath Prado MD  7/15/2024 2:53 PM EDT  Workstation ID: [id number]     Results for orders placed during the hospital encounter of 12/17/22    Adult Transthoracic Echo Complete w/ Color, Spectral and Contrast if Necessary Per Protocol    Interpretation Summary    Left ventricular systolic function is normal. Calculated left ventricular EF = 59.5%    Left ventricular wall thickness is consistent with mild concentric hypertrophy.    Left ventricular diastolic function is consistent with (grade II w/high LAP) pseudonormalization.    Left atrial volume is mildly increased.    There is calcification of the aortic valve.    Estimated right ventricular systolic pressure from tricuspid regurgitation is normal (<35 mmHg). Calculated right ventricular systolic pressure from tricuspid regurgitation is 27 mmHg.    Mild dilation of the ascending aorta is present.  4.1 cm      Assessment & Plan  Assessment & Plan       Symptomatic anemia    Primary  "hypertension    Type 2 diabetes mellitus, without long-term current use of insulin    Rheumatoid arthritis involving multiple sites    A-fib    Hypothyroidism (acquired)    Acute respiratory failure with hypoxia    Acute on chronic heart failure with preserved ejection fraction    Gout    Mixed hyperlipidemia      Hospital Course to date:  Charly Blevins is a 68 y.o. female PMH gout, DMII, HTN, HLD, RA, hypothyroidism presenting with symptomatic anemia to include dyspnea with exertion, fatigue.     Symptomatic anemia  Acute GI bleed and/or blood loss from hematomas  - Stool (+) occult blood  - Hgb 7.6  - type and screen and transfuse 1 unit of PRBCs  - consult GI  - Protonix 40 mg IV BID  - trend H&H  - NPO    Acute on chronic  respiratory failure  - currently on high flow oxygen  - CXR consistent with CHF exacerbation- diurese    Acute on chronic HFpEF  - ECHO (2022) LVEF 59.5% with diastolic function consistent with grade II w/high LAP.   - BNP 8000  - received Bumex in the ED  - strict I/O, daily weight    Falls  - fell on Wednesday and Saturday and struck her head  - CT head no acute intracranial process  - Bruising to the right breast (CT showing 4 or 5 adjacent rounded hyperdense \"masses\"within and along the right pectoralis minor muscle, each between 2-3 cm in diameter, thought to represent hematoma/intramuscular hemorrhage, rather than neoplasm.   - orthostatic BP    Elevated troponin  - 61 trending downward 52  - EKG Afib with NSST waves    Elevated pro ofelia  - Pro ofelia 12.49, WBC 11.31  - received zosyn/vanco in the ED- will continue for now  - UA neg for UTI, CXR/CT chest negative for PNA. Blood cultures pending.   - maybe elevated due to inflammation. Check CPR and sed rate.   - MRSA PCR  - Stool PCR      Afib  - holding eliquis  - continue metoprolol 12.5 mg bid    RA  Chronic right shoulder pain  - gabapentin 100 mg bid- home dose is 600 mg daily  - norco 5mg at reduced dose-home dose is 10 mg q 6 " hours  - holding prednisone 5 mg daily due to GI bleed  - follows with pain management for chronic right shoulder pain     RLE edema (chronic)  - Duplex neg for DVT     DMII  - A1c pending  - SSI        DVT prophylaxis:  SCDs    CODE STATUS:    Level Of Support Discussed With: Patient  Code Status (Patient has no pulse and is not breathing): CPR (Attempt to Resuscitate)  Medical Interventions (Patient has pulse or is breathing): Full Support      Expected Discharge 07/19/2024      This note has been completed as part of a split-shared workflow.     Signature: Electronically signed by KELSIE Fitzgerald, 07/15/24, 5:19 PM EDT.    Patient seen and examined.  She is alert, but pale, weak, wasted muscles and loose skin  Will transfuse and diurese  Agree with above.  Santa Kaba MD 07/15/24 22:27 EDT                  Electronically signed by Santa Kaba MD at 07/15/24 2228       Current Facility-Administered Medications   Medication Dose Route Frequency Provider Last Rate Last Admin    acetaminophen (TYLENOL) tablet 650 mg  650 mg Oral Q4H PRN Rasheeda Ontiveros APRN   650 mg at 07/18/24 0821    Or    acetaminophen (TYLENOL) 160 MG/5ML oral solution 650 mg  650 mg Oral Q4H PRN Rasheeda Ontiveros APRN        Or    acetaminophen (TYLENOL) suppository 650 mg  650 mg Rectal Q4H PRN Rasheeda Ontiveros APRN        allopurinol (ZYLOPRIM) tablet 300 mg  300 mg Oral Daily Rasheeda Ontiveros APRN   300 mg at 07/18/24 0803    sennosides-docusate (PERICOLACE) 8.6-50 MG per tablet 2 tablet  2 tablet Oral BID PRN Rasheeda Ontiveros APRN        And    polyethylene glycol (MIRALAX) packet 17 g  17 g Oral Daily PRN Rasheeda Ontiveros APRN        And    bisacodyl (DULCOLAX) EC tablet 5 mg  5 mg Oral Daily PRN Rasheeda Ontiveros APRN        And    bisacodyl (DULCOLAX) suppository 10 mg  10 mg Rectal Daily PRN Rasheeda Ontiveros APRN        budesonide-formoterol (SYMBICORT) 160-4.5 MCG/ACT inhaler 1 puff  1 puff Inhalation BID - RT  Rasheeda Ontiveros APRN   1 puff at 07/18/24 0716    dextrose (D50W) (25 g/50 mL) IV injection 25 g  25 g Intravenous Q15 Min PRN Rasheeda Ontiveros APRN        dextrose (GLUTOSE) oral gel 15 g  15 g Oral Q15 Min PRN Ino Grissom MD        doxycycline (VIBRAMYCIN) 100 mg in sodium chloride 0.9 % 100 mL MBP  100 mg Intravenous BID Ino Grissom MD   100 mg at 07/18/24 0802    ferric gluconate (FERRLECIT)125 MG in sodium chloride 0.9 % 100 mL IVPB  125 mg Intravenous Daily Ino Grissom  mL/hr at 07/18/24 0803 125 mg at 07/18/24 0803    gabapentin (NEURONTIN) capsule 100 mg  100 mg Oral Q12H Rasheeda Ontiveros APRN   100 mg at 07/18/24 0803    glucagon (GLUCAGEN) injection 1 mg  1 mg Intramuscular Q15 Min PRN Ino Grissom MD        haloperidol lactate (HALDOL) injection 0.5 mg  0.5 mg Intravenous Q8H PRN Ino Grissom MD        HYDROcodone-acetaminophen (NORCO) 7.5-325 MG per tablet 1 tablet  1 tablet Oral Q6H PRN Ino Grissom MD   1 tablet at 07/18/24 1207    Insulin Lispro (humaLOG) injection 2-7 Units  2-7 Units Subcutaneous 4x Daily AC & at Bedtime Ino Grissom MD   2 Units at 07/18/24 1208    ipratropium-albuterol (DUO-NEB) nebulizer solution 3 mL  3 mL Nebulization Q6H While Awake - RT Rasheeda Ontiveros APRN   3 mL at 07/18/24 1306    levothyroxine (SYNTHROID, LEVOTHROID) tablet 125 mcg  125 mcg Oral QAM Rasheeda Ontiveros APRN   125 mcg at 07/18/24 0610    Lidocaine 4 % 1 patch  1 patch Transdermal Q24H Rasheeda Ontiveros APRN   1 patch at 07/17/24 1741    Magnesium Cardiology Dose Replacement - Follow Nurse / BPA Driven Protocol   Does not apply PRN Ino Grissom MD        melatonin tablet 10 mg  10 mg Oral Nightly Ino Grissom MD   10 mg at 07/17/24 2140    metoprolol tartrate (LOPRESSOR) tablet 25 mg  25 mg Oral BID Ino Grissom MD   25 mg at 07/18/24 0803    midazolam (VERSED) injection 1 mg  1 mg Intravenous Q6H PRN Jaciel  Ino LOTT MD        nitroglycerin (NITROSTAT) SL tablet 0.4 mg  0.4 mg Sublingual Q5 Min PRN Rasheeda Ontiveros APRN        ondansetron (ZOFRAN) injection 4 mg  4 mg Intravenous Q6H PRN Rasheeda Ontiveros APRN   4 mg at 24 0249    pantoprazole (PROTONIX) injection 40 mg  40 mg Intravenous Q12H Ino Grissom MD   40 mg at 24 0803    Potassium Replacement - Follow Nurse / BPA Driven Protocol   Does not apply PRN Ino Grissom MD        sodium chloride 0.9 % flush 10 mL  10 mL Intravenous PRN Emergency, Triage Protocol, MD        sodium chloride 0.9 % flush 10 mL  10 mL Intravenous Q12H Rasheeda Ontiveros APRN   10 mL at 24 0803    sodium chloride 0.9 % flush 10 mL  10 mL Intravenous PRN Rasheeda Ontiveros APRN   10 mL at 07/15/24 2020    sodium chloride 0.9 % infusion 40 mL  40 mL Intravenous PRN Rasheeda Ontiveros APRN        sodium-potassium-magnesium sulfates (SUPREP) oral solution 1 bottle  1 bottle Oral Q12H Santa Lovelace PA        temazepam (RESTORIL) capsule 15 mg  15 mg Oral Nightly PRN Ino Grissom MD            Physical Therapy Notes (most recent note)        Jeanette Back, PT at 24 0911  Version 1 of 1         Patient Name: Charly Blevins  : 1955    MRN: [id number]                              Today's Date: 2024       Admit Date: 7/15/2024    Visit Dx:     ICD-10-CM ICD-9-CM   1. Acute on chronic respiratory failure with hypoxia  J96.21 518.84     799.02   2. Acute on chronic congestive heart failure, unspecified heart failure type  I50.9 428.0   3. Multiple falls  R29.6 V15.88   4. Anemia, unspecified type  D64.9 285.9   5. Gastrointestinal hemorrhage, unspecified gastrointestinal hemorrhage type  K92.2 578.9     Patient Active Problem List   Diagnosis    Closed intertrochanteric fracture of hip, right, initial encounter    Pubic ramus fracture    Primary hypertension    Dyslipidemia    Type 2 diabetes mellitus, without long-term current use  of insulin    Rheumatoid arthritis involving multiple sites    A-fib    Acute hypoxic respiratory failure    On chronic immunosuppressive therapy    GERD    Obesity (BMI 30-39.9)    Hypothyroidism (acquired)    Anxiety and depression    Gout    Steroid-induced hyperglycemia    Pneumonia of right lower lobe due to infectious organism    Acute conjunctivitis of left eye    Acute respiratory failure with hypoxia    Nocturnal hypoxia (baseline 2-3L NC QHS)    Immunosuppressed status    Insomnia    Symptomatic anemia    Acute respiratory failure with hypoxia    Acute on chronic heart failure with preserved ejection fraction    Gout    Mixed hyperlipidemia     Past Medical History:   Diagnosis Date    Anxiety and depression 12/17/2022    Atrial fibrillation 2015    CARDIOVERSION - NO REOCCURANCE SINCE     Diabetes mellitus     Gout 12/17/2022    Hyperlipidemia     Hypertension     Hypothyroidism 12/17/2022    Obesity (BMI 30-39.9) 12/17/2022     Past Surgical History:   Procedure Laterality Date    APPENDECTOMY      CHOLECYSTECTOMY      COLOSTOMY      RESULTED FROM HYSTERECTOMY     COLOSTOMY CLOSURE      WHILE DOING COLOSTOMY CLOSURE; ENDED UP HAVING A ILEOSTOMY    HEMORRHOIDECTOMY      HIP TROCHANTERIC NAILING WITH INTRAMEDULLARY HIP SCREW Right 3/27/2019    Procedure: HIP TROCANTERIC NAILING WITH INTRAMEDULLARY HIP SCREW RIGHT;  Surgeon: Jona Tom MD;  Location: [facility];  Service: Orthopedics    HYSTERECTOMY      LUMBAR DISCECTOMY      L4 - L5    OTHER SURGICAL HISTORY      TUBAL REVERSAL    THYROIDECTOMY      TUBAL ABDOMINAL LIGATION      X 2      General Information       Row Name 07/17/24 0955          Physical Therapy Time and Intention    Document Type evaluation  -CD     Mode of Treatment physical therapy  -CD       Row Name 07/17/24 0955          General Information    Patient Profile Reviewed yes  -CD     Prior Level of Function independent:;all household mobility;gait;transfer;bed mobility;min  assist:;dressing;bathing;dependent:;cooking;cleaning;driving;shopping  USES ROLLATOR IN THE HOME AND W/C IN COMMUNITY. HAS A CAREGIVER 8AM-5PM. SON IS THERE PART TIME. HAS A DTR CLOSE BY. USES O2 24/7.  -CD     Existing Precautions/Restrictions fall;oxygen therapy device and L/min  CHRONIC BACK, L SHOULDER AND R LE PAIN. MULTIPLE RECENT FALLS.  -CD     Barriers to Rehab medically complex;previous functional deficit;cognitive status  -CD       Row Name 07/17/24 0955          Living Environment    People in Home alone  CG DURING THE DAY.  -CD       Row Name 07/17/24 0955          Home Main Entrance    Number of Stairs, Main Entrance three  -CD     Stair Railings, Main Entrance none  FAMILY ASSISTS ALWAYS WITH ENTERING/LEAVING HOME.  -CD       Row Name 07/17/24 0955          Stairs Within Home, Primary    Number of Stairs, Within Home, Primary none  -CD       Row Name 07/17/24 0955          Cognition    Orientation Status (Cognition) oriented to;person;place;disoriented to;time  -CD       Row Name 07/17/24 0955          Safety Issues, Functional Mobility    Safety Issues Affecting Function (Mobility) at risk behavior observed;awareness of need for assistance  -CD     Impairments Affecting Function (Mobility) balance;coordination;endurance/activity tolerance;strength;shortness of breath  -CD               User Key  (r) = Recorded By, (t) = Taken By, (c) = Cosigned By      Initials Name Provider Type    CD Jeanette Back, PT Physical Therapist                   Mobility       Row Name 07/17/24 1002          Bed Mobility    Bed Mobility supine-sit  -CD     Supine-Sit [place] (Bed Mobility) minimum assist (75% patient effort);verbal cues  -CD     Assistive Device (Bed Mobility) bed rails;draw sheet;head of bed elevated  -CD     Comment, (Bed Mobility) INCREASED TIME AND EFFORT.  -CD       Row Name 07/17/24 1002          Transfers    Comment, (Transfers) CUES FOR HAND PLACEMENT. STS FROM EOB BUT C/O DIZZINESS AND  RETURNED TO SITTING. BP STABLE. COMPLETED STAND STEP PIVOT BED TO RECLINER.  -CD       Row Name 07/17/24 1002          Bed-Chair Transfer    Bed-Chair [place] (Transfers) minimum assist (75% patient effort);2 person assist;verbal cues  -CD     Assistive Device (Bed-Chair Transfers) walker, front-wheeled  -CD       Row Name 07/17/24 1002          Sit-Stand Transfer    Sit-Stand [place] (Transfers) verbal cues;contact guard  -CD     Assistive Device (Sit-Stand Transfers) walker, front-wheeled  -CD       Row Name 07/17/24 1002          Gait/Stairs (Locomotion)    [place] Level (Gait) not tested  -CD     Comment, (Gait/Stairs) PT DECLINED DUE TO FATIGUE, L LE PAIN.  -CD               User Key  (r) = Recorded By, (t) = Taken By, (c) = Cosigned By      Initials Name Provider Type    CD Jeanette Back, PT Physical Therapist                   Obj/Interventions       Row Name 07/17/24 1003          Range of Motion Comprehensive    General Range of Motion bilateral lower extremity ROM WFL  -CD       Row Name 07/17/24 1003          Strength Comprehensive (MMT)    General Manual Muscle Testing (MMT) Assessment lower extremity strength deficits identified  -CD     Comment, General Manual Muscle Testing (MMT) Assessment HIP FLEX R 3/5, L 4/5; KNEE EXT L 3+/5, R 4/5, B DF 4/5.  -CD       Row Name 07/17/24 1003          Motor Skills    Motor Skills coordination;functional endurance  -CD     Coordination gross motor deficit;bilateral;lower extremity;minimal impairment  PT TREMULOUS WITH ROM ASSESSEMENT.  -CD     Functional Endurance PT FATIGUES QUICKLY. ON 3L O2.  -CD       Row Name 07/17/24 1003          Balance    Balance Assessment sitting static balance;sit to stand dynamic balance;standing static balance;sitting dynamic balance;standing dynamic balance  -CD     Static Sitting Balance standby assist  -CD     Dynamic Sitting Balance contact guard  -CD     Position, Sitting Balance unsupported;sitting edge of bed   -CD     Sit to Stand Dynamic Balance minimal assist;2-person assist  -CD     Static Standing Balance minimal assist;verbal cues  -CD     Dynamic Standing Balance minimal assist;2-person assist  -CD     Position/Device Used, Standing Balance walker, rolling  -CD     Balance Interventions sitting;standing;sit to stand;supported;static;dynamic  -CD     Comment, Balance PT UNSTEADY. RELIES HEAVILY ON R WALKER FOR SUPPORT.  -CD               User Key  (r) = Recorded By, (t) = Taken By, (c) = Cosigned By      Initials Name Provider Type    CD Jeanette Back, PT Physical Therapist                   Goals/Plan       Row Name 07/17/24 1016          Bed Mobility Goal 1 (PT)    Activity/Assistive Device (Bed Mobility Goal 1, PT) sit to supine/supine to sit  -CD     [place] Level/Cues Needed (Bed Mobility Goal 1, PT) supervision required  -CD     Time Frame (Bed Mobility Goal 1, PT) short term goal (STG);1 week  -       Row Name 07/17/24 1016          Transfer Goal 1 (PT)    Activity/Assistive Device (Transfer Goal 1, PT) sit-to-stand/stand-to-sit;bed-to-chair/chair-to-bed  -CD     [place] Level/Cues Needed (Transfer Goal 1, PT) contact guard required  -CD     Time Frame (Transfer Goal 1, PT) long term goal (LTG);2 weeks  -       Row Name 07/17/24 1016          Gait Training Goal 1 (PT)    Activity/Assistive Device (Gait Training Goal 1, PT) gait (walking locomotion);walker, rolling  -CD     [place] Level (Gait Training Goal 1, PT) contact guard required  -CD     Distance (Gait Training Goal 1, PT) 200 FEET  -CD     Time Frame (Gait Training Goal 1, PT) long term goal (LTG);2 weeks  -CD       Row Name 07/17/24 1016          Therapy Assessment/Plan (PT)    Planned Therapy Interventions (PT) balance training;bed mobility training;gait training;home exercise program;transfer training;strengthening;postural re-education;patient/family education;motor coordination training  -CD               User Key  (r) =  Recorded By, (t) = Taken By, (c) = Cosigned By      Initials Name Provider Type    CD Jeanette Back, PT Physical Therapist                   Clinical Impression       Row Name 07/17/24 1010          Pain    Pretreatment Pain Rating 9/10  -CD     Posttreatment Pain Rating 9/10  -CD     Pain Location - Side/Orientation Right;Left  -CD     Pain Location lower  -CD     Pain Location - extremity;shoulder  -CD     Pre/Posttreatment Pain Comment PT REPORTS CHRONIC L SHOULDER, BACK  AND R LE PAIN. PT IS S/P  PRIOR R HIP NAILING AND BACK SURGERIES.  -CD     Pain Intervention(s) Repositioned;Rest  NOTIFIED NSG PT WOULD LIKE PAIN MEDICATION.  -CD       Row Name 07/17/24 1010          Plan of Care Review    Plan of Care Reviewed With patient  -CD     Outcome Evaluation PT PRESENTS WITH EVOLVING SYMPTOMS TO INCLUDE GENERALIZED WEAKNESS, IMPAIRED BALANCE, IMPAIRED COORDINATION, ACUTE ON CHRONIC PAIN AND DECLINE IN FUNCTIONAL MOBILITY. PT REQUIRES 3L O2 AND FATIGUES QUICKLY. NEEDS CUES FOR SAFETY AND SEQUENCING MOBILITY. REQUIRED MIN ASSIST OF 2 FOR STAND PIVOT BED TO RECLINER. BP STABLE SUPINE TO SIT. RECOMMEND IRF AT D/C FOR BEST OUTCOME.  -CD       Row Name 07/17/24 1010          Therapy Assessment/Plan (PT)    Patient/Family Therapy Goals Statement (PT) TO GO HOME.  -CD     Rehab Potential (PT) good, to achieve stated therapy goals  -CD     Criteria for Skilled Interventions Met (PT) yes;meets criteria;skilled treatment is necessary  -CD     Therapy Frequency (PT) daily  -CD     Predicted Duration of Therapy Intervention (PT) 2 WEEKS  -CD       Row Name 07/17/24 1010          Vital Signs    Pre Systolic BP Rehab 114  -CD     Pre Treatment Diastolic BP 67  -CD     Post Systolic BP Rehab 105  -CD     Post Treatment Diastolic BP 76  -CD     Intratreatment Heart Rate (beats/min) 103  -CD     Posttreatment Heart Rate (beats/min) 83  -CD     Pre SpO2 (%) 91  -CD     O2 Delivery Pre Treatment supplemental O2  -CD     O2 Delivery  Intra Treatment supplemental O2  -CD     Post SpO2 (%) --  NSG NOTIFIED SENSOR NEEDS REPLACED.  -CD     O2 Delivery Post Treatment supplemental O2  -CD     Pre Patient Position Supine  -CD     Intra Patient Position Standing  -CD     Post Patient Position Sitting  -CD       Row Name 07/17/24 1010          Positioning and Restraints    Pre-Treatment Position in bed  -CD     Post Treatment Position chair  -CD     In Chair reclined;call light within reach;encouraged to call for assist;legs elevated;notified nsg;exit alarm on  -CD               User Key  (r) = Recorded By, (t) = Taken By, (c) = Cosigned By      Initials Name Provider Type    CD Jeanette Back PT Physical Therapist                   Outcome Measures       Row Name 07/17/24 1017          How much help from another person do you currently need...    Turning from your back to your side while in flat bed without using bedrails? 3  -CD     Moving from lying on back to sitting on the side of a flat bed without bedrails? 3  -CD     Moving to and from a bed to a chair (including a wheelchair)? 2  -CD     Standing up from a chair using your arms (e.g., wheelchair, bedside chair)? 3  -CD     Climbing 3-5 steps with a railing? 2  -CD     To walk in hospital room? 2  -CD     AM-PAC 6 Clicks Score (PT) 15  -CD     Highest Level of Mobility Goal 4 --> Transfer to chair/commode  -CD       Row Name 07/17/24 1017          Modified [place] Scale    Modified [place] Scale 4 - Moderately severe disability.  Unable to walk without assistance, and unable to attend to own bodily needs without assistance.  -CD       Row Name 07/17/24 1017          Functional Assessment    Outcome Measure Options AM-PAC 6 Clicks Basic Mobility (PT);Modified [place]  -CD               User Key  (r) = Recorded By, (t) = Taken By, (c) = Cosigned By      Initials Name Provider Type    Jeanette Olmos PT Physical Therapist                                 Physical Therapy Education       Title: PT OT  SLP Therapies (Done)       Topic: Physical Therapy (Done)       Point: Mobility training (Done)       Learning Progress Summary             Patient Acceptance, E,D, VU,NR by CD at 7/17/2024 1018    Comment: BENEFITS OF OOB ACTIVITY, SAFETY WITH MOBILITY, PROGRESSION OF POC , D/C PLANNING,                         Point: Home exercise program (Done)       Learning Progress Summary             Patient Acceptance, E,D, VU,NR by CD at 7/17/2024 1018    Comment: BENEFITS OF OOB ACTIVITY, SAFETY WITH MOBILITY, PROGRESSION OF POC , D/C PLANNING,                         Point: Body mechanics (Done)       Learning Progress Summary             Patient Acceptance, E,D, VU,NR by CD at 7/17/2024 1018    Comment: BENEFITS OF OOB ACTIVITY, SAFETY WITH MOBILITY, PROGRESSION OF POC , D/C PLANNING,                         Point: Precautions (Done)       Learning Progress Summary             Patient Acceptance, E,D, VU,NR by CD at 7/17/2024 1018    Comment: BENEFITS OF OOB ACTIVITY, SAFETY WITH MOBILITY, PROGRESSION OF POC , D/C PLANNING,                                         User Key       Initials Effective Dates Name Provider Type Discipline    CD 02/03/23 -  Jeanette Back, PT Physical Therapist PT                  PT Recommendation and Plan  Planned Therapy Interventions (PT): balance training, bed mobility training, gait training, home exercise program, transfer training, strengthening, postural re-education, patient/family education, motor coordination training  Plan of Care Reviewed With: patient  Outcome Evaluation: PT PRESENTS WITH EVOLVING SYMPTOMS TO INCLUDE GENERALIZED WEAKNESS, IMPAIRED BALANCE, IMPAIRED COORDINATION, ACUTE ON CHRONIC PAIN AND DECLINE IN FUNCTIONAL MOBILITY. PT REQUIRES 3L O2 AND FATIGUES QUICKLY. NEEDS CUES FOR SAFETY AND SEQUENCING MOBILITY. REQUIRED MIN ASSIST OF 2 FOR STAND PIVOT BED TO RECLINER. BP STABLE SUPINE TO SIT. RECOMMEND IRF AT D/C FOR BEST OUTCOME.     Time Calculation:   PT Evaluation  Complexity  History, PT Evaluation Complexity: 3 or more personal factors and/or comorbidities  Examination of Body Systems (PT Eval Complexity): total of 4 or more elements  Clinical Presentation (PT Evaluation Complexity): evolving  Clinical Decision Making (PT Evaluation Complexity): moderate complexity  Overall Complexity (PT Evaluation Complexity): moderate complexity     PT Charges       Row Name 24 1020             Time Calculation    Start Time 0911  -CD      PT Received On 24  -CD      PT Goal Re-Cert Due Date 24  -CD         Untimed Charges    PT Eval/Re-eval Minutes 66  -CD         Total Minutes    Untimed Charges Total Minutes 66  -CD       Total Minutes 66  -CD                User Key  (r) = Recorded By, (t) = Taken By, (c) = Cosigned By      Initials Name Provider Type    Jeanette Olmos, PT Physical Therapist                  Therapy Charges for Today       Code Description Service Date Service Provider Modifiers Qty    72174433390 HC PT EVAL MOD COMPLEXITY 4 2024 Jeanette Back, PT GP 1            PT G-Codes  Outcome Measure Options: AM-PAC 6 Clicks Basic Mobility (PT), Modified [place]  AM-PAC 6 Clicks Score (PT): 15  Modified [place] Scale: 4 - Moderately severe disability.  Unable to walk without assistance, and unable to attend to own bodily needs without assistance.  PT Discharge Summary  Anticipated Discharge Disposition (PT): inpatient rehabilitation facility    Jeanette Back, MEL  2024      Electronically signed by Jeanette Back, PT at 24 1022          Occupational Therapy Notes (most recent note)        Marichuy Holt, OT at 24 0911          Patient Name: Charly Blevins  : 1955    MRN: [id number]                              Today's Date: 2024       Admit Date: 7/15/2024    Visit Dx:     ICD-10-CM ICD-9-CM   1. Acute on chronic respiratory failure with hypoxia  J96.21 518.84     799.02   2. Acute on chronic congestive heart failure,  unspecified heart failure type  I50.9 428.0   3. Multiple falls  R29.6 V15.88   4. Anemia, unspecified type  D64.9 285.9   5. Gastrointestinal hemorrhage, unspecified gastrointestinal hemorrhage type  K92.2 578.9     Patient Active Problem List   Diagnosis    Closed intertrochanteric fracture of hip, right, initial encounter    Pubic ramus fracture    Primary hypertension    Dyslipidemia    Type 2 diabetes mellitus, without long-term current use of insulin    Rheumatoid arthritis involving multiple sites    A-fib    Acute hypoxic respiratory failure    On chronic immunosuppressive therapy    GERD    Obesity (BMI 30-39.9)    Hypothyroidism (acquired)    Anxiety and depression    Gout    Steroid-induced hyperglycemia    Pneumonia of right lower lobe due to infectious organism    Acute conjunctivitis of left eye    Acute respiratory failure with hypoxia    Nocturnal hypoxia (baseline 2-3L NC QHS)    Immunosuppressed status    Insomnia    Symptomatic anemia    Acute respiratory failure with hypoxia    Acute on chronic heart failure with preserved ejection fraction    Gout    Mixed hyperlipidemia     Past Medical History:   Diagnosis Date    Anxiety and depression 12/17/2022    Atrial fibrillation 2015    CARDIOVERSION - NO REOCCURANCE SINCE     Diabetes mellitus     Gout 12/17/2022    Hyperlipidemia     Hypertension     Hypothyroidism 12/17/2022    Obesity (BMI 30-39.9) 12/17/2022     Past Surgical History:   Procedure Laterality Date    APPENDECTOMY      CHOLECYSTECTOMY      COLOSTOMY      RESULTED FROM HYSTERECTOMY     COLOSTOMY CLOSURE      WHILE DOING COLOSTOMY CLOSURE; ENDED UP HAVING A ILEOSTOMY    HEMORRHOIDECTOMY      HIP TROCHANTERIC NAILING WITH INTRAMEDULLARY HIP SCREW Right 3/27/2019    Procedure: HIP TROCANTERIC NAILING WITH INTRAMEDULLARY HIP SCREW RIGHT;  Surgeon: Jona Tom MD;  Location: [facility];  Service: Orthopedics    HYSTERECTOMY      LUMBAR DISCECTOMY      L4 - L5    OTHER SURGICAL  HISTORY      TUBAL REVERSAL    THYROIDECTOMY      TUBAL ABDOMINAL LIGATION      X 2      General Information       Row Name 07/17/24 1155          OT Time and Intention    Document Type evaluation  -MR     Mode of Treatment occupational therapy  -MR       Row Name 07/17/24 1155          General Information    Patient Profile Reviewed yes  -MR     Prior Level of Function independent:;all household mobility;gait;transfer;bed mobility;min assist:;dressing;bathing;dependent:;cooking;cleaning;shopping  Uses rollator for in home mobility and w/c for community mobility. Pt has a caregiver from 8 to 5. Son there part time and daughter lives close by. On supplemental oxygen at baseline.  -MR     Existing Precautions/Restrictions fall;oxygen therapy device and L/min  Chronic LBP and L shoulder pain. Multiple recent falls.  -MR     Barriers to Rehab medically complex;previous functional deficit;cognitive status  -MR       Row Name 07/17/24 1155          Living Environment    People in Home alone;other (see comments)  Caregiver from 8:00 AM to 5:00PM  -MR       Row Name 07/17/24 1155          Home Main Entrance    Number of Stairs, Main Entrance three  -MR     Stair Railings, Main Entrance none;other (see comments)  Family assists pt when ascending/descending stairs.  -MR       Row Name 07/17/24 1155          Stairs Within Home, Primary    Number of Stairs, Within Home, Primary none  -MR       Row Name 07/17/24 1155          Cognition    Orientation Status (Cognition) oriented to;person;place;disoriented to;time  -MR       Row Name 07/17/24 1155          Safety Issues, Functional Mobility    Safety Issues Affecting Function (Mobility) at risk behavior observed;awareness of need for assistance  -MR     Impairments Affecting Function (Mobility) balance;coordination;endurance/activity tolerance;strength;shortness of breath  -MR               User Key  (r) = Recorded By, (t) = Taken By, (c) = Cosigned By      Initials Name Provider  Type    Marichuy Payne OT Occupational Therapist                     Mobility/ADL's       Row Name 07/17/24 1158          Bed Mobility    Bed Mobility supine-sit  -MR     Supine-Sit [place] (Bed Mobility) minimum assist (75% patient effort);verbal cues  -MR     Assistive Device (Bed Mobility) bed rails;draw sheet;head of bed elevated  -MR       Row Name 07/17/24 1158          Transfers    Transfers sit-stand transfer;bed-chair transfer  -MR     Comment, (Transfers) Pt completed STS from EOB to upright at RW, pt reporting dizziness. BP stable.  -MR       Row Name 07/17/24 1158          Bed-Chair Transfer    Bed-Chair [place] (Transfers) minimum assist (75% patient effort);2 person assist;verbal cues  -MR     Assistive Device (Bed-Chair Transfers) walker, front-wheeled  -MR       Row Name 07/17/24 1158          Sit-Stand Transfer    Sit-Stand [place] (Transfers) verbal cues;contact guard  -MR     Assistive Device (Sit-Stand Transfers) walker, front-wheeled  -MR       Row Name 07/17/24 1158          Activities of Daily Living    BADL Assessment/Intervention lower body dressing;grooming;upper body dressing  -MR       Row Name 07/17/24 1158          Lower Body Dressing Assessment/Training    [place] Level (Lower Body Dressing) don;socks;maximum assist (25% patient effort)  -MR     Position (Lower Body Dressing) supine  -MR       Row Name 07/17/24 1158          Grooming Assessment/Training    [place] Level (Grooming) hair care, combing/brushing;maximum assist (25% patient effort)  -MR     Position (Grooming) edge of bed sitting  -MR       Row Name 07/17/24 1158          Upper Body Dressing Assessment/Training    [place] Level (Upper Body Dressing) don;moderate assist (50% patient effort)  -MR     Position (Upper Body Dressing) edge of bed sitting  -MR               User Key  (r) = Recorded By, (t) = Taken By, (c) = Cosigned By      Initials Name Provider Type    Marichuy Payne OT  Occupational Therapist                   Obj/Interventions       Row Name 07/17/24 1344          Sensory Assessment (Somatosensory)    Sensory Assessment (Somatosensory) UE sensation intact  -MR       Row Name 07/17/24 1344          Vision Assessment/Intervention    Visual Impairment/Limitations WFL  -MR       Row Name 07/17/24 1344          Range of Motion Comprehensive    Comment, General Range of Motion L shoulder ROM limited to approx 45 degrees; RUE WFL  -MR       Row Name 07/17/24 1344          Strength Comprehensive (MMT)    Comment, General Manual Muscle Testing (MMT) Assessment LUE 3+/5 elbow flex/ext, shoulder flex/ext 3/5; RUE grossly 4-/5 in all functional planes  -MR       Row Name 07/17/24 1344          Balance    Balance Assessment sitting static balance;sitting dynamic balance;standing static balance;standing dynamic balance  -MR     Static Sitting Balance standby assist  -MR     Dynamic Sitting Balance contact guard  -MR     Position, Sitting Balance unsupported;sitting edge of bed  -MR     Static Standing Balance minimal assist;verbal cues  -MR     Dynamic Standing Balance minimal assist;2-person assist  -MR     Position/Device Used, Standing Balance supported;walker, rolling  -MR     Balance Interventions sitting;standing;sit to stand;supported;static;dynamic;occupation based/functional task  -MR               User Key  (r) = Recorded By, (t) = Taken By, (c) = Cosigned By      Initials Name Provider Type     Marichuy Holt, SIDNEY Occupational Therapist                   Goals/Plan       Row Name 07/17/24 1401          Bed Mobility Goal 1 (OT)    Activity/Assistive Device (Bed Mobility Goal 1, OT) sit to supine;supine to sit  -MR     [place] Level/Cues Needed (Bed Mobility Goal 1, OT) contact guard required  -MR     Time Frame (Bed Mobility Goal 1, OT) short term goal (STG);3 days  -MR     Progress/Outcomes (Bed Mobility Goal 1, OT) new goal  -MR       Row Name 07/17/24 1401          Transfer  Goal 1 (OT)    Activity/Assistive Device (Transfer Goal 1, OT) bed-to-chair/chair-to-bed  -MR     [place] Level/Cues Needed (Transfer Goal 1, OT) contact guard required;tactile cues required;verbal cues required  -MR     Time Frame (Transfer Goal 1, OT) long term goal (LTG);10 days  -MR     Progress/Outcome (Transfer Goal 1, OT) new goal  -MR       Row Name 07/17/24 1401          Dressing Goal 1 (OT)    Activity/Device (Dressing Goal 1, OT) lower body dressing  -MR     [place]/Cues Needed (Dressing Goal 1, OT) moderate assist (50-74% patient effort);tactile cues required;verbal cues required  -MR     Time Frame (Dressing Goal 1, OT) long term goal (LTG);10 days  -MR       Row Name 07/17/24 5763          Therapy Assessment/Plan (OT)    Planned Therapy Interventions (OT) activity tolerance training;adaptive equipment training;BADL retraining;edema control/reduction;functional balance retraining;strengthening exercise;transfer/mobility retraining;ROM/therapeutic exercise;patient/caregiver education/training;passive ROM/stretching;IADL retraining  -MR               User Key  (r) = Recorded By, (t) = Taken By, (c) = Cosigned By      Initials Name Provider Type    MR Jonathon Marichuy, OT Occupational Therapist                   Clinical Impression       Row Name 07/17/24 7953          Pain Assessment    Pretreatment Pain Rating 9/10  -MR     Posttreatment Pain Rating 9/10  -MR     Pain Location - Side/Orientation Right;Left  -MR     Pain Location generalized  -MR     Pain Location - other (see comments);shoulder  RLE  -MR     Pain Intervention(s) Repositioned;Ambulation/increased activity  -MR       Row Name 07/17/24 3119          Plan of Care Review    Plan of Care Reviewed With patient  -MR     Progress no change  Initial Eval  -MR     Outcome Evaluation Pt presenting below her functional baseline w/ balance, mobility and transfers requiring increased need for assist w/ ADLs. Pt requiring assist w/ LB dressing,  bed mobility and SPT from bed to chair. Pt's deficits warrant skilled IP OT services. Recommend inpatient rehab at d/c for best functional outcome.  -       Row Name 07/17/24 5480          Therapy Assessment/Plan (OT)    Patient/Family Therapy Goal Statement (OT) Return to PLOF  -MR     Rehab Potential (OT) good, to achieve stated therapy goals  -MR     Criteria for Skilled Therapeutic Interventions Met (OT) yes;meets criteria;skilled treatment is necessary  -MR     Therapy Frequency (OT) daily  -MR     Predicted Duration of Therapy Intervention (OT) 5 days  -MR       Row Name 07/17/24 2097          Therapy Plan Review/Discharge Plan (OT)    Anticipated Discharge Disposition (OT) inpatient rehabilitation facility  -MR       Row Name 07/17/24 2200          Vital Signs    Pre Systolic BP Rehab 114  -MR     Pre Treatment Diastolic BP 67  -MR     Post Systolic BP Rehab 105  -MR     Post Treatment Diastolic BP 76  -MR     Pretreatment Heart Rate (beats/min) 76  -MR     Posttreatment Heart Rate (beats/min) 103  -MR     Pre SpO2 (%) 91  -MR     O2 Delivery Pre Treatment nasal cannula  -MR     O2 Delivery Intra Treatment nasal cannula  -MR     O2 Delivery Post Treatment nasal cannula  -MR     Pre Patient Position Supine  -MR     Intra Patient Position Standing  -MR     Post Patient Position Sitting  -MR       Row Name 07/17/24 4838          Positioning and Restraints    Pre-Treatment Position in bed  -MR     Post Treatment Position chair  -MR     In Chair notified nsg;reclined;sitting;call light within reach;encouraged to call for assist;exit alarm on;legs elevated;waffle cushion  -MR               User Key  (r) = Recorded By, (t) = Taken By, (c) = Cosigned By      Initials Name Provider Type    Marichuy Payne, OT Occupational Therapist                   Outcome Measures       Row Name 07/17/24 7134          How much help from another is currently needed...    Putting on and taking off regular lower body clothing? 1   -MR     Bathing (including washing, rinsing, and drying) 2  -MR     Toileting (which includes using toilet bed pan or urinal) 2  -MR     Putting on and taking off regular upper body clothing 2  -MR     Taking care of personal grooming (such as brushing teeth) 2  -MR     Eating meals 4  -MR     AM-PAC 6 Clicks Score (OT) 13  -MR       Row Name 07/17/24 1017          How much help from another person do you currently need...    Turning from your back to your side while in flat bed without using bedrails? 3  -CD     Moving from lying on back to sitting on the side of a flat bed without bedrails? 3  -CD     Moving to and from a bed to a chair (including a wheelchair)? 2  -CD     Standing up from a chair using your arms (e.g., wheelchair, bedside chair)? 3  -CD     Climbing 3-5 steps with a railing? 2  -CD     To walk in hospital room? 2  -CD     AM-PAC 6 Clicks Score (PT) 15  -CD     Highest Level of Mobility Goal 4 --> Transfer to chair/commode  -CD       Row Name 07/17/24 1017          Modified [place] Scale    Modified [place] Scale 4 - Moderately severe disability.  Unable to walk without assistance, and unable to attend to own bodily needs without assistance.  -CD       Row Name 07/17/24 1402 07/17/24 1017       Functional Assessment    Outcome Measure Options AM-PAC 6 Clicks Daily Activity (OT)  -MR AM-PAC 6 Clicks Basic Mobility (PT);Modified [place]  -CD              User Key  (r) = Recorded By, (t) = Taken By, (c) = Cosigned By      Initials Name Provider Type    CD Jeanette Back, PT Physical Therapist    Marichuy Payne, OT Occupational Therapist                    Occupational Therapy Education       Title: PT OT SLP Therapies (In Progress)       Topic: Occupational Therapy (In Progress)       Point: ADL training (Done)       Description:   Instruct learner(s) on proper safety adaptation and remediation techniques during self care or transfers.   Instruct in proper use of assistive devices.                   Learning Progress Summary             Patient Acceptance, E, VU by MR at 7/17/2024 1403                         Point: Home exercise program (Not Started)       Description:   Instruct learner(s) on appropriate technique for monitoring, assisting and/or progressing therapeutic exercises/activities.                  Learner Progress:  Not documented in this visit.              Point: Precautions (Done)       Description:   Instruct learner(s) on prescribed precautions during self-care and functional transfers.                  Learning Progress Summary             Patient Acceptance, E, VU by MR at 7/17/2024 1403                         Point: Body mechanics (Done)       Description:   Instruct learner(s) on proper positioning and spine alignment during self-care, functional mobility activities and/or exercises.                  Learning Progress Summary             Patient Acceptance, E, VU by MR at 7/17/2024 1403                                         User Key       Initials Effective Dates Name Provider Type Discipline    MR 09/22/22 -  Marichuy Holt, OT Occupational Therapist OT                  OT Recommendation and Plan  Planned Therapy Interventions (OT): activity tolerance training, adaptive equipment training, BADL retraining, edema control/reduction, functional balance retraining, strengthening exercise, transfer/mobility retraining, ROM/therapeutic exercise, patient/caregiver education/training, passive ROM/stretching, IADL retraining  Therapy Frequency (OT): daily  Plan of Care Review  Plan of Care Reviewed With: patient  Progress: no change (Initial Eval)  Outcome Evaluation: Pt presenting below her functional baseline w/ balance, mobility and transfers requiring increased need for assist w/ ADLs. Pt requiring assist w/ LB dressing, bed mobility and SPT from bed to chair. Pt's deficits warrant skilled IP OT services. Recommend inpatient rehab at d/c for best functional outcome.     Time Calculation:    Evaluation Complexity (OT)  Review Occupational Profile/Medical/Therapy History Complexity: brief/low complexity  Assessment, Occupational Performance/Identification of Deficit Complexity: 1-3 performance deficits  Clinical Decision Making Complexity (OT): problem focused assessment/low complexity  Overall Complexity of Evaluation (OT): low complexity     Time Calculation- OT       Row Name 07/17/24 1403             Time Calculation- OT    OT Start Time 0911  -MR      OT Received On 07/17/24  -MR      OT Goal Re-Cert Due Date 07/27/24  -MR         Untimed Charges    OT Eval/Re-eval Minutes 46  -MR         Total Minutes    Untimed Charges Total Minutes 46  -MR       Total Minutes 46  -MR                User Key  (r) = Recorded By, (t) = Taken By, (c) = Cosigned By      Initials Name Provider Type    MR Marichuy Holt OT Occupational Therapist                  Therapy Charges for Today       Code Description Service Date Service Provider Modifiers Qty    17528064766 HC OT EVAL LOW COMPLEXITY 4 7/17/2024 Marichuy Holt OT GO 1                 Marichuy Holt OT  7/17/2024    Electronically signed by Marichuy Holt OT at 07/17/24 1405       Speech Language Pathology Notes (most recent note)    No notes exist for this encounter.

## 2024-07-19 ENCOUNTER — ANESTHESIA (OUTPATIENT)
Dept: TELEMETRY | Facility: HOSPITAL | Age: 69
End: 2024-07-19

## 2024-07-19 PROBLEM — E44.0 MODERATE MALNUTRITION: Status: ACTIVE | Noted: 2024-07-19

## 2024-07-19 LAB
ANION GAP SERPL CALCULATED.3IONS-SCNC: 5 MMOL/L (ref 5–15)
BUN SERPL-MCNC: 6 MG/DL (ref 8–23)
BUN/CREAT SERPL: 14 (ref 7–25)
CALCIUM SPEC-SCNC: 7.5 MG/DL (ref 8.6–10.5)
CHLORIDE SERPL-SCNC: 105 MMOL/L (ref 98–107)
CO2 SERPL-SCNC: 34 MMOL/L (ref 22–29)
CREAT SERPL-MCNC: 0.43 MG/DL (ref 0.57–1)
DEPRECATED RDW RBC AUTO: 63.7 FL (ref 37–54)
EGFRCR SERPLBLD CKD-EPI 2021: 105.4 ML/MIN/1.73
ERYTHROCYTE [DISTWIDTH] IN BLOOD BY AUTOMATED COUNT: 20.1 % (ref 12.3–15.4)
GLUCOSE BLDC GLUCOMTR-MCNC: 121 MG/DL (ref 70–130)
GLUCOSE BLDC GLUCOMTR-MCNC: 161 MG/DL (ref 70–130)
GLUCOSE BLDC GLUCOMTR-MCNC: 87 MG/DL (ref 70–130)
GLUCOSE BLDC GLUCOMTR-MCNC: 97 MG/DL (ref 70–130)
GLUCOSE SERPL-MCNC: 86 MG/DL (ref 65–99)
HCT VFR BLD AUTO: 30.7 % (ref 34–46.6)
HCT VFR BLD AUTO: 32.7 % (ref 34–46.6)
HCT VFR BLD AUTO: 32.7 % (ref 34–46.6)
HGB BLD-MCNC: 8.5 G/DL (ref 12–15.9)
HGB BLD-MCNC: 9.2 G/DL (ref 12–15.9)
HGB BLD-MCNC: 9.2 G/DL (ref 12–15.9)
MAGNESIUM SERPL-MCNC: 1.5 MG/DL (ref 1.6–2.4)
MCH RBC QN AUTO: 24.9 PG (ref 26.6–33)
MCHC RBC AUTO-ENTMCNC: 27.7 G/DL (ref 31.5–35.7)
MCV RBC AUTO: 90 FL (ref 79–97)
PLATELET # BLD AUTO: 317 10*3/MM3 (ref 140–450)
PMV BLD AUTO: 9.4 FL (ref 6–12)
POTASSIUM SERPL-SCNC: 4.2 MMOL/L (ref 3.5–5.2)
RBC # BLD AUTO: 3.41 10*6/MM3 (ref 3.77–5.28)
SODIUM SERPL-SCNC: 144 MMOL/L (ref 136–145)
WBC NRBC COR # BLD AUTO: 8.78 10*3/MM3 (ref 3.4–10.8)

## 2024-07-19 PROCEDURE — 82948 REAGENT STRIP/BLOOD GLUCOSE: CPT

## 2024-07-19 PROCEDURE — 25010000002 NA FERRIC GLUC CPLX PER 12.5 MG: Performed by: INTERNAL MEDICINE

## 2024-07-19 PROCEDURE — 85027 COMPLETE CBC AUTOMATED: CPT | Performed by: INTERNAL MEDICINE

## 2024-07-19 PROCEDURE — 83735 ASSAY OF MAGNESIUM: CPT | Performed by: INTERNAL MEDICINE

## 2024-07-19 PROCEDURE — 63710000001 INSULIN LISPRO (HUMAN) PER 5 UNITS: Performed by: INTERNAL MEDICINE

## 2024-07-19 PROCEDURE — 80048 BASIC METABOLIC PNL TOTAL CA: CPT | Performed by: INTERNAL MEDICINE

## 2024-07-19 PROCEDURE — 85014 HEMATOCRIT: CPT | Performed by: STUDENT IN AN ORGANIZED HEALTH CARE EDUCATION/TRAINING PROGRAM

## 2024-07-19 PROCEDURE — 25010000002 MAGNESIUM SULFATE 2 GM/50ML SOLUTION: Performed by: INTERNAL MEDICINE

## 2024-07-19 PROCEDURE — 94799 UNLISTED PULMONARY SVC/PX: CPT

## 2024-07-19 PROCEDURE — 94664 DEMO&/EVAL PT USE INHALER: CPT

## 2024-07-19 PROCEDURE — 85018 HEMOGLOBIN: CPT | Performed by: STUDENT IN AN ORGANIZED HEALTH CARE EDUCATION/TRAINING PROGRAM

## 2024-07-19 PROCEDURE — 25010000002 ONDANSETRON PER 1 MG: Performed by: NURSE PRACTITIONER

## 2024-07-19 RX ORDER — ONDANSETRON 2 MG/ML
4 INJECTION INTRAMUSCULAR; INTRAVENOUS EVERY 6 HOURS PRN
Status: DISCONTINUED | OUTPATIENT
Start: 2024-07-19 | End: 2024-07-19

## 2024-07-19 RX ORDER — SODIUM, POTASSIUM,MAG SULFATES 17.5-3.13G
1 SOLUTION, RECONSTITUTED, ORAL ORAL ONCE
Status: COMPLETED | OUTPATIENT
Start: 2024-07-19 | End: 2024-07-19

## 2024-07-19 RX ORDER — MAGNESIUM SULFATE HEPTAHYDRATE 40 MG/ML
2 INJECTION, SOLUTION INTRAVENOUS
Status: COMPLETED | OUTPATIENT
Start: 2024-07-19 | End: 2024-07-19

## 2024-07-19 RX ADMIN — MAGNESIUM SULFATE HEPTAHYDRATE 2 G: 40 INJECTION, SOLUTION INTRAVENOUS at 06:16

## 2024-07-19 RX ADMIN — SODIUM SULFATE, POTASSIUM SULFATE, MAGNESIUM SULFATE 1 BOTTLE: 1.6; 3.13; 17.5 SOLUTION ORAL at 17:33

## 2024-07-19 RX ADMIN — SODIUM SULFATE, POTASSIUM SULFATE, MAGNESIUM SULFATE 1 BOTTLE: 1.6; 3.13; 17.5 SOLUTION ORAL at 04:55

## 2024-07-19 RX ADMIN — LIDOCAINE 1 PATCH: 4 PATCH TOPICAL at 17:33

## 2024-07-19 RX ADMIN — HYDROCODONE BITARTRATE AND ACETAMINOPHEN 1 TABLET: 7.5; 325 TABLET ORAL at 12:35

## 2024-07-19 RX ADMIN — ACETAMINOPHEN 650 MG: 325 TABLET, FILM COATED ORAL at 10:50

## 2024-07-19 RX ADMIN — IPRATROPIUM BROMIDE AND ALBUTEROL SULFATE 3 ML: 2.5; .5 SOLUTION RESPIRATORY (INHALATION) at 19:50

## 2024-07-19 RX ADMIN — DOXYCYCLINE 100 MG: 100 INJECTION, POWDER, LYOPHILIZED, FOR SOLUTION INTRAVENOUS at 22:19

## 2024-07-19 RX ADMIN — PANTOPRAZOLE SODIUM 40 MG: 40 INJECTION, POWDER, LYOPHILIZED, FOR SOLUTION INTRAVENOUS at 22:17

## 2024-07-19 RX ADMIN — INSULIN LISPRO 2 UNITS: 100 INJECTION, SOLUTION INTRAVENOUS; SUBCUTANEOUS at 22:06

## 2024-07-19 RX ADMIN — ONDANSETRON 4 MG: 2 INJECTION INTRAMUSCULAR; INTRAVENOUS at 04:55

## 2024-07-19 RX ADMIN — DOXYCYCLINE 100 MG: 100 INJECTION, POWDER, LYOPHILIZED, FOR SOLUTION INTRAVENOUS at 09:17

## 2024-07-19 RX ADMIN — Medication 10 ML: at 22:20

## 2024-07-19 RX ADMIN — SODIUM CHLORIDE 125 MG: 9 INJECTION, SOLUTION INTRAVENOUS at 12:35

## 2024-07-19 RX ADMIN — METOPROLOL TARTRATE 25 MG: 25 TABLET, FILM COATED ORAL at 22:05

## 2024-07-19 RX ADMIN — ALLOPURINOL 300 MG: 300 TABLET ORAL at 10:50

## 2024-07-19 RX ADMIN — HYDROCODONE BITARTRATE AND ACETAMINOPHEN 1 TABLET: 7.5; 325 TABLET ORAL at 06:24

## 2024-07-19 RX ADMIN — HYDROCODONE BITARTRATE AND ACETAMINOPHEN 1 TABLET: 7.5; 325 TABLET ORAL at 18:51

## 2024-07-19 RX ADMIN — BUDESONIDE AND FORMOTEROL FUMARATE DIHYDRATE 1 PUFF: 160; 4.5 AEROSOL RESPIRATORY (INHALATION) at 19:50

## 2024-07-19 RX ADMIN — GABAPENTIN 100 MG: 100 CAPSULE ORAL at 22:05

## 2024-07-19 RX ADMIN — Medication 10 MG: at 22:05

## 2024-07-19 RX ADMIN — METOPROLOL TARTRATE 25 MG: 25 TABLET, FILM COATED ORAL at 10:50

## 2024-07-19 RX ADMIN — MAGNESIUM SULFATE HEPTAHYDRATE 2 G: 40 INJECTION, SOLUTION INTRAVENOUS at 10:15

## 2024-07-19 RX ADMIN — Medication 10 ML: at 09:17

## 2024-07-19 RX ADMIN — MAGNESIUM SULFATE HEPTAHYDRATE 2 G: 40 INJECTION, SOLUTION INTRAVENOUS at 08:15

## 2024-07-19 RX ADMIN — GABAPENTIN 100 MG: 100 CAPSULE ORAL at 10:50

## 2024-07-19 RX ADMIN — ACETAMINOPHEN 650 MG: 325 TABLET, FILM COATED ORAL at 04:55

## 2024-07-19 RX ADMIN — PANTOPRAZOLE SODIUM 40 MG: 40 INJECTION, POWDER, LYOPHILIZED, FOR SOLUTION INTRAVENOUS at 09:17

## 2024-07-19 NOTE — CASE MANAGEMENT/SOCIAL WORK
Continued Stay Note   [facility]     Patient Name: Charly Blevins  MRN: [id number]  Today's Date: 7/19/2024    Admit Date: 7/15/2024    Plan: IRF   Discharge Plan       Row Name 07/19/24 1607       Plan    Plan IRF    Patient/Family in Agreement with Plan yes    Plan Comments Discussed in MDR. Ms. Blevins is not being discharged today, 7/19/24. She is to have a coloscopy/EGD today. Doug, Liaison with [facility] is following for inpatient rehab. Ms. Blevins and her daughter are agreeable with rehab. CM will continue to follow.    Final Discharge Disposition Code 62 - inpatient rehab facility                   Discharge Codes    No documentation.                 Expected Discharge Date and Time       Expected Discharge Date Expected Discharge Time    Jul 20, 2024               Mey Triana RN

## 2024-07-20 ENCOUNTER — ANESTHESIA EVENT (OUTPATIENT)
Dept: GASTROENTEROLOGY | Facility: HOSPITAL | Age: 69
End: 2024-07-20
Payer: MEDICARE

## 2024-07-20 ENCOUNTER — ANESTHESIA (OUTPATIENT)
Dept: GASTROENTEROLOGY | Facility: HOSPITAL | Age: 69
End: 2024-07-20
Payer: MEDICARE

## 2024-07-20 LAB
ANION GAP SERPL CALCULATED.3IONS-SCNC: 8 MMOL/L (ref 5–15)
BACTERIA SPEC AEROBE CULT: NORMAL
BACTERIA SPEC AEROBE CULT: NORMAL
BUN SERPL-MCNC: 6 MG/DL (ref 8–23)
BUN/CREAT SERPL: 16.2 (ref 7–25)
CALCIUM SPEC-SCNC: 8.3 MG/DL (ref 8.6–10.5)
CHLORIDE SERPL-SCNC: 103 MMOL/L (ref 98–107)
CO2 SERPL-SCNC: 30 MMOL/L (ref 22–29)
CREAT SERPL-MCNC: 0.37 MG/DL (ref 0.57–1)
DEPRECATED RDW RBC AUTO: 67.5 FL (ref 37–54)
EGFRCR SERPLBLD CKD-EPI 2021: 109.3 ML/MIN/1.73
ERYTHROCYTE [DISTWIDTH] IN BLOOD BY AUTOMATED COUNT: 20.6 % (ref 12.3–15.4)
GLUCOSE BLDC GLUCOMTR-MCNC: 124 MG/DL (ref 70–130)
GLUCOSE BLDC GLUCOMTR-MCNC: 211 MG/DL (ref 70–130)
GLUCOSE BLDC GLUCOMTR-MCNC: 220 MG/DL (ref 70–130)
GLUCOSE SERPL-MCNC: 128 MG/DL (ref 65–99)
HCT VFR BLD AUTO: 34.1 % (ref 34–46.6)
HGB BLD-MCNC: 9.2 G/DL (ref 12–15.9)
MCH RBC QN AUTO: 25.3 PG (ref 26.6–33)
MCHC RBC AUTO-ENTMCNC: 27 G/DL (ref 31.5–35.7)
MCV RBC AUTO: 93.9 FL (ref 79–97)
PLATELET # BLD AUTO: 297 10*3/MM3 (ref 140–450)
PMV BLD AUTO: 9.4 FL (ref 6–12)
POTASSIUM SERPL-SCNC: 4 MMOL/L (ref 3.5–5.2)
RBC # BLD AUTO: 3.63 10*6/MM3 (ref 3.77–5.28)
SODIUM SERPL-SCNC: 141 MMOL/L (ref 136–145)
WBC NRBC COR # BLD AUTO: 7.77 10*3/MM3 (ref 3.4–10.8)

## 2024-07-20 PROCEDURE — 0DBL8ZX EXCISION OF TRANSVERSE COLON, VIA NATURAL OR ARTIFICIAL OPENING ENDOSCOPIC, DIAGNOSTIC: ICD-10-PCS | Performed by: INTERNAL MEDICINE

## 2024-07-20 PROCEDURE — 25010000002 HYDROMORPHONE PER 4 MG: Performed by: NURSE ANESTHETIST, CERTIFIED REGISTERED

## 2024-07-20 PROCEDURE — 0DB68ZX EXCISION OF STOMACH, VIA NATURAL OR ARTIFICIAL OPENING ENDOSCOPIC, DIAGNOSTIC: ICD-10-PCS | Performed by: INTERNAL MEDICINE

## 2024-07-20 PROCEDURE — 88305 TISSUE EXAM BY PATHOLOGIST: CPT | Performed by: INTERNAL MEDICINE

## 2024-07-20 PROCEDURE — 43239 EGD BIOPSY SINGLE/MULTIPLE: CPT | Performed by: INTERNAL MEDICINE

## 2024-07-20 PROCEDURE — 45385 COLONOSCOPY W/LESION REMOVAL: CPT | Performed by: INTERNAL MEDICINE

## 2024-07-20 PROCEDURE — 94799 UNLISTED PULMONARY SVC/PX: CPT

## 2024-07-20 PROCEDURE — 0DBF8ZX EXCISION OF RIGHT LARGE INTESTINE, VIA NATURAL OR ARTIFICIAL OPENING ENDOSCOPIC, DIAGNOSTIC: ICD-10-PCS | Performed by: INTERNAL MEDICINE

## 2024-07-20 PROCEDURE — 88342 IMHCHEM/IMCYTCHM 1ST ANTB: CPT | Performed by: INTERNAL MEDICINE

## 2024-07-20 PROCEDURE — 25010000002 PROPOFOL 10 MG/ML EMULSION: Performed by: NURSE ANESTHETIST, CERTIFIED REGISTERED

## 2024-07-20 PROCEDURE — 82948 REAGENT STRIP/BLOOD GLUCOSE: CPT

## 2024-07-20 PROCEDURE — 25810000003 LACTATED RINGERS PER 1000 ML: Performed by: NURSE ANESTHETIST, CERTIFIED REGISTERED

## 2024-07-20 PROCEDURE — 0DB98ZX EXCISION OF DUODENUM, VIA NATURAL OR ARTIFICIAL OPENING ENDOSCOPIC, DIAGNOSTIC: ICD-10-PCS | Performed by: INTERNAL MEDICINE

## 2024-07-20 PROCEDURE — 80048 BASIC METABOLIC PNL TOTAL CA: CPT | Performed by: INTERNAL MEDICINE

## 2024-07-20 PROCEDURE — 99232 SBSQ HOSP IP/OBS MODERATE 35: CPT | Performed by: STUDENT IN AN ORGANIZED HEALTH CARE EDUCATION/TRAINING PROGRAM

## 2024-07-20 PROCEDURE — 25010000002 ONDANSETRON PER 1 MG: Performed by: INTERNAL MEDICINE

## 2024-07-20 PROCEDURE — 63710000001 INSULIN LISPRO (HUMAN) PER 5 UNITS: Performed by: INTERNAL MEDICINE

## 2024-07-20 PROCEDURE — 0DBM8ZX EXCISION OF DESCENDING COLON, VIA NATURAL OR ARTIFICIAL OPENING ENDOSCOPIC, DIAGNOSTIC: ICD-10-PCS | Performed by: INTERNAL MEDICINE

## 2024-07-20 PROCEDURE — 85027 COMPLETE CBC AUTOMATED: CPT | Performed by: INTERNAL MEDICINE

## 2024-07-20 RX ORDER — IPRATROPIUM BROMIDE AND ALBUTEROL SULFATE 2.5; .5 MG/3ML; MG/3ML
3 SOLUTION RESPIRATORY (INHALATION) ONCE AS NEEDED
Status: DISCONTINUED | OUTPATIENT
Start: 2024-07-20 | End: 2024-07-20 | Stop reason: HOSPADM

## 2024-07-20 RX ORDER — PROPOFOL 10 MG/ML
VIAL (ML) INTRAVENOUS AS NEEDED
Status: DISCONTINUED | OUTPATIENT
Start: 2024-07-20 | End: 2024-07-20 | Stop reason: SURG

## 2024-07-20 RX ORDER — HYDRALAZINE HYDROCHLORIDE 20 MG/ML
5 INJECTION INTRAMUSCULAR; INTRAVENOUS
Status: DISCONTINUED | OUTPATIENT
Start: 2024-07-20 | End: 2024-07-20 | Stop reason: HOSPADM

## 2024-07-20 RX ORDER — SODIUM CHLORIDE 9 MG/ML
40 INJECTION, SOLUTION INTRAVENOUS AS NEEDED
Status: DISCONTINUED | OUTPATIENT
Start: 2024-07-20 | End: 2024-07-20 | Stop reason: HOSPADM

## 2024-07-20 RX ORDER — SODIUM CHLORIDE 0.9 % (FLUSH) 0.9 %
3-10 SYRINGE (ML) INJECTION AS NEEDED
Status: DISCONTINUED | OUTPATIENT
Start: 2024-07-20 | End: 2024-07-20 | Stop reason: HOSPADM

## 2024-07-20 RX ORDER — FAMOTIDINE 10 MG/ML
20 INJECTION, SOLUTION INTRAVENOUS ONCE
Status: DISCONTINUED | OUTPATIENT
Start: 2024-07-20 | End: 2024-07-20 | Stop reason: HOSPADM

## 2024-07-20 RX ORDER — LABETALOL HYDROCHLORIDE 5 MG/ML
5 INJECTION, SOLUTION INTRAVENOUS
Status: DISCONTINUED | OUTPATIENT
Start: 2024-07-20 | End: 2024-07-20 | Stop reason: HOSPADM

## 2024-07-20 RX ORDER — NALOXONE HCL 0.4 MG/ML
0.4 VIAL (ML) INJECTION AS NEEDED
Status: DISCONTINUED | OUTPATIENT
Start: 2024-07-20 | End: 2024-07-20 | Stop reason: HOSPADM

## 2024-07-20 RX ORDER — PROMETHAZINE HYDROCHLORIDE 25 MG/1
25 SUPPOSITORY RECTAL ONCE AS NEEDED
Status: DISCONTINUED | OUTPATIENT
Start: 2024-07-20 | End: 2024-07-20 | Stop reason: HOSPADM

## 2024-07-20 RX ORDER — DROPERIDOL 2.5 MG/ML
0.62 INJECTION, SOLUTION INTRAMUSCULAR; INTRAVENOUS ONCE AS NEEDED
Status: DISCONTINUED | OUTPATIENT
Start: 2024-07-20 | End: 2024-07-20 | Stop reason: HOSPADM

## 2024-07-20 RX ORDER — SODIUM CHLORIDE, SODIUM LACTATE, POTASSIUM CHLORIDE, CALCIUM CHLORIDE 600; 310; 30; 20 MG/100ML; MG/100ML; MG/100ML; MG/100ML
INJECTION, SOLUTION INTRAVENOUS CONTINUOUS PRN
Status: DISCONTINUED | OUTPATIENT
Start: 2024-07-20 | End: 2024-07-20 | Stop reason: SURG

## 2024-07-20 RX ORDER — SODIUM CHLORIDE 0.9 % (FLUSH) 0.9 %
3 SYRINGE (ML) INJECTION EVERY 12 HOURS SCHEDULED
Status: DISCONTINUED | OUTPATIENT
Start: 2024-07-20 | End: 2024-07-20 | Stop reason: HOSPADM

## 2024-07-20 RX ORDER — LIDOCAINE HYDROCHLORIDE 10 MG/ML
INJECTION, SOLUTION EPIDURAL; INFILTRATION; INTRACAUDAL; PERINEURAL AS NEEDED
Status: DISCONTINUED | OUTPATIENT
Start: 2024-07-20 | End: 2024-07-20 | Stop reason: SURG

## 2024-07-20 RX ORDER — ONDANSETRON 2 MG/ML
4 INJECTION INTRAMUSCULAR; INTRAVENOUS ONCE AS NEEDED
Status: DISCONTINUED | OUTPATIENT
Start: 2024-07-20 | End: 2024-07-20 | Stop reason: HOSPADM

## 2024-07-20 RX ORDER — LIDOCAINE HYDROCHLORIDE 10 MG/ML
0.5 INJECTION, SOLUTION EPIDURAL; INFILTRATION; INTRACAUDAL; PERINEURAL ONCE AS NEEDED
Status: DISCONTINUED | OUTPATIENT
Start: 2024-07-20 | End: 2024-07-20 | Stop reason: HOSPADM

## 2024-07-20 RX ORDER — SODIUM CHLORIDE, SODIUM LACTATE, POTASSIUM CHLORIDE, CALCIUM CHLORIDE 600; 310; 30; 20 MG/100ML; MG/100ML; MG/100ML; MG/100ML
9 INJECTION, SOLUTION INTRAVENOUS CONTINUOUS
Status: DISCONTINUED | OUTPATIENT
Start: 2024-07-20 | End: 2024-07-22 | Stop reason: HOSPADM

## 2024-07-20 RX ORDER — FENTANYL CITRATE 50 UG/ML
50 INJECTION, SOLUTION INTRAMUSCULAR; INTRAVENOUS
Status: DISCONTINUED | OUTPATIENT
Start: 2024-07-20 | End: 2024-07-20 | Stop reason: HOSPADM

## 2024-07-20 RX ORDER — PROMETHAZINE HYDROCHLORIDE 25 MG/1
25 TABLET ORAL ONCE AS NEEDED
Status: DISCONTINUED | OUTPATIENT
Start: 2024-07-20 | End: 2024-07-20 | Stop reason: HOSPADM

## 2024-07-20 RX ORDER — MIDAZOLAM HYDROCHLORIDE 1 MG/ML
0.5 INJECTION INTRAMUSCULAR; INTRAVENOUS
Status: DISCONTINUED | OUTPATIENT
Start: 2024-07-20 | End: 2024-07-20 | Stop reason: HOSPADM

## 2024-07-20 RX ORDER — SODIUM CHLORIDE 0.9 % (FLUSH) 0.9 %
10 SYRINGE (ML) INJECTION AS NEEDED
Status: DISCONTINUED | OUTPATIENT
Start: 2024-07-20 | End: 2024-07-20 | Stop reason: HOSPADM

## 2024-07-20 RX ORDER — EPHEDRINE SULFATE 50 MG/ML
INJECTION, SOLUTION INTRAVENOUS AS NEEDED
Status: DISCONTINUED | OUTPATIENT
Start: 2024-07-20 | End: 2024-07-20 | Stop reason: SURG

## 2024-07-20 RX ORDER — MEPERIDINE HYDROCHLORIDE 50 MG/ML
12.5 INJECTION INTRAMUSCULAR; INTRAVENOUS; SUBCUTANEOUS
Status: DISCONTINUED | OUTPATIENT
Start: 2024-07-20 | End: 2024-07-20 | Stop reason: HOSPADM

## 2024-07-20 RX ORDER — HYDROCODONE BITARTRATE AND ACETAMINOPHEN 5; 325 MG/1; MG/1
1 TABLET ORAL ONCE AS NEEDED
Status: DISCONTINUED | OUTPATIENT
Start: 2024-07-20 | End: 2024-07-20 | Stop reason: HOSPADM

## 2024-07-20 RX ORDER — FAMOTIDINE 20 MG/1
20 TABLET, FILM COATED ORAL ONCE
Status: DISCONTINUED | OUTPATIENT
Start: 2024-07-20 | End: 2024-07-20 | Stop reason: HOSPADM

## 2024-07-20 RX ORDER — HYDROMORPHONE HYDROCHLORIDE 1 MG/ML
0.5 INJECTION, SOLUTION INTRAMUSCULAR; INTRAVENOUS; SUBCUTANEOUS
Status: DISCONTINUED | OUTPATIENT
Start: 2024-07-20 | End: 2024-07-20 | Stop reason: HOSPADM

## 2024-07-20 RX ORDER — DROPERIDOL 2.5 MG/ML
0.62 INJECTION, SOLUTION INTRAMUSCULAR; INTRAVENOUS
Status: DISCONTINUED | OUTPATIENT
Start: 2024-07-20 | End: 2024-07-20 | Stop reason: HOSPADM

## 2024-07-20 RX ORDER — BUPIVACAINE HCL/0.9 % NACL/PF 0.125 %
PLASTIC BAG, INJECTION (ML) EPIDURAL AS NEEDED
Status: DISCONTINUED | OUTPATIENT
Start: 2024-07-20 | End: 2024-07-20 | Stop reason: SURG

## 2024-07-20 RX ORDER — SODIUM CHLORIDE 0.9 % (FLUSH) 0.9 %
10 SYRINGE (ML) INJECTION EVERY 12 HOURS SCHEDULED
Status: DISCONTINUED | OUTPATIENT
Start: 2024-07-20 | End: 2024-07-20 | Stop reason: HOSPADM

## 2024-07-20 RX ADMIN — Medication 10 MG: at 22:53

## 2024-07-20 RX ADMIN — IPRATROPIUM BROMIDE AND ALBUTEROL SULFATE 3 ML: 2.5; .5 SOLUTION RESPIRATORY (INHALATION) at 12:53

## 2024-07-20 RX ADMIN — HYDROMORPHONE HYDROCHLORIDE 0.5 MG: 1 INJECTION, SOLUTION INTRAMUSCULAR; INTRAVENOUS; SUBCUTANEOUS at 09:13

## 2024-07-20 RX ADMIN — PROPOFOL 125 MCG/KG/MIN: 10 INJECTION, EMULSION INTRAVENOUS at 08:16

## 2024-07-20 RX ADMIN — DOXYCYCLINE 100 MG: 100 INJECTION, POWDER, LYOPHILIZED, FOR SOLUTION INTRAVENOUS at 09:51

## 2024-07-20 RX ADMIN — EPHEDRINE SULFATE 10 MG: 50 INJECTION INTRAVENOUS at 08:21

## 2024-07-20 RX ADMIN — BUDESONIDE AND FORMOTEROL FUMARATE DIHYDRATE 1 PUFF: 160; 4.5 AEROSOL RESPIRATORY (INHALATION) at 20:09

## 2024-07-20 RX ADMIN — ONDANSETRON 4 MG: 2 INJECTION INTRAMUSCULAR; INTRAVENOUS at 23:01

## 2024-07-20 RX ADMIN — INSULIN LISPRO 3 UNITS: 100 INJECTION, SOLUTION INTRAVENOUS; SUBCUTANEOUS at 12:02

## 2024-07-20 RX ADMIN — SODIUM CHLORIDE, POTASSIUM CHLORIDE, SODIUM LACTATE AND CALCIUM CHLORIDE: 600; 310; 30; 20 INJECTION, SOLUTION INTRAVENOUS at 08:04

## 2024-07-20 RX ADMIN — HYDROCODONE BITARTRATE AND ACETAMINOPHEN 1 TABLET: 7.5; 325 TABLET ORAL at 16:36

## 2024-07-20 RX ADMIN — PROPOFOL 100 MG: 10 INJECTION, EMULSION INTRAVENOUS at 08:15

## 2024-07-20 RX ADMIN — ALLOPURINOL 300 MG: 300 TABLET ORAL at 09:50

## 2024-07-20 RX ADMIN — EPHEDRINE SULFATE 10 MG: 50 INJECTION INTRAVENOUS at 08:31

## 2024-07-20 RX ADMIN — METOPROLOL TARTRATE 25 MG: 25 TABLET, FILM COATED ORAL at 22:53

## 2024-07-20 RX ADMIN — LEVOTHYROXINE SODIUM 125 MCG: 125 TABLET ORAL at 09:50

## 2024-07-20 RX ADMIN — LIDOCAINE HYDROCHLORIDE 100 MG: 10 SOLUTION INTRAVENOUS at 08:15

## 2024-07-20 RX ADMIN — Medication 100 MCG: at 08:33

## 2024-07-20 RX ADMIN — IPRATROPIUM BROMIDE AND ALBUTEROL SULFATE 3 ML: 2.5; .5 SOLUTION RESPIRATORY (INHALATION) at 20:09

## 2024-07-20 RX ADMIN — EPHEDRINE SULFATE 5 MG: 50 INJECTION INTRAVENOUS at 08:18

## 2024-07-20 RX ADMIN — METOPROLOL TARTRATE 25 MG: 25 TABLET, FILM COATED ORAL at 09:51

## 2024-07-20 RX ADMIN — HYDROCODONE BITARTRATE AND ACETAMINOPHEN 1 TABLET: 7.5; 325 TABLET ORAL at 09:50

## 2024-07-20 RX ADMIN — Medication 100 MCG: at 08:45

## 2024-07-20 RX ADMIN — EPHEDRINE SULFATE 10 MG: 50 INJECTION INTRAVENOUS at 08:29

## 2024-07-20 RX ADMIN — GABAPENTIN 100 MG: 100 CAPSULE ORAL at 22:53

## 2024-07-20 RX ADMIN — Medication 10 ML: at 23:05

## 2024-07-20 RX ADMIN — DOXYCYCLINE 100 MG: 100 INJECTION, POWDER, LYOPHILIZED, FOR SOLUTION INTRAVENOUS at 23:04

## 2024-07-20 RX ADMIN — INSULIN LISPRO 3 UNITS: 100 INJECTION, SOLUTION INTRAVENOUS; SUBCUTANEOUS at 17:30

## 2024-07-20 RX ADMIN — HYDROCODONE BITARTRATE AND ACETAMINOPHEN 1 TABLET: 7.5; 325 TABLET ORAL at 00:47

## 2024-07-20 RX ADMIN — HYDROCODONE BITARTRATE AND ACETAMINOPHEN 1 TABLET: 7.5; 325 TABLET ORAL at 22:53

## 2024-07-20 RX ADMIN — Medication 200 MCG: at 08:39

## 2024-07-20 RX ADMIN — GABAPENTIN 100 MG: 100 CAPSULE ORAL at 09:50

## 2024-07-20 RX ADMIN — LIDOCAINE 1 PATCH: 4 PATCH TOPICAL at 17:30

## 2024-07-20 NOTE — PLAN OF CARE
Problem: Adult Inpatient Plan of Care  Goal: Readiness for Transition of Care  Outcome: Ongoing, Progressing   Goal Outcome Evaluation:

## 2024-07-20 NOTE — ANESTHESIA POSTPROCEDURE EVALUATION
Patient: Charly Blevins    Procedure Summary       Date: 07/20/24 Room / Location:  [facility]    Anesthesia Start: 0810 Anesthesia Stop: 0905    Procedures:       ESOPHAGOGASTRODUODENOSCOPY      COLONOSCOPY Diagnosis:     Surgeons: Edwin Wilson MD Provider: Terrence Huddleston MD    Anesthesia Type: general ASA Status: 4            Anesthesia Type: general    Vitals  Vitals Value Taken Time   BP     Temp     Pulse 67 07/20/24 0904   Resp     SpO2     Vitals shown include unfiled device data.        Post Anesthesia Care and Evaluation    Patient location during evaluation: PACU  Patient participation: complete - patient participated  Level of consciousness: awake and alert  Pain management: adequate    Airway patency: patent  Anesthetic complications: No anesthetic complications  PONV Status: none  Cardiovascular status: hemodynamically stable and acceptable  Respiratory status: nonlabored ventilation, acceptable and nasal cannula  Hydration status: acceptable

## 2024-07-20 NOTE — PROGRESS NOTES
[facility] Medicine Services  PROGRESS NOTE    Patient Name: Charly Blevins  : 1955  MRN: [id number]    Date of Admission: 7/15/2024  Primary Care Physician: Ptael Evans MD    Subjective   Subjective     CC:  Confusion, shortness of breath     HPI:  Patient seen post endoscopy.  She states that she tolerated the procedure well and feels well afterwards.  She denies any nausea, admits only mild abdominal discomfort.      Objective   Objective     Vital Signs:   Temp:  [97.6 °F (36.4 °C)-97.9 °F (36.6 °C)] 97.6 °F (36.4 °C)  Heart Rate:  [55-75] 66  Resp:  [16-20] 18  BP: ()/(56-93) 137/85  Flow (L/min):  [2-4] 2     Physical Exam:  General appearance: alert, awake, oriented, no acute distress   Cardiovascular: Irregularly irregular, no murmurs or rubs, radial pulse full 2/4 BL   Respiratory: CTAB, no crackles or wheezes on 2L NC  Abdomen: soft, epigastric/LUQ TTP, no organomegaly, bowel sounds normoactive    Neuro/CNS: alert and oriented x3, normal speech    Results Reviewed:  LAB RESULTS:      Lab 24  1110 24  1839 24  0439 24  2106 24  0531 24  2106 24  0724 24  1038 24  0516 07/15/24  1639 07/15/24  1447   WBC 7.77  --   --  8.78  --  8.24  --  9.64  --  10.50  --  11.31*   HEMOGLOBIN 9.2* 9.2* 9.2* 8.5* 8.7* 7.7*   < > 8.7*   < > 9.7*  --  7.6*   HEMATOCRIT 34.1 32.7* 32.7* 30.7* 31.0* 27.8*   < > 29.9*   < > 33.2*  --  27.0*   PLATELETS 297  --   --  317  --  297  --  292  --  367  --  373   NEUTROS ABS  --   --   --   --   --   --   --   --   --  8.11*  --  9.25*   IMMATURE GRANS (ABS)  --   --   --   --   --   --   --   --   --  0.07*  --  0.08*   LYMPHS ABS  --   --   --   --   --   --   --   --   --  1.04  --  0.98   MONOS ABS  --   --   --   --   --   --   --   --   --  1.08*  --  0.94*   EOS ABS  --   --   --   --   --   --   --   --   --  0.19  --  0.04   MCV 93.9  --   --  90.0  --   89.4  --  86.2  --  84.1  --  84.1   SED RATE  --   --   --   --   --   --   --   --   --   --   --  31*   CRP  --   --   --   --   --   --   --   --   --   --  14.75*  --    PROCALCITONIN  --   --   --   --   --   --   --   --   --   --   --  12.49*   LACTATE  --   --   --   --   --   --   --   --   --   --   --  1.7    < > = values in this interval not displayed.         Lab 07/20/24  1110 07/19/24  0439 07/18/24  0531 07/17/24  0724 07/16/24  1544 07/16/24  0516 07/15/24  1447   SODIUM 141 144 141 138  --  143 139   POTASSIUM 4.0 4.2 4.3 4.9 3.3* 3.0* 3.6   CHLORIDE 103 105 103 100  --  100 100   CO2 30.0* 34.0* 32.0* 30.0*  --  33.0* 29.0   ANION GAP 8.0 5.0 6.0 8.0  --  10.0 10.0   BUN 6* 6* 10 9  --  10 16   CREATININE 0.37* 0.43* 0.40* 0.66  --  0.62 0.68   EGFR 109.3 105.4 107.3 95.1  --  96.5 94.4   GLUCOSE 128* 86 120* 107*  --  96 174*   CALCIUM 8.3* 7.5* 7.7* 7.5*  --  7.1* 7.7*   MAGNESIUM  --  1.5* 1.7 2.0  --  1.3* 1.8   HEMOGLOBIN A1C  --   --   --   --   --   --  6.10*   TSH  --   --   --   --   --  1.890  --          Lab 07/16/24  0516 07/15/24  1447   TOTAL PROTEIN 5.8* 5.1*   ALBUMIN 2.6* 2.7*   GLOBULIN 3.2 2.4   ALT (SGPT) 12 12   AST (SGOT) 21 18   BILIRUBIN 1.2 0.5   ALK PHOS 64 71         Lab 07/15/24  1639 07/15/24  1447   PROBNP  --  8,823.0*   HSTROP T 52* 61*             Lab 07/17/24  0724 07/15/24  1639   IRON 23*  --    IRON SATURATION (TSAT) 10*  --    TIBC 228*  --    TRANSFERRIN 153*  --    ABO TYPING  --  A   RH TYPING  --  Positive   ANTIBODY SCREEN  --  Negative         Lab 07/16/24  0513 07/15/24  1511   PH, ARTERIAL 7.590* 7.486*   PCO2, ARTERIAL 34.2* 39.5   PO2 .0* 65.9*   FIO2 45 44   HCO3 ART 32.8* 29.8*   BASE EXCESS ART 10.5* 5.9*   CARBOXYHEMOGLOBIN 1.7 1.7     Brief Urine Lab Results  (Last result in the past 365 days)        Color   Clarity   Blood   Leuk Est   Nitrite   Protein   CREAT   Urine HCG        07/15/24 1440 Yellow   Clear   Negative   Negative    Negative   Negative                   Microbiology Results Abnormal       Procedure Component Value - Date/Time    Blood Culture - Blood, Wrist, Left [[id number]]  (Normal) Collected: 07/15/24 1526    Lab Status: Preliminary result Specimen: Blood from Wrist, Left Updated: 07/19/24 1545     Blood Culture No growth at 4 days    Narrative:      Less than seven (7) mL's of blood was collected.  Insufficient quantity may yield false negative results.    Blood Culture - Blood, Arm, Right [[id number]]  (Normal) Collected: 07/15/24 1447    Lab Status: Preliminary result Specimen: Blood from Arm, Right Updated: 07/19/24 1520     Blood Culture No growth at 4 days    Narrative:      Less than seven (7) mL's of blood was collected.  Insufficient quantity may yield false negative results.    Respiratory Panel PCR w/COVID-19(SARS-CoV-2) GEOVANI/ROSALIE/HÉCTOR/PAD/COR/EDISON In-House, NP Swab in UTM/VTM, 2 HR TAT - Swab, Nasopharynx [[id number]]  (Normal) Collected: 07/16/24 1038    Lab Status: Final result Specimen: Swab from Nasopharynx Updated: 07/16/24 1148     ADENOVIRUS, PCR Not Detected     Coronavirus 229E Not Detected     Coronavirus HKU1 Not Detected     Coronavirus NL63 Not Detected     Coronavirus OC43 Not Detected     COVID19 Not Detected     Human Metapneumovirus Not Detected     Human Rhinovirus/Enterovirus Not Detected     Influenza A PCR Not Detected     Influenza B PCR Not Detected     Parainfluenza Virus 1 Not Detected     Parainfluenza Virus 2 Not Detected     Parainfluenza Virus 3 Not Detected     Parainfluenza Virus 4 Not Detected     RSV, PCR Not Detected     Bordetella pertussis pcr Not Detected     Bordetella parapertussis PCR Not Detected     Chlamydophila pneumoniae PCR Not Detected     Mycoplasma pneumo by PCR Not Detected    Narrative:      In the setting of a positive respiratory panel with a viral infection PLUS a negative procalcitonin without other underlying concern for bacterial infection, consider observing off  antibiotics or discontinuation of antibiotics and continue supportive care. If the respiratory panel is positive for atypical bacterial infection (Bordetella pertussis, Chlamydophila pneumoniae, or Mycoplasma pneumoniae), consider antibiotic de-escalation to target atypical bacterial infection.    MRSA Screen, PCR (Inpatient) - Swab, Nares [[id number]]  (Normal) Collected: 07/15/24 1854    Lab Status: Final result Specimen: Swab from Nares Updated: 07/15/24 2010     MRSA PCR Negative    Narrative:      The negative predictive value of this diagnostic test is high and should only be used to consider de-escalating anti-MRSA therapy. A positive result may indicate colonization with MRSA and must be correlated clinically.  MRSA Negative            No radiology results from the last 24 hrs    Results for orders placed during the hospital encounter of 07/15/24    Adult Transthoracic Echo Complete W/ Cont if Necessary Per Protocol    Interpretation Summary    Left ventricular ejection fraction appears to be 56 - 60%.    Left ventricular diastolic function was normal.    The right ventricular cavity is borderline dilated.    The left atrial cavity is dilated.      Current medications:  Scheduled Meds:allopurinol, 300 mg, Oral, Daily  budesonide-formoterol, 1 puff, Inhalation, BID - RT  doxycycline, 100 mg, Intravenous, BID  gabapentin, 100 mg, Oral, Q12H  HYDROmorphone, , ,   insulin lispro, 2-7 Units, Subcutaneous, 4x Daily AC & at Bedtime  ipratropium-albuterol, 3 mL, Nebulization, Q6H While Awake - RT  levothyroxine, 125 mcg, Oral, QAM  Lidocaine, 1 patch, Transdermal, Q24H  melatonin, 10 mg, Oral, Nightly  metoprolol tartrate, 25 mg, Oral, BID  sodium chloride, 10 mL, Intravenous, Q12H      Continuous Infusions:lactated ringers, 9 mL/hr      PRN Meds:.  acetaminophen **OR** acetaminophen **OR** acetaminophen    senna-docusate sodium **AND** polyethylene glycol **AND** bisacodyl **AND** bisacodyl    dextrose    dextrose     glucagon (human recombinant)    haloperidol lactate    HYDROcodone-acetaminophen    HYDROmorphone    Magnesium Cardiology Dose Replacement - Follow Nurse / BPA Driven Protocol    midazolam    nitroglycerin    ondansetron    Potassium Replacement - Follow Nurse / BPA Driven Protocol    sodium chloride    sodium chloride    sodium chloride    temazepam    Assessment & Plan   Assessment & Plan     Active Hospital Problems    Diagnosis  POA    **Symptomatic anemia [D64.9]  Yes    Moderate malnutrition [E44.0]  Yes    Acute respiratory failure with hypoxia [J96.01]  Unknown    Acute on chronic heart failure with preserved ejection fraction [I50.33]  Unknown    Gout [M10.9]  Unknown    Mixed hyperlipidemia [E78.2]  Unknown    Gastrointestinal hemorrhage [K92.2]  Unknown    Hypothyroidism (acquired) [E03.9]  Yes    A-fib [I48.91]  Yes    Type 2 diabetes mellitus, without long-term current use of insulin [E11.9]  Yes    Primary hypertension [I10]  Yes    Rheumatoid arthritis involving multiple sites [M06.9]  Yes      Resolved Hospital Problems   No resolved problems to display.        This patient's assessments and plans were partially entered by my partner and updated as appropriate by me on 7/20/2024    Brief Hospital Course to date:  Charly Blevins is a 68 y.o. female w/ past medical history of atrial fibrillation on Eliquis, hypertension, type 2 diabetes mellitus, RA, hypothyroidism, was admitted for acute on chronic hypoxic respiratory failure after presenting with progressive fatigue and shortness of breath.  She had fallen on 7/13 and subsequently developed confusion later that evening.  Imaging revealed right pectoralis hematoma.  Patient was found to suffer from acute diastolic heart failure and atrial fibrillation with RVR resulting in her hypoxia. Cardiology was consulted, patient was treated with digoxin and metoprolol with improvement in rate control.  She was also diuresed with improvement in respiratory  symptoms.  Her hospital course was complicated by the development of anemia with melenic stool and positive stool occult.  GI was consulted and performed upper and lower endoscopies on 7/20.     Acute on chronic hypoxic resp failure, resolved  Acute HFpEF   Chronic 3L o2 dependence (unclear etiology)  Afib w/ rvr  Elevated troponin   Right chest wall pectoralis hematoma, traumatic   -Weaned to baseline O2 after diuresis   -Rate controlled after digoxin   -Cardiology followed: continue metoprolol 25mg po bid, holding eliquis, follow up w/ cards as outpatient for consideration of watchman if unable to anticoagulate  -Restart Eliquis in AM      Acute symptomatic anemia  Iron def  Gi bleed (dark stool, guaic +)  S/p EGD and Colonoscopy s/p polypectomy   N/V/D, resolved  -ct c/a/p without overt source infection  **s/p 1 unit prbc (hgb 7.6 with symptoms of hypotension, dyspnea in setting of hypotension and afib rvr)  -serial hgb stable  -Completed 3 days of Ferrlecit   -GI consulted  -Colonoscopy revealed hemorrhoids, polyps in the ascending, transverse and descending colon which were all resected, diverticulosis  -EGD w gastritis   -Restart Eliquis in a.m., advance diet, repeat colonoscopy in 3 years, pathology pending     Leukocytosis  Elevated procal  -unclear etiology, perhaps possible atypical pneumonia?  -ct c/a/p without overt source infection  -u/a benign  -resp mrsa pcr negative  -stopped vanc & zosyn  -doxycycline day #5/5 empiric rx     RLE edema  -venous duplex negative RLE for dvt     Encephalopathy, resolved  -ct head negative  -u/a benign  -likely was due to sleep deprivation, acute illness  -continue melatonin, nightly restoril     Chronic back pain  -on lortab  -heating pad     Hypokalemia and hypomagnesemia  -replace per protocol     Dm2   -A1c 6.1  -ssi     Hypothyroidism  -tsh wnl1.8; continue levothyroxine     Hx RA    Expected Discharge Location and Transportation: [facility]  Expected Discharge   Expected  Discharge Date: 7/22/2024; Expected Discharge Time:      VTE Prophylaxis:  Mechanical VTE prophylaxis orders are present.         AM-PAC 6 Clicks Score (PT): 16 (07/20/24 3830)    CODE STATUS:   Code Status and Medical Interventions:   Ordered at: 07/15/24 6921     Level Of Support Discussed With:    Patient     Code Status (Patient has no pulse and is not breathing):    CPR (Attempt to Resuscitate)     Medical Interventions (Patient has pulse or is breathing):    Full Support       Florian Perez,   07/20/24

## 2024-07-20 NOTE — ANESTHESIA PREPROCEDURE EVALUATION
Anesthesia Evaluation     Patient summary reviewed and Nursing notes reviewed   no history of anesthetic complications:   NPO Solid Status: > 8 hours  NPO Liquid Status: > 2 hours           Airway   Mallampati: II  TM distance: >3 FB  Neck ROM: full  No difficulty expected  Dental    (+) upper dentures, lower dentures and edentulous    Pulmonary - normal exam    breath sounds clear to auscultation  (+) ,home oxygen (Prescribed home O2 but patient reports never wearing it because she doesn't get short of breath to feel like she needs it), sleep apnea (Noncompliant)  Cardiovascular   Exercise tolerance: poor (<4 METS)    Patient on routine beta blocker  Rhythm: irregular  Rate: normal    (+) hypertension, dysrhythmias Atrial Fib, CHF Diastolic >=55%, hyperlipidemia    ROS comment: ECHO 7/16/24: LV EF 56-60%, normal diastolic function    Neuro/Psych  (+) psychiatric history Anxiety and Depression  GI/Hepatic/Renal/Endo    (+) obesity, GERD, GI bleeding , diabetes mellitus (a1c 6.1%) using insulin, thyroid problem (s/p thyroidectomy) hypothyroidism    Musculoskeletal     (+) back pain (chronic lbp)      ROS comment: Gout  Abdominal    Substance History - negative use     OB/GYN          Other   autoimmune disease (On methotrexate, prednisone) rheumatoid arthritis, blood dyscrasia anemia,     ROS/Med Hx Other: Eliquis    Hgb 9.2  K 4.2                Anesthesia Plan    ASA 4     general   total IV anesthesia  intravenous induction     Anesthetic plan, risks, benefits, and alternatives have been provided, discussed and informed consent has been obtained with: patient.    Plan discussed with CRNA.      CODE STATUS:    Level Of Support Discussed With: Patient  Code Status (Patient has no pulse and is not breathing): CPR (Attempt to Resuscitate)  Medical Interventions (Patient has pulse or is breathing): Full Support

## 2024-07-21 LAB
ANION GAP SERPL CALCULATED.3IONS-SCNC: 5 MMOL/L (ref 5–15)
BUN SERPL-MCNC: 7 MG/DL (ref 8–23)
BUN/CREAT SERPL: 11.5 (ref 7–25)
CALCIUM SPEC-SCNC: 7.7 MG/DL (ref 8.6–10.5)
CHLORIDE SERPL-SCNC: 102 MMOL/L (ref 98–107)
CO2 SERPL-SCNC: 33 MMOL/L (ref 22–29)
CREAT SERPL-MCNC: 0.61 MG/DL (ref 0.57–1)
DEPRECATED RDW RBC AUTO: 65 FL (ref 37–54)
EGFRCR SERPLBLD CKD-EPI 2021: 96.9 ML/MIN/1.73
ERYTHROCYTE [DISTWIDTH] IN BLOOD BY AUTOMATED COUNT: 20.5 % (ref 12.3–15.4)
GLUCOSE BLDC GLUCOMTR-MCNC: 126 MG/DL (ref 70–130)
GLUCOSE BLDC GLUCOMTR-MCNC: 137 MG/DL (ref 70–130)
GLUCOSE BLDC GLUCOMTR-MCNC: 145 MG/DL (ref 70–130)
GLUCOSE BLDC GLUCOMTR-MCNC: 226 MG/DL (ref 70–130)
GLUCOSE SERPL-MCNC: 150 MG/DL (ref 65–99)
HCT VFR BLD AUTO: 29.7 % (ref 34–46.6)
HGB BLD-MCNC: 8.3 G/DL (ref 12–15.9)
MCH RBC QN AUTO: 25.1 PG (ref 26.6–33)
MCHC RBC AUTO-ENTMCNC: 27.9 G/DL (ref 31.5–35.7)
MCV RBC AUTO: 89.7 FL (ref 79–97)
PLATELET # BLD AUTO: 291 10*3/MM3 (ref 140–450)
PMV BLD AUTO: 9.5 FL (ref 6–12)
POTASSIUM SERPL-SCNC: 4.2 MMOL/L (ref 3.5–5.2)
RBC # BLD AUTO: 3.31 10*6/MM3 (ref 3.77–5.28)
SODIUM SERPL-SCNC: 140 MMOL/L (ref 136–145)
WBC NRBC COR # BLD AUTO: 8.82 10*3/MM3 (ref 3.4–10.8)

## 2024-07-21 PROCEDURE — 63710000001 INSULIN LISPRO (HUMAN) PER 5 UNITS: Performed by: INTERNAL MEDICINE

## 2024-07-21 PROCEDURE — 94799 UNLISTED PULMONARY SVC/PX: CPT

## 2024-07-21 PROCEDURE — 97116 GAIT TRAINING THERAPY: CPT

## 2024-07-21 PROCEDURE — 94664 DEMO&/EVAL PT USE INHALER: CPT

## 2024-07-21 PROCEDURE — 82948 REAGENT STRIP/BLOOD GLUCOSE: CPT

## 2024-07-21 PROCEDURE — 85027 COMPLETE CBC AUTOMATED: CPT | Performed by: STUDENT IN AN ORGANIZED HEALTH CARE EDUCATION/TRAINING PROGRAM

## 2024-07-21 PROCEDURE — 80048 BASIC METABOLIC PNL TOTAL CA: CPT | Performed by: STUDENT IN AN ORGANIZED HEALTH CARE EDUCATION/TRAINING PROGRAM

## 2024-07-21 PROCEDURE — 99232 SBSQ HOSP IP/OBS MODERATE 35: CPT | Performed by: STUDENT IN AN ORGANIZED HEALTH CARE EDUCATION/TRAINING PROGRAM

## 2024-07-21 RX ORDER — SUMATRIPTAN 50 MG/1
50 TABLET, FILM COATED ORAL ONCE
Status: COMPLETED | OUTPATIENT
Start: 2024-07-21 | End: 2024-07-21

## 2024-07-21 RX ORDER — HYDROXYZINE HYDROCHLORIDE 25 MG/1
25 TABLET, FILM COATED ORAL 3 TIMES DAILY PRN
Status: DISCONTINUED | OUTPATIENT
Start: 2024-07-21 | End: 2024-07-22

## 2024-07-21 RX ADMIN — SUMATRIPTAN SUCCINATE 50 MG: 50 TABLET ORAL at 12:13

## 2024-07-21 RX ADMIN — BUDESONIDE AND FORMOTEROL FUMARATE DIHYDRATE 1 PUFF: 160; 4.5 AEROSOL RESPIRATORY (INHALATION) at 19:18

## 2024-07-21 RX ADMIN — ACETAMINOPHEN 650 MG: 325 TABLET, FILM COATED ORAL at 10:10

## 2024-07-21 RX ADMIN — Medication 10 MG: at 20:52

## 2024-07-21 RX ADMIN — HYDROCODONE BITARTRATE AND ACETAMINOPHEN 1 TABLET: 7.5; 325 TABLET ORAL at 12:16

## 2024-07-21 RX ADMIN — ALLOPURINOL 300 MG: 300 TABLET ORAL at 10:12

## 2024-07-21 RX ADMIN — Medication 10 ML: at 10:14

## 2024-07-21 RX ADMIN — METOPROLOL TARTRATE 25 MG: 25 TABLET, FILM COATED ORAL at 10:12

## 2024-07-21 RX ADMIN — APIXABAN 5 MG: 5 TABLET, FILM COATED ORAL at 20:53

## 2024-07-21 RX ADMIN — METOPROLOL TARTRATE 25 MG: 25 TABLET, FILM COATED ORAL at 20:53

## 2024-07-21 RX ADMIN — IPRATROPIUM BROMIDE AND ALBUTEROL SULFATE 3 ML: 2.5; .5 SOLUTION RESPIRATORY (INHALATION) at 07:13

## 2024-07-21 RX ADMIN — HYDROXYZINE HYDROCHLORIDE 25 MG: 25 TABLET ORAL at 10:10

## 2024-07-21 RX ADMIN — HYDROCODONE BITARTRATE AND ACETAMINOPHEN 1 TABLET: 7.5; 325 TABLET ORAL at 18:41

## 2024-07-21 RX ADMIN — GABAPENTIN 100 MG: 100 CAPSULE ORAL at 10:12

## 2024-07-21 RX ADMIN — BUDESONIDE AND FORMOTEROL FUMARATE DIHYDRATE 1 PUFF: 160; 4.5 AEROSOL RESPIRATORY (INHALATION) at 07:14

## 2024-07-21 RX ADMIN — LEVOTHYROXINE SODIUM 125 MCG: 125 TABLET ORAL at 10:12

## 2024-07-21 RX ADMIN — HYDROCODONE BITARTRATE AND ACETAMINOPHEN 1 TABLET: 7.5; 325 TABLET ORAL at 05:01

## 2024-07-21 RX ADMIN — HYDROXYZINE HYDROCHLORIDE 25 MG: 25 TABLET ORAL at 20:53

## 2024-07-21 RX ADMIN — IPRATROPIUM BROMIDE AND ALBUTEROL SULFATE 3 ML: 2.5; .5 SOLUTION RESPIRATORY (INHALATION) at 19:18

## 2024-07-21 RX ADMIN — INSULIN LISPRO 3 UNITS: 100 INJECTION, SOLUTION INTRAVENOUS; SUBCUTANEOUS at 20:53

## 2024-07-21 RX ADMIN — IPRATROPIUM BROMIDE AND ALBUTEROL SULFATE 3 ML: 2.5; .5 SOLUTION RESPIRATORY (INHALATION) at 12:51

## 2024-07-21 RX ADMIN — GABAPENTIN 100 MG: 100 CAPSULE ORAL at 20:53

## 2024-07-21 RX ADMIN — APIXABAN 5 MG: 5 TABLET, FILM COATED ORAL at 10:12

## 2024-07-21 RX ADMIN — Medication 10 ML: at 20:54

## 2024-07-21 NOTE — THERAPY TREATMENT NOTE
Patient Name: Charly Blevins  : 1955    MRN: [id number]                              Today's Date: 2024       Admit Date: 7/15/2024    Visit Dx:     ICD-10-CM ICD-9-CM   1. Acute on chronic respiratory failure with hypoxia  J96.21 518.84     799.02   2. Acute on chronic congestive heart failure, unspecified heart failure type  I50.9 428.0   3. Multiple falls  R29.6 V15.88   4. Anemia, unspecified type  D64.9 285.9   5. Gastrointestinal hemorrhage, unspecified gastrointestinal hemorrhage type  K92.2 578.9   6. Melena  K92.1 578.1     Patient Active Problem List   Diagnosis    Closed intertrochanteric fracture of hip, right, initial encounter    Pubic ramus fracture    Primary hypertension    Dyslipidemia    Type 2 diabetes mellitus, without long-term current use of insulin    Rheumatoid arthritis involving multiple sites    A-fib    Acute hypoxic respiratory failure    On chronic immunosuppressive therapy    GERD    Obesity (BMI 30-39.9)    Hypothyroidism (acquired)    Anxiety and depression    Gout    Steroid-induced hyperglycemia    Pneumonia of right lower lobe due to infectious organism    Acute conjunctivitis of left eye    Acute respiratory failure with hypoxia    Nocturnal hypoxia (baseline 2-3L NC QHS)    Immunosuppressed status    Insomnia    Symptomatic anemia    Acute respiratory failure with hypoxia    Acute on chronic heart failure with preserved ejection fraction    Gout    Mixed hyperlipidemia    Gastrointestinal hemorrhage    Moderate malnutrition     Past Medical History:   Diagnosis Date    Anxiety and depression 2022    Atrial fibrillation 2015    CARDIOVERSION - NO REOCCURANCE SINCE     Diabetes mellitus     Gout 2022    Hyperlipidemia     Hypertension     Hypothyroidism 2022    Obesity (BMI 30-39.9) 2022     Past Surgical History:   Procedure Laterality Date    APPENDECTOMY      CHOLECYSTECTOMY      COLOSTOMY      RESULTED FROM HYSTERECTOMY     COLOSTOMY  CLOSURE      WHILE DOING COLOSTOMY CLOSURE; ENDED UP HAVING A ILEOSTOMY    HEMORRHOIDECTOMY      HIP TROCHANTERIC NAILING WITH INTRAMEDULLARY HIP SCREW Right 3/27/2019    Procedure: HIP TROCANTERIC NAILING WITH INTRAMEDULLARY HIP SCREW RIGHT;  Surgeon: Jona Tom MD;  Location: [facility];  Service: Orthopedics    HYSTERECTOMY      LUMBAR DISCECTOMY      L4 - L5    OTHER SURGICAL HISTORY      TUBAL REVERSAL    THYROIDECTOMY      TUBAL ABDOMINAL LIGATION      X 2      General Information       Row Name 07/21/24 1553          Physical Therapy Time and Intention    Document Type therapy note (daily note)  -CT     Mode of Treatment physical therapy  -CT       Row Name 07/21/24 1553          General Information    Patient Profile Reviewed yes  -CT     Prior Level of Function independent:;all household mobility;gait;transfer;bed mobility;ADL's;home management  -CT     Existing Precautions/Restrictions fall;oxygen therapy device and L/min  -CT     Barriers to Rehab medically complex;previous functional deficit  -CT       Row Name 07/21/24 1553          Living Environment    People in Home alone  -CT       Row Name 07/21/24 1553          Home Main Entrance    Number of Stairs, Main Entrance three  -CT     Stair Railings, Main Entrance none  -CT       Row Name 07/21/24 1553          Stairs Within Home, Primary    Number of Stairs, Within Home, Primary none  -CT       Row Name 07/21/24 1553          Cognition    Orientation Status (Cognition) oriented x 4  -CT       Row Name 07/21/24 1553          Safety Issues, Functional Mobility    Safety Issues Affecting Function (Mobility) positioning of assistive device;sequencing abilities;other (see comments)  R leg remians swollen and painful.  Reports her AMD also makes navigating enviorment difficult.  -CT     Impairments Affecting Function (Mobility) visual/perceptual   AMD  -CT               User Key  (r) = Recorded By, (t) = Taken By, (c) = Cosigned By      Initials  Name Provider Type    CT Kwabena Ontiveros, MEL Physical Therapist                   Mobility       Row Name 07/21/24 1555          Bed Mobility    Bed Mobility bed mobility (all) activities  -CT     All Activities, [place] (Bed Mobility) independent  -CT     Supine-Sit [place] (Bed Mobility) independent  -CT     Assistive Device (Bed Mobility) bed rails;head of bed elevated  -CT       Row Name 07/21/24 1555          Bed-Chair Transfer    Bed-Chair [place] (Transfers) supervision;verbal cues;other (see comments)  cues to assist with naviagation due to visual compromise.  -CT     Assistive Device (Bed-Chair Transfers) walker, front-wheeled  -CT       Row Name 07/21/24 1555          Sit-Stand Transfer    Sit-Stand [place] (Transfers) verbal cues;supervision  -CT     Assistive Device (Sit-Stand Transfers) walker, front-wheeled  -CT       Row Name 07/21/24 1555          Gait/Stairs (Locomotion)    [place] Level (Gait) contact guard;verbal cues  -CT     Assistive Device (Gait) walker, front-wheeled  -CT     Patient was able to Ambulate yes  -CT     Distance in Feet (Gait) 100  -CT     Deviations/Abnormal Patterns (Gait) gait speed decreased;weight shifting decreased  -CT     Left Sided Gait Deviations leans left  -CT     Right Sided Gait Deviations weight shift ability decreased  -CT               User Key  (r) = Recorded By, (t) = Taken By, (c) = Cosigned By      Initials Name Provider Type    CT Kwabena Ontiveros, MEL Physical Therapist                   Obj/Interventions       Row Name 07/21/24 1601          Range of Motion Comprehensive    General Range of Motion bilateral lower extremity ROM WFL  -CT       Row Name 07/21/24 1601          Strength Comprehensive (MMT)    General Manual Muscle Testing (MMT) Assessment lower extremity strength deficits identified  -CT     Comment, General Manual Muscle Testing (MMT) Assessment LE strength 4/5 with gait and transfers.  Off loads  slightly L and increased trunk rot to advance R LE.  -CT       Row Name 07/21/24 1601          Motor Skills    Motor Skills coordination;functional endurance  -CT     Coordination gross motor deficit;lower extremity;minimal impairment  -CT     Functional Endurance 3 L O2 for gait  -CT       Row Name 07/21/24 1601          Balance    Balance Assessment sitting static balance;sitting dynamic balance;standing static balance;standing dynamic balance  -CT     Static Sitting Balance independent  -CT     Dynamic Sitting Balance supervision  -CT     Position, Sitting Balance supported  -CT     Static Standing Balance modified independence  -CT     Dynamic Standing Balance supervision  -CT     Position/Device Used, Standing Balance walker, front-wheeled  -CT     Balance Interventions sitting;standing;sit to stand;supported;weight shifting activity  -CT               User Key  (r) = Recorded By, (t) = Taken By, (c) = Cosigned By      Initials Name Provider Type    CT Kwabena Ontiveros, PT Physical Therapist                   Goals/Plan    No documentation.                  Clinical Impression       Row Name 07/21/24 1603          Pain    Pretreatment Pain Rating 3/10  -CT     Posttreatment Pain Rating 3/10  -CT     Pain Location - Side/Orientation Right  -CT     Pain Location lateral  -CT     Pain Location - hip  -CT     Pre/Posttreatment Pain Comment Noted Lshoulder and upper trunk flank pain as a result of fall.  -CT     Pain Intervention(s) Repositioned;Ambulation/increased activity;Elevated  -CT       Row Name 07/21/24 1603          Plan of Care Review    Plan of Care Reviewed With patient  -CT     Progress improving  -CT     Outcome Evaluation Trnafers with Supervision to MOD IND, gait to 100 ft with RW and cues to navigate due to AMD visual impairment.  -CT       Row Name 07/21/24 1603          Therapy Assessment/Plan (PT)    Rehab Potential (PT) good, to achieve stated therapy goals  -CT     Criteria for Skilled  Interventions Met (PT) yes  -CT     Therapy Frequency (PT) daily  -CT       Row Name 07/21/24 1603          Vital Signs    Pre SpO2 (%) 94  -CT     O2 Delivery Pre Treatment supplemental O2  -CT     Intra SpO2 (%) 92  -CT     O2 Delivery Intra Treatment supplemental O2  -CT     Post SpO2 (%) 92  -CT     O2 Delivery Post Treatment supplemental O2  -CT     Pre Patient Position Supine  -CT     Intra Patient Position Sitting  -CT     Post Patient Position Sitting  -CT       Row Name 07/21/24 1603          Positioning and Restraints    Pre-Treatment Position in bed  -CT     Post Treatment Position chair  -CT     In Chair notified nsg;reclined;sitting;call light within reach;encouraged to call for assist;exit alarm on;with family/caregiver;legs elevated  -CT               User Key  (r) = Recorded By, (t) = Taken By, (c) = Cosigned By      Initials Name Provider Type    CT Kwabena Ontiveros, PT Physical Therapist                   Outcome Measures       Row Name 07/21/24 1608 07/21/24 0800       How much help from another person do you currently need...    Turning from your back to your side while in flat bed without using bedrails? 4  -CT 3  -AS    Moving from lying on back to sitting on the side of a flat bed without bedrails? 4  -CT 3  -AS    Moving to and from a bed to a chair (including a wheelchair)? 4  -CT 3  -AS    Standing up from a chair using your arms (e.g., wheelchair, bedside chair)? 4  -CT 3  -AS    Climbing 3-5 steps with a railing? 2  -CT 2  -AS    To walk in hospital room? 3  -CT 2  -AS    AM-PAC 6 Clicks Score (PT) 21  -CT 16  -AS    Highest Level of Mobility Goal 6 --> Walk 10 steps or more  -CT 5 --> Static standing  -AS      Row Name 07/21/24 1608          Modified [place] Scale    Pre-Stroke Modified [place] Scale 4 - Moderately severe disability.  Unable to walk without assistance, and unable to attend to own bodily needs without assistance.  -CT     Modified [place] Scale 3 - Moderate  disability.  Requiring some help, but able to walk without assistance.  -CT       Row Name 07/21/24 1608          Functional Assessment    Outcome Measure Options AM-PAC 6 Clicks Basic Mobility (PT)  -CT               User Key  (r) = Recorded By, (t) = Taken By, (c) = Cosigned By      Initials Name Provider Type    CT Kwabena Ontiveros, PT Physical Therapist    AS Genny Duncan, RN Registered Nurse                                 Physical Therapy Education       Title: PT OT SLP Therapies (In Progress)       Topic: Physical Therapy (Done)       Point: Mobility training (Done)       Learning Progress Summary             Patient Acceptance, E,D, DU by CT at 7/21/2024 1611    Acceptance, E,D, VU,NR by CD at 7/17/2024 1018    Comment: BENEFITS OF OOB ACTIVITY, SAFETY WITH MOBILITY, PROGRESSION OF POC , D/C PLANNING,                         Point: Home exercise program (Done)       Learning Progress Summary             Patient Acceptance, E,D, DU by CT at 7/21/2024 1611    Acceptance, E,D, VU,NR by CD at 7/17/2024 1018    Comment: BENEFITS OF OOB ACTIVITY, SAFETY WITH MOBILITY, PROGRESSION OF POC , D/C PLANNING,                         Point: Body mechanics (Done)       Learning Progress Summary             Patient Acceptance, E,D, DU by CT at 7/21/2024 1611    Acceptance, E,D, VU,NR by CD at 7/17/2024 1018    Comment: BENEFITS OF OOB ACTIVITY, SAFETY WITH MOBILITY, PROGRESSION OF POC , D/C PLANNING,                         Point: Precautions (Done)       Learning Progress Summary             Patient Acceptance, E,D, DU by CT at 7/21/2024 1611    Acceptance, E,D, VU,NR by CD at 7/17/2024 1018    Comment: BENEFITS OF OOB ACTIVITY, SAFETY WITH MOBILITY, PROGRESSION OF POC , D/C PLANNING,                                         User Key       Initials Effective Dates Name Provider Type Discipline    CD 02/03/23 -  Jeanette Back PT Physical Therapist PT    CT 07/07/23 -  Kwabena Ontiveros, PT Physical  Therapist PT                  PT Recommendation and Plan     Plan of Care Reviewed With: patient  Progress: improving  Outcome Evaluation: Trnafers with Supervision to MOD IND, gait to 100 ft with RW and cues to navigate due to AMD visual impairment.     Time Calculation:         PT Charges       Row Name 07/21/24 1611             Time Calculation    Start Time 1535  -CT      PT Received On 07/21/24  -CT         Time Calculation- PT    Total Timed Code Minutes- PT 35 minute(s)  -CT                User Key  (r) = Recorded By, (t) = Taken By, (c) = Cosigned By      Initials Name Provider Type    CT Kwabena Ontiveros, PT Physical Therapist                  Therapy Charges for Today       Code Description Service Date Service Provider Modifiers Qty    [id number] HC GAIT TRAINING EA 15 MIN 7/21/2024 Kwabena Ontiveros, PT GP 2            PT G-Codes  Outcome Measure Options: AM-PAC 6 Clicks Basic Mobility (PT)  AM-PAC 6 Clicks Score (PT): 21  AM-PAC 6 Clicks Score (OT): 13  Modified [place] Scale: 3 - Moderate disability.  Requiring some help, but able to walk without assistance.  PT Discharge Summary  Anticipated Discharge Disposition (PT): inpatient rehabilitation facility    Kwabena Ontiveros PT  7/21/2024

## 2024-07-21 NOTE — PLAN OF CARE
Goal Outcome Evaluation:  Plan of Care Reviewed With: patient        Progress: improving  Outcome Evaluation: Trnafers with Supervision to MOD IND, gait to 100 ft with RW and cues to navigate due to AMD visual impairment.      Anticipated Discharge Disposition (PT): inpatient rehabilitation facility

## 2024-07-21 NOTE — PROGRESS NOTES
[facility] Medicine Services  PROGRESS NOTE    Patient Name: Charly Blevins  : 1955  MRN: [id number]    Date of Admission: 7/15/2024  Primary Care Physician: Patel Evans MD    Subjective   Subjective     CC:  Confusion, shortness of breath       HPI:  Patient reports having a migraine this morning as well as itching.  States she has taken Imitrex before with improvement.  Denies any rashes.  Admits to mild abdominal pain but is tolerated a diet without difficulty.      Objective   Objective     Vital Signs:   Temp:  [97.8 °F (36.6 °C)-98.3 °F (36.8 °C)] 98 °F (36.7 °C)  Heart Rate:  [64-86] 80  Resp:  [16-18] 16  BP: (129-155)/(73-99) 132/83  Flow (L/min):  [2] 2     Physical Exam:  General appearance: alert, awake, oriented, no acute distress   Cardiovascular: Irregularly irregular, no murmurs or rubs, radial pulse full 2/4 BL   Respiratory: CTAB, no crackles or wheezes on 2L NC  Abdomen: soft, mild epigastric/LUQ TTP, no organomegaly, bowel sounds normoactive    Neuro/CNS: alert and oriented x3, normal speech  Skin: no rashes visualized     Results Reviewed:  LAB RESULTS:      Lab 24  1047 24  1110 247 24  1839 24  0439 24  2106 24  0531 24  2106 24  0724 24  1038 24  0516 07/15/24  1639 07/15/24  1447   WBC 8.82 7.77  --   --  8.78  --  8.24  --  9.64  --  10.50  --  11.31*   HEMOGLOBIN 8.3* 9.2* 9.2* 9.2* 8.5*   < > 7.7*   < > 8.7*   < > 9.7*  --  7.6*   HEMATOCRIT 29.7* 34.1 32.7* 32.7* 30.7*   < > 27.8*   < > 29.9*   < > 33.2*  --  27.0*   PLATELETS 291 297  --   --  317  --  297  --  292  --  367  --  373   NEUTROS ABS  --   --   --   --   --   --   --   --   --   --  8.11*  --  9.25*   IMMATURE GRANS (ABS)  --   --   --   --   --   --   --   --   --   --  0.07*  --  0.08*   LYMPHS ABS  --   --   --   --   --   --   --   --   --   --  1.04  --  0.98   MONOS ABS  --   --   --   --   --   --    --   --   --   --  1.08*  --  0.94*   EOS ABS  --   --   --   --   --   --   --   --   --   --  0.19  --  0.04   MCV 89.7 93.9  --   --  90.0  --  89.4  --  86.2  --  84.1  --  84.1   SED RATE  --   --   --   --   --   --   --   --   --   --   --   --  31*   CRP  --   --   --   --   --   --   --   --   --   --   --  14.75*  --    PROCALCITONIN  --   --   --   --   --   --   --   --   --   --   --   --  12.49*   LACTATE  --   --   --   --   --   --   --   --   --   --   --   --  1.7    < > = values in this interval not displayed.         Lab 07/20/24  1110 07/19/24  0439 07/18/24  0531 07/17/24  0724 07/16/24  1544 07/16/24  0516 07/15/24  1447   SODIUM 141 144 141 138  --  143 139   POTASSIUM 4.0 4.2 4.3 4.9 3.3* 3.0* 3.6   CHLORIDE 103 105 103 100  --  100 100   CO2 30.0* 34.0* 32.0* 30.0*  --  33.0* 29.0   ANION GAP 8.0 5.0 6.0 8.0  --  10.0 10.0   BUN 6* 6* 10 9  --  10 16   CREATININE 0.37* 0.43* 0.40* 0.66  --  0.62 0.68   EGFR 109.3 105.4 107.3 95.1  --  96.5 94.4   GLUCOSE 128* 86 120* 107*  --  96 174*   CALCIUM 8.3* 7.5* 7.7* 7.5*  --  7.1* 7.7*   MAGNESIUM  --  1.5* 1.7 2.0  --  1.3* 1.8   HEMOGLOBIN A1C  --   --   --   --   --   --  6.10*   TSH  --   --   --   --   --  1.890  --          Lab 07/16/24  0516 07/15/24  1447   TOTAL PROTEIN 5.8* 5.1*   ALBUMIN 2.6* 2.7*   GLOBULIN 3.2 2.4   ALT (SGPT) 12 12   AST (SGOT) 21 18   BILIRUBIN 1.2 0.5   ALK PHOS 64 71         Lab 07/15/24  1639 07/15/24  1447   PROBNP  --  8,823.0*   HSTROP T 52* 61*             Lab 07/17/24  0724 07/15/24  1639   IRON 23*  --    IRON SATURATION (TSAT) 10*  --    TIBC 228*  --    TRANSFERRIN 153*  --    ABO TYPING  --  A   RH TYPING  --  Positive   ANTIBODY SCREEN  --  Negative         Lab 07/16/24  0513 07/15/24  1511   PH, ARTERIAL 7.590* 7.486*   PCO2, ARTERIAL 34.2* 39.5   PO2 .0* 65.9*   FIO2 45 44   HCO3 ART 32.8* 29.8*   BASE EXCESS ART 10.5* 5.9*   CARBOXYHEMOGLOBIN 1.7 1.7     Brief Urine Lab Results  (Last result  in the past 365 days)        Color   Clarity   Blood   Leuk Est   Nitrite   Protein   CREAT   Urine HCG        07/15/24 1440 Yellow   Clear   Negative   Negative   Negative   Negative                   Microbiology Results Abnormal       Procedure Component Value - Date/Time    Blood Culture - Blood, Wrist, Left [[id number]]  (Normal) Collected: 07/15/24 1526    Lab Status: Final result Specimen: Blood from Wrist, Left Updated: 07/20/24 1546     Blood Culture No growth at 5 days    Narrative:      Less than seven (7) mL's of blood was collected.  Insufficient quantity may yield false negative results.    Blood Culture - Blood, Arm, Right [[id number]]  (Normal) Collected: 07/15/24 1447    Lab Status: Final result Specimen: Blood from Arm, Right Updated: 07/20/24 1515     Blood Culture No growth at 5 days    Narrative:      Less than seven (7) mL's of blood was collected.  Insufficient quantity may yield false negative results.    Respiratory Panel PCR w/COVID-19(SARS-CoV-2) GEOVANI/ROSALIE/HÉCTOR/PAD/COR/EDISON In-House, NP Swab in UTM/VTM, 2 HR TAT - Swab, Nasopharynx [[id number]]  (Normal) Collected: 07/16/24 1038    Lab Status: Final result Specimen: Swab from Nasopharynx Updated: 07/16/24 1148     ADENOVIRUS, PCR Not Detected     Coronavirus 229E Not Detected     Coronavirus HKU1 Not Detected     Coronavirus NL63 Not Detected     Coronavirus OC43 Not Detected     COVID19 Not Detected     Human Metapneumovirus Not Detected     Human Rhinovirus/Enterovirus Not Detected     Influenza A PCR Not Detected     Influenza B PCR Not Detected     Parainfluenza Virus 1 Not Detected     Parainfluenza Virus 2 Not Detected     Parainfluenza Virus 3 Not Detected     Parainfluenza Virus 4 Not Detected     RSV, PCR Not Detected     Bordetella pertussis pcr Not Detected     Bordetella parapertussis PCR Not Detected     Chlamydophila pneumoniae PCR Not Detected     Mycoplasma pneumo by PCR Not Detected    Narrative:      In the setting of a positive  respiratory panel with a viral infection PLUS a negative procalcitonin without other underlying concern for bacterial infection, consider observing off antibiotics or discontinuation of antibiotics and continue supportive care. If the respiratory panel is positive for atypical bacterial infection (Bordetella pertussis, Chlamydophila pneumoniae, or Mycoplasma pneumoniae), consider antibiotic de-escalation to target atypical bacterial infection.    MRSA Screen, PCR (Inpatient) - Swab, Nares [[id number]]  (Normal) Collected: 07/15/24 9268    Lab Status: Final result Specimen: Swab from Nares Updated: 07/15/24 2010     MRSA PCR Negative    Narrative:      The negative predictive value of this diagnostic test is high and should only be used to consider de-escalating anti-MRSA therapy. A positive result may indicate colonization with MRSA and must be correlated clinically.  MRSA Negative            No radiology results from the last 24 hrs    Results for orders placed during the hospital encounter of 07/15/24    Adult Transthoracic Echo Complete W/ Cont if Necessary Per Protocol    Interpretation Summary    Left ventricular ejection fraction appears to be 56 - 60%.    Left ventricular diastolic function was normal.    The right ventricular cavity is borderline dilated.    The left atrial cavity is dilated.      Current medications:  Scheduled Meds:allopurinol, 300 mg, Oral, Daily  apixaban, 5 mg, Oral, Q12H  budesonide-formoterol, 1 puff, Inhalation, BID - RT  gabapentin, 100 mg, Oral, Q12H  insulin lispro, 2-7 Units, Subcutaneous, 4x Daily AC & at Bedtime  ipratropium-albuterol, 3 mL, Nebulization, Q6H While Awake - RT  levothyroxine, 125 mcg, Oral, QAM  Lidocaine, 1 patch, Transdermal, Q24H  melatonin, 10 mg, Oral, Nightly  metoprolol tartrate, 25 mg, Oral, BID  sodium chloride, 10 mL, Intravenous, Q12H  SUMAtriptan, 50 mg, Oral, Once      Continuous Infusions:lactated ringers, 9 mL/hr      PRN Meds:.  acetaminophen  **OR** acetaminophen **OR** acetaminophen    senna-docusate sodium **AND** polyethylene glycol **AND** bisacodyl **AND** bisacodyl    dextrose    dextrose    glucagon (human recombinant)    haloperidol lactate    HYDROcodone-acetaminophen    hydrOXYzine    Magnesium Cardiology Dose Replacement - Follow Nurse / BPA Driven Protocol    midazolam    nitroglycerin    ondansetron    Potassium Replacement - Follow Nurse / BPA Driven Protocol    sodium chloride    sodium chloride    sodium chloride    Assessment & Plan   Assessment & Plan     Active Hospital Problems    Diagnosis  POA    **Symptomatic anemia [D64.9]  Yes    Moderate malnutrition [E44.0]  Yes    Acute respiratory failure with hypoxia [J96.01]  Unknown    Acute on chronic heart failure with preserved ejection fraction [I50.33]  Unknown    Gout [M10.9]  Unknown    Mixed hyperlipidemia [E78.2]  Unknown    Gastrointestinal hemorrhage [K92.2]  Unknown    Hypothyroidism (acquired) [E03.9]  Yes    A-fib [I48.91]  Yes    Type 2 diabetes mellitus, without long-term current use of insulin [E11.9]  Yes    Primary hypertension [I10]  Yes    Rheumatoid arthritis involving multiple sites [M06.9]  Yes      Resolved Hospital Problems   No resolved problems to display.        Brief Hospital Course to date:  Charly Blevins is a 68 y.o. female w/ past medical history of atrial fibrillation on Eliquis, hypertension, type 2 diabetes mellitus, RA, hypothyroidism, was admitted for acute on chronic hypoxic respiratory failure after presenting with progressive fatigue and shortness of breath.  She had fallen on 7/13 and subsequently developed confusion later that evening.  Imaging revealed right pectoralis hematoma.  Patient was found to suffer from acute diastolic heart failure and atrial fibrillation with RVR resulting in her hypoxia. Cardiology was consulted, patient was treated with digoxin and metoprolol with improvement in rate control.  She was also diuresed with  improvement in respiratory symptoms.  Her hospital course was complicated by the development of anemia with melenic stool and positive stool occult.  GI was consulted and performed upper and lower endoscopies on 7/20 significant for colonic polyps.      Acute on chronic hypoxic resp failure, resolved  Acute HFpEF   Chronic 3L o2 dependence (unclear etiology)  Afib w/ rvr  Elevated troponin   Right chest wall pectoralis hematoma, traumatic   -Weaned to baseline O2 after diuresis   -Rate controlled after digoxin   -Cardiology followed: continue metoprolol 25mg po bid, holding eliquis, follow up w/ cards as outpatient for consideration of watchman if unable to anticoagulate  -Restart Eliquis today, monitor for bleeding  -CBC in AM      Acute symptomatic anemia  Iron def  Gi bleed (dark stool, guaic +)  S/p EGD and Colonoscopy s/p polypectomy   N/V/D, resolved  -ct c/a/p without overt source infection  **s/p 1 unit prbc (hgb 7.6 with symptoms of hypotension, dyspnea in setting of hypotension and afib rvr)  -serial hgb stable  -Completed 3 days of Ferrlecit   -GI consulted; s/p endoscopy on 7/20  -Colonoscopy revealed hemorrhoids, polyps in the ascending, transverse and descending colon which were all resected, diverticulosis  -EGD w gastritis   -Advance diet, repeat colonoscopy in 3 years, pathology pending     Leukocytosis  Elevated procal  -unclear etiology, perhaps possible atypical pneumonia?  -ct c/a/p without overt source infection  -u/a benign  -resp mrsa pcr negative  -stopped vanc & zosyn  -Completed 5 day course of doxycycline      RLE edema  -venous duplex negative RLE for dvt     Encephalopathy, resolved  -ct head negative  -u/a benign  -likely was due to sleep deprivation, acute illness  -continue melatonin, nightly restoril     Chronic back pain  -on lortab  -heating pad     Hypokalemia and hypomagnesemia  -replace per protocol     Dm2   -A1c 6.1  -ssi     Hypothyroidism  -tsh wnl1.8; continue  levothyroxine     Hx RA    Acute on chronic migraine  -Imitrex prn     Expected Discharge Location and Transportation: [facility]   Expected Discharge   Expected Discharge Date: 7/22/2024; Expected Discharge Time:      VTE Prophylaxis:  Pharmacologic & mechanical VTE prophylaxis orders are present.         AM-PAC 6 Clicks Score (PT): 16 (07/21/24 0800)    CODE STATUS:   Code Status and Medical Interventions:   Ordered at: 07/15/24 8769     Level Of Support Discussed With:    Patient     Code Status (Patient has no pulse and is not breathing):    CPR (Attempt to Resuscitate)     Medical Interventions (Patient has pulse or is breathing):    Full Support       Florian Perez,   07/21/24

## 2024-07-21 NOTE — PLAN OF CARE
Problem: Adult Inpatient Plan of Care  Goal: Plan of Care Review  Outcome: Ongoing, Progressing  Flowsheets  Taken 7/21/2024 0620 by Mariam Lamas RN  Progress: no change  Plan of Care Reviewed With: patient  Taken 7/17/2024 1355 by Marichuy Holt OT  Outcome Evaluation: Pt presenting below her functional baseline w/ balance, mobility and transfers requiring increased need for assist w/ ADLs. Pt requiring assist w/ LB dressing, bed mobility and SPT from bed to chair. Pt's deficits warrant skilled IP OT services. Recommend inpatient rehab at d/c for best functional outcome.   Goal Outcome Evaluation:  Plan of Care Reviewed With: patient        Progress: no change

## 2024-07-22 VITALS
WEIGHT: 178.57 LBS | OXYGEN SATURATION: 97 % | HEART RATE: 100 BPM | SYSTOLIC BLOOD PRESSURE: 125 MMHG | BODY MASS INDEX: 28.7 KG/M2 | DIASTOLIC BLOOD PRESSURE: 67 MMHG | RESPIRATION RATE: 18 BRPM | HEIGHT: 66 IN | TEMPERATURE: 98 F

## 2024-07-22 PROBLEM — I50.33 ACUTE ON CHRONIC HEART FAILURE WITH PRESERVED EJECTION FRACTION: Status: RESOLVED | Noted: 2024-07-15 | Resolved: 2024-07-22

## 2024-07-22 PROBLEM — H00.014 HORDEOLUM EXTERNUM OF LEFT UPPER EYELID: Chronic | Status: ACTIVE | Noted: 2024-07-22

## 2024-07-22 PROBLEM — J96.21 ACUTE ON CHRONIC RESPIRATORY FAILURE WITH HYPOXIA: Status: ACTIVE | Noted: 2024-07-15

## 2024-07-22 PROBLEM — K92.2 GASTROINTESTINAL HEMORRHAGE: Status: RESOLVED | Noted: 2024-07-15 | Resolved: 2024-07-22

## 2024-07-22 PROBLEM — J96.21 ACUTE ON CHRONIC RESPIRATORY FAILURE WITH HYPOXIA: Status: RESOLVED | Noted: 2024-07-15 | Resolved: 2024-07-22

## 2024-07-22 PROBLEM — D64.9 SYMPTOMATIC ANEMIA: Status: RESOLVED | Noted: 2024-07-15 | Resolved: 2024-07-22

## 2024-07-22 LAB
ANION GAP SERPL CALCULATED.3IONS-SCNC: 9 MMOL/L (ref 5–15)
BUN SERPL-MCNC: 9 MG/DL (ref 8–23)
BUN/CREAT SERPL: 21.4 (ref 7–25)
CALCIUM SPEC-SCNC: 8.1 MG/DL (ref 8.6–10.5)
CHLORIDE SERPL-SCNC: 101 MMOL/L (ref 98–107)
CO2 SERPL-SCNC: 30 MMOL/L (ref 22–29)
CREAT SERPL-MCNC: 0.42 MG/DL (ref 0.57–1)
DEPRECATED RDW RBC AUTO: 67.1 FL (ref 37–54)
EGFRCR SERPLBLD CKD-EPI 2021: 106 ML/MIN/1.73
ERYTHROCYTE [DISTWIDTH] IN BLOOD BY AUTOMATED COUNT: 20.7 % (ref 12.3–15.4)
GLUCOSE BLDC GLUCOMTR-MCNC: 139 MG/DL (ref 70–130)
GLUCOSE BLDC GLUCOMTR-MCNC: 172 MG/DL (ref 70–130)
GLUCOSE SERPL-MCNC: 109 MG/DL (ref 65–99)
HCT VFR BLD AUTO: 31.5 % (ref 34–46.6)
HGB BLD-MCNC: 8.9 G/DL (ref 12–15.9)
MCH RBC QN AUTO: 25.3 PG (ref 26.6–33)
MCHC RBC AUTO-ENTMCNC: 28.3 G/DL (ref 31.5–35.7)
MCV RBC AUTO: 89.5 FL (ref 79–97)
PLATELET # BLD AUTO: 257 10*3/MM3 (ref 140–450)
PMV BLD AUTO: 9.4 FL (ref 6–12)
POTASSIUM SERPL-SCNC: 4.1 MMOL/L (ref 3.5–5.2)
RBC # BLD AUTO: 3.52 10*6/MM3 (ref 3.77–5.28)
SODIUM SERPL-SCNC: 140 MMOL/L (ref 136–145)
WBC NRBC COR # BLD AUTO: 9.7 10*3/MM3 (ref 3.4–10.8)

## 2024-07-22 PROCEDURE — 97535 SELF CARE MNGMENT TRAINING: CPT

## 2024-07-22 PROCEDURE — 85027 COMPLETE CBC AUTOMATED: CPT | Performed by: STUDENT IN AN ORGANIZED HEALTH CARE EDUCATION/TRAINING PROGRAM

## 2024-07-22 PROCEDURE — 99239 HOSP IP/OBS DSCHRG MGMT >30: CPT | Performed by: STUDENT IN AN ORGANIZED HEALTH CARE EDUCATION/TRAINING PROGRAM

## 2024-07-22 PROCEDURE — 94664 DEMO&/EVAL PT USE INHALER: CPT

## 2024-07-22 PROCEDURE — 82948 REAGENT STRIP/BLOOD GLUCOSE: CPT

## 2024-07-22 PROCEDURE — 97530 THERAPEUTIC ACTIVITIES: CPT

## 2024-07-22 PROCEDURE — 94799 UNLISTED PULMONARY SVC/PX: CPT

## 2024-07-22 PROCEDURE — 80048 BASIC METABOLIC PNL TOTAL CA: CPT | Performed by: STUDENT IN AN ORGANIZED HEALTH CARE EDUCATION/TRAINING PROGRAM

## 2024-07-22 RX ORDER — SUMATRIPTAN 50 MG/1
TABLET, FILM COATED ORAL
Start: 2024-07-22

## 2024-07-22 RX ORDER — SUMATRIPTAN 50 MG/1
50 TABLET, FILM COATED ORAL
Status: DISCONTINUED | OUTPATIENT
Start: 2024-07-22 | End: 2024-07-22 | Stop reason: HOSPADM

## 2024-07-22 RX ORDER — HYDROXYZINE HYDROCHLORIDE 25 MG/1
25 TABLET, FILM COATED ORAL 4 TIMES DAILY PRN
Start: 2024-07-22 | End: 2024-08-21

## 2024-07-22 RX ORDER — HYDROXYZINE HYDROCHLORIDE 25 MG/1
25 TABLET, FILM COATED ORAL 4 TIMES DAILY PRN
Status: DISCONTINUED | OUTPATIENT
Start: 2024-07-22 | End: 2024-07-22 | Stop reason: HOSPADM

## 2024-07-22 RX ADMIN — ALLOPURINOL 300 MG: 300 TABLET ORAL at 09:38

## 2024-07-22 RX ADMIN — GABAPENTIN 100 MG: 100 CAPSULE ORAL at 09:38

## 2024-07-22 RX ADMIN — METOPROLOL TARTRATE 25 MG: 25 TABLET, FILM COATED ORAL at 09:37

## 2024-07-22 RX ADMIN — IPRATROPIUM BROMIDE AND ALBUTEROL SULFATE 3 ML: 2.5; .5 SOLUTION RESPIRATORY (INHALATION) at 07:14

## 2024-07-22 RX ADMIN — HYDROCODONE BITARTRATE AND ACETAMINOPHEN 1 TABLET: 7.5; 325 TABLET ORAL at 05:07

## 2024-07-22 RX ADMIN — HYDROXYZINE HYDROCHLORIDE 25 MG: 25 TABLET ORAL at 11:24

## 2024-07-22 RX ADMIN — LEVOTHYROXINE SODIUM 125 MCG: 125 TABLET ORAL at 05:06

## 2024-07-22 RX ADMIN — APIXABAN 5 MG: 5 TABLET, FILM COATED ORAL at 09:37

## 2024-07-22 RX ADMIN — HYDROCODONE BITARTRATE AND ACETAMINOPHEN 1 TABLET: 7.5; 325 TABLET ORAL at 11:24

## 2024-07-22 RX ADMIN — HYDROXYZINE HYDROCHLORIDE 25 MG: 25 TABLET ORAL at 05:06

## 2024-07-22 RX ADMIN — SUMATRIPTAN SUCCINATE 50 MG: 50 TABLET ORAL at 11:24

## 2024-07-22 RX ADMIN — Medication 10 ML: at 09:38

## 2024-07-22 RX ADMIN — BUDESONIDE AND FORMOTEROL FUMARATE DIHYDRATE 1 PUFF: 160; 4.5 AEROSOL RESPIRATORY (INHALATION) at 07:14

## 2024-07-22 NOTE — PLAN OF CARE
Goal Outcome Evaluation:  Plan of Care Reviewed With: patient        Progress: improving  Outcome Evaluation: Pt with good participation and progress toward goals during OT session. The pt ambulated <HH distance using a RWx with CGA, requiring two standing rest breaks due to RLE pain and SOA. O2 monitored and remained >95% on 2L NC during session. Grooming ADLs performed seated with set up this date. The pt remains below her functional baseline with generalized weakness, decreased activity tolerance, and balance deficits warranting continued IP OT services. Continue to recommend a d/c to IRF for best outcome.      Anticipated Discharge Disposition (OT): inpatient rehabilitation facility

## 2024-07-22 NOTE — DISCHARGE SUMMARY
[facility] Medicine Services  DISCHARGE SUMMARY    Patient Name: Charly Blevins  : 1955  MRN: [id number]    Date of Admission: 7/15/2024  2:07 PM  Date of Discharge:  2024  Primary Care Physician: Patel Evans MD    Consults       Date and Time Order Name Status Description    2024  7:24 AM Inpatient Cardiology Consult Completed     7/15/2024  5:57 PM Inpatient Gastroenterology Consult Completed             Hospital Course     Presenting Problem: Acute on chronic hypoxic respiratory failure    Active Hospital Problems    Diagnosis  POA    Hordeolum externum of left upper eyelid [H00.014]  Yes    Moderate malnutrition [E44.0]  Yes    Hypothyroidism (acquired) [E03.9]  Yes    A-fib [I48.91]  Yes    Type 2 diabetes mellitus, without long-term current use of insulin [E11.9]  Yes    Primary hypertension [I10]  Yes    Rheumatoid arthritis involving multiple sites [M06.9]  Yes      Resolved Hospital Problems    Diagnosis Date Resolved POA    **Symptomatic anemia [D64.9] 2024 Yes    Acute on chronic respiratory failure with hypoxia [J96.21] 2024 Yes    Acute on chronic heart failure with preserved ejection fraction [I50.33] 2024 Yes    Gastrointestinal hemorrhage [K92.2] 2024 Yes          Hospital Course:  Charly Blevins is a 68 y.o. female w/ past medical history of atrial fibrillation on Eliquis, hypertension, type 2 diabetes mellitus, RA, hypothyroidism, was admitted for acute on chronic hypoxic respiratory failure after presenting with progressive fatigue and shortness of breath.  She had fallen on  and subsequently developed confusion later that evening.  Imaging revealed right pectoralis hematoma.  Patient was found to suffer from acute diastolic heart failure and atrial fibrillation with RVR resulting in her hypoxia. Cardiology was consulted, patient was treated with digoxin and metoprolol with improvement in rate control.  She  was also diuresed with improvement in respiratory symptoms.  Her hospital course was complicated by the development of anemia with melenic stool and positive stool occult.  GI was consulted and performed upper and lower endoscopies on 7/20 significant for colonic polyps.  She was restarted on Eliquis and remained hemodynamically stable thereafter.  Patient worked with physical therapy and Occupational Therapy, determined to benefit from rehab upon discharge.  Patient determined to be stable for discharge.     Acute on chronic hypoxic resp failure, resolved  Acute HFpEF   Chronic 3L o2 dependence (unclear etiology)  Afib w/ rvr  Elevated troponin   Right chest wall pectoralis hematoma, traumatic   -Weaned to baseline O2 after diuresis   -Rate controlled after digoxin   -Cardiology followed: continue metoprolol 25mg po bid, holding eliquis, follow up w/ cardiology as outpatient for consideration of watchman if unable to anticoagulate  -Restarted Eliquis, monitor for bleeding     Acute symptomatic anemia  Iron deficiency anemia   GI bleed (dark stool, guaic +)  S/p EGD and Colonoscopy s/p polypectomy   N/V/D, resolved  -ct c/a/p without overt source infection  **s/p 1 unit prbc (hgb 7.6 with symptoms of hypotension, dyspnea in setting of hypotension and afib rvr)  -serial hgb stable  -Completed 3 days of Ferrlecit   -GI consulted; s/p endoscopy on 7/20  -Colonoscopy revealed hemorrhoids, polyps in the ascending, transverse and descending colon which were all resected, diverticulosis  -EGD w gastritis   -Advance diet, repeat colonoscopy in 3 years, pathology pending     Leukocytosis  Elevated procal  -unclear etiology, perhaps possible atypical pneumonia?  -ct c/a/p without overt source infection  -u/a benign  -resp mrsa pcr negative  -stopped vanc & zosyn  -Completed 5 day course of doxycycline      RLE edema  -venous duplex negative RLE for dvt     Encephalopathy, resolved  -ct head negative  -u/a benign  -likely was  due to sleep deprivation, acute illness  -continue melatonin, nightly restoril     Chronic back pain  -on lortab  -heating pad     Hypokalemia and hypomagnesemia  -replace per protocol     Dm2   -A1c 6.1  -restart home metformin on discharge      Hypothyroidism  -tsh wnl1.8; continue levothyroxine     Hx RA  -Continue prednisone     Acute on chronic migraine  -Imitrex prn       Discharge Follow Up Recommendations for outpatient labs/diagnostics:  Follow-up with PCP in 1 week  Follow-up with cardiology in 1 month    Day of Discharge     HPI:   Patient reports feeling well this morning aside from intermittent itching throughout her body and a slight migraine that improved with Imitrex yesterday.  She states she is able to tolerate her diet, complains only of mild upper abdominal discomfort, denies nausea.  States she feels ready to leave for rehab.    Vital Signs:   Temp:  [94.4 °F (34.7 °C)-98.3 °F (36.8 °C)] 98.1 °F (36.7 °C)  Heart Rate:  [] 104  Resp:  [16-20] 18  BP: (115-147)/(67-93) 120/67  Flow (L/min):  [2] 2      Physical Exam:  General appearance: alert, awake, oriented, no acute distress   Cardiovascular: Irregularly irregular, no murmurs or rubs, radial pulse full 2/4 BL   Respiratory: CTAB, no crackles or wheezes on 2L NC  Abdomen: soft, mild epigastric/LUQ TTP, no organomegaly, bowel sounds normoactive    Neuro/CNS: alert and oriented x3, normal speech, pupils ERRL  Skin: no rashes visualized. Stye of left upper eyelid     Pertinent  and/or Most Recent Results     LAB RESULTS:      Lab 07/22/24  0447 07/21/24  1047 07/20/24  1110 07/19/24  1957 07/19/24  1839 07/19/24  0439 07/18/24  2106 07/18/24  0531 07/16/24  1038 07/16/24  0516 07/15/24  1639 07/15/24  1447   WBC 9.70 8.82 7.77  --   --  8.78  --  8.24   < > 10.50  --  11.31*   HEMOGLOBIN 8.9* 8.3* 9.2* 9.2* 9.2* 8.5*   < > 7.7*   < > 9.7*  --  7.6*   HEMATOCRIT 31.5* 29.7* 34.1 32.7* 32.7* 30.7*   < > 27.8*   < > 33.2*  --  27.0*   PLATELETS  257 291 297  --   --  317  --  297   < > 367  --  373   NEUTROS ABS  --   --   --   --   --   --   --   --   --  8.11*  --  9.25*   IMMATURE GRANS (ABS)  --   --   --   --   --   --   --   --   --  0.07*  --  0.08*   LYMPHS ABS  --   --   --   --   --   --   --   --   --  1.04  --  0.98   MONOS ABS  --   --   --   --   --   --   --   --   --  1.08*  --  0.94*   EOS ABS  --   --   --   --   --   --   --   --   --  0.19  --  0.04   MCV 89.5 89.7 93.9  --   --  90.0  --  89.4   < > 84.1  --  84.1   SED RATE  --   --   --   --   --   --   --   --   --   --   --  31*   CRP  --   --   --   --   --   --   --   --   --   --  14.75*  --    PROCALCITONIN  --   --   --   --   --   --   --   --   --   --   --  12.49*   LACTATE  --   --   --   --   --   --   --   --   --   --   --  1.7    < > = values in this interval not displayed.         Lab 07/22/24  0447 07/21/24  1509 07/20/24  1110 07/19/24  0439 07/18/24  0531 07/17/24  0724 07/16/24  1544 07/16/24  0516 07/15/24  1447   SODIUM 140 140 141 144 141 138  --  143 139   POTASSIUM 4.1 4.2 4.0 4.2 4.3 4.9   < > 3.0* 3.6   CHLORIDE 101 102 103 105 103 100  --  100 100   CO2 30.0* 33.0* 30.0* 34.0* 32.0* 30.0*  --  33.0* 29.0   ANION GAP 9.0 5.0 8.0 5.0 6.0 8.0  --  10.0 10.0   BUN 9 7* 6* 6* 10 9  --  10 16   CREATININE 0.42* 0.61 0.37* 0.43* 0.40* 0.66  --  0.62 0.68   EGFR 106.0 96.9 109.3 105.4 107.3 95.1  --  96.5 94.4   GLUCOSE 109* 150* 128* 86 120* 107*  --  96 174*   CALCIUM 8.1* 7.7* 8.3* 7.5* 7.7* 7.5*  --  7.1* 7.7*   MAGNESIUM  --   --   --  1.5* 1.7 2.0  --  1.3* 1.8   HEMOGLOBIN A1C  --   --   --   --   --   --   --   --  6.10*   TSH  --   --   --   --   --   --   --  1.890  --     < > = values in this interval not displayed.         Lab 07/16/24  0516 07/15/24  1447   TOTAL PROTEIN 5.8* 5.1*   ALBUMIN 2.6* 2.7*   GLOBULIN 3.2 2.4   ALT (SGPT) 12 12   AST (SGOT) 21 18   BILIRUBIN 1.2 0.5   ALK PHOS 64 71         Lab 07/15/24  1639 07/15/24  1447   PROBNP  --   8,823.0*   HSTROP T 52* 61*             Lab 07/17/24  0724 07/15/24  1639   IRON 23*  --    IRON SATURATION (TSAT) 10*  --    TIBC 228*  --    TRANSFERRIN 153*  --    ABO TYPING  --  A   RH TYPING  --  Positive   ANTIBODY SCREEN  --  Negative         Lab 07/16/24  0513 07/15/24  1511   PH, ARTERIAL 7.590* 7.486*   PCO2, ARTERIAL 34.2* 39.5   PO2 .0* 65.9*   FIO2 45 44   HCO3 ART 32.8* 29.8*   BASE EXCESS ART 10.5* 5.9*   CARBOXYHEMOGLOBIN 1.7 1.7     Brief Urine Lab Results  (Last result in the past 365 days)        Color   Clarity   Blood   Leuk Est   Nitrite   Protein   CREAT   Urine HCG        07/15/24 1440 Yellow   Clear   Negative   Negative   Negative   Negative                 Microbiology Results (last 10 days)       Procedure Component Value - Date/Time    Respiratory Panel PCR w/COVID-19(SARS-CoV-2) GEOVANI/ROSALIE/HÉCTOR/PAD/COR/EDISON In-House, NP Swab in UTM/VTM, 2 HR TAT - Swab, Nasopharynx [[id number]]  (Normal) Collected: 07/16/24 1038    Lab Status: Final result Specimen: Swab from Nasopharynx Updated: 07/16/24 1148     ADENOVIRUS, PCR Not Detected     Coronavirus 229E Not Detected     Coronavirus HKU1 Not Detected     Coronavirus NL63 Not Detected     Coronavirus OC43 Not Detected     COVID19 Not Detected     Human Metapneumovirus Not Detected     Human Rhinovirus/Enterovirus Not Detected     Influenza A PCR Not Detected     Influenza B PCR Not Detected     Parainfluenza Virus 1 Not Detected     Parainfluenza Virus 2 Not Detected     Parainfluenza Virus 3 Not Detected     Parainfluenza Virus 4 Not Detected     RSV, PCR Not Detected     Bordetella pertussis pcr Not Detected     Bordetella parapertussis PCR Not Detected     Chlamydophila pneumoniae PCR Not Detected     Mycoplasma pneumo by PCR Not Detected    Narrative:      In the setting of a positive respiratory panel with a viral infection PLUS a negative procalcitonin without other underlying concern for bacterial infection, consider observing off  antibiotics or discontinuation of antibiotics and continue supportive care. If the respiratory panel is positive for atypical bacterial infection (Bordetella pertussis, Chlamydophila pneumoniae, or Mycoplasma pneumoniae), consider antibiotic de-escalation to target atypical bacterial infection.    MRSA Screen, PCR (Inpatient) - Swab, Nares [[id number]]  (Normal) Collected: 07/15/24 1851    Lab Status: Final result Specimen: Swab from Nares Updated: 07/15/24 2010     MRSA PCR Negative    Narrative:      The negative predictive value of this diagnostic test is high and should only be used to consider de-escalating anti-MRSA therapy. A positive result may indicate colonization with MRSA and must be correlated clinically.  MRSA Negative    Blood Culture - Blood, Wrist, Left [[id number]]  (Normal) Collected: 07/15/24 1526    Lab Status: Final result Specimen: Blood from Wrist, Left Updated: 07/20/24 1546     Blood Culture No growth at 5 days    Narrative:      Less than seven (7) mL's of blood was collected.  Insufficient quantity may yield false negative results.    Blood Culture - Blood, Arm, Right [[id number]]  (Normal) Collected: 07/15/24 1447    Lab Status: Final result Specimen: Blood from Arm, Right Updated: 07/20/24 1515     Blood Culture No growth at 5 days    Narrative:      Less than seven (7) mL's of blood was collected.  Insufficient quantity may yield false negative results.            Adult Transthoracic Echo Complete W/ Cont if Necessary Per Protocol    Result Date: 7/16/2024    Left ventricular ejection fraction appears to be 56 - 60%.   Left ventricular diastolic function was normal.   The right ventricular cavity is borderline dilated.   The left atrial cavity is dilated.     XR Chest 1 View    Result Date: 7/16/2024  XR CHEST 1 VW Date of Exam: 7/16/2024 7:31 AM EDT Indication: hypoxia, chf (after diuresis) Comparison: 7/15/2024 Findings: Implanted loop recorder is again noted. The heart is enlarged.  Prominent mediastinal shadow is in part due to rotation of the patient to the right on today's exam, as well as significant superior mediastinal fat as seen on yesterday's chest CT scan. Mild  patchy disease in the right midlung is improving, now appearing as linear atelectasis. There is mild pulmonary venous hypertension without evidence of congestive failure, and mild nonspecific interstitial lung changes appear stable. No effusion or pneumothorax is seen.     Impression: 1. Cardiomegaly and mild pulmonary venous hypertension. 2. Mild remaining right midlung discoid atelectasis. No new chest disease is seen. Electronically Signed: Heath Prado MD  7/16/2024 8:02 AM EDT  Workstation ID: [id number]    XR Chest 1 View    Result Date: 7/15/2024  XR CHEST 1 VW Date of Exam: 7/15/2024 3:53 PM EDT Indication: Weak/Dizzy/AMS triage protocol Comparison: Chest CT performed on the same day. Findings: Mild linear atelectasis in the right upper and middle lobes is visualized. There is mild elevation of the right hemidiaphragm. There is no evidence of pneumothorax. There is mild pulmonary vascular congestion. Cardiac silhouette is enlarged. Loop recorder overlies the lower thoracic spine. No acute osseous abnormality identified.     Impression: Mild linear atelectasis in the right upper and middle lobes. Findings compatible with mild CHF. Electronically Signed: Jay Andrew MD  7/15/2024 4:21 PM EDT  Workstation ID: [id number]    Duplex Venous Lower Extremity - RIGHT    Result Date: 7/15/2024    Normal right lower extremity venous duplex scan.     CT Head Without Contrast    Result Date: 7/15/2024  CT HEAD WO CONTRAST, CT ABDOMEN PELVIS WO CONTRAST, CT CHEST WO CONTRAST DIAGNOSTIC Date of Exam: 7/15/2024 2:24 PM EDT Indication: AMS. Comparison: Previous head CT scan 1/19/2023. Angiographic chest CT scan 1/20/2023. No prior abdominal scan. Technique: Axial CT images were obtained of the head, chest abdomen pelvis without contrast  "administration.  Automated exposure control and iterative construction methods were used. Findings: No additional clinical history is currently available. CT SCAN OF THE HEAD WITHOUT CONTRAST: The calvarium appears intact. Included paranasal sinuses and mastoids appear clear. There is expected degree of generalized cerebral atrophy for the patient's age. No hemorrhage, contusion, edema, or infarct is seen.  There is no evidence of mass or mass effect, hydrocephalus, or abnormal extra-axial collection.     No evidence of acute intracranial disease. CT SCAN OF THE CHEST WITHOUT CONTRAST: Axial images 20 through 46 show multiple well-defined hyperdense rounded lesions within and dorsal to the right pectoralis minor, resembling soft tissue masses, but similar to density of acute blood, and with the lesion on image #27 appearing to show a potential hematocrit level, possibly all intramuscular hematoma. The largest of these measures approximately 3 cm in maximal diameter. No similar findings are seen elsewhere and no underlying rib fractures identified. The heart is enlarged. No pericardial effusion or mediastinal adenopathy is seen. There is trace bilateral pleural effusion. No coronary calcification is seen. Images of the lungs appear to show prominent pulmonary vasculature and mild diffuse interstitial disease suggesting early interstitial edema. No particular focal pulmonary disease is seen to favor a pneumonia although a small focus of pneumonia. Bony structures appear to be intact. IMPRESSION: 1. 4 or 5 adjacent rounded hyperdense \"masses\" within and along the right pectoralis minor muscle, each between 2 and 3 cm in diameter, and favored to represent hematoma/intramuscular hemorrhage, rather than neoplasm. Please correlate with any history of  trauma here. 2. Minimal bibasilar pleural effusion, and small area of right upper lobe discoid atelectasis. No particular features to suggest pneumonia. 3. Cardiomegaly and " mild pulmonary vascular congestion, perhaps with early interstitial edema. ABDOMEN AND PELVIS CT SCAN WITHOUT CONTRAST: An area of slightly nodular left lower breast tissue is unchanged from 1/20/2023. No abdominal wall or retroperitoneal hematoma is seen. Clips are seen in the gallbladder fossa. No significant abnormalities are appreciated of the liver, pancreas, adrenal glands, or kidneys for non-IV contrast enhanced exam. Multiple splenic lesions, presumably cysts, are stable from the prior exam, the largest measuring water density, 3 cm in diameter image 33 series 2. No upper abdominal free air, ascites, adenopathy, or acute inflammatory change is seen. Bowel loops are nondistended. Regarding the lower abdomen and pelvis, there is evidence of previous extensive bowel surgery. Terminal ileum and cecum appear grossly normal. Appendix is not identified. There appears to be a distal ileal suture line, and suture line at the rectosigmoid  junction. These areas are clear of mass or inflammatory change. No intrapelvic free fluid mass or adenopathy is seen. Bladder is normally distended and normal in appearance. Uterus and ovaries are not identified, whether atrophic or surgically absent. Streak artifact is seen from the patient's right hip fixation hardware. There is an old healed right inferior pubic ramus fracture but no acute bony abnormality is seen. IMPRESSION: 1. No evidence of acute inflammatory process or other clearly acute intra-abdominal or intrapelvic disease. 2. Evidence of previous bowel surgery. No evidence of significant ileus or obstruction. Electronically Signed: Heath Prado MD  7/15/2024 2:53 PM EDT  Workstation ID: [id number]    CT Chest Without Contrast Diagnostic    Result Date: 7/15/2024  CT HEAD WO CONTRAST, CT ABDOMEN PELVIS WO CONTRAST, CT CHEST WO CONTRAST DIAGNOSTIC Date of Exam: 7/15/2024 2:24 PM EDT Indication: AMS. Comparison: Previous head CT scan 1/19/2023. Angiographic chest CT scan  "1/20/2023. No prior abdominal scan. Technique: Axial CT images were obtained of the head, chest abdomen pelvis without contrast administration.  Automated exposure control and iterative construction methods were used. Findings: No additional clinical history is currently available. CT SCAN OF THE HEAD WITHOUT CONTRAST: The calvarium appears intact. Included paranasal sinuses and mastoids appear clear. There is expected degree of generalized cerebral atrophy for the patient's age. No hemorrhage, contusion, edema, or infarct is seen.  There is no evidence of mass or mass effect, hydrocephalus, or abnormal extra-axial collection.     No evidence of acute intracranial disease. CT SCAN OF THE CHEST WITHOUT CONTRAST: Axial images 20 through 46 show multiple well-defined hyperdense rounded lesions within and dorsal to the right pectoralis minor, resembling soft tissue masses, but similar to density of acute blood, and with the lesion on image #27 appearing to show a potential hematocrit level, possibly all intramuscular hematoma. The largest of these measures approximately 3 cm in maximal diameter. No similar findings are seen elsewhere and no underlying rib fractures identified. The heart is enlarged. No pericardial effusion or mediastinal adenopathy is seen. There is trace bilateral pleural effusion. No coronary calcification is seen. Images of the lungs appear to show prominent pulmonary vasculature and mild diffuse interstitial disease suggesting early interstitial edema. No particular focal pulmonary disease is seen to favor a pneumonia although a small focus of pneumonia. Bony structures appear to be intact. IMPRESSION: 1. 4 or 5 adjacent rounded hyperdense \"masses\" within and along the right pectoralis minor muscle, each between 2 and 3 cm in diameter, and favored to represent hematoma/intramuscular hemorrhage, rather than neoplasm. Please correlate with any history of  trauma here. 2. Minimal bibasilar pleural " effusion, and small area of right upper lobe discoid atelectasis. No particular features to suggest pneumonia. 3. Cardiomegaly and mild pulmonary vascular congestion, perhaps with early interstitial edema. ABDOMEN AND PELVIS CT SCAN WITHOUT CONTRAST: An area of slightly nodular left lower breast tissue is unchanged from 1/20/2023. No abdominal wall or retroperitoneal hematoma is seen. Clips are seen in the gallbladder fossa. No significant abnormalities are appreciated of the liver, pancreas, adrenal glands, or kidneys for non-IV contrast enhanced exam. Multiple splenic lesions, presumably cysts, are stable from the prior exam, the largest measuring water density, 3 cm in diameter image 33 series 2. No upper abdominal free air, ascites, adenopathy, or acute inflammatory change is seen. Bowel loops are nondistended. Regarding the lower abdomen and pelvis, there is evidence of previous extensive bowel surgery. Terminal ileum and cecum appear grossly normal. Appendix is not identified. There appears to be a distal ileal suture line, and suture line at the rectosigmoid  junction. These areas are clear of mass or inflammatory change. No intrapelvic free fluid mass or adenopathy is seen. Bladder is normally distended and normal in appearance. Uterus and ovaries are not identified, whether atrophic or surgically absent. Streak artifact is seen from the patient's right hip fixation hardware. There is an old healed right inferior pubic ramus fracture but no acute bony abnormality is seen. IMPRESSION: 1. No evidence of acute inflammatory process or other clearly acute intra-abdominal or intrapelvic disease. 2. Evidence of previous bowel surgery. No evidence of significant ileus or obstruction. Electronically Signed: Heath Prado MD  7/15/2024 2:53 PM EDT  Workstation ID: [id number]    CT Abdomen Pelvis Without Contrast    Result Date: 7/15/2024  CT HEAD WO CONTRAST, CT ABDOMEN PELVIS WO CONTRAST, CT CHEST WO CONTRAST DIAGNOSTIC  "Date of Exam: 7/15/2024 2:24 PM EDT Indication: AMS. Comparison: Previous head CT scan 1/19/2023. Angiographic chest CT scan 1/20/2023. No prior abdominal scan. Technique: Axial CT images were obtained of the head, chest abdomen pelvis without contrast administration.  Automated exposure control and iterative construction methods were used. Findings: No additional clinical history is currently available. CT SCAN OF THE HEAD WITHOUT CONTRAST: The calvarium appears intact. Included paranasal sinuses and mastoids appear clear. There is expected degree of generalized cerebral atrophy for the patient's age. No hemorrhage, contusion, edema, or infarct is seen.  There is no evidence of mass or mass effect, hydrocephalus, or abnormal extra-axial collection.     No evidence of acute intracranial disease. CT SCAN OF THE CHEST WITHOUT CONTRAST: Axial images 20 through 46 show multiple well-defined hyperdense rounded lesions within and dorsal to the right pectoralis minor, resembling soft tissue masses, but similar to density of acute blood, and with the lesion on image #27 appearing to show a potential hematocrit level, possibly all intramuscular hematoma. The largest of these measures approximately 3 cm in maximal diameter. No similar findings are seen elsewhere and no underlying rib fractures identified. The heart is enlarged. No pericardial effusion or mediastinal adenopathy is seen. There is trace bilateral pleural effusion. No coronary calcification is seen. Images of the lungs appear to show prominent pulmonary vasculature and mild diffuse interstitial disease suggesting early interstitial edema. No particular focal pulmonary disease is seen to favor a pneumonia although a small focus of pneumonia. Bony structures appear to be intact. IMPRESSION: 1. 4 or 5 adjacent rounded hyperdense \"masses\" within and along the right pectoralis minor muscle, each between 2 and 3 cm in diameter, and favored to represent " hematoma/intramuscular hemorrhage, rather than neoplasm. Please correlate with any history of  trauma here. 2. Minimal bibasilar pleural effusion, and small area of right upper lobe discoid atelectasis. No particular features to suggest pneumonia. 3. Cardiomegaly and mild pulmonary vascular congestion, perhaps with early interstitial edema. ABDOMEN AND PELVIS CT SCAN WITHOUT CONTRAST: An area of slightly nodular left lower breast tissue is unchanged from 1/20/2023. No abdominal wall or retroperitoneal hematoma is seen. Clips are seen in the gallbladder fossa. No significant abnormalities are appreciated of the liver, pancreas, adrenal glands, or kidneys for non-IV contrast enhanced exam. Multiple splenic lesions, presumably cysts, are stable from the prior exam, the largest measuring water density, 3 cm in diameter image 33 series 2. No upper abdominal free air, ascites, adenopathy, or acute inflammatory change is seen. Bowel loops are nondistended. Regarding the lower abdomen and pelvis, there is evidence of previous extensive bowel surgery. Terminal ileum and cecum appear grossly normal. Appendix is not identified. There appears to be a distal ileal suture line, and suture line at the rectosigmoid  junction. These areas are clear of mass or inflammatory change. No intrapelvic free fluid mass or adenopathy is seen. Bladder is normally distended and normal in appearance. Uterus and ovaries are not identified, whether atrophic or surgically absent. Streak artifact is seen from the patient's right hip fixation hardware. There is an old healed right inferior pubic ramus fracture but no acute bony abnormality is seen. IMPRESSION: 1. No evidence of acute inflammatory process or other clearly acute intra-abdominal or intrapelvic disease. 2. Evidence of previous bowel surgery. No evidence of significant ileus or obstruction. Electronically Signed: Heath Prado MD  7/15/2024 2:53 PM EDT  Workstation ID: [id number]     Results  for orders placed during the hospital encounter of 07/15/24    Duplex Venous Lower Extremity - RIGHT    Interpretation Summary    Normal right lower extremity venous duplex scan.      Results for orders placed during the hospital encounter of 07/15/24    Duplex Venous Lower Extremity - RIGHT    Interpretation Summary    Normal right lower extremity venous duplex scan.      Results for orders placed during the hospital encounter of 07/15/24    Adult Transthoracic Echo Complete W/ Cont if Necessary Per Protocol    Interpretation Summary    Left ventricular ejection fraction appears to be 56 - 60%.    Left ventricular diastolic function was normal.    The right ventricular cavity is borderline dilated.    The left atrial cavity is dilated.      Plan for Follow-up of Pending Labs/Results:   Pending Labs       Order Current Status    Tissue Pathology Exam In process          Discharge Details        Discharge Medications        New Medications        Instructions Start Date   hydrOXYzine 25 MG tablet  Commonly known as: ATARAX   25 mg, Oral, 4 Times Daily PRN      melatonin 5 MG tablet tablet   5 mg, Oral, Nightly      SUMAtriptan 50 MG tablet  Commonly known as: IMITREX   Take one tablet at onset of headache. May repeat dose one time in 2 hours if headache not relieved.             Changes to Medications        Instructions Start Date   metoprolol tartrate 25 MG tablet  Commonly known as: LOPRESSOR  What changed: how much to take   25 mg, Oral, 2 Times Daily             Continue These Medications        Instructions Start Date   acetaminophen 325 MG tablet  Commonly known as: TYLENOL   650 mg, Oral, Every 4 Hours PRN      allopurinol 300 MG tablet  Commonly known as: ZYLOPRIM   1 tablet, Oral, Daily      apixaban 5 MG tablet tablet  Commonly known as: ELIQUIS   5 mg, Oral, 2 Times Daily      colesevelam 625 MG tablet  Commonly known as: WELCHOL   1,250 mg, Oral, 2 Times Daily With Meals      famotidine 20 MG  tablet  Commonly known as: PEPCID   20 mg, Oral, 2 Times Daily      Farxiga 10 MG tablet  Generic drug: dapagliflozin Propanediol   10 mg, Oral, Daily      FLUoxetine 20 MG capsule  Commonly known as: PROzac   20 mg, Oral, 2 Times Daily      Fluticasone Furoate-Vilanterol 100-25 MCG/ACT aerosol powder    1 puff, Inhalation, Daily      folic acid 400 MCG tablet  Commonly known as: FOLVITE   400 mcg, Oral, Daily, OTC      gabapentin 100 MG capsule  Commonly known as: NEURONTIN   100 mg, Oral, Every 12 Hours Scheduled      HYDROcodone-acetaminophen  MG per tablet  Commonly known as: NORCO   1 tablet, Oral, Every 6 Hours PRN      ipratropium-albuterol 0.5-2.5 mg/3 ml nebulizer  Commonly known as: DUO-NEB   3 mL, Nebulization, Every 6 Hours While Awake - RT      levothyroxine 125 MCG tablet  Commonly known as: SYNTHROID, LEVOTHROID   125 mcg, Oral, Every Morning      lidocaine 5 %  Commonly known as: LIDODERM   2 patches, Transdermal, Every 24 Hours Scheduled, Remove & Discard patch within 12 hours or as directed by MD. Place on R posterior shoulder      magnesium oxide 400 tablet tablet  Commonly known as: MAG-OX   800 mg, Oral, Daily      metFORMIN 1000 MG tablet  Commonly known as: GLUCOPHAGE   1,000 mg, Oral, 2 Times Daily With Meals      methotrexate PF 50 MG/2ML chemo syringe   50 mg, Intramuscular, Weekly, Every Tuesday (HOLD WHILE AT [facility])      O2  Commonly known as: OXYGEN   2 L/min, Inhalation, Continuous      ondansetron 4 MG tablet  Commonly known as: ZOFRAN   4 mg, Oral, Every 8 Hours PRN      predniSONE 5 MG tablet  Commonly known as: DELTASONE   5 mg, Oral, Daily, For 90 days, started 12-12-22 ongoing therapy      Robaxin 500 MG tablet  Generic drug: methocarbamol   500 mg, Oral, 4 Times Daily After Meals & at Bedtime      rosuvastatin 20 MG tablet  Commonly known as: CRESTOR   20 mg, Oral, Nightly      traZODone 100 MG tablet  Commonly known as: DESYREL   100 mg, Oral, Nightly      zolpidem 10 MG  tablet  Commonly known as: AMBIEN   5 mg, Oral, Nightly             Stop These Medications      INSULIN ASPART FLEXPEN SC     terbinafine 250 MG tablet  Commonly known as: lamiSIL              No Known Allergies      Discharge Disposition:  Skilled Nursing Facility (DC - External)    Diet:  Hospital:  Diet Order   Procedures    Diet: Cardiac, Diabetic; Healthy Heart (2-3 Na+); Consistent Carbohydrate; Texture: Regular (IDDSI 7); Fluid Consistency: Thin (IDDSI 0)       Diet Instructions       Diet: Cardiac Diets, Diabetic Diets; Healthy Heart (2-3 Na+); Regular (IDDSI 7); Thin (IDDSI 0); Consistent Carbohydrate      Discharge Diet:  Cardiac Diets  Diabetic Diets       Cardiac Diet: Healthy Heart (2-3 Na+)    Texture: Regular (IDDSI 7)    Fluid Consistency: Thin (IDDSI 0)    Diabetic Diet: Consistent Carbohydrate             Activity:  Activity Instructions       Activity as Tolerated              Restrictions or Other Recommendations:         CODE STATUS:    Code Status and Medical Interventions:   Ordered at: 07/15/24 8388     Level Of Support Discussed With:    Patient     Code Status (Patient has no pulse and is not breathing):    CPR (Attempt to Resuscitate)     Medical Interventions (Patient has pulse or is breathing):    Full Support       No future appointments.    Additional Instructions for the Follow-ups that You Need to Schedule       Discharge Follow-up with PCP   As directed       Currently Documented PCP:    Patel Evans MD    PCP Phone Number:    [phone]     Follow Up Details: 1 week                      Florian Perez DO  07/22/24      Time Spent on Discharge:  I spent  40  minutes on this discharge activity which included: face-to-face encounter with the patient, reviewing the data in the system, coordination of the care with the nursing staff as well as consultants, documentation, and entering orders.

## 2024-07-22 NOTE — CASE MANAGEMENT/SOCIAL WORK
Case Management Discharge Note      Final Note: Plan is [facility]. [facility] van will transport at 14:30. Patient will need to be in the 17[address] building lobby by 14:15.         Selected Continued Care - Admitted Since 7/15/2024       Destination Coordination complete.      Service Provider Selected Services Address Phone Fax Patient Preferred    [facility] [address] [id number] [phone] --              Durable Medical Equipment    No services have been selected for the patient.                Dialysis/Infusion    No services have been selected for the patient.                Home Medical Care    No services have been selected for the patient.                Therapy    No services have been selected for the patient.                Community Resources    No services have been selected for the patient.                Community & DME    No services have been selected for the patient.                         Final Discharge Disposition Code: 62 - inpatient rehab facility

## 2024-07-22 NOTE — CASE MANAGEMENT/SOCIAL WORK
Case Management Discharge Note      Final Note: Plan is [facility]. Daughter will transport. Nurse to call report to [phone] and fax discharge summary to [phone].         Selected Continued Care - Admitted Since 7/15/2024       Destination Coordination complete.      Service Provider Selected Services Address Phone Fax Patient Preferred    [facility] [address] [facility] [address] [facility] [address] [phone] --              Durable Medical Equipment    No services have been selected for the patient.                Dialysis/Infusion    No services have been selected for the patient.                Home Medical Care    No services have been selected for the patient.                Therapy    No services have been selected for the patient.                Community Resources    No services have been selected for the patient.                Community & DME    No services have been selected for the patient.                         Final Discharge Disposition Code: 62 - inpatient rehab facility

## 2024-07-22 NOTE — THERAPY TREATMENT NOTE
Patient Name: Charly Blevins  : 1955    MRN: [id number]                              Today's Date: 2024       Admit Date: 7/15/2024    Visit Dx:     ICD-10-CM ICD-9-CM   1. Acute on chronic respiratory failure with hypoxia  J96.21 518.84     799.02   2. Acute on chronic congestive heart failure, unspecified heart failure type  I50.9 428.0   3. Multiple falls  R29.6 V15.88   4. Anemia, unspecified type  D64.9 285.9   5. Gastrointestinal hemorrhage, unspecified gastrointestinal hemorrhage type  K92.2 578.9   6. Melena  K92.1 578.1     Patient Active Problem List   Diagnosis    Closed intertrochanteric fracture of hip, right, initial encounter    Pubic ramus fracture    Primary hypertension    Dyslipidemia    Type 2 diabetes mellitus, without long-term current use of insulin    Rheumatoid arthritis involving multiple sites    A-fib    Acute hypoxic respiratory failure    On chronic immunosuppressive therapy    GERD    Obesity (BMI 30-39.9)    Hypothyroidism (acquired)    Anxiety and depression    Gout    Steroid-induced hyperglycemia    Pneumonia of right lower lobe due to infectious organism    Acute conjunctivitis of left eye    Acute respiratory failure with hypoxia    Nocturnal hypoxia (baseline 2-3L NC QHS)    Immunosuppressed status    Insomnia    Symptomatic anemia    Acute respiratory failure with hypoxia    Acute on chronic heart failure with preserved ejection fraction    Gout    Mixed hyperlipidemia    Gastrointestinal hemorrhage    Moderate malnutrition     Past Medical History:   Diagnosis Date    Anxiety and depression 2022    Atrial fibrillation 2015    CARDIOVERSION - NO REOCCURANCE SINCE     Diabetes mellitus     Gout 2022    Hyperlipidemia     Hypertension     Hypothyroidism 2022    Obesity (BMI 30-39.9) 2022     Past Surgical History:   Procedure Laterality Date    APPENDECTOMY      CHOLECYSTECTOMY      COLOSTOMY      RESULTED FROM HYSTERECTOMY     COLOSTOMY  CLOSURE      WHILE DOING COLOSTOMY CLOSURE; ENDED UP HAVING A ILEOSTOMY    HEMORRHOIDECTOMY      HIP TROCHANTERIC NAILING WITH INTRAMEDULLARY HIP SCREW Right 3/27/2019    Procedure: HIP TROCANTERIC NAILING WITH INTRAMEDULLARY HIP SCREW RIGHT;  Surgeon: Jona Tom MD;  Location: [facility];  Service: Orthopedics    HYSTERECTOMY      LUMBAR DISCECTOMY      L4 - L5    OTHER SURGICAL HISTORY      TUBAL REVERSAL    THYROIDECTOMY      TUBAL ABDOMINAL LIGATION      X 2      General Information       Row Name 07/22/24 0929          OT Time and Intention    Document Type therapy note (daily note)  -KF     Mode of Treatment occupational therapy;individual therapy  -KF       Row Name 07/22/24 0929          General Information    Patient Profile Reviewed yes  -KF     Existing Precautions/Restrictions fall;oxygen therapy device and L/min;other (see comments)  RLE pain  -KF     Barriers to Rehab medically complex;previous functional deficit  -KF       Row Name 07/22/24 0929          Cognition    Orientation Status (Cognition) oriented x 3  -KF       Row Name 07/22/24 0929          Safety Issues, Functional Mobility    Safety Issues Affecting Function (Mobility) awareness of need for assistance;insight into deficits/self-awareness;positioning of assistive device;safety precaution awareness;safety precautions follow-through/compliance;sequencing abilities  -KF     Impairments Affecting Function (Mobility) balance;endurance/activity tolerance;shortness of breath;strength;pain;postural/trunk control  -KF               User Key  (r) = Recorded By, (t) = Taken By, (c) = Cosigned By      Initials Name Provider Type    KF Madelin Tee OT Occupational Therapist                     Mobility/ADL's       Row Name 07/22/24 0929          Bed Mobility    Bed Mobility supine-sit  -KF     Supine-Sit [place] (Bed Mobility) minimum assist (75% patient effort);1 person assist;verbal cues  -KF     Assistive Device (Bed Mobility)  bed rails;head of bed elevated  -KF     Comment, (Bed Mobility) Increased time and effort needed  -KF       Row Name 07/22/24 0929          Transfers    Transfers sit-stand transfer;stand-sit transfer  -KF     Comment, (Transfers) Verbal cues for safe hand placement and keep RWx close to base of support prior to sitting  -KF       Row Name 07/22/24 0929          Sit-Stand Transfer    Sit-Stand [place] (Transfers) contact guard;verbal cues  -KF     Assistive Device (Sit-Stand Transfers) walker, front-wheeled  -KF       Row Name 07/22/24 0929          Stand-Sit Transfer    Stand-Sit [place] (Transfers) contact guard;verbal cues  -KF     Assistive Device (Stand-Sit Transfers) walker, front-wheeled  -KF       Row Name 07/22/24 0929          Functional Mobility    Functional Mobility- Ind. Level contact guard assist  -     Functional Mobility- Device walker, front-wheeled  -KF     Functional Mobility-Distance (Feet) --  <HH distance  -KF     Functional Mobility- Comment Pt requiring two standing rest breaks due to RLE pain and feeling SOA. O2 monitored and found 96% on 2L NC.  -       Row Name 07/22/24 0929          Activities of Daily Living    BADL Assessment/Intervention upper body dressing;grooming  -[facility] Name 07/22/24 0929          Grooming Assessment/Training    [place] Level (Grooming) wash face, hands;set up  -KF     Position (Grooming) supported sitting  -[facility] Name 07/22/24 0929          Upper Body Dressing Assessment/Training    [place] Level (Upper Body Dressing) don;front opening garment;minimum assist (75% patient effort)  -KF     Position (Upper Body Dressing) edge of bed sitting  -               User Key  (r) = Recorded By, (t) = Taken By, (c) = Cosigned By      Initials Name Provider Type    KF Madelin Tee OT Occupational Therapist                   Obj/Interventions       Row Name 07/22/24 0931          Balance    Balance Assessment sitting static  balance;sitting dynamic balance;sit to stand dynamic balance;standing static balance;standing dynamic balance  -KF     Static Sitting Balance standby assist  -KF     Dynamic Sitting Balance standby assist  -KF     Position, Sitting Balance unsupported;sitting edge of bed  -KF     Sit to Stand Dynamic Balance contact guard;verbal cues  -KF     Static Standing Balance contact guard  -KF     Dynamic Standing Balance contact guard  -KF     Position/Device Used, Standing Balance supported;walker, front-wheeled  -KF     Balance Interventions sitting;standing;sit to stand;supported;static;dynamic;occupation based/functional task  -KF               User Key  (r) = Recorded By, (t) = Taken By, (c) = Cosigned By      Initials Name Provider Type    KF Madelin Tee, SIDNEY Occupational Therapist                   Goals/Plan    No documentation.                  Clinical Impression       Row Name 07/22/24 0931          Pain Assessment    Pretreatment Pain Rating 4/10  -KF     Posttreatment Pain Rating 4/10  -KF     Pain Location - Side/Orientation Right  -KF     Pain Location lower  -KF     Pain Location - extremity  -KF     Pain Intervention(s) Repositioned;Ambulation/increased activity;Rest;Elevated  -KF       Row Name 07/22/24 0931          Plan of Care Review    Plan of Care Reviewed With patient  -KF     Progress improving  -KF     Outcome Evaluation Pt with good participation and progress toward goals during OT session. The pt ambulated <HH distance using a RWx with CGA, requiring two standing rest breaks due to RLE pain and SOA. O2 monitored and remained >95% on 2L NC during session. Grooming ADLs performed seated with set up this date. The pt remains below her functional baseline with generalized weakness, decreased activity tolerance, and balance deficits warranting continued IP OT services. Continue to recommend a d/c to IRF for best outcome.  -KF       Row Name 07/22/24 0931          Therapy Assessment/Plan (OT)     Rehab Potential (OT) good, to achieve stated therapy goals  -KF     Criteria for Skilled Therapeutic Interventions Met (OT) yes;meets criteria;skilled treatment is necessary  -KF     Therapy Frequency (OT) daily  -KF       Row Name 07/22/24 0931          Therapy Plan Review/Discharge Plan (OT)    Anticipated Discharge Disposition (OT) inpatient rehabilitation facility  -       Row Name 07/22/24 0931          Vital Signs    Pre Systolic BP Rehab 120  -KF     Pre Treatment Diastolic BP 67  -KF     Pretreatment Heart Rate (beats/min) 80  -KF     Pre SpO2 (%) 97  2L  -KF     O2 Delivery Pre Treatment nasal cannula  -KF     Pre Patient Position Supine  -KF     Intra Patient Position Standing  -KF     Post Patient Position Sitting  -KF       Row Name 07/22/24 0931          Positioning and Restraints    Pre-Treatment Position in bed  -KF     Post Treatment Position chair  -KF     In Chair notified nsg;reclined;call light within reach;encouraged to call for assist;exit alarm on;waffle cushion;legs elevated  -KF               User Key  (r) = Recorded By, (t) = Taken By, (c) = Cosigned By      Initials Name Provider Type    Madelin Gonzalez OT Occupational Therapist                   Outcome Measures       Row Name 07/22/24 0934          How much help from another is currently needed...    Putting on and taking off regular lower body clothing? 1  -KF     Bathing (including washing, rinsing, and drying) 2  -KF     Toileting (which includes using toilet bed pan or urinal) 2  -KF     Putting on and taking off regular upper body clothing 3  -KF     Taking care of personal grooming (such as brushing teeth) 3  -KF     Eating meals 4  -KF     AM-PAC 6 Clicks Score (OT) 15  -KF       Row Name 07/22/24 0934          Functional Assessment    Outcome Measure Options AM-PAC 6 Clicks Daily Activity (OT)  -KF               User Key  (r) = Recorded By, (t) = Taken By, (c) = Cosigned By      Initials Name Provider Type    RANJITH Tee  SIDNEY Yusuf Occupational Therapist                    Occupational Therapy Education       Title: PT OT SLP Therapies (In Progress)       Topic: Occupational Therapy (In Progress)       Point: ADL training (Done)       Description:   Instruct learner(s) on proper safety adaptation and remediation techniques during self care or transfers.   Instruct in proper use of assistive devices.                  Learning Progress Summary             Patient Acceptance, E,TB, VU,DU by KF at 7/22/2024 0810    Acceptance, E, VU by MR at 7/17/2024 1403                         Point: Home exercise program (Not Started)       Description:   Instruct learner(s) on appropriate technique for monitoring, assisting and/or progressing therapeutic exercises/activities.                  Learner Progress:  Not documented in this visit.              Point: Precautions (Done)       Description:   Instruct learner(s) on prescribed precautions during self-care and functional transfers.                  Learning Progress Summary             Patient Acceptance, E,TB, VU,DU by KF at 7/22/2024 0810    Acceptance, E, VU by MR at 7/17/2024 1403                         Point: Body mechanics (Done)       Description:   Instruct learner(s) on proper positioning and spine alignment during self-care, functional mobility activities and/or exercises.                  Learning Progress Summary             Patient Acceptance, E,TB, VU,DU by KF at 7/22/2024 0810    Acceptance, E, VU by MR at 7/17/2024 1403                                         User Key       Initials Effective Dates Name Provider Type Discipline     09/22/22 -  Marichuy Holt OT Occupational Therapist OT     08/09/23 -  Madelin Tee OT Occupational Therapist OT                  OT Recommendation and Plan  Therapy Frequency (OT): daily  Plan of Care Review  Plan of Care Reviewed With: patient  Progress: improving  Outcome Evaluation: Pt with good participation and progress toward goals  during OT session. The pt ambulated <HH distance using a RWx with CGA, requiring two standing rest breaks due to RLE pain and SOA. O2 monitored and remained >95% on 2L NC during session. Grooming ADLs performed seated with set up this date. The pt remains below her functional baseline with generalized weakness, decreased activity tolerance, and balance deficits warranting continued IP OT services. Continue to recommend a d/c to IRF for best outcome.     Time Calculation:         Time Calculation- OT       Row Name 07/22/24 0934             Time Calculation- OT    OT Start Time 0810  -KF      OT Received On 07/22/24  -KF      OT Goal Re-Cert Due Date 07/27/24  -KF         Timed Charges    04271 - OT Therapeutic Activity Minutes 13  -KF      02780 - OT Self Care/Mgmt Minutes 10  -KF         Total Minutes    Timed Charges Total Minutes 23  -KF       Total Minutes 23  -KF                User Key  (r) = Recorded By, (t) = Taken By, (c) = Cosigned By      Initials Name Provider Type    KF Madelin Tee OT Occupational Therapist                  Therapy Charges for Today       Code Description Service Date Service Provider Modifiers Qty    54988561117  OT THERAPEUTIC ACT EA 15 MIN 7/22/2024 Madelin Tee OT GO 1    [id number] HC OT SELF CARE/MGMT/TRAIN EA 15 MIN 7/22/2024 Madelin Tee OT GO 1                 Madelin Tee OT  7/22/2024

## 2024-07-22 NOTE — DISCHARGE PLACEMENT REQUEST
"Cally Fuller (68 y.o. Female)       Date of Birth   1955    Social Security Number       Address   [address] [address]    Home Phone   [phone]    MRN   [id number]       Advent   [employer]    Marital Status                               Admission Date   7/15/24    Admission Type   Emergency    Admitting Provider   Florian Perez DO    Attending Provider   Florian Perez DO    Department, Room/Bed   [facility] 3E, S347/1       Discharge Date       Discharge Disposition   Skilled Nursing Facility (DC - External)    Discharge Destination                                 Attending Provider: Florian Perez DO    Allergies: No Known Allergies    Isolation: None   Infection: None   Code Status: CPR    Ht: 167 cm (65.75\")   Wt: 81 kg (178 lb 9.2 oz)    Admission Cmt: None   Principal Problem: Symptomatic anemia [D64.9]                   Active Insurance as of 7/15/2024       Primary Coverage       Payor Plan Insurance Group Employer/Plan Group    MEDICARE MEDICARE A & B        Payor Plan Address Payor Plan Phone Number Payor Plan Fax Number Effective Dates    [address] [phone]  7/1/2015 - None Entered    [facility] [id number]         Subscriber Name Subscriber Birth Date Member ID       CALLY FULLER 1955 [id number]               Secondary Coverage       Payor Plan Insurance Group Employer/Plan Group    ANTH BLUE CROSS [facility] SUPP [id number]       Payor Plan Address Payor Plan Phone Number Payor Plan Fax Number Effective Dates    [address]   12/1/2016 - None Entered    [facility] [id number]         Subscriber Name Subscriber Birth Date Member ID       CALLY FULLER 1955 [id number]                     Emergency Contacts        (Rel.) Home Phone Work Phone Mobile Phone    ROSEANN NOBLE (Daughter) -- -- [phone]                 Discharge Summary        Florian Perez DO at 07/22/24 1129        "       [facility] Medicine Services  DISCHARGE SUMMARY    Patient Name: Charly Blevins  : 1955  MRN: [id number]    Date of Admission: 7/15/2024  2:07 PM  Date of Discharge:  2024  Primary Care Physician: Patel Evans MD    Consults       Date and Time Order Name Status Description    2024  7:24 AM Inpatient Cardiology Consult Completed     7/15/2024  5:57 PM Inpatient Gastroenterology Consult Completed             Hospital Course     Presenting Problem: Acute on chronic hypoxic respiratory failure    Active Hospital Problems    Diagnosis  POA    Hordeolum externum of left upper eyelid [H00.014]  Yes    Moderate malnutrition [E44.0]  Yes    Hypothyroidism (acquired) [E03.9]  Yes    A-fib [I48.91]  Yes    Type 2 diabetes mellitus, without long-term current use of insulin [E11.9]  Yes    Primary hypertension [I10]  Yes    Rheumatoid arthritis involving multiple sites [M06.9]  Yes      Resolved Hospital Problems    Diagnosis Date Resolved POA    **Symptomatic anemia [D64.9] 2024 Yes    Acute on chronic respiratory failure with hypoxia [J96.21] 2024 Yes    Acute on chronic heart failure with preserved ejection fraction [I50.33] 2024 Yes    Gastrointestinal hemorrhage [K92.2] 2024 Yes          Hospital Course:  Charly Blevins is a 68 y.o. female w/ past medical history of atrial fibrillation on Eliquis, hypertension, type 2 diabetes mellitus, RA, hypothyroidism, was admitted for acute on chronic hypoxic respiratory failure after presenting with progressive fatigue and shortness of breath.  She had fallen on  and subsequently developed confusion later that evening.  Imaging revealed right pectoralis hematoma.  Patient was found to suffer from acute diastolic heart failure and atrial fibrillation with RVR resulting in her hypoxia. Cardiology was consulted, patient was treated with digoxin and metoprolol with improvement in rate control.  She  was also diuresed with improvement in respiratory symptoms.  Her hospital course was complicated by the development of anemia with melenic stool and positive stool occult.  GI was consulted and performed upper and lower endoscopies on 7/20 significant for colonic polyps.  She was restarted on Eliquis and remained hemodynamically stable thereafter.  Patient worked with physical therapy and Occupational Therapy, determined to benefit from rehab upon discharge.  Patient determined to be stable for discharge.     Acute on chronic hypoxic resp failure, resolved  Acute HFpEF   Chronic 3L o2 dependence (unclear etiology)  Afib w/ rvr  Elevated troponin   Right chest wall pectoralis hematoma, traumatic   -Weaned to baseline O2 after diuresis   -Rate controlled after digoxin   -Cardiology followed: continue metoprolol 25mg po bid, holding eliquis, follow up w/ cardiology as outpatient for consideration of watchman if unable to anticoagulate  -Restarted Eliquis, monitor for bleeding     Acute symptomatic anemia  Iron deficiency anemia   GI bleed (dark stool, guaic +)  S/p EGD and Colonoscopy s/p polypectomy   N/V/D, resolved  -ct c/a/p without overt source infection  **s/p 1 unit prbc (hgb 7.6 with symptoms of hypotension, dyspnea in setting of hypotension and afib rvr)  -serial hgb stable  -Completed 3 days of Ferrlecit   -GI consulted; s/p endoscopy on 7/20  -Colonoscopy revealed hemorrhoids, polyps in the ascending, transverse and descending colon which were all resected, diverticulosis  -EGD w gastritis   -Advance diet, repeat colonoscopy in 3 years, pathology pending     Leukocytosis  Elevated procal  -unclear etiology, perhaps possible atypical pneumonia?  -ct c/a/p without overt source infection  -u/a benign  -resp mrsa pcr negative  -stopped vanc & zosyn  -Completed 5 day course of doxycycline      RLE edema  -venous duplex negative RLE for dvt     Encephalopathy, resolved  -ct head negative  -u/a benign  -likely was  due to sleep deprivation, acute illness  -continue melatonin, nightly restoril     Chronic back pain  -on lortab  -heating pad     Hypokalemia and hypomagnesemia  -replace per protocol     Dm2   -A1c 6.1  -restart home metformin on discharge      Hypothyroidism  -tsh wnl1.8; continue levothyroxine     Hx RA  -Continue prednisone     Acute on chronic migraine  -Imitrex prn       Discharge Follow Up Recommendations for outpatient labs/diagnostics:  Follow-up with PCP in 1 week  Follow-up with cardiology in 1 month    Day of Discharge     HPI:   Patient reports feeling well this morning aside from intermittent itching throughout her body and a slight migraine that improved with Imitrex yesterday.  She states she is able to tolerate her diet, complains only of mild upper abdominal discomfort, denies nausea.  States she feels ready to leave for rehab.    Vital Signs:   Temp:  [94.4 °F (34.7 °C)-98.3 °F (36.8 °C)] 98.1 °F (36.7 °C)  Heart Rate:  [] 104  Resp:  [16-20] 18  BP: (115-147)/(67-93) 120/67  Flow (L/min):  [2] 2      Physical Exam:  General appearance: alert, awake, oriented, no acute distress   Cardiovascular: Irregularly irregular, no murmurs or rubs, radial pulse full 2/4 BL   Respiratory: CTAB, no crackles or wheezes on 2L NC  Abdomen: soft, mild epigastric/LUQ TTP, no organomegaly, bowel sounds normoactive    Neuro/CNS: alert and oriented x3, normal speech, pupils ERRL  Skin: no rashes visualized. Stye of left upper eyelid     Pertinent  and/or Most Recent Results     LAB RESULTS:      Lab 07/22/24  0447 07/21/24  1047 07/20/24  1110 07/19/24  1957 07/19/24  1839 07/19/24  0439 07/18/24  2106 07/18/24  0531 07/16/24  1038 07/16/24  0516 07/15/24  1639 07/15/24  1447   WBC 9.70 8.82 7.77  --   --  8.78  --  8.24   < > 10.50  --  11.31*   HEMOGLOBIN 8.9* 8.3* 9.2* 9.2* 9.2* 8.5*   < > 7.7*   < > 9.7*  --  7.6*   HEMATOCRIT 31.5* 29.7* 34.1 32.7* 32.7* 30.7*   < > 27.8*   < > 33.2*  --  27.0*   PLATELETS  257 291 297  --   --  317  --  297   < > 367  --  373   NEUTROS ABS  --   --   --   --   --   --   --   --   --  8.11*  --  9.25*   IMMATURE GRANS (ABS)  --   --   --   --   --   --   --   --   --  0.07*  --  0.08*   LYMPHS ABS  --   --   --   --   --   --   --   --   --  1.04  --  0.98   MONOS ABS  --   --   --   --   --   --   --   --   --  1.08*  --  0.94*   EOS ABS  --   --   --   --   --   --   --   --   --  0.19  --  0.04   MCV 89.5 89.7 93.9  --   --  90.0  --  89.4   < > 84.1  --  84.1   SED RATE  --   --   --   --   --   --   --   --   --   --   --  31*   CRP  --   --   --   --   --   --   --   --   --   --  14.75*  --    PROCALCITONIN  --   --   --   --   --   --   --   --   --   --   --  12.49*   LACTATE  --   --   --   --   --   --   --   --   --   --   --  1.7    < > = values in this interval not displayed.         Lab 07/22/24  0447 07/21/24  1509 07/20/24  1110 07/19/24  0439 07/18/24  0531 07/17/24  0724 07/16/24  1544 07/16/24  0516 07/15/24  1447   SODIUM 140 140 141 144 141 138  --  143 139   POTASSIUM 4.1 4.2 4.0 4.2 4.3 4.9   < > 3.0* 3.6   CHLORIDE 101 102 103 105 103 100  --  100 100   CO2 30.0* 33.0* 30.0* 34.0* 32.0* 30.0*  --  33.0* 29.0   ANION GAP 9.0 5.0 8.0 5.0 6.0 8.0  --  10.0 10.0   BUN 9 7* 6* 6* 10 9  --  10 16   CREATININE 0.42* 0.61 0.37* 0.43* 0.40* 0.66  --  0.62 0.68   EGFR 106.0 96.9 109.3 105.4 107.3 95.1  --  96.5 94.4   GLUCOSE 109* 150* 128* 86 120* 107*  --  96 174*   CALCIUM 8.1* 7.7* 8.3* 7.5* 7.7* 7.5*  --  7.1* 7.7*   MAGNESIUM  --   --   --  1.5* 1.7 2.0  --  1.3* 1.8   HEMOGLOBIN A1C  --   --   --   --   --   --   --   --  6.10*   TSH  --   --   --   --   --   --   --  1.890  --     < > = values in this interval not displayed.         Lab 07/16/24  0516 07/15/24  1447   TOTAL PROTEIN 5.8* 5.1*   ALBUMIN 2.6* 2.7*   GLOBULIN 3.2 2.4   ALT (SGPT) 12 12   AST (SGOT) 21 18   BILIRUBIN 1.2 0.5   ALK PHOS 64 71         Lab 07/15/24  1639 07/15/24  1447   PROBNP  --   8,823.0*   HSTROP T 52* 61*             Lab 07/17/24  0724 07/15/24  1639   IRON 23*  --    IRON SATURATION (TSAT) 10*  --    TIBC 228*  --    TRANSFERRIN 153*  --    ABO TYPING  --  A   RH TYPING  --  Positive   ANTIBODY SCREEN  --  Negative         Lab 07/16/24  0513 07/15/24  1511   PH, ARTERIAL 7.590* 7.486*   PCO2, ARTERIAL 34.2* 39.5   PO2 .0* 65.9*   FIO2 45 44   HCO3 ART 32.8* 29.8*   BASE EXCESS ART 10.5* 5.9*   CARBOXYHEMOGLOBIN 1.7 1.7     Brief Urine Lab Results  (Last result in the past 365 days)        Color   Clarity   Blood   Leuk Est   Nitrite   Protein   CREAT   Urine HCG        07/15/24 1440 Yellow   Clear   Negative   Negative   Negative   Negative                 Microbiology Results (last 10 days)       Procedure Component Value - Date/Time    Respiratory Panel PCR w/COVID-19(SARS-CoV-2) GEOVANI/ROSALIE/HÉCTOR/PAD/COR/EDISON In-House, NP Swab in UTM/VTM, 2 HR TAT - Swab, Nasopharynx [[id number]]  (Normal) Collected: 07/16/24 1038    Lab Status: Final result Specimen: Swab from Nasopharynx Updated: 07/16/24 1148     ADENOVIRUS, PCR Not Detected     Coronavirus 229E Not Detected     Coronavirus HKU1 Not Detected     Coronavirus NL63 Not Detected     Coronavirus OC43 Not Detected     COVID19 Not Detected     Human Metapneumovirus Not Detected     Human Rhinovirus/Enterovirus Not Detected     Influenza A PCR Not Detected     Influenza B PCR Not Detected     Parainfluenza Virus 1 Not Detected     Parainfluenza Virus 2 Not Detected     Parainfluenza Virus 3 Not Detected     Parainfluenza Virus 4 Not Detected     RSV, PCR Not Detected     Bordetella pertussis pcr Not Detected     Bordetella parapertussis PCR Not Detected     Chlamydophila pneumoniae PCR Not Detected     Mycoplasma pneumo by PCR Not Detected    Narrative:      In the setting of a positive respiratory panel with a viral infection PLUS a negative procalcitonin without other underlying concern for bacterial infection, consider observing off  antibiotics or discontinuation of antibiotics and continue supportive care. If the respiratory panel is positive for atypical bacterial infection (Bordetella pertussis, Chlamydophila pneumoniae, or Mycoplasma pneumoniae), consider antibiotic de-escalation to target atypical bacterial infection.    MRSA Screen, PCR (Inpatient) - Swab, Nares [[id number]]  (Normal) Collected: 07/15/24 1851    Lab Status: Final result Specimen: Swab from Nares Updated: 07/15/24 2010     MRSA PCR Negative    Narrative:      The negative predictive value of this diagnostic test is high and should only be used to consider de-escalating anti-MRSA therapy. A positive result may indicate colonization with MRSA and must be correlated clinically.  MRSA Negative    Blood Culture - Blood, Wrist, Left [[id number]]  (Normal) Collected: 07/15/24 1526    Lab Status: Final result Specimen: Blood from Wrist, Left Updated: 07/20/24 1546     Blood Culture No growth at 5 days    Narrative:      Less than seven (7) mL's of blood was collected.  Insufficient quantity may yield false negative results.    Blood Culture - Blood, Arm, Right [[id number]]  (Normal) Collected: 07/15/24 1447    Lab Status: Final result Specimen: Blood from Arm, Right Updated: 07/20/24 1515     Blood Culture No growth at 5 days    Narrative:      Less than seven (7) mL's of blood was collected.  Insufficient quantity may yield false negative results.            Adult Transthoracic Echo Complete W/ Cont if Necessary Per Protocol    Result Date: 7/16/2024    Left ventricular ejection fraction appears to be 56 - 60%.   Left ventricular diastolic function was normal.   The right ventricular cavity is borderline dilated.   The left atrial cavity is dilated.     XR Chest 1 View    Result Date: 7/16/2024  XR CHEST 1 VW Date of Exam: 7/16/2024 7:31 AM EDT Indication: hypoxia, chf (after diuresis) Comparison: 7/15/2024 Findings: Implanted loop recorder is again noted. The heart is enlarged.  Prominent mediastinal shadow is in part due to rotation of the patient to the right on today's exam, as well as significant superior mediastinal fat as seen on yesterday's chest CT scan. Mild  patchy disease in the right midlung is improving, now appearing as linear atelectasis. There is mild pulmonary venous hypertension without evidence of congestive failure, and mild nonspecific interstitial lung changes appear stable. No effusion or pneumothorax is seen.     Impression: 1. Cardiomegaly and mild pulmonary venous hypertension. 2. Mild remaining right midlung discoid atelectasis. No new chest disease is seen. Electronically Signed: Heath Prado MD  7/16/2024 8:02 AM EDT  Workstation ID: [id number]    XR Chest 1 View    Result Date: 7/15/2024  XR CHEST 1 VW Date of Exam: 7/15/2024 3:53 PM EDT Indication: Weak/Dizzy/AMS triage protocol Comparison: Chest CT performed on the same day. Findings: Mild linear atelectasis in the right upper and middle lobes is visualized. There is mild elevation of the right hemidiaphragm. There is no evidence of pneumothorax. There is mild pulmonary vascular congestion. Cardiac silhouette is enlarged. Loop recorder overlies the lower thoracic spine. No acute osseous abnormality identified.     Impression: Mild linear atelectasis in the right upper and middle lobes. Findings compatible with mild CHF. Electronically Signed: Jay Andrew MD  7/15/2024 4:21 PM EDT  Workstation ID: [id number]    Duplex Venous Lower Extremity - RIGHT    Result Date: 7/15/2024    Normal right lower extremity venous duplex scan.     CT Head Without Contrast    Result Date: 7/15/2024  CT HEAD WO CONTRAST, CT ABDOMEN PELVIS WO CONTRAST, CT CHEST WO CONTRAST DIAGNOSTIC Date of Exam: 7/15/2024 2:24 PM EDT Indication: AMS. Comparison: Previous head CT scan 1/19/2023. Angiographic chest CT scan 1/20/2023. No prior abdominal scan. Technique: Axial CT images were obtained of the head, chest abdomen pelvis without contrast  "administration.  Automated exposure control and iterative construction methods were used. Findings: No additional clinical history is currently available. CT SCAN OF THE HEAD WITHOUT CONTRAST: The calvarium appears intact. Included paranasal sinuses and mastoids appear clear. There is expected degree of generalized cerebral atrophy for the patient's age. No hemorrhage, contusion, edema, or infarct is seen.  There is no evidence of mass or mass effect, hydrocephalus, or abnormal extra-axial collection.     No evidence of acute intracranial disease. CT SCAN OF THE CHEST WITHOUT CONTRAST: Axial images 20 through 46 show multiple well-defined hyperdense rounded lesions within and dorsal to the right pectoralis minor, resembling soft tissue masses, but similar to density of acute blood, and with the lesion on image #27 appearing to show a potential hematocrit level, possibly all intramuscular hematoma. The largest of these measures approximately 3 cm in maximal diameter. No similar findings are seen elsewhere and no underlying rib fractures identified. The heart is enlarged. No pericardial effusion or mediastinal adenopathy is seen. There is trace bilateral pleural effusion. No coronary calcification is seen. Images of the lungs appear to show prominent pulmonary vasculature and mild diffuse interstitial disease suggesting early interstitial edema. No particular focal pulmonary disease is seen to favor a pneumonia although a small focus of pneumonia. Bony structures appear to be intact. IMPRESSION: 1. 4 or 5 adjacent rounded hyperdense \"masses\" within and along the right pectoralis minor muscle, each between 2 and 3 cm in diameter, and favored to represent hematoma/intramuscular hemorrhage, rather than neoplasm. Please correlate with any history of  trauma here. 2. Minimal bibasilar pleural effusion, and small area of right upper lobe discoid atelectasis. No particular features to suggest pneumonia. 3. Cardiomegaly and " mild pulmonary vascular congestion, perhaps with early interstitial edema. ABDOMEN AND PELVIS CT SCAN WITHOUT CONTRAST: An area of slightly nodular left lower breast tissue is unchanged from 1/20/2023. No abdominal wall or retroperitoneal hematoma is seen. Clips are seen in the gallbladder fossa. No significant abnormalities are appreciated of the liver, pancreas, adrenal glands, or kidneys for non-IV contrast enhanced exam. Multiple splenic lesions, presumably cysts, are stable from the prior exam, the largest measuring water density, 3 cm in diameter image 33 series 2. No upper abdominal free air, ascites, adenopathy, or acute inflammatory change is seen. Bowel loops are nondistended. Regarding the lower abdomen and pelvis, there is evidence of previous extensive bowel surgery. Terminal ileum and cecum appear grossly normal. Appendix is not identified. There appears to be a distal ileal suture line, and suture line at the rectosigmoid  junction. These areas are clear of mass or inflammatory change. No intrapelvic free fluid mass or adenopathy is seen. Bladder is normally distended and normal in appearance. Uterus and ovaries are not identified, whether atrophic or surgically absent. Streak artifact is seen from the patient's right hip fixation hardware. There is an old healed right inferior pubic ramus fracture but no acute bony abnormality is seen. IMPRESSION: 1. No evidence of acute inflammatory process or other clearly acute intra-abdominal or intrapelvic disease. 2. Evidence of previous bowel surgery. No evidence of significant ileus or obstruction. Electronically Signed: Heath Prado MD  7/15/2024 2:53 PM EDT  Workstation ID: [id number]    CT Chest Without Contrast Diagnostic    Result Date: 7/15/2024  CT HEAD WO CONTRAST, CT ABDOMEN PELVIS WO CONTRAST, CT CHEST WO CONTRAST DIAGNOSTIC Date of Exam: 7/15/2024 2:24 PM EDT Indication: AMS. Comparison: Previous head CT scan 1/19/2023. Angiographic chest CT scan  "1/20/2023. No prior abdominal scan. Technique: Axial CT images were obtained of the head, chest abdomen pelvis without contrast administration.  Automated exposure control and iterative construction methods were used. Findings: No additional clinical history is currently available. CT SCAN OF THE HEAD WITHOUT CONTRAST: The calvarium appears intact. Included paranasal sinuses and mastoids appear clear. There is expected degree of generalized cerebral atrophy for the patient's age. No hemorrhage, contusion, edema, or infarct is seen.  There is no evidence of mass or mass effect, hydrocephalus, or abnormal extra-axial collection.     No evidence of acute intracranial disease. CT SCAN OF THE CHEST WITHOUT CONTRAST: Axial images 20 through 46 show multiple well-defined hyperdense rounded lesions within and dorsal to the right pectoralis minor, resembling soft tissue masses, but similar to density of acute blood, and with the lesion on image #27 appearing to show a potential hematocrit level, possibly all intramuscular hematoma. The largest of these measures approximately 3 cm in maximal diameter. No similar findings are seen elsewhere and no underlying rib fractures identified. The heart is enlarged. No pericardial effusion or mediastinal adenopathy is seen. There is trace bilateral pleural effusion. No coronary calcification is seen. Images of the lungs appear to show prominent pulmonary vasculature and mild diffuse interstitial disease suggesting early interstitial edema. No particular focal pulmonary disease is seen to favor a pneumonia although a small focus of pneumonia. Bony structures appear to be intact. IMPRESSION: 1. 4 or 5 adjacent rounded hyperdense \"masses\" within and along the right pectoralis minor muscle, each between 2 and 3 cm in diameter, and favored to represent hematoma/intramuscular hemorrhage, rather than neoplasm. Please correlate with any history of  trauma here. 2. Minimal bibasilar pleural " effusion, and small area of right upper lobe discoid atelectasis. No particular features to suggest pneumonia. 3. Cardiomegaly and mild pulmonary vascular congestion, perhaps with early interstitial edema. ABDOMEN AND PELVIS CT SCAN WITHOUT CONTRAST: An area of slightly nodular left lower breast tissue is unchanged from 1/20/2023. No abdominal wall or retroperitoneal hematoma is seen. Clips are seen in the gallbladder fossa. No significant abnormalities are appreciated of the liver, pancreas, adrenal glands, or kidneys for non-IV contrast enhanced exam. Multiple splenic lesions, presumably cysts, are stable from the prior exam, the largest measuring water density, 3 cm in diameter image 33 series 2. No upper abdominal free air, ascites, adenopathy, or acute inflammatory change is seen. Bowel loops are nondistended. Regarding the lower abdomen and pelvis, there is evidence of previous extensive bowel surgery. Terminal ileum and cecum appear grossly normal. Appendix is not identified. There appears to be a distal ileal suture line, and suture line at the rectosigmoid  junction. These areas are clear of mass or inflammatory change. No intrapelvic free fluid mass or adenopathy is seen. Bladder is normally distended and normal in appearance. Uterus and ovaries are not identified, whether atrophic or surgically absent. Streak artifact is seen from the patient's right hip fixation hardware. There is an old healed right inferior pubic ramus fracture but no acute bony abnormality is seen. IMPRESSION: 1. No evidence of acute inflammatory process or other clearly acute intra-abdominal or intrapelvic disease. 2. Evidence of previous bowel surgery. No evidence of significant ileus or obstruction. Electronically Signed: Heath Prado MD  7/15/2024 2:53 PM EDT  Workstation ID: [id number]    CT Abdomen Pelvis Without Contrast    Result Date: 7/15/2024  CT HEAD WO CONTRAST, CT ABDOMEN PELVIS WO CONTRAST, CT CHEST WO CONTRAST DIAGNOSTIC  "Date of Exam: 7/15/2024 2:24 PM EDT Indication: AMS. Comparison: Previous head CT scan 1/19/2023. Angiographic chest CT scan 1/20/2023. No prior abdominal scan. Technique: Axial CT images were obtained of the head, chest abdomen pelvis without contrast administration.  Automated exposure control and iterative construction methods were used. Findings: No additional clinical history is currently available. CT SCAN OF THE HEAD WITHOUT CONTRAST: The calvarium appears intact. Included paranasal sinuses and mastoids appear clear. There is expected degree of generalized cerebral atrophy for the patient's age. No hemorrhage, contusion, edema, or infarct is seen.  There is no evidence of mass or mass effect, hydrocephalus, or abnormal extra-axial collection.     No evidence of acute intracranial disease. CT SCAN OF THE CHEST WITHOUT CONTRAST: Axial images 20 through 46 show multiple well-defined hyperdense rounded lesions within and dorsal to the right pectoralis minor, resembling soft tissue masses, but similar to density of acute blood, and with the lesion on image #27 appearing to show a potential hematocrit level, possibly all intramuscular hematoma. The largest of these measures approximately 3 cm in maximal diameter. No similar findings are seen elsewhere and no underlying rib fractures identified. The heart is enlarged. No pericardial effusion or mediastinal adenopathy is seen. There is trace bilateral pleural effusion. No coronary calcification is seen. Images of the lungs appear to show prominent pulmonary vasculature and mild diffuse interstitial disease suggesting early interstitial edema. No particular focal pulmonary disease is seen to favor a pneumonia although a small focus of pneumonia. Bony structures appear to be intact. IMPRESSION: 1. 4 or 5 adjacent rounded hyperdense \"masses\" within and along the right pectoralis minor muscle, each between 2 and 3 cm in diameter, and favored to represent " hematoma/intramuscular hemorrhage, rather than neoplasm. Please correlate with any history of  trauma here. 2. Minimal bibasilar pleural effusion, and small area of right upper lobe discoid atelectasis. No particular features to suggest pneumonia. 3. Cardiomegaly and mild pulmonary vascular congestion, perhaps with early interstitial edema. ABDOMEN AND PELVIS CT SCAN WITHOUT CONTRAST: An area of slightly nodular left lower breast tissue is unchanged from 1/20/2023. No abdominal wall or retroperitoneal hematoma is seen. Clips are seen in the gallbladder fossa. No significant abnormalities are appreciated of the liver, pancreas, adrenal glands, or kidneys for non-IV contrast enhanced exam. Multiple splenic lesions, presumably cysts, are stable from the prior exam, the largest measuring water density, 3 cm in diameter image 33 series 2. No upper abdominal free air, ascites, adenopathy, or acute inflammatory change is seen. Bowel loops are nondistended. Regarding the lower abdomen and pelvis, there is evidence of previous extensive bowel surgery. Terminal ileum and cecum appear grossly normal. Appendix is not identified. There appears to be a distal ileal suture line, and suture line at the rectosigmoid  junction. These areas are clear of mass or inflammatory change. No intrapelvic free fluid mass or adenopathy is seen. Bladder is normally distended and normal in appearance. Uterus and ovaries are not identified, whether atrophic or surgically absent. Streak artifact is seen from the patient's right hip fixation hardware. There is an old healed right inferior pubic ramus fracture but no acute bony abnormality is seen. IMPRESSION: 1. No evidence of acute inflammatory process or other clearly acute intra-abdominal or intrapelvic disease. 2. Evidence of previous bowel surgery. No evidence of significant ileus or obstruction. Electronically Signed: Heath Prado MD  7/15/2024 2:53 PM EDT  Workstation ID: [id number]     Results  for orders placed during the hospital encounter of 07/15/24    Duplex Venous Lower Extremity - RIGHT    Interpretation Summary    Normal right lower extremity venous duplex scan.      Results for orders placed during the hospital encounter of 07/15/24    Duplex Venous Lower Extremity - RIGHT    Interpretation Summary    Normal right lower extremity venous duplex scan.      Results for orders placed during the hospital encounter of 07/15/24    Adult Transthoracic Echo Complete W/ Cont if Necessary Per Protocol    Interpretation Summary    Left ventricular ejection fraction appears to be 56 - 60%.    Left ventricular diastolic function was normal.    The right ventricular cavity is borderline dilated.    The left atrial cavity is dilated.      Plan for Follow-up of Pending Labs/Results:   Pending Labs       Order Current Status    Tissue Pathology Exam In process          Discharge Details        Discharge Medications        New Medications        Instructions Start Date   hydrOXYzine 25 MG tablet  Commonly known as: ATARAX   25 mg, Oral, 4 Times Daily PRN      melatonin 5 MG tablet tablet   5 mg, Oral, Nightly      SUMAtriptan 50 MG tablet  Commonly known as: IMITREX   Take one tablet at onset of headache. May repeat dose one time in 2 hours if headache not relieved.             Changes to Medications        Instructions Start Date   metoprolol tartrate 25 MG tablet  Commonly known as: LOPRESSOR  What changed: how much to take   25 mg, Oral, 2 Times Daily             Continue These Medications        Instructions Start Date   acetaminophen 325 MG tablet  Commonly known as: TYLENOL   650 mg, Oral, Every 4 Hours PRN      allopurinol 300 MG tablet  Commonly known as: ZYLOPRIM   1 tablet, Oral, Daily      apixaban 5 MG tablet tablet  Commonly known as: ELIQUIS   5 mg, Oral, 2 Times Daily      colesevelam 625 MG tablet  Commonly known as: WELCHOL   1,250 mg, Oral, 2 Times Daily With Meals      famotidine 20 MG  tablet  Commonly known as: PEPCID   20 mg, Oral, 2 Times Daily      Farxiga 10 MG tablet  Generic drug: dapagliflozin Propanediol   10 mg, Oral, Daily      FLUoxetine 20 MG capsule  Commonly known as: PROzac   20 mg, Oral, 2 Times Daily      Fluticasone Furoate-Vilanterol 100-25 MCG/ACT aerosol powder    1 puff, Inhalation, Daily      folic acid 400 MCG tablet  Commonly known as: FOLVITE   400 mcg, Oral, Daily, OTC      gabapentin 100 MG capsule  Commonly known as: NEURONTIN   100 mg, Oral, Every 12 Hours Scheduled      HYDROcodone-acetaminophen  MG per tablet  Commonly known as: NORCO   1 tablet, Oral, Every 6 Hours PRN      ipratropium-albuterol 0.5-2.5 mg/3 ml nebulizer  Commonly known as: DUO-NEB   3 mL, Nebulization, Every 6 Hours While Awake - RT      levothyroxine 125 MCG tablet  Commonly known as: SYNTHROID, LEVOTHROID   125 mcg, Oral, Every Morning      lidocaine 5 %  Commonly known as: LIDODERM   2 patches, Transdermal, Every 24 Hours Scheduled, Remove & Discard patch within 12 hours or as directed by MD. Place on R posterior shoulder      magnesium oxide 400 tablet tablet  Commonly known as: MAG-OX   800 mg, Oral, Daily      metFORMIN 1000 MG tablet  Commonly known as: GLUCOPHAGE   1,000 mg, Oral, 2 Times Daily With Meals      methotrexate PF 50 MG/2ML chemo syringe   50 mg, Intramuscular, Weekly, Every Tuesday (HOLD WHILE AT [facility])      O2  Commonly known as: OXYGEN   2 L/min, Inhalation, Continuous      ondansetron 4 MG tablet  Commonly known as: ZOFRAN   4 mg, Oral, Every 8 Hours PRN      predniSONE 5 MG tablet  Commonly known as: DELTASONE   5 mg, Oral, Daily, For 90 days, started 12-12-22 ongoing therapy      Robaxin 500 MG tablet  Generic drug: methocarbamol   500 mg, Oral, 4 Times Daily After Meals & at Bedtime      rosuvastatin 20 MG tablet  Commonly known as: CRESTOR   20 mg, Oral, Nightly      traZODone 100 MG tablet  Commonly known as: DESYREL   100 mg, Oral, Nightly      zolpidem 10 MG  tablet  Commonly known as: AMBIEN   5 mg, Oral, Nightly             Stop These Medications      INSULIN ASPART FLEXPEN SC     terbinafine 250 MG tablet  Commonly known as: lamiSIL              No Known Allergies      Discharge Disposition:  Skilled Nursing Facility (DC - External)    Diet:  Hospital:  Diet Order   Procedures    Diet: Cardiac, Diabetic; Healthy Heart (2-3 Na+); Consistent Carbohydrate; Texture: Regular (IDDSI 7); Fluid Consistency: Thin (IDDSI 0)       Diet Instructions       Diet: Cardiac Diets, Diabetic Diets; Healthy Heart (2-3 Na+); Regular (IDDSI 7); Thin (IDDSI 0); Consistent Carbohydrate      Discharge Diet:  Cardiac Diets  Diabetic Diets       Cardiac Diet: Healthy Heart (2-3 Na+)    Texture: Regular (IDDSI 7)    Fluid Consistency: Thin (IDDSI 0)    Diabetic Diet: Consistent Carbohydrate             Activity:  Activity Instructions       Activity as Tolerated              Restrictions or Other Recommendations:         CODE STATUS:    Code Status and Medical Interventions:   Ordered at: 07/15/24 5297     Level Of Support Discussed With:    Patient     Code Status (Patient has no pulse and is not breathing):    CPR (Attempt to Resuscitate)     Medical Interventions (Patient has pulse or is breathing):    Full Support       No future appointments.    Additional Instructions for the Follow-ups that You Need to Schedule       Discharge Follow-up with PCP   As directed       Currently Documented PCP:    Patel Evans MD    PCP Phone Number:    [phone]     Follow Up Details: 1 week                      Florian Perez DO  07/22/24      Time Spent on Discharge:  I spent  40  minutes on this discharge activity which included: face-to-face encounter with the patient, reviewing the data in the system, coordination of the care with the nursing staff as well as consultants, documentation, and entering orders.            Electronically signed by Florian Perez DO at 07/22/24 [id number]

## 2024-07-24 LAB
CYTO UR: NORMAL
LAB AP CASE REPORT: NORMAL
LAB AP CLINICAL INFORMATION: NORMAL
PATH REPORT.FINAL DX SPEC: NORMAL
PATH REPORT.GROSS SPEC: NORMAL

## 2024-07-27 NOTE — PROGRESS NOTES
Pathology from recent EGD and colonoscopy with biopsies as follows:    Biopsies from the duodenum were normal.  Biopsies from the stomach show benign reactive changes with no evidence of H. pylori.    The polyps are removed from the colon were tubular adenomas. These types of polyps are not cancer but could become cancer over period of time if not removed.  I recommend repeat colonoscopy in 3 years.    Follow-up with your primary care provider as scheduled.

## (undated) DEVICE — SOLIDIFIER LIQ PREMISORB 1500CC

## (undated) DEVICE — GW FOR TROCH NAIL 3.2X400MM

## (undated) DEVICE — Device

## (undated) DEVICE — SUCTION CANISTER, 2500CC, RIGID: Brand: DEROYAL

## (undated) DEVICE — SOL IRR H2O BTL 1000ML STRL

## (undated) DEVICE — 3M™ IOBAN™ 2 ANTIMICROBIAL INCISE DRAPE 6651EZ: Brand: IOBAN™ 2

## (undated) DEVICE — THE SINGLE USE ETRAP – POLYP TRAP IS USED FOR SUCTION RETRIEVAL OF ENDOSCOPICALLY REMOVED POLYPS.: Brand: ETRAP

## (undated) DEVICE — HYBRID CO2 TUBING/CAP SET FOR OLYMPUS® SCOPES & CO2 SOURCE: Brand: ERBE

## (undated) DEVICE — SNAR POLYP CAPTIVATOR RND STFF 2.4 240CM 10MM 1P/U

## (undated) DEVICE — KT ORCA ORCAPOD DISP STRL

## (undated) DEVICE — SAFELINER SUCTION CANISTER 1000CC: Brand: DEROYAL

## (undated) DEVICE — CVR HNDL LT SURG ACCSSRY BLU STRL

## (undated) DEVICE — BIT DRL 3FLUT QC NDL PT 4.2X145MM

## (undated) DEVICE — SYR LUERLOK 50ML

## (undated) DEVICE — GRADUATE CONTN 1000ML

## (undated) DEVICE — INTRO ACCSR BLNT TP

## (undated) DEVICE — SPK10295 ORTHOPEDIC FRACTURE KIT: Brand: SPK10295 ORTHOPEDIC FRACTURE KIT

## (undated) DEVICE — ENCORE® LATEX MICRO SIZE 8, STERILE LATEX POWDER-FREE SURGICAL GLOVE: Brand: ENCORE

## (undated) DEVICE — FIRST STEP BEDSIDE ADD WATER KIT - RESEALABLE STAND-UP POUCH, ENDOSCOPIC CLEANING PAD - 1 POUCH: Brand: FIRST STEP BEDSIDE ADD WATER KIT - RESEALABLE STAND-UP POUCH, ENDOSCOPIC CLEANIN

## (undated) DEVICE — LUBE JELLY FOIL PACK 1.4 OZ: Brand: MEDLINE INDUSTRIES, INC.

## (undated) DEVICE — TUBING, SUCTION, 1/4" X 10', STRAIGHT: Brand: MEDLINE

## (undated) DEVICE — DRSNG WND BORDR/ADHS NONADHR/GZ LF 2X2IN STRL

## (undated) DEVICE — PROXIMATE RH ROTATING HEAD SKIN STAPLERS (35 WIDE) CONTAINS 35 STAINLESS STEEL STAPLES: Brand: PROXIMATE

## (undated) DEVICE — PK MAJ FX HIP 10

## (undated) DEVICE — ANTIBACTERIAL UNDYED BRAIDED (POLYGLACTIN 910), SYNTHETIC ABSORBABLE SUTURE: Brand: COATED VICRYL

## (undated) DEVICE — UNDERCAST PADDING: Brand: DEROYAL

## (undated) DEVICE — THE BITE BLOCK MAXI, LATEX FREE STRAP IS USED TO PROTECT THE ENDOSCOPE INSERTION TUBE FROM BEING BITTEN BY THE PATIENT.

## (undated) DEVICE — ADAPT CLN LUM OLYMP AIR/H20

## (undated) DEVICE — CONTN GRAD MEAS TRIANG 32OZ BLK

## (undated) DEVICE — SNAP KOVER: Brand: UNBRANDED

## (undated) DEVICE — LEGGINGS, PAIR, 29X43, STERILE: Brand: MEDLINE

## (undated) DEVICE — ST LINER SAFECAP GRN RED CP STRL

## (undated) DEVICE — DRSNG SURG AQUACEL AG 9X15CM